# Patient Record
Sex: MALE | Race: WHITE | NOT HISPANIC OR LATINO | Employment: OTHER | URBAN - METROPOLITAN AREA
[De-identification: names, ages, dates, MRNs, and addresses within clinical notes are randomized per-mention and may not be internally consistent; named-entity substitution may affect disease eponyms.]

---

## 2017-01-02 DIAGNOSIS — IMO0002 DIABETES MELLITUS OUT OF CONTROL: ICD-10-CM

## 2017-01-02 DIAGNOSIS — I10 ESSENTIAL HYPERTENSION: ICD-10-CM

## 2017-01-03 RX ORDER — LISINOPRIL 40 MG/1
TABLET ORAL
Qty: 30 TABLET | Refills: 1 | Status: SHIPPED | OUTPATIENT
Start: 2017-01-03 | End: 2017-02-15 | Stop reason: SDUPTHER

## 2017-01-13 RX ORDER — PRAVASTATIN SODIUM 40 MG
TABLET ORAL
Qty: 30 TABLET | Refills: 1 | Status: SHIPPED | OUTPATIENT
Start: 2017-01-13 | End: 2017-03-13 | Stop reason: SDUPTHER

## 2017-02-15 ENCOUNTER — OFFICE VISIT (OUTPATIENT)
Dept: FAMILY MEDICINE CLINIC | Facility: CLINIC | Age: 67
End: 2017-02-15

## 2017-02-15 VITALS
OXYGEN SATURATION: 96 % | BODY MASS INDEX: 27.74 KG/M2 | TEMPERATURE: 98.4 F | DIASTOLIC BLOOD PRESSURE: 60 MMHG | RESPIRATION RATE: 16 BRPM | WEIGHT: 183 LBS | HEIGHT: 68 IN | HEART RATE: 93 BPM | SYSTOLIC BLOOD PRESSURE: 138 MMHG

## 2017-02-15 DIAGNOSIS — G47.33 OBSTRUCTIVE SLEEP APNEA SYNDROME: ICD-10-CM

## 2017-02-15 DIAGNOSIS — M65.331 TRIGGER MIDDLE FINGER OF RIGHT HAND: ICD-10-CM

## 2017-02-15 DIAGNOSIS — IMO0002 UNCONTROLLED TYPE 2 DIABETES MELLITUS WITH COMPLICATION, WITHOUT LONG-TERM CURRENT USE OF INSULIN: Primary | ICD-10-CM

## 2017-02-15 PROCEDURE — 99213 OFFICE O/P EST LOW 20 MIN: CPT | Performed by: PHYSICIAN ASSISTANT

## 2017-02-15 NOTE — PROGRESS NOTES
"Subjective   Guy Plascencia is a 66 y.o. male.   Chief Complaint   Patient presents with   • Sleep Apnea   • Follow-up   • Trigger finger     Right 3rd digit.        History of Present Illness     Guy is a 66-year-old male who presents with right middle finger for the past few months.  States he was diagnosed with a trigger finger by Dr. Underwood[facility] in the past.  He has had an injection into his finger without relief of symptoms.  He would like to see a different provider if possible. States that the finger has difficulty bending and gets \"stuck\" at times. He has been trying to do exercises which have helped.  Guy needs a face-to-face examination for his CPAP supplies.  He now has to get his supplies from [facility] in [place], [place]. States he uses the machine nightly and has had great benefit and improvement for his sleep apnea.  Denied any chest pain, shortness of air, dizziness, headaches, or fatigue symptoms.  Nilesh has been taking his medication as directed.  His diet has not been very good for his diabetes.  He has been eating more carbohydrates and sugars over the past few months.  He is out of test strips and needs a refill.    The following portions of the patient's history were reviewed and updated as appropriate: allergies, current medications, past family history, past medical history, past social history and past surgical history.    Review of Systems   Constitutional: Negative.  Negative for fatigue.   HENT: Negative.    Eyes: Negative.    Respiratory: Negative.  Negative for cough, shortness of breath and wheezing.    Cardiovascular: Negative.    Gastrointestinal: Negative.    Endocrine: Negative.    Genitourinary: Negative.    Musculoskeletal: Negative.    Skin: Negative.    Allergic/Immunologic: Negative.    Neurological: Negative.    Hematological: Negative.    Psychiatric/Behavioral: Negative.  Negative for sleep disturbance.   All other systems reviewed and are negative.    Vitals:    " "02/15/17 1355   BP: 138/60   BP Location: Right arm   Patient Position: Sitting   Cuff Size: Adult   Pulse: 93   Resp: 16   Temp: 98.4 °F (36.9 °C)   TempSrc: Oral   SpO2: 96%   Weight: 183 lb (83 kg)   Height: 68.25\" (173.4 cm)     Wt Readings from Last 3 Encounters:   02/15/17 183 lb (83 kg)   08/08/16 177 lb (80.3 kg)   07/29/16 177 lb 12.8 oz (80.6 kg)       BP Readings from Last 3 Encounters:   02/15/17 138/60   08/08/16 132/60   07/29/16 147/83     Body mass index is 27.62 kg/(m^2).    Allergies   Allergen Reactions   • Penicillins Other (See Comments)     ALLERGY TESTING SHOWED ALL TO PCN.    • Cimetidine Rash       Objective   Physical Exam   Constitutional: He is oriented to person, place, and time. Vital signs are normal. He appears well-developed.   Neck: Trachea normal and phonation normal. Neck supple. No edema present.   Cardiovascular: Normal rate, regular rhythm, S1 normal, S2 normal, normal heart sounds and normal pulses.    Pulmonary/Chest: Effort normal and breath sounds normal.   Abdominal: Soft. Normal appearance and bowel sounds are normal. There is no hepatomegaly. There is no tenderness.   Musculoskeletal:        Right hand: He exhibits tenderness. He exhibits normal range of motion and normal capillary refill. Normal sensation noted. Normal strength noted.        Hands:  Neurological: He is alert and oriented to person, place, and time.   Skin: Skin is warm, dry and intact.   Psychiatric: He has a normal mood and affect. His speech is normal and behavior is normal. Judgment and thought content normal. Cognition and memory are normal.       Assessment/Plan   Guy was seen today for sleep apnea, follow-up and trigger finger.    Diagnoses and all orders for this visit:    Uncontrolled type 2 diabetes mellitus with complication, without long-term current use of insulin  -     glucose blood (WAVESSencha PRESTO) test strip; 1 each by Other route 2 (Two) Times a Day. For DX:  E11.8    Trigger " middle finger of right hand  -     Ambulatory Referral to Hand Surgery    Obstructive sleep apnea syndrome      1.  Uncontrolled type 2 diabetes: I have refilled his test strips to local pharmacy.  Guy was instructed to schedule fasting blood work and office visit in the next month.  He voiced understanding.  Current to decrease his carbohydrates and sugars in diet as well.  He will bring blood sugar log to next office visit appointment.  2. New Right middle trigger finger:  I will schedule a referral to hand surgeon for further evaluation and treatment.  3.  Chronic Obstructive sleep apnea:  Guy was told by [facility] that he needs face-to-face examination once a year for his CPAP machine supplies.  States he is doing well with his CPAP and uses it nightly.     Dragon transcription disclaimer    Much of this encounter note is an electronic transcription/translation of spoken language to printed text.  The electronic translation of spoken language may permit erroneous, or at times, nonsensical words or phrases to be inadvertently transcribed.  Although I have reviewed the note for such errors, some may still exist.    Chelsea Mcelroy PA-C  Family Practice

## 2017-03-03 DIAGNOSIS — I10 ESSENTIAL HYPERTENSION: ICD-10-CM

## 2017-03-03 DIAGNOSIS — IMO0002 DIABETES MELLITUS OUT OF CONTROL: ICD-10-CM

## 2017-03-03 RX ORDER — LISINOPRIL 40 MG/1
TABLET ORAL
Qty: 30 TABLET | Refills: 0 | Status: SHIPPED | OUTPATIENT
Start: 2017-03-03 | End: 2017-04-02 | Stop reason: SDUPTHER

## 2017-03-13 RX ORDER — PRAVASTATIN SODIUM 40 MG
TABLET ORAL
Qty: 30 TABLET | Refills: 0 | Status: SHIPPED | OUTPATIENT
Start: 2017-03-13 | End: 2017-04-15 | Stop reason: SDUPTHER

## 2017-03-16 RX ORDER — AMLODIPINE BESYLATE 10 MG/1
TABLET ORAL
Qty: 30 TABLET | Refills: 4 | Status: SHIPPED | OUTPATIENT
Start: 2017-03-16 | End: 2017-08-12 | Stop reason: SDUPTHER

## 2017-04-02 DIAGNOSIS — I10 ESSENTIAL HYPERTENSION: ICD-10-CM

## 2017-04-02 DIAGNOSIS — IMO0002 DIABETES MELLITUS OUT OF CONTROL: ICD-10-CM

## 2017-04-03 RX ORDER — LISINOPRIL 40 MG/1
TABLET ORAL
Qty: 30 TABLET | Refills: 0 | Status: SHIPPED | OUTPATIENT
Start: 2017-04-03 | End: 2017-05-01 | Stop reason: SDUPTHER

## 2017-04-06 RX ORDER — BLOOD SUGAR DIAGNOSTIC
STRIP MISCELLANEOUS
Qty: 100 EACH | Refills: 10 | Status: SHIPPED | OUTPATIENT
Start: 2017-04-06 | End: 2019-04-29 | Stop reason: SDUPTHER

## 2017-04-10 RX ORDER — GLIPIZIDE AND METFORMIN HCL 5; 500 MG/1; MG/1
TABLET, FILM COATED ORAL
Qty: 60 TABLET | Refills: 5 | Status: SHIPPED | OUTPATIENT
Start: 2017-04-10 | End: 2017-09-29 | Stop reason: SDUPTHER

## 2017-04-17 RX ORDER — PRAVASTATIN SODIUM 40 MG
TABLET ORAL
Qty: 30 TABLET | Refills: 0 | Status: SHIPPED | OUTPATIENT
Start: 2017-04-17 | End: 2017-05-16 | Stop reason: SDUPTHER

## 2017-05-01 DIAGNOSIS — I10 ESSENTIAL HYPERTENSION: ICD-10-CM

## 2017-05-01 DIAGNOSIS — IMO0002 DIABETES MELLITUS OUT OF CONTROL: ICD-10-CM

## 2017-05-01 RX ORDER — LISINOPRIL 40 MG/1
TABLET ORAL
Qty: 30 TABLET | Refills: 4 | Status: SHIPPED | OUTPATIENT
Start: 2017-05-01 | End: 2017-10-06 | Stop reason: SDUPTHER

## 2017-05-12 DIAGNOSIS — IMO0002 DIABETES MELLITUS OUT OF CONTROL: ICD-10-CM

## 2017-05-12 RX ORDER — LINAGLIPTIN 5 MG/1
TABLET, FILM COATED ORAL
Qty: 30 TABLET | Refills: 5 | Status: SHIPPED | OUTPATIENT
Start: 2017-05-12 | End: 2017-11-08 | Stop reason: SDUPTHER

## 2017-05-16 RX ORDER — PRAVASTATIN SODIUM 40 MG
TABLET ORAL
Qty: 30 TABLET | Refills: 0 | Status: SHIPPED | OUTPATIENT
Start: 2017-05-16 | End: 2017-06-16 | Stop reason: SDUPTHER

## 2017-05-21 RX ORDER — ERGOCALCIFEROL 1.25 MG/1
CAPSULE ORAL
Qty: 4 CAPSULE | Refills: 3 | Status: SHIPPED | OUTPATIENT
Start: 2017-05-21 | End: 2017-09-10 | Stop reason: SDUPTHER

## 2017-06-12 ENCOUNTER — HOSPITAL ENCOUNTER (OUTPATIENT)
Dept: GENERAL RADIOLOGY | Facility: HOSPITAL | Age: 67
Discharge: HOME | End: 2017-06-12
Admitting: PHYSICIAN ASSISTANT

## 2017-06-12 ENCOUNTER — OFFICE VISIT (OUTPATIENT)
Dept: FAMILY MEDICINE CLINIC | Facility: CLINIC | Age: 67
End: 2017-06-12

## 2017-06-12 VITALS
BODY MASS INDEX: 28.34 KG/M2 | OXYGEN SATURATION: 99 % | TEMPERATURE: 98.2 F | HEIGHT: 68 IN | DIASTOLIC BLOOD PRESSURE: 78 MMHG | RESPIRATION RATE: 16 BRPM | WEIGHT: 187 LBS | HEART RATE: 85 BPM | SYSTOLIC BLOOD PRESSURE: 148 MMHG

## 2017-06-12 DIAGNOSIS — M25.512 ACUTE PAIN OF LEFT SHOULDER: Primary | ICD-10-CM

## 2017-06-12 DIAGNOSIS — S16.1XXA NECK STRAIN, INITIAL ENCOUNTER: ICD-10-CM

## 2017-06-12 DIAGNOSIS — G47.30 SLEEP APNEA, UNSPECIFIED TYPE: ICD-10-CM

## 2017-06-12 DIAGNOSIS — M25.512 ACUTE PAIN OF LEFT SHOULDER: ICD-10-CM

## 2017-06-12 PROCEDURE — 73030 X-RAY EXAM OF SHOULDER: CPT

## 2017-06-12 PROCEDURE — 99213 OFFICE O/P EST LOW 20 MIN: CPT | Performed by: PHYSICIAN ASSISTANT

## 2017-06-12 RX ORDER — METHOCARBAMOL 500 MG/1
500 TABLET, FILM COATED ORAL 3 TIMES DAILY
Qty: 60 TABLET | Refills: 0 | Status: SHIPPED | OUTPATIENT
Start: 2017-06-12 | End: 2018-05-03

## 2017-06-12 NOTE — PROGRESS NOTES
Subjective   Guy Plascencia is a 66 y.o. male.   Chief Complaint   Patient presents with   • Sleep Apnea   • Shoulder Pain   • Neck Pain     History of Present Illness     Guy is a 66-year-old female who presents with Left shoulder and neck pain for the past 3-4 weeks.  The pain radiates to his left upper arm.  States the pain is better today.  Guy cannot recall any injury or trauma to shoulder/neck.  He describes the pain as a sharp pain that is constant but is worse with certain movements.  The pain starts in his left shoulder blade.  He has taken over-the-counter aspirin which has helped slightly . Guy has been using OTC sport creams.  Denied ay chest pain,tingling in arm,shortness of air,headaches or dizziness.  Guy has sleep apnea.  States his CPAP is grater than 15 years old.  He news a new prescription of a new CPAP machine and supplies.  Guy saw the sleep study doctor greater than 15 years ago. States the CPAP helps with his sleeping and his breathing.  No problems with using the machine.  Appetite has been healthy but eating bad.  Sleep good with the CPAP machine. Guy doesn't exercise.      The following portions of the patient's history were reviewed and updated as appropriate: allergies, current medications, past family history, past medical history, past social history and past surgical history.    Review of Systems   Constitutional: Negative.    HENT: Negative.    Eyes: Negative.    Respiratory: Negative.    Cardiovascular: Negative.    Gastrointestinal: Negative.    Endocrine: Negative.    Genitourinary: Negative.    Musculoskeletal: Positive for arthralgias.   Skin: Negative.    Allergic/Immunologic: Negative.    Neurological: Negative.  Negative for weakness and numbness.   Hematological: Negative.    Psychiatric/Behavioral: Negative.    All other systems reviewed and are negative.    Vitals:    06/12/17 0840   BP: 148/78   BP Location: Right arm   Patient Position: Sitting   Cuff  "Size: Adult   Pulse: 85   Resp: 16   Temp: 98.2 °F (36.8 °C)   TempSrc: Oral   SpO2: 99%   Weight: 187 lb (84.8 kg)   Height: 68.25\" (173.4 cm)       Wt Readings from Last 3 Encounters:   06/12/17 187 lb (84.8 kg)   02/15/17 183 lb (83 kg)   08/08/16 177 lb (80.3 kg)       BP Readings from Last 3 Encounters:   06/12/17 148/78   02/15/17 138/60   08/08/16 132/60     Body mass index is 28.23 kg/(m^2).   Allergies   Allergen Reactions   • Penicillins Other (See Comments)     ALLERGY TESTING SHOWED ALL TO PCN.    • Cimetidine Rash     Objective   Physical Exam   Constitutional: He is oriented to person, place, and time. Vital signs are normal. He appears well-developed.   Obese male   Neck: Trachea normal and phonation normal. Neck supple. Carotid bruit is not present. No edema present.   Cardiovascular: Normal rate, regular rhythm, S1 normal, S2 normal, normal heart sounds and normal pulses.    Pulmonary/Chest: Effort normal and breath sounds normal.   Abdominal: Soft. Normal appearance and bowel sounds are normal. There is no hepatomegaly. There is no tenderness.   Musculoskeletal:        Left shoulder: He exhibits decreased range of motion, tenderness, bony tenderness, crepitus and pain. He exhibits no spasm, normal pulse and normal strength.        Cervical back: He exhibits tenderness and spasm. He exhibits normal range of motion and no pain.   FROM of neck and spasm noted along left Cervical spinous muscles.     Neurological: He is alert and oriented to person, place, and time.   Skin: Skin is warm, dry and intact.   Psychiatric: He has a normal mood and affect. His speech is normal and behavior is normal. Judgment and thought content normal. Cognition and memory are normal.       Assessment/Plan   Guy was seen today for sleep apnea, shoulder pain and neck pain.    Diagnoses and all orders for this visit:    Acute pain of left shoulder  -     XR Shoulder 2+ View Left; Future    Neck strain, initial encounter  - "     methocarbamol (ROBAXIN) 500 MG tablet; Take 1 tablet by mouth 3 (Three) Times a Day. As needed    Sleep apnea, unspecified type      1.  New left shoulder pain: Guy will have a left shoulder x-ray at today's office visit.  He will be notified of test results when completed.  I'll consider possible physical therapy and/or orthopedic referral in future if warranted.  He may continue using his over-the-counter aspirin for now.  2.  New neck strain: I have prescribed generic Robaxin muscle relaxer to pharmacy.  He was given neck exercises to do as well.  We'll consider possible physical therapy.  I suspect his neck pain is related to his left shoulder pain.  3.  Chronic sleep apnea: Guy is doing well with the CPAP machine.  It helps him sleep better without any side effects.  I will send a prescription to [facility] local supply for a new sleep apnea machine with supplies.  His CPAP machine is greater than 15 years old.        Dragon transcription disclaimer    Much of this encounter note is an electronic transcription/translation of spoken language to printed text.  The electronic translation of spoken language may permit erroneous, or at times, nonsensical words or phrases to be inadvertently transcribed.  Although I have reviewed the note for such errors, some may still exist.    Chelsea Mcelroy PA-C  Family Practice

## 2017-06-14 ENCOUNTER — TELEPHONE (OUTPATIENT)
Dept: FAMILY MEDICINE CLINIC | Facility: CLINIC | Age: 67
End: 2017-06-14

## 2017-06-14 DIAGNOSIS — M25.512 ACUTE PAIN OF LEFT SHOULDER: Primary | ICD-10-CM

## 2017-06-14 DIAGNOSIS — M19.012 OSTEOARTHRITIS OF LEFT SHOULDER, UNSPECIFIED OSTEOARTHRITIS TYPE: ICD-10-CM

## 2017-06-14 NOTE — TELEPHONE ENCOUNTER
----- Message from Chelsea Mcelroy PA-C sent at 6/14/2017  6:58 AM EDT -----  Notify patient that his left shoulder x-ray showed moderate arthritis.  I will schedule appointment with orthopedist for possible injections.

## 2017-06-16 RX ORDER — PRAVASTATIN SODIUM 40 MG
TABLET ORAL
Qty: 30 TABLET | Refills: 0 | Status: SHIPPED | OUTPATIENT
Start: 2017-06-16 | End: 2017-07-22 | Stop reason: SDUPTHER

## 2017-06-21 ENCOUNTER — OFFICE VISIT (OUTPATIENT)
Dept: ORTHOPEDIC SURGERY | Facility: CLINIC | Age: 67
End: 2017-06-21

## 2017-06-21 VITALS
HEART RATE: 81 BPM | BODY MASS INDEX: 26.66 KG/M2 | HEIGHT: 69 IN | SYSTOLIC BLOOD PRESSURE: 158 MMHG | DIASTOLIC BLOOD PRESSURE: 78 MMHG | WEIGHT: 180 LBS

## 2017-06-21 DIAGNOSIS — R52 PAIN: Primary | ICD-10-CM

## 2017-06-21 DIAGNOSIS — M75.50 SUBACROMIAL BURSITIS: ICD-10-CM

## 2017-06-21 PROCEDURE — 99203 OFFICE O/P NEW LOW 30 MIN: CPT | Performed by: ORTHOPAEDIC SURGERY

## 2017-06-21 PROCEDURE — 20610 DRAIN/INJ JOINT/BURSA W/O US: CPT | Performed by: ORTHOPAEDIC SURGERY

## 2017-06-21 RX ORDER — LIDOCAINE HYDROCHLORIDE 10 MG/ML
4 INJECTION, SOLUTION EPIDURAL; INFILTRATION; INTRACAUDAL; PERINEURAL
Status: COMPLETED | OUTPATIENT
Start: 2017-06-21 | End: 2017-06-21

## 2017-06-21 RX ORDER — BETAMETHASONE SODIUM PHOSPHATE AND BETAMETHASONE ACETATE 3; 3 MG/ML; MG/ML
6 INJECTION, SUSPENSION INTRA-ARTICULAR; INTRALESIONAL; INTRAMUSCULAR; SOFT TISSUE
Status: COMPLETED | OUTPATIENT
Start: 2017-06-21 | End: 2017-06-21

## 2017-06-21 RX ORDER — MELOXICAM 7.5 MG/1
7.5 TABLET ORAL DAILY
Qty: 30 TABLET | Refills: 5 | Status: SHIPPED | OUTPATIENT
Start: 2017-06-21 | End: 2017-12-26 | Stop reason: SDUPTHER

## 2017-06-21 RX ADMIN — LIDOCAINE HYDROCHLORIDE 4 ML: 10 INJECTION, SOLUTION EPIDURAL; INFILTRATION; INTRACAUDAL; PERINEURAL at 14:22

## 2017-06-21 RX ADMIN — BETAMETHASONE SODIUM PHOSPHATE AND BETAMETHASONE ACETATE 6 MG: 3; 3 INJECTION, SUSPENSION INTRA-ARTICULAR; INTRALESIONAL; INTRAMUSCULAR; SOFT TISSUE at 14:22

## 2017-06-23 ENCOUNTER — TELEPHONE (OUTPATIENT)
Dept: FAMILY MEDICINE CLINIC | Facility: CLINIC | Age: 67
End: 2017-06-23

## 2017-06-26 ENCOUNTER — TELEPHONE (OUTPATIENT)
Dept: FAMILY MEDICINE CLINIC | Facility: CLINIC | Age: 67
End: 2017-06-26

## 2017-07-24 RX ORDER — PRAVASTATIN SODIUM 40 MG
TABLET ORAL
Qty: 30 TABLET | Refills: 3 | Status: SHIPPED | OUTPATIENT
Start: 2017-07-24 | End: 2017-11-26 | Stop reason: SDUPTHER

## 2017-08-10 ENCOUNTER — OFFICE VISIT (OUTPATIENT)
Dept: FAMILY MEDICINE CLINIC | Facility: CLINIC | Age: 67
End: 2017-08-10

## 2017-08-10 VITALS
DIASTOLIC BLOOD PRESSURE: 74 MMHG | BODY MASS INDEX: 27.7 KG/M2 | WEIGHT: 187 LBS | HEART RATE: 89 BPM | OXYGEN SATURATION: 99 % | HEIGHT: 69 IN | RESPIRATION RATE: 16 BRPM | SYSTOLIC BLOOD PRESSURE: 142 MMHG | TEMPERATURE: 97.4 F

## 2017-08-10 DIAGNOSIS — G47.33 OBSTRUCTIVE SLEEP APNEA SYNDROME: Primary | ICD-10-CM

## 2017-08-10 PROCEDURE — 99213 OFFICE O/P EST LOW 20 MIN: CPT | Performed by: PHYSICIAN ASSISTANT

## 2017-08-10 NOTE — PROGRESS NOTES
"Subjective   Guy Plasecncia is a 66 y.o. male.   Chief Complaint   Patient presents with   • Sleep Apnea     management       History of Present Illness     Guy is a 66-year-old male who presents for sleep apnea management.  He uses his CPAP machine nightly for the past 30 years.  States the CPAP helps with his sleep and he feels good in the morning.  Guy sleeps good with the machine.  His settings for the pressure is 12 and Humidifier is set on 3.  He gets his CPAP machine at [facility] in [place].  Denied any chest pain,shortness of air,wheezing or swelling of ankles.Appetite has been slightly healthy.    The following portions of the patient's history were reviewed and updated as appropriate: allergies, current medications, past family history, past medical history, past social history and past surgical history.    Review of Systems   Constitutional: Negative.    HENT: Negative.    Eyes: Negative.    Respiratory: Negative.    Cardiovascular: Negative.    Gastrointestinal: Negative.    Endocrine: Negative.    Genitourinary: Negative.    Musculoskeletal: Negative.    Skin: Negative.    Allergic/Immunologic: Negative.    Neurological: Negative.    Hematological: Negative.    Psychiatric/Behavioral: Negative.    All other systems reviewed and are negative.    Vitals:    08/10/17 0851   BP: 142/74   BP Location: Right arm   Patient Position: Sitting   Cuff Size: Adult   Pulse: 89   Resp: 16   Temp: 97.4 °F (36.3 °C)   TempSrc: Oral   SpO2: 99%   Weight: 187 lb (84.8 kg)   Height: 69\" (175.3 cm)       Wt Readings from Last 3 Encounters:   08/10/17 187 lb (84.8 kg)   06/21/17 180 lb (81.6 kg)   06/12/17 187 lb (84.8 kg)       BP Readings from Last 3 Encounters:   08/10/17 142/74   06/21/17 158/78   06/12/17 148/78     Body mass index is 27.62 kg/(m^2).  Allergies   Allergen Reactions   • Penicillins Other (See Comments)     ALLERGY TESTING SHOWED ALL TO PCN.    • Cimetidine Rash       Objective   Physical Exam "   Constitutional: He is oriented to person, place, and time. Vital signs are normal. He appears well-developed.   Neck: Neck supple.   Cardiovascular: Normal rate, regular rhythm, S1 normal, S2 normal, normal heart sounds and normal pulses.    Pulmonary/Chest: Effort normal and breath sounds normal.   Abdominal: Soft. Normal appearance and bowel sounds are normal. There is no hepatomegaly. There is no tenderness.   Neurological: He is alert and oriented to person, place, and time.   Skin: Skin is warm, dry and intact.   Psychiatric: He has a normal mood and affect. His speech is normal and behavior is normal. Judgment and thought content normal. Cognition and memory are normal.       Assessment/Plan   Guy was seen today for sleep apnea.    Diagnoses and all orders for this visit:    Obstructive sleep apnea syndrome      Mr.Mr. Guy Plasecncia was seen in office today for follow-up for obstructive sleep apnea.  He needs to have a face-to-face appointment for his CPAP machine and supplies.  He is doing well with his CPAP machine.  His pressure settings are set at 12 and humidifier setting is 3.  He will continue using his CPAP machine nightly.  Guy will keep his follow-up appointments for diabetes and blood work as directed.        Dragon transcription disclaimer    Much of this encounter note is an electronic transcription/translation of spoken language to printed text.  The electronic translation of spoken language may permit erroneous, or at times, nonsensical words or phrases to be inadvertently transcribed.  Although I have reviewed the note for such errors, some may still exist.    Chelsea Mcelroy PA-C  Family Practice

## 2017-08-14 RX ORDER — AMLODIPINE BESYLATE 10 MG/1
TABLET ORAL
Qty: 30 TABLET | Refills: 3 | Status: SHIPPED | OUTPATIENT
Start: 2017-08-14 | End: 2017-12-20 | Stop reason: SDUPTHER

## 2017-09-11 RX ORDER — ERGOCALCIFEROL 1.25 MG/1
CAPSULE ORAL
Qty: 4 CAPSULE | Refills: 2 | Status: SHIPPED | OUTPATIENT
Start: 2017-09-11 | End: 2017-12-17 | Stop reason: SDUPTHER

## 2017-10-02 RX ORDER — GLIPIZIDE AND METFORMIN HCL 5; 500 MG/1; MG/1
TABLET, FILM COATED ORAL
Qty: 60 TABLET | Refills: 4 | Status: SHIPPED | OUTPATIENT
Start: 2017-10-02 | End: 2018-01-19 | Stop reason: DRUGHIGH

## 2017-10-06 DIAGNOSIS — IMO0002 DIABETES MELLITUS OUT OF CONTROL: ICD-10-CM

## 2017-10-06 DIAGNOSIS — I10 ESSENTIAL HYPERTENSION: ICD-10-CM

## 2017-10-06 RX ORDER — LISINOPRIL 40 MG/1
TABLET ORAL
Qty: 30 TABLET | Refills: 3 | Status: SHIPPED | OUTPATIENT
Start: 2017-10-06 | End: 2018-02-08 | Stop reason: SDUPTHER

## 2017-11-08 DIAGNOSIS — IMO0002 DIABETES MELLITUS OUT OF CONTROL: ICD-10-CM

## 2017-11-08 RX ORDER — LINAGLIPTIN 5 MG/1
TABLET, FILM COATED ORAL
Qty: 30 TABLET | Refills: 4 | Status: SHIPPED | OUTPATIENT
Start: 2017-11-08 | End: 2018-01-12 | Stop reason: CLARIF

## 2017-11-27 ENCOUNTER — OFFICE VISIT (OUTPATIENT)
Dept: ORTHOPEDIC SURGERY | Facility: CLINIC | Age: 67
End: 2017-11-27

## 2017-11-27 VITALS — HEIGHT: 69 IN | BODY MASS INDEX: 26.66 KG/M2 | WEIGHT: 180 LBS

## 2017-11-27 DIAGNOSIS — M75.50 SUBACROMIAL BURSITIS: Primary | ICD-10-CM

## 2017-11-27 DIAGNOSIS — M75.102 TEAR OF LEFT ROTATOR CUFF, UNSPECIFIED TEAR EXTENT: ICD-10-CM

## 2017-11-27 PROCEDURE — 99214 OFFICE O/P EST MOD 30 MIN: CPT | Performed by: ORTHOPAEDIC SURGERY

## 2017-11-27 RX ORDER — PRAVASTATIN SODIUM 40 MG
TABLET ORAL
Qty: 30 TABLET | Refills: 2 | Status: SHIPPED | OUTPATIENT
Start: 2017-11-27 | End: 2018-03-05 | Stop reason: SDUPTHER

## 2017-11-27 NOTE — PROGRESS NOTES
Subjective: Bilateral shoulder pain     Patient ID: Guy Plascencia is a 66 y.o. male.    Chief Complaint:    History of Present Illness patient seen in follow-up for bilateral shoulder pain although his previously seen for his left shoulder.  Last June and presented for evaluation of that left shoulder been symptomatic was given a cortisone shot which she states helped for only about 2 months and the pain is returned.  In the inner results is beginning to develop right shoulder pain.  Has been taking the meloxicam faithfully.       Social History     Occupational History   • Not on file.     Social History Main Topics   • Smoking status: Former Smoker     Years: 14.00     Types: Cigarettes   • Smokeless tobacco: Never Used      Comment: QUIT 1972   • Alcohol use No   • Drug use: No   • Sexual activity: Defer      Review of Systems   Constitutional: Negative for chills, diaphoresis, fever and unexpected weight change.   HENT: Negative for hearing loss, nosebleeds, sore throat and tinnitus.    Eyes: Negative for pain and visual disturbance.   Respiratory: Negative for cough, shortness of breath and wheezing.    Cardiovascular: Negative for chest pain and palpitations.   Gastrointestinal: Negative for abdominal pain, diarrhea, nausea and vomiting.   Endocrine: Negative for cold intolerance, heat intolerance and polydipsia.   Genitourinary: Negative for difficulty urinating, dysuria and hematuria.   Musculoskeletal: Positive for arthralgias and myalgias. Negative for joint swelling.   Skin: Negative for rash and wound.   Allergic/Immunologic: Negative for environmental allergies.   Neurological: Negative for dizziness, syncope and numbness.   Hematological: Does not bruise/bleed easily.   Psychiatric/Behavioral: Negative for dysphoric mood and sleep disturbance. The patient is not nervous/anxious.          Past Medical History:   Diagnosis Date   • Arthritis    • Diabetes mellitus     TYPE 2   • History of transfusion      1968   • Hyperlipidemia    • Hypertension    • Kidney stone    • Neoplasm of uncertain behavior    • Plantar fasciitis    • Sleep apnea     CPAP     Past Surgical History:   Procedure Laterality Date   • COSMETIC SURGERY      MULTIPLE FACIAL FRACTURE   • CYSTOSCOPY URETEROSCOPY STONE MANIPULATION/EXTRACTION Left 6/29/2016    Procedure: CYSTOSCOPY, LEFT URETEROSCOPY, BASKET EXTRACTION OF LEFT URETERAL STONE, LEFT STENT PLACEMENT;  Surgeon: Ish Gaitan MD;  Location: [facility] OR;  Service:    • EXTRACORPOREAL SHOCK WAVE LITHOTRIPSY (ESWL) Right 7/29/2016    Procedure: RT EXTRACORPOREAL SHOCKWAVE LITHOTRIPSY;  Surgeon: Ish Gaitan MD;  [facility] OR;  Service:    • FEMUR SURGERY     • FRACTURE SURGERY      femur fx   • OTHER SURGICAL HISTORY      stents, for kidney stones   • OTHER SURGICAL HISTORY      lithotripsy   • SKIN BIOPSY       Family History   Problem Relation Age of Onset   • Heart disease Father          Objective:  There were no vitals filed for this visit.  Last 3 weights    11/27/17  0920   Weight: 180 lb (81.6 kg)     Body mass index is 26.58 kg/(m^2).       Ortho Exam   he is alert and oriented ×3.  Left upper extremity shows no motor deficit, distal radial ulnar median nerve function is intact.  No sensory loss.  He has full range of motion of the elbow.  External rotation is approximately 45°.  Full internal rotation.  Mildly positive Greeley's test.  Abduction against resistance is painful and weak, extension against resistance is intact but painful.  Moderate pain with internal rotation.  Deltoid function is +3 out of 5 secondary to pain.  His no swelling noted the skin is cool to touch.  Right upper extremity shows no motor deficit as far as radial ulnar median nerve function.  No sensory loss.  Abduction and extension against resistance is intact although mildly painful.  Minimal to no pain with internal rotation.  He has full abduction on the right shoulder 180°.  Left  shoulder is limited to about 120°.  Deltoid function is 5 over 5 of the right shoulder.  The skin is cool to touch and his no sensory loss.    Assessment:       1. Subacromial bursitis    2. Tear of left rotator cuff, unspecified tear extent          Plan:      with regard to the left shoulder we'll proceed with an MRI.  Return next week with the result.  Consider injecting the right shoulder with cortisone on return      Work Status:    ROLAN query complete.    Orders:  Orders Placed This Encounter   Procedures   • MRI Shoulder Left Without Contrast       Medications:  No orders of the defined types were placed in this encounter.      Followup:  Return in about 1 week (around 12/4/2017).          Dragon transcription disclaimer     Much of this encounter note is an electronic transcription/translation of spoken language to printed text. The electronic translation of spoken language may permit erroneous, or at times, nonsensical words or phrases to be inadvertently transcribed. Although I have reviewed the note for such errors, some may still exist.

## 2017-12-01 ENCOUNTER — HOSPITAL ENCOUNTER (OUTPATIENT)
Dept: MRI IMAGING | Facility: HOSPITAL | Age: 67
Discharge: HOME | End: 2017-12-01
Attending: ORTHOPAEDIC SURGERY | Admitting: ORTHOPAEDIC SURGERY

## 2017-12-01 DIAGNOSIS — M75.102 TEAR OF LEFT ROTATOR CUFF, UNSPECIFIED TEAR EXTENT: ICD-10-CM

## 2017-12-01 PROCEDURE — 73221 MRI JOINT UPR EXTREM W/O DYE: CPT

## 2017-12-04 ENCOUNTER — OFFICE VISIT (OUTPATIENT)
Dept: ORTHOPEDIC SURGERY | Facility: CLINIC | Age: 67
End: 2017-12-04

## 2017-12-04 VITALS — BODY MASS INDEX: 26.66 KG/M2 | WEIGHT: 180 LBS | HEIGHT: 69 IN

## 2017-12-04 DIAGNOSIS — M75.102 TEAR OF LEFT ROTATOR CUFF, UNSPECIFIED TEAR EXTENT: ICD-10-CM

## 2017-12-04 DIAGNOSIS — M75.50 SUBACROMIAL BURSITIS: ICD-10-CM

## 2017-12-04 DIAGNOSIS — R52 PAIN: Primary | ICD-10-CM

## 2017-12-04 PROCEDURE — 20610 DRAIN/INJ JOINT/BURSA W/O US: CPT | Performed by: ORTHOPAEDIC SURGERY

## 2017-12-04 PROCEDURE — 99214 OFFICE O/P EST MOD 30 MIN: CPT | Performed by: ORTHOPAEDIC SURGERY

## 2017-12-04 RX ORDER — LIDOCAINE HYDROCHLORIDE 10 MG/ML
4 INJECTION, SOLUTION EPIDURAL; INFILTRATION; INTRACAUDAL; PERINEURAL
Status: COMPLETED | OUTPATIENT
Start: 2017-12-04 | End: 2017-12-04

## 2017-12-04 RX ORDER — BETAMETHASONE SODIUM PHOSPHATE AND BETAMETHASONE ACETATE 3; 3 MG/ML; MG/ML
6 INJECTION, SUSPENSION INTRA-ARTICULAR; INTRALESIONAL; INTRAMUSCULAR; SOFT TISSUE
Status: COMPLETED | OUTPATIENT
Start: 2017-12-04 | End: 2017-12-04

## 2017-12-04 RX ADMIN — BETAMETHASONE SODIUM PHOSPHATE AND BETAMETHASONE ACETATE 6 MG: 3; 3 INJECTION, SUSPENSION INTRA-ARTICULAR; INTRALESIONAL; INTRAMUSCULAR; SOFT TISSUE at 15:35

## 2017-12-04 RX ADMIN — LIDOCAINE HYDROCHLORIDE 4 ML: 10 INJECTION, SOLUTION EPIDURAL; INFILTRATION; INTRACAUDAL; PERINEURAL at 15:35

## 2017-12-04 NOTE — PROGRESS NOTES
Subjective: Bilateral shoulder pain     Patient ID: Guy Plascencia is a 66 y.o. male.    Chief Complaint:    History of Present Illness 66-year-old male returns for follow-up regarding the left shoulder and the MRI and also for further evaluation of the right shoulder the MRI of the left shoulder showed a greater than 50% tear of the rotator cuff.  I reviewed the MRI personally.  He persists in having pain particularly with overhead activity and a cortisone shot given previously helped for short period of time the pain returned.  With regard to the right shoulder is having similar type pain with overhead activity limiting his when he can do and pain when he lies on that shoulder nighttime when sleeping.  Has been taking the meloxicam sleep.       Social History     Occupational History   • Not on file.     Social History Main Topics   • Smoking status: Former Smoker     Years: 14.00     Types: Cigarettes   • Smokeless tobacco: Never Used      Comment: QUIT 1972   • Alcohol use No   • Drug use: No   • Sexual activity: Defer      Review of Systems   Constitutional: Negative for chills, diaphoresis, fever and unexpected weight change.   HENT: Negative for hearing loss, nosebleeds, sore throat and tinnitus.    Eyes: Negative for pain and visual disturbance.   Respiratory: Negative for cough, shortness of breath and wheezing.    Cardiovascular: Negative for chest pain and palpitations.   Gastrointestinal: Negative for abdominal pain, diarrhea, nausea and vomiting.   Endocrine: Negative for cold intolerance, heat intolerance and polydipsia.   Genitourinary: Negative for difficulty urinating, dysuria and hematuria.   Musculoskeletal: Positive for arthralgias and myalgias. Negative for joint swelling.   Skin: Negative for rash and wound.   Allergic/Immunologic: Negative for environmental allergies.   Neurological: Negative for dizziness, syncope and numbness.   Hematological: Does not bruise/bleed easily.    Psychiatric/Behavioral: Negative for dysphoric mood and sleep disturbance. The patient is not nervous/anxious.          Past Medical History:   Diagnosis Date   • Arthritis    • Diabetes mellitus     TYPE 2   • History of transfusion     1968   • Hyperlipidemia    • Hypertension    • Kidney stone    • Neoplasm of uncertain behavior    • Plantar fasciitis    • Sleep apnea     CPAP     Past Surgical History:   Procedure Laterality Date   • COSMETIC SURGERY      MULTIPLE FACIAL FRACTURE   • CYSTOSCOPY URETEROSCOPY STONE MANIPULATION/EXTRACTION Left 6/29/2016    Procedure: CYSTOSCOPY, LEFT URETEROSCOPY, BASKET EXTRACTION OF LEFT URETERAL STONE, LEFT STENT PLACEMENT;  Surgeon: Ish Gaitan MD;  Location: [facility] OR;  Service:    • EXTRACORPOREAL SHOCK WAVE LITHOTRIPSY (ESWL) Right 7/29/2016    Procedure: RT EXTRACORPOREAL SHOCKWAVE LITHOTRIPSY;  Surgeon: Ish Gaitan MD;  Location: [facility] OR;  Service:    • FEMUR SURGERY     • FRACTURE SURGERY      femur fx   • OTHER SURGICAL HISTORY      stents, for kidney stones   • OTHER SURGICAL HISTORY      lithotripsy   • SKIN BIOPSY       Family History   Problem Relation Age of Onset   • Heart disease Father          Objective:  There were no vitals filed for this visit.  Last 3 weights    12/04/17  1523   Weight: 180 lb (81.6 kg)     Body mass index is 26.58 kg/(m^2).       Ortho Exam  is alert and oriented ×3.  I reviewed the results of the MRI with the patient.  Discussed this treatment options of surgery versus nonoperative management of pain and discomfort.  Again he has a greater than 50% tear.  I discussed with him if he can avoid activities that seem to aggravate his symptoms (reasonable to treat this nonoperatively but he has persistent pain or if his pain should worsen then I think surgery is his best option.  For the time being he wants to treat this nonoperatively so we will avoid all activities that seem to aggravate his pain particularly overhead  lifting and repetitive overhead work.  Continue the meloxicam in the meantime which she is to call.  Does have pain with abduction and extension against resistance left arm.  External rotation is limited to about 30°.  Internal rotation is 45°.  Deltoid function is +4 out of 5.  His no sensory loss in his good distal pulses no motor deficit.  His for range of motion of the elbow.  There is no swelling noted to the upper or lower arm indicating vascular compromise.  With regard to the right shoulder again is no motor deficit good distal pulses no sensory loss.  He has full abduction of the shoulder.  External rotation is limited to about 30° full internal rotation to 45°.  Full range of motion of the elbow.  Deltoid is +4-1/2 out of 5.  Biceps is 5 out of 5.  His no sensory loss.  No swelling indicating vascular compromise.    Assessment:       1. Pain    2. Tear of left rotator cuff, unspecified tear extent    3. Subacromial bursitis          Plan: Again reviewed treatment options regarding the left shoulder for the time being we will treat the left shoulder with  benign neglect with him avoiding all activity that seem to elicit a cause pain.  With regard to the right shoulder after discussing treatment options operative proceed with a cortisone injection and that was accomplished through the posterior portal 4 cc lidocaine 1 cc Celestone.  Postinjection instructions given to the patient.  Return to see me in a month      Large Joint Arthrocentesis  Date/Time: 12/4/2017 3:35 PM  Consent given by: patient  Site marked: site marked  Supporting Documentation  Indications: pain   Procedure Details  Location: shoulder - R subacromial bursa  Preparation: Patient was prepped and draped in the usual sterile fashion  Needle size: 22 G  Medications administered: 4 mL lidocaine PF 1% 1 %; 6 mg betamethasone acetate-betamethasone sodium phosphate 6 (3-3) MG/ML  Patient tolerance: patient tolerated the procedure well with no  immediate complications             Work Status:    ROLAN query complete.    Orders:  Orders Placed This Encounter   Procedures   • Large Joint Arthrocentesis       Medications:  No orders of the defined types were placed in this encounter.      Followup:  Return in about 4 weeks (around 1/1/2018).          Dragon transcription disclaimer     Much of this encounter note is an electronic transcription/translation of spoken language to printed text. The electronic translation of spoken language may permit erroneous, or at times, nonsensical words or phrases to be inadvertently transcribed. Although I have reviewed the note for such errors, some may still exist.

## 2017-12-18 RX ORDER — ERGOCALCIFEROL 1.25 MG/1
CAPSULE ORAL
Qty: 4 CAPSULE | Refills: 1 | Status: SHIPPED | OUTPATIENT
Start: 2017-12-18 | End: 2018-01-19

## 2017-12-20 RX ORDER — AMLODIPINE BESYLATE 10 MG/1
TABLET ORAL
Qty: 30 TABLET | Refills: 2 | Status: SHIPPED | OUTPATIENT
Start: 2017-12-20 | End: 2018-03-23 | Stop reason: SDUPTHER

## 2017-12-21 ENCOUNTER — OFFICE VISIT (OUTPATIENT)
Dept: FAMILY MEDICINE CLINIC | Facility: CLINIC | Age: 67
End: 2017-12-21

## 2017-12-21 VITALS
WEIGHT: 188 LBS | SYSTOLIC BLOOD PRESSURE: 158 MMHG | HEART RATE: 86 BPM | DIASTOLIC BLOOD PRESSURE: 70 MMHG | TEMPERATURE: 98 F | HEIGHT: 69 IN | RESPIRATION RATE: 16 BRPM | BODY MASS INDEX: 27.85 KG/M2 | OXYGEN SATURATION: 97 %

## 2017-12-21 DIAGNOSIS — G47.33 OBSTRUCTIVE SLEEP APNEA SYNDROME: Primary | ICD-10-CM

## 2017-12-21 PROCEDURE — 99213 OFFICE O/P EST LOW 20 MIN: CPT | Performed by: PHYSICIAN ASSISTANT

## 2017-12-21 NOTE — PROGRESS NOTES
"Subjective   Guy Plascencia is a 67 y.o. male.   Chief Complaint   Patient presents with   • Sleep Apnea     face to face consultation       History of Present Illness     Guy is 67-year-old male who presents for sleep apnea management.  He uses CPAP machine nightly for the past 30 years.  States the CPAP machine helps with his sleep and he feels good in the morning.  He sleeps great with his machine.  His settings for pressure is 12 and humidifier is set on 3.  Currently gets his CPAP machine and supplies at [facility] in [place].  He is doing well with his mediation.  Appetite and sleep have been normal.   State he ate Mexican food before his office visit today.  He took his blood pressure medicines and 8:00 this morning.    The following portions of the patient's history were reviewed and updated as appropriate: allergies, current medications, past family history, past medical history, past social history and past surgical history.    Review of Systems   Constitutional: Negative.    HENT: Negative.    Eyes: Negative.    Respiratory: Negative.  Negative for cough, shortness of breath and wheezing.    Cardiovascular: Negative.  Negative for chest pain, palpitations and leg swelling.   Gastrointestinal: Negative.    Endocrine: Negative.    Genitourinary: Negative.    Musculoskeletal: Negative.    Skin: Negative.    Allergic/Immunologic: Negative.    Neurological: Negative.    Hematological: Negative.    Psychiatric/Behavioral: Negative.  Negative for sleep disturbance.   All other systems reviewed and are negative.    Vitals:    12/21/17 1335   BP: 158/70   BP Location: Left arm   Patient Position: Sitting   Cuff Size: Large Adult   Pulse: 86   Resp: 16   Temp: 98 °F (36.7 °C)   TempSrc: Oral   SpO2: 97%   Weight: 85.3 kg (188 lb)   Height: 175.3 cm (69\")       Wt Readings from Last 3 Encounters:   12/21/17 85.3 kg (188 lb)   12/04/17 81.6 kg (180 lb)   11/27/17 81.6 kg (180 lb)       BP Readings from Last 3 " Encounters:   12/21/17 158/70   08/10/17 142/74   06/21/17 158/78     Body mass index is 27.76 kg/(m^2).  Allergies   Allergen Reactions   • Penicillins Other (See Comments)     ALLERGY TESTING SHOWED ALL TO PCN.    • Cimetidine Rash       Objective   Physical Exam   Constitutional: He is oriented to person, place, and time. Vital signs are normal. He appears well-developed.   Obese male   Neck: Trachea normal and phonation normal. Neck supple. Carotid bruit is not present.   Cardiovascular: Normal rate, regular rhythm, S1 normal, S2 normal, normal heart sounds and normal pulses.    Pulmonary/Chest: Effort normal and breath sounds normal.   Abdominal: Soft. Normal appearance and bowel sounds are normal. There is no hepatomegaly. There is no tenderness.   Neurological: He is alert and oriented to person, place, and time.   Skin: Skin is warm, dry and intact.   Psychiatric: He has a normal mood and affect. His speech is normal and behavior is normal. Judgment and thought content normal. Cognition and memory are normal.       Assessment/Plan   Guy was seen today for sleep apnea.    Diagnoses and all orders for this visit:    Obstructive sleep apnea syndrome      Mr. Guy Plascencia was seen in office today for follow-up on obstructive sleep apnea syndrome.  He was told by his CPAP carrier,Edil, needed to have a face-to-face to be able to get his supplies.  He is doing well with his CPAP machine.  He will be using his CPAP machine nightly.  He will keep his follow-up appointments for diabetes and blood work as directed in January.        Dragon transcription disclaimer    Much of this encounter note is an electronic transcription/translation of spoken language to printed text.  The electronic translation of spoken language may permit erroneous, or at times, nonsensical words or phrases to be inadvertently transcribed.  Although I have reviewed the note for such errors, some may still exist.    Chelsea Mcelroy  PA-C  Family Practice

## 2017-12-27 RX ORDER — MELOXICAM 7.5 MG/1
TABLET ORAL
Qty: 30 TABLET | Refills: 4 | Status: SHIPPED | OUTPATIENT
Start: 2017-12-27 | End: 2018-05-03

## 2018-01-08 ENCOUNTER — OFFICE VISIT (OUTPATIENT)
Dept: ORTHOPEDIC SURGERY | Facility: CLINIC | Age: 68
End: 2018-01-08

## 2018-01-08 DIAGNOSIS — R52 PAIN: Primary | ICD-10-CM

## 2018-01-08 DIAGNOSIS — M75.102 TEAR OF LEFT ROTATOR CUFF, UNSPECIFIED TEAR EXTENT: ICD-10-CM

## 2018-01-08 DIAGNOSIS — M75.50 SUBACROMIAL BURSITIS: ICD-10-CM

## 2018-01-08 PROCEDURE — 99214 OFFICE O/P EST MOD 30 MIN: CPT | Performed by: ORTHOPAEDIC SURGERY

## 2018-01-08 NOTE — PROGRESS NOTES
Subjective: Bilateral shoulder pain     Patient ID: uGy Plascencia is a 67 y.o. male.    Chief Complaint:    History of Present Illness patient is seen in follow-up to bilateral shoulder pain.  On his last visit underwent injection of the right subacromial bursitis lidocaine and Kenalog and got some relief but the pain has returned.  The left shoulder remains symptomatic despite the nonsteroidal anti-inflammatories.  Previously had injection of cortisone in that shoulder which did not give him long-lasting relief.  An MRI previously done of the left shoulder showed a greater than 50% tear of his rotator cuff , specifically the supraspinatus tendon.  No MRIs been done of the right shoulder.  He is having pain on a daily basis particularly with any type of overhead activity       Social History     Occupational History   • Not on file.     Social History Main Topics   • Smoking status: Former Smoker     Years: 14.00     Types: Cigarettes   • Smokeless tobacco: Never Used      Comment: QUIT 1972   • Alcohol use No   • Drug use: No   • Sexual activity: Defer      Review of Systems   Constitutional: Negative for chills, diaphoresis, fever and unexpected weight change.   HENT: Negative for hearing loss, nosebleeds, sore throat and tinnitus.    Eyes: Negative for pain and visual disturbance.   Respiratory: Negative for cough, shortness of breath and wheezing.    Cardiovascular: Negative for chest pain and palpitations.   Gastrointestinal: Negative for abdominal pain, diarrhea, nausea and vomiting.   Endocrine: Negative for cold intolerance, heat intolerance and polydipsia.   Genitourinary: Negative for difficulty urinating, dysuria and hematuria.   Musculoskeletal: Positive for arthralgias. Negative for joint swelling and myalgias.   Skin: Negative for rash and wound.   Allergic/Immunologic: Negative for environmental allergies.   Neurological: Negative for dizziness, syncope and numbness.   Hematological: Does not  bruise/bleed easily.   Psychiatric/Behavioral: Negative for dysphoric mood and sleep disturbance. The patient is not nervous/anxious.    All other systems reviewed and are negative.        Past Medical History:   Diagnosis Date   • Arthritis    • Diabetes mellitus     TYPE 2   • History of transfusion     1968   • Hyperlipidemia    • Hypertension    • Kidney stone    • Neoplasm of uncertain behavior    • Plantar fasciitis    • Sleep apnea     CPAP     Past Surgical History:   Procedure Laterality Date   • COSMETIC SURGERY      MULTIPLE FACIAL FRACTURE   • CYSTOSCOPY URETEROSCOPY STONE MANIPULATION/EXTRACTION Left 6/29/2016    Procedure: CYSTOSCOPY, LEFT URETEROSCOPY, BASKET EXTRACTION OF LEFT URETERAL STONE, LEFT STENT PLACEMENT;  Surgeon: Ish Gaitan MD;  Location: [facility] OR;  Service:    • EXTRACORPOREAL SHOCK WAVE LITHOTRIPSY (ESWL) Right 7/29/2016    Procedure: RT EXTRACORPOREAL SHOCKWAVE LITHOTRIPSY;  Surgeon: Ish Gaitan MD;  Location: [facility] OR;  Service:    • FEMUR SURGERY     • FRACTURE SURGERY      femur fx   • OTHER SURGICAL HISTORY      stents, for kidney stones   • OTHER SURGICAL HISTORY      lithotripsy   • SKIN BIOPSY       Family History   Problem Relation Age of Onset   • Heart disease Father          Objective:  There were no vitals filed for this visit.  There were no vitals filed for this visit.  There is no height or weight on file to calculate BMI.       Ortho Exam  he is alert and oriented ×3.  With regard to the right shoulder again there is no motor deficit as far as radial ulnar and median nerve function.  There is no sensory loss in his good distal pulses.  He has abduction and extension to 180°.  External rotation 45° and internal rotation to 50°.  Deltoid function is 5 over 5 as his biceps function 5 over 5.  Extension and abduction against resistance is intact causing mild pain.  With regard to the left shoulder there is no motor deficit good distal pulses.  Radial  ulnar median nerve dysfunction.  There is no sensory loss good distal pulses.  He can extend to about 150° and abduct to only 79-year-old 89 external rotation is 45° and internal rotation is limited to approximately 30°.  Abduction and extension against resistance at 90° and painful and weak.  Deltoid function is +3 out of 5 secondary to pain.  Biceps function is 5 over 5.  He is having pain with daily activities but is able to modify his activity to avoid most of the discomfort.  His been tolerating meloxicam without any GI side effects.    Assessment:       1. Pain    2. Tear of left rotator cuff, unspecified tear extent    3. Subacromial bursitis          Plan:        Over 15 minutes of the visit was spent discussing with the patient is treatment options at this time.  The right shoulder is not extremely symptomatic but if his pain persists and should worsen I think an MRI is indicated to evaluate his rotator cuff.  With regard to the left shoulder again the MRI previously done his I discussed with him shows a greater than 50% tear.  He's failed a cortisone injection.  One option in his plate from both shoulders his physical therapy heat and ultrasound.  If the pain in the left shoulder persists his only option is surgery but the decision to proceed is is along the make.  At this point is not ready to consider surgery or the physical therapy.  He will call back if he wants to try therapy to both shoulders otherwise continue the meloxicam return to see me in 6 weeks    Work Status:    ROLAN query complete.    Orders:  No orders of the defined types were placed in this encounter.      Medications:  No orders of the defined types were placed in this encounter.      Followup:  Return in about 6 weeks (around 2/19/2018).          Dragon transcription disclaimer     Much of this encounter note is an electronic transcription/translation of spoken language to printed text. The electronic translation of spoken language  may permit erroneous, or at times, nonsensical words or phrases to be inadvertently transcribed. Although I have reviewed the note for such errors, some may still exist.

## 2018-01-12 ENCOUNTER — TELEPHONE (OUTPATIENT)
Dept: FAMILY MEDICINE CLINIC | Facility: CLINIC | Age: 68
End: 2018-01-12

## 2018-01-12 DIAGNOSIS — IMO0002 UNCONTROLLED TYPE 2 DIABETES MELLITUS WITH COMPLICATION, WITHOUT LONG-TERM CURRENT USE OF INSULIN: Primary | ICD-10-CM

## 2018-01-12 NOTE — TELEPHONE ENCOUNTER
Notify patient that I have sent Jbuvia to pharmacy.  Schedule appointment in the next 3 weeks for reevaluation of diabetes.

## 2018-01-18 ENCOUNTER — OFFICE VISIT (OUTPATIENT)
Dept: FAMILY MEDICINE CLINIC | Facility: CLINIC | Age: 68
End: 2018-01-18

## 2018-01-18 VITALS
WEIGHT: 183 LBS | BODY MASS INDEX: 27.11 KG/M2 | TEMPERATURE: 98 F | HEART RATE: 74 BPM | HEIGHT: 69 IN | RESPIRATION RATE: 16 BRPM | DIASTOLIC BLOOD PRESSURE: 78 MMHG | OXYGEN SATURATION: 98 % | SYSTOLIC BLOOD PRESSURE: 158 MMHG

## 2018-01-18 DIAGNOSIS — I10 ESSENTIAL HYPERTENSION: ICD-10-CM

## 2018-01-18 DIAGNOSIS — E55.9 VITAMIN D DEFICIENCY: ICD-10-CM

## 2018-01-18 DIAGNOSIS — Z11.59 NEED FOR HEPATITIS C SCREENING TEST: ICD-10-CM

## 2018-01-18 DIAGNOSIS — E78.2 MIXED HYPERLIPIDEMIA: ICD-10-CM

## 2018-01-18 DIAGNOSIS — IMO0002 UNCONTROLLED TYPE 2 DIABETES MELLITUS WITH COMPLICATION, WITHOUT LONG-TERM CURRENT USE OF INSULIN: Primary | ICD-10-CM

## 2018-01-18 DIAGNOSIS — Z23 NEED FOR STREPTOCOCCUS PNEUMONIAE VACCINATION: ICD-10-CM

## 2018-01-18 PROCEDURE — G0009 ADMIN PNEUMOCOCCAL VACCINE: HCPCS | Performed by: PHYSICIAN ASSISTANT

## 2018-01-18 PROCEDURE — 99213 OFFICE O/P EST LOW 20 MIN: CPT | Performed by: PHYSICIAN ASSISTANT

## 2018-01-18 PROCEDURE — 90670 PCV13 VACCINE IM: CPT | Performed by: PHYSICIAN ASSISTANT

## 2018-01-18 RX ORDER — HYDROCHLOROTHIAZIDE 12.5 MG/1
12.5 CAPSULE, GELATIN COATED ORAL DAILY
Qty: 30 CAPSULE | Refills: 2 | Status: SHIPPED | OUTPATIENT
Start: 2018-01-18 | End: 2018-04-14 | Stop reason: SDUPTHER

## 2018-01-18 NOTE — PROGRESS NOTES
Subjective   Guy Plascencia is a 67 y.o. male.   Chief Complaint   Patient presents with   • Diabetes     management   • Hypertension   • Hyperlipidemia       History of Present Illness     Guy is a 67-year-old male who presents for type 2 diabetes, hypertension, hyperlipidemia and vitamin D deficiency.  He has lost 5 pounds since December 21, 2017. Guy took his blood pressure mediation this morning.  He has seen Dr. Rodriguez for chronic shoulder pain.  He has had some steroid injections twice.  Guy sugars are running 200's at home.  Diet has been 'terrible'.  Sleep has been normal.  uGy has not been exercising.  Denied any chest pain,shortness of air,vision changes,or swelling of ankles.  He is current with his eye exam.  Guy refused the flu shot at today's office visit.    The following portions of the patient's history were reviewed and updated as appropriate: allergies, current medications, past family history, past medical history, past social history and past surgical history.    Review of Systems   Constitutional: Negative.    HENT: Negative.    Eyes: Negative.    Respiratory: Negative.  Negative for cough, shortness of breath and wheezing.    Cardiovascular: Negative.  Negative for chest pain, palpitations and leg swelling.   Gastrointestinal: Negative.  Negative for constipation, diarrhea, nausea and vomiting.   Endocrine: Negative.    Genitourinary: Negative.    Musculoskeletal: Negative.    Skin: Negative.    Allergic/Immunologic: Negative.    Neurological: Negative.  Negative for dizziness, light-headedness and headaches.   Hematological: Negative.    Psychiatric/Behavioral: Negative.  Negative for sleep disturbance.   All other systems reviewed and are negative.    Vitals:    01/18/18 0801 01/18/18 0821   BP: 162/70 158/78   BP Location: Left arm Left arm   Patient Position: Sitting Sitting   Cuff Size: Adult Adult   Pulse: 74    Resp: 16    Temp: 98 °F (36.7 °C)    TempSrc: Oral    SpO2:  "98%    Weight: 83 kg (183 lb)    Height: 175.3 cm (69\")        BP Readings from Last 3 Encounters:   01/18/18 158/78   12/21/17 158/70   08/10/17 142/74       Wt Readings from Last 3 Encounters:   01/18/18 83 kg (183 lb)   12/21/17 85.3 kg (188 lb)   12/04/17 81.6 kg (180 lb)     Body mass index is 27.02 kg/(m^2).  Allergies   Allergen Reactions   • Penicillins Other (See Comments)     ALLERGY TESTING SHOWED ALL TO PCN.    • Cimetidine Rash       Objective   Physical Exam   Constitutional: He is oriented to person, place, and time. Vital signs are normal. He appears well-developed.   Obese male   Neck: Trachea normal and phonation normal. Neck supple. Carotid bruit is not present. No edema present. No thyromegaly present.   Cardiovascular: Normal rate, regular rhythm, S1 normal, S2 normal, normal heart sounds and normal pulses.    Pulmonary/Chest: Effort normal and breath sounds normal.   Abdominal: Soft. Normal appearance and bowel sounds are normal. There is no hepatomegaly. There is no tenderness.    Guy had a diabetic foot exam performed today.   During the foot exam he had a monofilament test performed.    Vascular Status -  His exam exhibits right foot vasculature normal. His exam exhibits no right foot edema. His exam exhibits left foot vasculature normal. His exam exhibits no left foot edema.   Skin Integrity  -  His right foot skin is intact.     uGy 's left foot skin is intact. .  Neurological: He is alert and oriented to person, place, and time.   Skin: Skin is warm, dry and intact.   Psychiatric: He has a normal mood and affect. His speech is normal and behavior is normal. Judgment and thought content normal. Cognition and memory are normal.       Physical Exam     Feet: Diabetic foot exam performed during this visit.    Monofilament test performed  Right foot - number of sites tested - 10  Right foot - number of sites sensed - 10  Left foot number of sites tested 10  Left foot - number of sites " sensed 10.  Right foot first MTP joint ROM is normal.  Left foot first MTP joint ROM is normal.  Vascular Status: right foot vasculature normal.  Left foot vasculature normal.  Right foot skin intact. Left foot skin intact.    Assessment/Plan   Guy was seen today for diabetes, hypertension and hyperlipidemia.    Diagnoses and all orders for this visit:    Uncontrolled type 2 diabetes mellitus with complication, without long-term current use of insulin  -     CBC & Differential  -     Comprehensive Metabolic Panel  -     Lipid Panel With LDL / HDL Ratio  -     Hemoglobin A1c    Mixed hyperlipidemia  -     CBC & Differential  -     Comprehensive Metabolic Panel  -     Lipid Panel With LDL / HDL Ratio  -     Hemoglobin A1c    Essential hypertension  -     CBC & Differential  -     Comprehensive Metabolic Panel  -     Lipid Panel With LDL / HDL Ratio  -     Hemoglobin A1c  -     hydrochlorothiazide (MICROZIDE) 12.5 MG capsule; Take 1 capsule by mouth Daily.    Need for hepatitis C screening test  -     Hepatitis C Antibody    Vitamin D deficiency  -     Vitamin D 25 Hydroxy    Need for Streptococcus pneumoniae vaccination  -     Pneumococcal Conjugate Vaccine 13-Valent All      1.  Chronic uncontrolled type 2 diabetes with hyperlipidemia: Guy is fasting will have a CBC, CMP, and hemoglobin A1c and a lipid profile collected at today's office visit.  He will be notified of test results when completed.  I've instructed Guy to decrease carbohydrates and sugars in diet.  He must get his diabetes under control.  He voiced understanding.  Will return to office in one month for Medicare physical with hypertension follow-up.  2.  Chronic and uncontrolled hypertension: I have rechecked his blood pressure at today's office visit and got 158/78 in left arm.  I will add hydrochlorothiazide 12.5 to his current amlodipine and lisinopril prescriptions.  Saadia was instructed to lose weight and to eat healthy.  He was encouraged  to exercise as well.  Guy will schedule follow-up appointment in one month for repeat blood pressure check.  3.  Need for hepatitis C screening: Guy will have a one-time only hepatitis C antibody testing.  He'll be notified of test results when completed.  4.  Chronic and stable vitamin D deficiency: In addition to above blood work she'll have a vitamin D collected.  He will continue his current vitamin D medication at home.  He will be notified of test results when completed.  5.  Need for pneumonia-Prevnar immunization: Guy has given written consent and will receive the Prevnar 13 immunization at today's office visit.        Dragon transcription disclaimer    Much of this encounter note is an electronic transcription/translation of spoken language to printed text.  The electronic translation of spoken language may permit erroneous, or at times, nonsensical words or phrases to be inadvertently transcribed.  Although I have reviewed the note for such errors, some may still exist.    Chelsea Mcelroy PA-C  Family Practice

## 2018-01-19 DIAGNOSIS — IMO0002 UNCONTROLLED TYPE 2 DIABETES MELLITUS WITH COMPLICATION, WITHOUT LONG-TERM CURRENT USE OF INSULIN: Primary | ICD-10-CM

## 2018-01-19 LAB
25(OH)D3+25(OH)D2 SERPL-MCNC: 37.5 NG/ML
ALBUMIN SERPL-MCNC: 4.4 G/DL (ref 3.5–5.2)
ALBUMIN/GLOB SERPL: 1.8 G/DL
ALP SERPL-CCNC: 78 U/L (ref 40–129)
ALT SERPL-CCNC: 36 U/L (ref 5–41)
AST SERPL-CCNC: 21 U/L (ref 5–40)
BASOPHILS # BLD AUTO: 0.02 10*3/MM3 (ref 0–0.2)
BASOPHILS NFR BLD AUTO: 0.4 % (ref 0–2)
BILIRUB SERPL-MCNC: 0.4 MG/DL (ref 0.2–1.2)
BUN SERPL-MCNC: 18 MG/DL (ref 8–23)
BUN/CREAT SERPL: 17.3 (ref 7–25)
CALCIUM SERPL-MCNC: 9.6 MG/DL (ref 8.8–10.5)
CHLORIDE SERPL-SCNC: 101 MMOL/L (ref 98–107)
CHOLEST SERPL-MCNC: 152 MG/DL (ref 0–200)
CO2 SERPL-SCNC: 29.2 MMOL/L (ref 22–29)
CREAT SERPL-MCNC: 1.04 MG/DL (ref 0.76–1.27)
EOSINOPHIL # BLD AUTO: 0.13 10*3/MM3 (ref 0.1–0.3)
EOSINOPHIL NFR BLD AUTO: 2.7 % (ref 0–4)
ERYTHROCYTE [DISTWIDTH] IN BLOOD BY AUTOMATED COUNT: 13.3 % (ref 11.5–14.5)
GLOBULIN SER CALC-MCNC: 2.5 GM/DL
GLUCOSE SERPL-MCNC: 196 MG/DL (ref 65–99)
HBA1C MFR BLD: 8.2 % (ref 4.8–5.6)
HCT VFR BLD AUTO: 44.2 % (ref 42–52)
HCV AB S/CO SERPL IA: 0.1 S/CO RATIO (ref 0–0.9)
HDLC SERPL-MCNC: 33 MG/DL (ref 40–60)
HGB BLD-MCNC: 14.7 G/DL (ref 14–18)
IMM GRANULOCYTES # BLD: 0 10*3/MM3 (ref 0–0.03)
IMM GRANULOCYTES NFR BLD: 0 % (ref 0–0.5)
LDLC SERPL CALC-MCNC: 71 MG/DL (ref 0–100)
LDLC/HDLC SERPL: 2.15 {RATIO}
LYMPHOCYTES # BLD AUTO: 1.02 10*3/MM3 (ref 0.6–4.8)
LYMPHOCYTES NFR BLD AUTO: 20.9 % (ref 20–45)
MCH RBC QN AUTO: 29.3 PG (ref 27–31)
MCHC RBC AUTO-ENTMCNC: 33.3 G/DL (ref 31–37)
MCV RBC AUTO: 88.2 FL (ref 80–94)
MONOCYTES # BLD AUTO: 0.45 10*3/MM3 (ref 0–1)
MONOCYTES NFR BLD AUTO: 9.2 % (ref 3–8)
NEUTROPHILS # BLD AUTO: 3.26 10*3/MM3 (ref 1.5–8.3)
NEUTROPHILS NFR BLD AUTO: 66.8 % (ref 45–70)
NRBC BLD AUTO-RTO: 0 /100 WBC (ref 0–0)
PLATELET # BLD AUTO: 121 10*3/MM3 (ref 140–500)
POTASSIUM SERPL-SCNC: 4.2 MMOL/L (ref 3.5–5.2)
PROT SERPL-MCNC: 6.9 G/DL (ref 6–8.5)
RBC # BLD AUTO: 5.01 10*6/MM3 (ref 4.7–6.1)
SODIUM SERPL-SCNC: 141 MMOL/L (ref 136–145)
TRIGL SERPL-MCNC: 240 MG/DL (ref 0–150)
VLDLC SERPL CALC-MCNC: 48 MG/DL (ref 8–32)
WBC # BLD AUTO: 4.88 10*3/MM3 (ref 4.8–10.8)

## 2018-01-19 RX ORDER — GLIPIZIDE AND METFORMIN HCL 5; 500 MG/1; MG/1
2 TABLET, FILM COATED ORAL
Qty: 120 TABLET | Refills: 3 | Status: SHIPPED | OUTPATIENT
Start: 2018-01-19 | End: 2018-05-15 | Stop reason: SDUPTHER

## 2018-01-22 ENCOUNTER — TELEPHONE (OUTPATIENT)
Dept: FAMILY MEDICINE CLINIC | Facility: CLINIC | Age: 68
End: 2018-01-22

## 2018-01-22 NOTE — TELEPHONE ENCOUNTER
----- Message from Chelsea Mcelroy PA-C sent at 1/19/2018 12:30 PM EST -----  1.  Notify patient that his platelet count was 121,000.  This has decreased from last year.  Please plan to repeat CBC with manual differential in one month.  2.  Stable blood sugar was 196 with hemoglobin A1c of 8.2.  These have elevated from last year.  I will change his glipizide/metformin to take 2 tablets in morning and 2 tablets with dinner. I have sent a new prescription to pharmacy.  Plan to repeat fasting blood sugar in one month with CBC.  Decrease carbohydrates and sugar in diet as well.  3.  Cholesterol 152, triglycerides 240, HDL 33 and LDL was 71.  Triglycerides have increased from 1 year ago.  Please decrease carbohydrates and sugars in diet.  Be sure to take his statin medication at home daily.  Plan to repeat fasting blood work in 3 months.  4.  Vitamin D was normal at 37.5.  Please stop the 50,000 international units of vitamin D at home.  He may use over-the-counter vitamin D3 2000 international units daily.  5.  Hepatitis C testing was negative.

## 2018-01-22 NOTE — TELEPHONE ENCOUNTER
notified patient of lab results. Will start medication with new directions and eat a healthy diet. Also will come back in 1 month for lab redraw.

## 2018-02-08 DIAGNOSIS — IMO0002 DIABETES MELLITUS OUT OF CONTROL: ICD-10-CM

## 2018-02-08 DIAGNOSIS — I10 ESSENTIAL HYPERTENSION: ICD-10-CM

## 2018-02-08 RX ORDER — LISINOPRIL 40 MG/1
TABLET ORAL
Qty: 30 TABLET | Refills: 0 | Status: SHIPPED | OUTPATIENT
Start: 2018-02-08 | End: 2018-03-06 | Stop reason: SDUPTHER

## 2018-02-22 ENCOUNTER — OFFICE VISIT (OUTPATIENT)
Dept: FAMILY MEDICINE CLINIC | Facility: CLINIC | Age: 68
End: 2018-02-22

## 2018-02-22 VITALS
OXYGEN SATURATION: 97 % | HEIGHT: 69 IN | BODY MASS INDEX: 27.55 KG/M2 | TEMPERATURE: 98.2 F | WEIGHT: 186 LBS | SYSTOLIC BLOOD PRESSURE: 130 MMHG | RESPIRATION RATE: 16 BRPM | HEART RATE: 97 BPM | DIASTOLIC BLOOD PRESSURE: 78 MMHG

## 2018-02-22 DIAGNOSIS — D69.6 THROMBOCYTOPENIA (HCC): ICD-10-CM

## 2018-02-22 DIAGNOSIS — I10 ESSENTIAL HYPERTENSION: ICD-10-CM

## 2018-02-22 DIAGNOSIS — IMO0002 UNCONTROLLED TYPE 2 DIABETES MELLITUS WITH COMPLICATION, WITHOUT LONG-TERM CURRENT USE OF INSULIN: Primary | ICD-10-CM

## 2018-02-22 PROCEDURE — 99213 OFFICE O/P EST LOW 20 MIN: CPT | Performed by: PHYSICIAN ASSISTANT

## 2018-02-22 NOTE — PROGRESS NOTES
Subjective   Guy Plascencia is a 67 y.o. male.   Chief Complaint   Patient presents with   • Hypertension     management   • Diabetes     management       History of Present Illness     Guy is a 67-year-old male who presents for type 2 diabetes and hypertension management. His blood sugars have been running around 190's at home.  Diet has been not healthy.  Sleep has been normal.  Denied any chest pain,shortness of air,swelling of ankles.  He has been checking his blood pressure at home and has otilia getting around 130/70's.  He has been taking HCT 12.5 and Lisinopril 40 mg a day.      The following portions of the patient's history were reviewed and updated as appropriate: allergies, current medications, past family history, past medical history, past social history and past surgical history.    Review of Systems   Constitutional: Negative.    HENT: Negative.    Eyes: Negative.    Respiratory: Negative.  Negative for cough, shortness of breath and wheezing.    Cardiovascular: Negative.  Negative for chest pain, palpitations and leg swelling.   Gastrointestinal: Negative.  Negative for constipation, diarrhea and vomiting.   Endocrine: Negative.    Genitourinary: Negative.    Musculoskeletal: Negative.    Skin: Negative.    Allergic/Immunologic: Negative.    Neurological: Negative.  Negative for dizziness, light-headedness and headaches.   Hematological: Negative.    Psychiatric/Behavioral: Negative.    All other systems reviewed and are negative.      Social History     Social History   • Marital status:      Spouse name: N/A   • Number of children: N/A   • Years of education: N/A     Social History Main Topics   • Smoking status: Former Smoker     Years: 14.00     Types: Cigarettes   • Smokeless tobacco: Never Used      Comment: QUIT 1972   • Alcohol use No   • Drug use: No   • Sexual activity: Defer     Other Topics Concern   • None     Social History Narrative     Vitals:    02/22/18 0845 02/22/18 0859  "  BP: 148/78 130/78   BP Location: Left arm Left arm   Patient Position: Sitting Sitting   Cuff Size: Adult Adult   Pulse: 97    Resp: 16    Temp: 98.2 °F (36.8 °C)    TempSrc: Oral    SpO2: 97%    Weight: 84.4 kg (186 lb)    Height: 175.3 cm (69\")        Wt Readings from Last 3 Encounters:   02/22/18 84.4 kg (186 lb)   01/18/18 83 kg (183 lb)   12/21/17 85.3 kg (188 lb)       BP Readings from Last 3 Encounters:   02/22/18 130/78   01/18/18 158/78   12/21/17 158/70     Body mass index is 27.47 kg/(m^2).  Allergies   Allergen Reactions   • Penicillins Other (See Comments)     ALLERGY TESTING SHOWED ALL TO PCN.    • Cimetidine Rash       Objective   Physical Exam   Constitutional: He is oriented to person, place, and time. Vital signs are normal. He appears well-developed.   Neck: Trachea normal and phonation normal. Neck supple. Carotid bruit is not present. No edema present.   Cardiovascular: Normal rate, regular rhythm, S1 normal, S2 normal, normal heart sounds and normal pulses.    Pulmonary/Chest: Effort normal and breath sounds normal.   Abdominal: Soft. Normal appearance and bowel sounds are normal. There is no hepatomegaly. There is no tenderness.   Neurological: He is alert and oriented to person, place, and time.   Skin: Skin is warm, dry and intact.   Psychiatric: He has a normal mood and affect. His speech is normal and behavior is normal. Judgment and thought content normal. Cognition and memory are normal.       Assessment/Plan   Guy was seen today for hypertension and diabetes.    Diagnoses and all orders for this visit:    Uncontrolled type 2 diabetes mellitus with complication, without long-term current use of insulin  -     Cancel: CBC & Differential; Future  -     Basic metabolic panel; Future  -     Basic metabolic panel    Thrombocytopenia  -     Cancel: Basic Metabolic Panel; Future  -     CBC & Differential; Future  -     CBC & Differential    Essential hypertension      1.  Chronic " uncontrolled type 2 diabetes: Guy is non fasting at today's office visit.  I've asked him to return to office in the next few days for a BMP to check his fasting blood sugar.  He was highly encouraged to decrease his carbohydrates in diet.  He has got to improve his diet to help get his sugars under control.  He voiced understanding.  2.  Chronic thrombocytopenia: He will return to office in the next few days for CBC.  He will be notified of test results when completed.  3.  Stable hypertension.  I have rechecked his blood pressure today's office visit and got 130/78 in left arm.  He will continue his current lisinopril and hydrochlorothiazide medication for now.  We'll reevaluate at office visit in 3 months.

## 2018-02-28 LAB
BASOPHILS # BLD AUTO: 0.02 10*3/MM3 (ref 0–0.2)
BASOPHILS NFR BLD AUTO: 0.4 % (ref 0–2)
BUN SERPL-MCNC: 19 MG/DL (ref 8–23)
BUN/CREAT SERPL: 19 (ref 7–25)
CALCIUM SERPL-MCNC: 9.7 MG/DL (ref 8.8–10.5)
CHLORIDE SERPL-SCNC: 102 MMOL/L (ref 98–107)
CO2 SERPL-SCNC: 30.2 MMOL/L (ref 22–29)
CREAT SERPL-MCNC: 1 MG/DL (ref 0.76–1.27)
EOSINOPHIL # BLD AUTO: 0.09 10*3/MM3 (ref 0.1–0.3)
EOSINOPHIL NFR BLD AUTO: 2 % (ref 0–4)
ERYTHROCYTE [DISTWIDTH] IN BLOOD BY AUTOMATED COUNT: 13.6 % (ref 11.5–14.5)
GFR SERPLBLD CREATININE-BSD FMLA CKD-EPI: 74 ML/MIN/1.73
GFR SERPLBLD CREATININE-BSD FMLA CKD-EPI: 90 ML/MIN/1.73
GLUCOSE SERPL-MCNC: 185 MG/DL (ref 65–99)
HCT VFR BLD AUTO: 40.3 % (ref 42–52)
HGB BLD-MCNC: 13.7 G/DL (ref 14–18)
IMM GRANULOCYTES # BLD: 0.01 10*3/MM3 (ref 0–0.03)
IMM GRANULOCYTES NFR BLD: 0.2 % (ref 0–0.5)
LYMPHOCYTES # BLD AUTO: 1.21 10*3/MM3 (ref 0.6–4.8)
LYMPHOCYTES NFR BLD AUTO: 26.5 % (ref 20–45)
MCH RBC QN AUTO: 29.3 PG (ref 27–31)
MCHC RBC AUTO-ENTMCNC: 34 G/DL (ref 31–37)
MCV RBC AUTO: 86.3 FL (ref 80–94)
MONOCYTES # BLD AUTO: 0.38 10*3/MM3 (ref 0–1)
MONOCYTES NFR BLD AUTO: 8.3 % (ref 3–8)
NEUTROPHILS # BLD AUTO: 2.85 10*3/MM3 (ref 1.5–8.3)
NEUTROPHILS NFR BLD AUTO: 62.6 % (ref 45–70)
NRBC BLD AUTO-RTO: 0 /100 WBC (ref 0–0)
PLATELET # BLD AUTO: 105 10*3/MM3 (ref 140–500)
POTASSIUM SERPL-SCNC: 4.1 MMOL/L (ref 3.5–5.2)
RBC # BLD AUTO: 4.67 10*6/MM3 (ref 4.7–6.1)
SODIUM SERPL-SCNC: 143 MMOL/L (ref 136–145)
WBC # BLD AUTO: 4.56 10*3/MM3 (ref 4.8–10.8)

## 2018-03-01 DIAGNOSIS — D69.6 THROMBOCYTOPENIA (HCC): Primary | ICD-10-CM

## 2018-03-02 ENCOUNTER — TELEPHONE (OUTPATIENT)
Dept: FAMILY MEDICINE CLINIC | Facility: CLINIC | Age: 68
End: 2018-03-02

## 2018-03-02 NOTE — TELEPHONE ENCOUNTER
----- Message from Chelsea Mcelroy PA-C sent at 3/1/2018 10:26 AM EST -----  1.  Notify patient that his hemoglobin was 13.7 with hematocrit 40.3.  Platelet count has decreased to 105,000.  This is slightly decreased.  Is he having any dark black tarry stools.  I will schedule a referral to hematologist for further evaluation of his low platelet count.  2.  Fasting blood sugar was 185 this has  decreased slightly from 1 month ago.  Denied current medication at home as instructed.  Please decrease carbohydrates and sugar in diet.  Return to office in May for fasting blood work

## 2018-03-05 RX ORDER — PRAVASTATIN SODIUM 40 MG
TABLET ORAL
Qty: 30 TABLET | Refills: 1 | Status: SHIPPED | OUTPATIENT
Start: 2018-03-05 | End: 2018-04-29 | Stop reason: SDUPTHER

## 2018-03-06 DIAGNOSIS — IMO0002 DIABETES MELLITUS OUT OF CONTROL: ICD-10-CM

## 2018-03-06 DIAGNOSIS — I10 ESSENTIAL HYPERTENSION: ICD-10-CM

## 2018-03-06 RX ORDER — LISINOPRIL 40 MG/1
TABLET ORAL
Qty: 30 TABLET | Refills: 0 | Status: SHIPPED | OUTPATIENT
Start: 2018-03-06 | End: 2018-04-06 | Stop reason: SDUPTHER

## 2018-03-19 DIAGNOSIS — IMO0002 UNCONTROLLED TYPE 2 DIABETES MELLITUS WITH COMPLICATION, WITHOUT LONG-TERM CURRENT USE OF INSULIN: ICD-10-CM

## 2018-03-19 RX ORDER — AMLODIPINE BESYLATE 10 MG/1
TABLET ORAL
Qty: 30 TABLET | Refills: 1 | OUTPATIENT
Start: 2018-03-19

## 2018-03-19 RX ORDER — BLOOD SUGAR DIAGNOSTIC
STRIP MISCELLANEOUS
Refills: 10 | OUTPATIENT
Start: 2018-03-19

## 2018-03-21 DIAGNOSIS — D69.6 THROMBOCYTOPENIA (HCC): Primary | ICD-10-CM

## 2018-03-21 DIAGNOSIS — IMO0002 UNCONTROLLED TYPE 2 DIABETES MELLITUS WITH COMPLICATION, WITHOUT LONG-TERM CURRENT USE OF INSULIN: ICD-10-CM

## 2018-03-21 LAB
DEVELOPER EXPIRATION DATE: NORMAL
DEVELOPER LOT NUMBER: NORMAL
EXPIRATION DATE: NORMAL
FECAL OCCULT BLOOD SCREEN, POC: NEGATIVE
Lab: NORMAL
NEGATIVE CONTROL: NEGATIVE
POSITIVE CONTROL: POSITIVE

## 2018-03-21 PROCEDURE — 82274 ASSAY TEST FOR BLOOD FECAL: CPT | Performed by: PHYSICIAN ASSISTANT

## 2018-03-21 RX ORDER — BLOOD SUGAR DIAGNOSTIC
STRIP MISCELLANEOUS
Qty: 100 EACH | Refills: 10 | Status: SHIPPED | OUTPATIENT
Start: 2018-03-21 | End: 2019-04-26 | Stop reason: SDUPTHER

## 2018-03-23 ENCOUNTER — APPOINTMENT (OUTPATIENT)
Dept: OTHER | Facility: HOSPITAL | Age: 68
End: 2018-03-23

## 2018-03-23 RX ORDER — AMLODIPINE BESYLATE 10 MG/1
TABLET ORAL
Qty: 30 TABLET | Refills: 1 | Status: SHIPPED | OUTPATIENT
Start: 2018-03-23 | End: 2018-04-19 | Stop reason: SDUPTHER

## 2018-03-26 RX ORDER — BLOOD-GLUCOSE METER
EACH MISCELLANEOUS
Qty: 1 DEVICE | Refills: 0 | Status: SHIPPED | OUTPATIENT
Start: 2018-03-26 | End: 2019-04-29 | Stop reason: SDUPTHER

## 2018-04-06 DIAGNOSIS — I10 ESSENTIAL HYPERTENSION: ICD-10-CM

## 2018-04-06 DIAGNOSIS — IMO0002 DIABETES MELLITUS OUT OF CONTROL: ICD-10-CM

## 2018-04-06 RX ORDER — LISINOPRIL 40 MG/1
TABLET ORAL
Qty: 30 TABLET | Refills: 5 | Status: SHIPPED | OUTPATIENT
Start: 2018-04-06 | End: 2018-07-18 | Stop reason: SDUPTHER

## 2018-04-14 DIAGNOSIS — I10 ESSENTIAL HYPERTENSION: ICD-10-CM

## 2018-04-16 RX ORDER — HYDROCHLOROTHIAZIDE 12.5 MG/1
CAPSULE, GELATIN COATED ORAL
Qty: 30 CAPSULE | Refills: 6 | Status: SHIPPED | OUTPATIENT
Start: 2018-04-16 | End: 2018-11-20 | Stop reason: SDUPTHER

## 2018-04-19 ENCOUNTER — APPOINTMENT (OUTPATIENT)
Dept: ONCOLOGY | Facility: CLINIC | Age: 68
End: 2018-04-19

## 2018-04-19 ENCOUNTER — APPOINTMENT (OUTPATIENT)
Dept: OTHER | Facility: HOSPITAL | Age: 68
End: 2018-04-19

## 2018-04-19 RX ORDER — AMLODIPINE BESYLATE 10 MG/1
TABLET ORAL
Qty: 30 TABLET | Refills: 0 | Status: SHIPPED | OUTPATIENT
Start: 2018-04-19 | End: 2018-05-15 | Stop reason: SDUPTHER

## 2018-04-30 RX ORDER — PRAVASTATIN SODIUM 40 MG
TABLET ORAL
Qty: 30 TABLET | Refills: 5 | Status: SHIPPED | OUTPATIENT
Start: 2018-04-30 | End: 2018-08-15 | Stop reason: SDUPTHER

## 2018-05-03 ENCOUNTER — CONSULT (OUTPATIENT)
Dept: ONCOLOGY | Facility: CLINIC | Age: 68
End: 2018-05-03

## 2018-05-03 ENCOUNTER — APPOINTMENT (OUTPATIENT)
Dept: OTHER | Facility: HOSPITAL | Age: 68
End: 2018-05-03

## 2018-05-03 VITALS
BODY MASS INDEX: 26.69 KG/M2 | OXYGEN SATURATION: 97 % | DIASTOLIC BLOOD PRESSURE: 79 MMHG | HEIGHT: 70 IN | TEMPERATURE: 98.6 F | HEART RATE: 79 BPM | SYSTOLIC BLOOD PRESSURE: 169 MMHG | RESPIRATION RATE: 16 BRPM | WEIGHT: 186.4 LBS

## 2018-05-03 DIAGNOSIS — R16.1 SPLENOMEGALY: ICD-10-CM

## 2018-05-03 DIAGNOSIS — K76.0 FATTY LIVER: ICD-10-CM

## 2018-05-03 DIAGNOSIS — D69.6 THROMBOCYTOPENIA (HCC): Primary | ICD-10-CM

## 2018-05-03 LAB
BASOPHILS # BLD AUTO: 0.03 10*3/MM3 (ref 0–0.2)
BASOPHILS NFR BLD AUTO: 0.6 % (ref 0–1.5)
DEPRECATED RDW RBC AUTO: 39.8 FL (ref 37–54)
EOSINOPHIL # BLD AUTO: 0.08 10*3/MM3 (ref 0–0.7)
EOSINOPHIL NFR BLD AUTO: 1.5 % (ref 0.3–6.2)
ERYTHROCYTE [DISTWIDTH] IN BLOOD BY AUTOMATED COUNT: 13.3 % (ref 11.5–14.5)
FOLATE SERPL-MCNC: 16.33 NG/ML (ref 4.78–24.2)
HCT VFR BLD AUTO: 42.8 % (ref 40.4–52.2)
HGB BLD-MCNC: 14.7 G/DL (ref 13.7–17.6)
HGB RETIC QN: 33.4 PG (ref 32.7–38.6)
IMM GRANULOCYTES # BLD: 0.01 10*3/MM3 (ref 0–0.03)
IMM GRANULOCYTES NFR BLD: 0.2 % (ref 0–0.5)
IMM RETICS NFR: 10 % (ref 0.7–13.7)
LYMPHOCYTES # BLD AUTO: 1.36 10*3/MM3 (ref 0.9–4.8)
LYMPHOCYTES NFR BLD AUTO: 25.1 % (ref 19.6–45.3)
MCH RBC QN AUTO: 28.2 PG (ref 27–32.7)
MCHC RBC AUTO-ENTMCNC: 34.3 G/DL (ref 32.6–36.4)
MCV RBC AUTO: 82 FL (ref 79.8–96.2)
MONOCYTES # BLD AUTO: 0.42 10*3/MM3 (ref 0.2–1.2)
MONOCYTES NFR BLD AUTO: 7.8 % (ref 5–12)
NEUTROPHILS # BLD AUTO: 3.51 10*3/MM3 (ref 1.9–8.1)
NEUTROPHILS NFR BLD AUTO: 64.8 % (ref 42.7–76)
NRBC BLD MANUAL-RTO: 0 /100 WBC (ref 0–0)
PLATELET # BLD AUTO: 109 10*3/MM3 (ref 140–500)
PLATELET # BLD AUTO: 114 10*3/MM3 (ref 140–500)
PLATELETS.RETICULATED NFR BLD AUTO: 8.7 % (ref 0.9–6.5)
PMV BLD AUTO: 11.6 FL (ref 6–12)
RBC # BLD AUTO: 5.22 10*6/MM3 (ref 4.6–6)
RETICS/RBC NFR AUTO: 1.59 % (ref 0.5–1.5)
VIT B12 BLD-MCNC: 291 PG/ML (ref 211–946)
WBC NRBC COR # BLD: 5.41 10*3/MM3 (ref 4.5–10.7)

## 2018-05-03 PROCEDURE — 83921 ORGANIC ACID SINGLE QUANT: CPT | Performed by: INTERNAL MEDICINE

## 2018-05-03 PROCEDURE — 36415 COLL VENOUS BLD VENIPUNCTURE: CPT

## 2018-05-03 PROCEDURE — 82607 VITAMIN B-12: CPT | Performed by: INTERNAL MEDICINE

## 2018-05-03 PROCEDURE — 85025 COMPLETE CBC W/AUTO DIFF WBC: CPT | Performed by: INTERNAL MEDICINE

## 2018-05-03 PROCEDURE — 85046 RETICYTE/HGB CONCENTRATE: CPT | Performed by: INTERNAL MEDICINE

## 2018-05-03 PROCEDURE — 82746 ASSAY OF FOLIC ACID SERUM: CPT | Performed by: INTERNAL MEDICINE

## 2018-05-03 PROCEDURE — 99205 OFFICE O/P NEW HI 60 MIN: CPT | Performed by: INTERNAL MEDICINE

## 2018-05-03 PROCEDURE — 85055 RETICULATED PLATELET ASSAY: CPT | Performed by: INTERNAL MEDICINE

## 2018-05-03 RX ORDER — MELOXICAM 7.5 MG/1
7.5 TABLET ORAL DAILY
COMMUNITY
End: 2018-12-20

## 2018-05-03 NOTE — PROGRESS NOTES
Subjective     REASON FOR CONSULTATION:  Provide an opinion on any further workup or treatment on:    Thrombocytopenia                       REQUESTING PHYSICIAN: Chelsea Mcelroy PA-C      RECORDS OBTAINED: Records of the patients history including those obtained from the referring provider were reviewed and summarized in detail.    HISTORY OF PRESENT ILLNESS:      Guy Plascencia is a 67 y.o. patient who was referred for evaluation of thrombocytopenia.       Patient is not having problems with bleeding or bruising.  On review of labs dating back to 2015, he had a platelet count of 127,000 on 2/5/16.  Hemoglobin was normal at 14.5 and WBC count was normal at 4330.  Platelets were at 108,000 on 6/28/16.  Platelets were at 105,000 on 2/28/18 with a normal WBC of 4500.  Hemoglobin was at 13.7.    Patient has history of partial tear of the left shoulder area and he takes Mobic 7.5 mg daily.  He has been taking the medicine regularly for the past year.  He is not noticing significant improvement.        Past Medical History:   Diagnosis Date   • Arthritis    • Diabetes mellitus     TYPE 2   • H/O  Multiple facial fractures 11/1968   • H/O Broken femur 11/1968   • History of transfusion     1968   • Hyperlipidemia    • Hypertension    • Kidney stone    • Neoplasm of uncertain behavior    • Peptic ulceration    • Plantar fasciitis    • Sleep apnea     CPAP   • Thrombocytosis        Past Surgical History:   Procedure Laterality Date   • COSMETIC SURGERY  11/1968    MULTIPLE FACIAL FRACTURE   • CYSTOSCOPY URETEROSCOPY STONE MANIPULATION/EXTRACTION Left 6/29/2016    Procedure: CYSTOSCOPY, LEFT URETEROSCOPY, BASKET EXTRACTION OF LEFT URETERAL STONE, LEFT STENT PLACEMENT;  Surgeon: Ish Gaitan MD;  Location: [facility] OR;  Service:    • EXTRACORPOREAL SHOCK WAVE LITHOTRIPSY (ESWL) Right 7/29/2016    Procedure: RT EXTRACORPOREAL SHOCKWAVE LITHOTRIPSY;  Surgeon: Ish Gaitan MD;  Location: [facility] OR;  Service:    •  FEMUR SURGERY  11/1968   • FRACTURE SURGERY      femur fx   • OTHER SURGICAL HISTORY      stents, for kidney stones   • OTHER SURGICAL HISTORY      lithotripsy   • SKIN BIOPSY         Social History     Social History   • Marital status:      Spouse name: Jessica   • Number of children: N/A   • Years of education: N/A     Occupational History   •  Retired     Social History Main Topics   • Smoking status: Former Smoker     Years: 14.00     Types: Cigarettes   • Smokeless tobacco: Never Used      Comment: QUIT 1972  SMOED 2PPD X10 YRS   • Alcohol use No   • Drug use: No   • Sexual activity: Defer     Other Topics Concern   • Not on file     Social History Narrative   • No narrative on file       Cancer-related family history includes Cancer in his other.    MEDICATIONS:    Current Outpatient Prescriptions:   •  amLODIPine (NORVASC) 10 MG tablet, TAKE ONE TABLET BY MOUTH EVERY MORNING, Disp: 30 tablet, Rfl: 0  •  Aspirin (ASPIR-81 PO), Take  by mouth Daily., Disp: , Rfl:   •  Blood Glucose Monitoring Suppl ([employer] KEYNOTE PRO METER) device, Patient needs to test bid. E11.8 and E11.65, Disp: 1 Device, Rfl: 0  •  Cholecalciferol (D3 ADULT PO), Take 2,000 Units by mouth Daily., Disp: , Rfl:   •  CINNAMON PO, Take 1,000 mg by mouth 4 (Four) Times a Day., Disp: , Rfl:   •  glipiZIDE-metFORMIN (METAGLIP) 5-500 MG per tablet, Take 2 tablets by mouth 2 (Two) Times a Day Before Meals., Disp: 120 tablet, Rfl: 3  •  hydrochlorothiazide (MICROZIDE) 12.5 MG capsule, TAKE ONE CAPSULE BY MOUTH DAILY, Disp: 30 capsule, Rfl: 6  •  linagliptin (TRADJENTA) 5 MG tablet tablet, Take 5 mg by mouth Daily., Disp: , Rfl:   •  lisinopril (PRINIVIL,ZESTRIL) 40 MG tablet, TAKE ONE TABLET BY MOUTH DAILY, Disp: 30 tablet, Rfl: 5  •  meloxicam (MOBIC) 7.5 MG tablet, Take 7.5 mg by mouth Daily., Disp: , Rfl:   •  pravastatin (PRAVACHOL) 40 MG tablet, TAKE ONE TABLET BY MOUTH DAILY, Disp: 30 tablet, Rfl: 5  •  [employer] PRESTO test strip, USE  "TO TEST 2-3 TIMES DAILY, Disp: 100 each, Rfl: 10  •  WAVESENSE PRESTO test strip, TEST TWICE DAILY, Disp: 100 each, Rfl: 10     ALLERGIES:  Allergies   Allergen Reactions   • Penicillins Other (See Comments)     ALLERGY TESTING SHOWED ALL TO PCN.    • Cimetidine Rash        REVIEW OF SYSTEMS:  GENERAL:  No Fever or chills. No weight change.  No Fatigue.   SKIN:  No skin rashes or lesions.  HEME/LYMPH: No Anemia. No Easy bruisability. No Enlarged lymph nodes.  EYES:  No Vision changes. No Diplopia.  ENT:  No Mouth bleeding. No Epistaxis. No Hoarseness.  RESPIRATORY:  No Shortness of breath. No Cough. No Hemoptysis.   CVS:  No Chest pain. No Lower extremity swelling. No Palpitations.   GI:  Occasional left-sided Abdominal pain. No Nausea. No Vomiting. No Constipation. No Diarrhea. No Melena. No Hematochezia.  :  History of kidney stones.  No Hematuria. No Dysuria. No Increased frequency.   MUSCULOSKELETAL:  Left shoulder pain..  NEUROLOGICAL:  No Headaches. No Dizziness. No Focal weakness. No Global weakness.  No Numbness.  PSYCHIATRIC:  No Anxiety. No Depression.         Objective   VITAL SIGNS:  Vitals:    05/03/18 1537   BP: 169/79   Pulse: 79   Resp: 16   Temp: 98.6 °F (37 °C)   TempSrc: Oral   SpO2: 97%   Weight: 84.6 kg (186 lb 6.4 oz)   Height: 178 cm (70.08\")   PainSc: 0-No pain       Wt Readings from Last 3 Encounters:   05/03/18 84.6 kg (186 lb 6.4 oz)   02/22/18 84.4 kg (186 lb)   01/18/18 83 kg (183 lb)       PHYSICAL EXAMINATION  GENERAL:  The patient appears in good general condition, not in acute distress.  SKIN:  No skin rashes or lesions. No Ecchymosis or Petechiae.  HEAD:  Normocephalic.  EYES:  No Pallor. No Jaundice. DARIEL. EOMI.  MOUTH: No Ulcers. No Thrush. No Exudates.  NECK:  Supple with no Thyromegaly or Masses.  LYMPHATICS:  No cervical or supraclavicular lymphadenopathy.  CHEST:  Lungs clear to auscultation bilaterally. No added sounds.  CARDIAC:  Normal S1 & S2. No Murmurs.  ABDOMEN:  " Soft. No tenderness. No Hepatomegaly. No Splenomegaly. No masses.  EXTREMITIES:  No Edema. No Calf tenderness. No Cyanosis.   NEUROLOGICAL:  No Focal neurological deficits.       RESULT REVIEW:     Results from last 7 days  Lab Units 05/03/18  1458   WBC 10*3/mm3 5.41   NEUTROS ABS 10*3/mm3 3.51   HEMOGLOBIN g/dL 14.7   HEMATOCRIT % 42.8   PLATELETS 10*3/mm3 109*           CT scan of the abdomen and pelvis on 6/28/16:  HISTORY:  Back pain for one week with left flank pain that started this morning.  HISTORY of kidney stones.     TECHNIQUE:  Axial noncontrast images were obtained through the abdomen and pelvis.  Multiplanar reformats were obtained. A comparison is made with  12/19/2012. Radiation dose reduction techniques were utilized, including  automated exposure control and exposure modulation based on body size.     ABDOMEN FINDINGS:  The lung bases are clear. The gallbladder is unremarkable. There is very  mild left hydronephrosis secondary to a 3 mm stone in the proximal left  ureter. No other ureteral stones are identified. There is a 9 mm  nonobstructing stone in the right kidney. There is a tiny sand-like  calcification in the left kidney. There is a left renal cyst. There is  mild splenomegaly with a craniocaudal [id number] length of 14.2 cm.  This is stable to minimally worsened. Hepatic steatosis is present. The  unopacified GI tract is normal.     PELVIS FINDINGS:  There are no lower ureteral stones. Urinary bladder is normal. There is  no free fluid. The unopacified lower GI tract is normal. The appendix  does contain some high density material that could reflect previously  administered barium contrast. No adjacent inflammation. An  intramedullary dawna is present in the right femur.     IMPRESSION:  1. Mild left hydronephrosis secondary to a 3 mm stone in the proximal  left ureter.  2. Bilateral nonobstructing renal stones  3. Mild splenomegaly.  4. Hepatic steatosis.  5. No acute findings in the GI  tract.    Assessment/Plan   1.  Thrombocytopenia.  This date so on the s back to 2015 when he had a platelet count of 127,000.  Platelet count is today at 109,000.  Hemoglobin and white blood cells are normal.  Differential count is normal as well.  Immature platelet fraction is slightly elevated at 8.7%. Immature platelet fraction is slightly elevated at 8.7%.      The differential diagnosis is vitamin B-12 and or folate deficiency versus immune mediated thrombocytopenia versus medication-induced (Mobic) versus secondary to splenomegaly.  The normal hemoglobin and WBC counts make an underlying bone marrow pathology less likely.    2.  Splenomegaly.  This was identified on the CT scan in June 2016.  The CT scan reported steatohepatitis which may be the cause.  However, he has normal liver enzymes.  I recommended repeating the CT scan to evaluate the status of the spleen to make sure it has not enlarged.    PLAN:    1.  Obtain B12, folate and methylmalonic acid levels.    2.  Obtain CT scan of the abdomen to evaluate the spleen and liver.    3.  I asked the patient to stop Mobic.    4.  I'll see the patient follow-up in one month with repeat CBC and IPF at his return visit.        Michelle Sullivan MD  05/03/18

## 2018-05-07 LAB — METHYLMALONATE SERPL-SCNC: 255 NMOL/L (ref 0–378)

## 2018-05-09 ENCOUNTER — APPOINTMENT (OUTPATIENT)
Dept: CT IMAGING | Facility: HOSPITAL | Age: 68
End: 2018-05-09
Attending: INTERNAL MEDICINE

## 2018-05-10 DIAGNOSIS — IMO0002 UNCONTROLLED TYPE 2 DIABETES MELLITUS WITH COMPLICATION, WITHOUT LONG-TERM CURRENT USE OF INSULIN: ICD-10-CM

## 2018-05-10 RX ORDER — SITAGLIPTIN 100 MG/1
TABLET, FILM COATED ORAL
Qty: 30 TABLET | Refills: 2 | Status: SHIPPED | OUTPATIENT
Start: 2018-05-10 | End: 2018-07-18 | Stop reason: SDUPTHER

## 2018-05-11 ENCOUNTER — HOSPITAL ENCOUNTER (OUTPATIENT)
Dept: CT IMAGING | Facility: HOSPITAL | Age: 68
Discharge: HOME | End: 2018-05-11
Attending: INTERNAL MEDICINE | Admitting: INTERNAL MEDICINE

## 2018-05-11 DIAGNOSIS — R16.1 SPLENOMEGALY: ICD-10-CM

## 2018-05-11 DIAGNOSIS — K76.0 FATTY LIVER: ICD-10-CM

## 2018-05-11 DIAGNOSIS — D69.6 THROMBOCYTOPENIA (HCC): ICD-10-CM

## 2018-05-11 PROCEDURE — 0 IOPAMIDOL PER 1 ML: Performed by: INTERNAL MEDICINE

## 2018-05-11 PROCEDURE — 0 DIATRIZOATE MEGLUMINE & SODIUM PER 1 ML: Performed by: INTERNAL MEDICINE

## 2018-05-11 PROCEDURE — 74160 CT ABDOMEN W/CONTRAST: CPT

## 2018-05-11 RX ADMIN — IOPAMIDOL 100 ML: 755 INJECTION, SOLUTION INTRAVENOUS at 08:53

## 2018-05-11 RX ADMIN — DIATRIZOATE MEGLUMINE AND DIATRIZOATE SODIUM 30 ML: 600; 100 SOLUTION ORAL; RECTAL at 07:33

## 2018-05-15 DIAGNOSIS — IMO0002 UNCONTROLLED TYPE 2 DIABETES MELLITUS WITH COMPLICATION, WITHOUT LONG-TERM CURRENT USE OF INSULIN: ICD-10-CM

## 2018-05-15 RX ORDER — AMLODIPINE BESYLATE 10 MG/1
TABLET ORAL
Qty: 30 TABLET | Refills: 0 | Status: SHIPPED | OUTPATIENT
Start: 2018-05-15 | End: 2018-06-26 | Stop reason: SDUPTHER

## 2018-05-15 RX ORDER — GLIPIZIDE AND METFORMIN HCL 5; 500 MG/1; MG/1
TABLET, FILM COATED ORAL
Qty: 120 TABLET | Refills: 2 | Status: SHIPPED | OUTPATIENT
Start: 2018-05-15 | End: 2018-08-15 | Stop reason: SDUPTHER

## 2018-05-31 ENCOUNTER — LAB (OUTPATIENT)
Dept: OTHER | Facility: HOSPITAL | Age: 68
End: 2018-05-31

## 2018-05-31 ENCOUNTER — OFFICE VISIT (OUTPATIENT)
Dept: ONCOLOGY | Facility: CLINIC | Age: 68
End: 2018-05-31

## 2018-05-31 VITALS
BODY MASS INDEX: 27 KG/M2 | TEMPERATURE: 98.5 F | RESPIRATION RATE: 16 BRPM | WEIGHT: 188.6 LBS | DIASTOLIC BLOOD PRESSURE: 85 MMHG | OXYGEN SATURATION: 97 % | HEIGHT: 70 IN | HEART RATE: 86 BPM | SYSTOLIC BLOOD PRESSURE: 157 MMHG

## 2018-05-31 DIAGNOSIS — D69.6 THROMBOCYTOPENIA (HCC): Primary | ICD-10-CM

## 2018-05-31 DIAGNOSIS — E53.8 VITAMIN B12 DEFICIENCY: ICD-10-CM

## 2018-05-31 DIAGNOSIS — K86.2 PANCREATIC CYST: ICD-10-CM

## 2018-05-31 DIAGNOSIS — D69.6 THROMBOCYTOPENIA (HCC): ICD-10-CM

## 2018-05-31 DIAGNOSIS — R16.1 SPLENOMEGALY: ICD-10-CM

## 2018-05-31 DIAGNOSIS — K75.81 STEATOHEPATITIS: ICD-10-CM

## 2018-05-31 LAB
BASOPHILS # BLD AUTO: 0.02 10*3/MM3 (ref 0–0.2)
BASOPHILS NFR BLD AUTO: 0.5 % (ref 0–1.5)
DEPRECATED RDW RBC AUTO: 40.6 FL (ref 37–54)
EOSINOPHIL # BLD AUTO: 0.06 10*3/MM3 (ref 0–0.7)
EOSINOPHIL NFR BLD AUTO: 1.4 % (ref 0.3–6.2)
ERYTHROCYTE [DISTWIDTH] IN BLOOD BY AUTOMATED COUNT: 13.3 % (ref 11.5–14.5)
HCT VFR BLD AUTO: 40.2 % (ref 40.4–52.2)
HGB BLD-MCNC: 14.2 G/DL (ref 13.7–17.6)
IMM GRANULOCYTES # BLD: 0.01 10*3/MM3 (ref 0–0.03)
IMM GRANULOCYTES NFR BLD: 0.2 % (ref 0–0.5)
LYMPHOCYTES # BLD AUTO: 1.1 10*3/MM3 (ref 0.9–4.8)
LYMPHOCYTES NFR BLD AUTO: 25 % (ref 19.6–45.3)
MCH RBC QN AUTO: 29.4 PG (ref 27–32.7)
MCHC RBC AUTO-ENTMCNC: 35.3 G/DL (ref 32.6–36.4)
MCV RBC AUTO: 83.2 FL (ref 79.8–96.2)
MONOCYTES # BLD AUTO: 0.44 10*3/MM3 (ref 0.2–1.2)
MONOCYTES NFR BLD AUTO: 10 % (ref 5–12)
NEUTROPHILS # BLD AUTO: 2.77 10*3/MM3 (ref 1.9–8.1)
NEUTROPHILS NFR BLD AUTO: 62.9 % (ref 42.7–76)
NRBC BLD MANUAL-RTO: 0 /100 WBC (ref 0–0)
PLATELET # BLD AUTO: 114 10*3/MM3 (ref 140–500)
PLATELET # BLD AUTO: 114 10*3/MM3 (ref 140–500)
PLATELETS.RETICULATED NFR BLD AUTO: 9.3 % (ref 0.9–6.5)
PMV BLD AUTO: 11.7 FL (ref 6–12)
RBC # BLD AUTO: 4.83 10*6/MM3 (ref 4.6–6)
WBC NRBC COR # BLD: 4.4 10*3/MM3 (ref 4.5–10.7)

## 2018-05-31 PROCEDURE — 36415 COLL VENOUS BLD VENIPUNCTURE: CPT

## 2018-05-31 PROCEDURE — 85055 RETICULATED PLATELET ASSAY: CPT | Performed by: INTERNAL MEDICINE

## 2018-05-31 PROCEDURE — 85025 COMPLETE CBC W/AUTO DIFF WBC: CPT | Performed by: INTERNAL MEDICINE

## 2018-05-31 PROCEDURE — 99215 OFFICE O/P EST HI 40 MIN: CPT | Performed by: INTERNAL MEDICINE

## 2018-05-31 RX ORDER — LANOLIN ALCOHOL/MO/W.PET/CERES
1000 CREAM (GRAM) TOPICAL DAILY
Start: 2018-05-31

## 2018-05-31 NOTE — PROGRESS NOTES
Subjective     REASON FOR FOLLOW UP:      Thrombocytopenia    HISTORY OF PRESENT ILLNESS:    Guy Plascencia is a 67 y.o. patient who was referred for evaluation of thrombocytopenia.  He had a platelet count of 127,000 on 2/5/16.  Hemoglobin was normal at 14.5 and WBC count was normal at 4330.  Platelets were at 108,000 on 6/28/16.  Platelets were at 105,000 on 2/28/18 with a normal WBC of 4500.  Hemoglobin was at 13.7.  Patient has history of partial tear of the left shoulder area and he takes Mobic 7.5 mg daily.      When the patient was seen at our office in initial consultation, I asked him to stop Mobic due to the concern that it was contributing to the thrombocytopenia.  He did so.  He also started a B complex after his initial visit here.    The patient is not having problems with bleeding or bruising.    Past Medical History:   Diagnosis Date   • Arthritis    • Diabetes mellitus     TYPE 2   • H/O  Multiple facial fractures 11/1968   • H/O Broken femur 11/1968   • History of transfusion     1968   • Hyperlipidemia    • Hypertension    • Kidney stone    • Neoplasm of uncertain behavior    • Peptic ulceration    • Plantar fasciitis    • Sleep apnea     CPAP   • Thrombocytosis        Past Surgical History:   Procedure Laterality Date   • COSMETIC SURGERY  11/1968    MULTIPLE FACIAL FRACTURE   • CYSTOSCOPY URETEROSCOPY STONE MANIPULATION/EXTRACTION Left 6/29/2016    Procedure: CYSTOSCOPY, LEFT URETEROSCOPY, BASKET EXTRACTION OF LEFT URETERAL STONE, LEFT STENT PLACEMENT;  Surgeon: Ish Gaitan MD;  Location: [facility];  Service:    • EXTRACORPOREAL SHOCK WAVE LITHOTRIPSY (ESWL) Right 7/29/2016    Procedure: RT EXTRACORPOREAL SHOCKWAVE LITHOTRIPSY;  Surgeon: Ish Gaitan MD;  Location: [facility];  Service:    • FEMUR SURGERY  11/1968   • FRACTURE SURGERY      femur fx   • OTHER SURGICAL HISTORY      stents, for kidney stones   • OTHER SURGICAL HISTORY      lithotripsy   • SKIN BIOPSY         Social  History     Social History   • Marital status:      Spouse name: Jessica   • Number of children: N/A   • Years of education: College     Occupational History   •  Retired     Social History Main Topics   • Smoking status: Former Smoker     Years: 14.00     Types: Cigarettes   • Smokeless tobacco: Never Used      Comment: QUIT 1972  SMOKED 2PPD X10 YRS   • Alcohol use No   • Drug use: No   • Sexual activity: Defer     Other Topics Concern   • Not on file     Social History Narrative   • No narrative on file       Cancer-related family history includes Cancer in his other; Skin cancer (age of onset: 50) in his brother.    MEDICATIONS:    Current Outpatient Prescriptions:   •  amLODIPine (NORVASC) 10 MG tablet, TAKE ONE TABLET BY MOUTH EVERY MORNING, Disp: 30 tablet, Rfl: 0  •  Aspirin (ASPIR-81 PO), Take  by mouth Daily., Disp: , Rfl:   •  Blood Glucose Monitoring Suppl ([employer] KEYNOTE PRO METER) device, Patient needs to test bid. E11.8 and E11.65, Disp: 1 Device, Rfl: 0  •  Cholecalciferol (D3 ADULT PO), Take 2,000 Units by mouth Daily., Disp: , Rfl:   •  CINNAMON PO, Take 1,000 mg by mouth 4 (Four) Times a Day., Disp: , Rfl:   •  glipiZIDE-metFORMIN (METAGLIP) 5-500 MG per tablet, TAKE TWO TABLETS BY MOUTH TWICE A DAY BEFORE MEALS, Disp: 120 tablet, Rfl: 2  •  hydrochlorothiazide (MICROZIDE) 12.5 MG capsule, TAKE ONE CAPSULE BY MOUTH DAILY, Disp: 30 capsule, Rfl: 6  •  JANUVIA 100 MG tablet, TAKE ONE TABLET BY MOUTH DAILY, Disp: 30 tablet, Rfl: 2  •  lisinopril (PRINIVIL,ZESTRIL) 40 MG tablet, TAKE ONE TABLET BY MOUTH DAILY, Disp: 30 tablet, Rfl: 5  •  meloxicam (MOBIC) 7.5 MG tablet, Take 7.5 mg by mouth Daily., Disp: , Rfl:   •  pravastatin (PRAVACHOL) 40 MG tablet, TAKE ONE TABLET BY MOUTH DAILY, Disp: 30 tablet, Rfl: 5  •  WAVESENSE PRESTO test strip, USE TO TEST 2-3 TIMES DAILY, Disp: 100 each, Rfl: 10  •  WAVESENSE PRESTO test strip, TEST TWICE DAILY, Disp: 100 each, Rfl: 10  "    ALLERGIES:  Allergies   Allergen Reactions   • Penicillins Other (See Comments)     ALLERGY TESTING SHOWED ALL TO PCN.    • Cimetidine Rash        REVIEW OF SYSTEMS:  GENERAL:  No Fever or chills. No weight change.  No Fatigue.   SKIN:  No skin rashes or lesions.  HEME/LYMPH: No Anemia. No Easy bruisability. No Enlarged lymph nodes.  RESPIRATORY:  No Shortness of breath.  CVS:  No Chest pain.   GI:  No Abdominal pain.  No hematochezia or melena.  :  No Hematuria.   MUSCULOSKELETAL:  Left shoulder pain.        Objective   VITAL SIGNS:  Vitals:    05/31/18 1436   BP: 157/85   Pulse: 86   Resp: 16   Temp: 98.5 °F (36.9 °C)   TempSrc: Oral   SpO2: 97%   Weight: 85.5 kg (188 lb 9.6 oz)   Height: 178 cm (70.08\")   PainSc: 0-No pain       Wt Readings from Last 3 Encounters:   05/31/18 85.5 kg (188 lb 9.6 oz)   05/03/18 84.6 kg (186 lb 6.4 oz)   02/22/18 84.4 kg (186 lb)       PHYSICAL EXAMINATION  GENERAL:  The patient appears in good general condition, not in acute distress.  SKIN:  No skin rashes or lesions. No Ecchymosis or Petechiae. [place] spots over the abdomen.    HEAD:  Normocephalic.  EYES:  No Pallor. No Jaundice.   NECK:  Supple with no Thyromegaly or Masses.  LYMPHATICS:  No cervical or supraclavicular lymphadenopathy.  CHEST:  Lungs clear to auscultation bilaterally. No added sounds.  CARDIAC:  Normal S1 & S2. No Murmurs.  ABDOMEN:  Soft. No tenderness. No Hepatomegaly. No Splenomegaly. No masses.  EXTREMITIES:  No Edema.   NEUROLOGICAL:  No Focal neurological deficits.       RESULT REVIEW:     Results from last 7 days  Lab Units 05/31/18  1429   WBC 10*3/mm3 4.40*   NEUTROS ABS 10*3/mm3 2.77   HEMOGLOBIN g/dL 14.2   HEMATOCRIT % 40.2*   PLATELETS 10*3/mm3 114*  114*     Immature platelet fraction 9.3%    Lab Results   Component Value Date    FOLATE 16.33 05/03/2018     Lab Results   Component Value Date    [id number] 291 05/03/2018     CT ABDOMEN WITH CONTRAST, 5/11/2018   "   HISTORY:  67-year-old male with thrombocytopenia. Previous examination showing  mild splenomegaly.     TECHNIQUE:  CT examination of the abdomen with oral and IV contrast. Radiation dose  reduction techniques included automated exposure control or exposure  modulation based on body size. Radiation audit for CT and nuclear  cardiology exams in the last 12 months: 0.     COMPARISON:  *  CT abdomen/pelvis, 6/28/2016 and 12/19/2012.     FINDINGS:  The examination shows stable mild splenomegaly. Splenic length measures  as follows:  *  14.2 cm on 12/19/2012.  *  14.3 cm on 6/28/2016.  *  13.9 cm today.     Mild diffuse hepatic steatosis is unchanged. No mass or adenopathy  within the abdomen.     Small nonobstructing right upper pole renal calculus and left mid renal  cyst are unchanged.     Incidental note is made of a 10 mm cyst within the posterior tail of the  pancreas, unchanged in retrospect since June 2016, although not visible  on the 2012 exam. Currently published guidelines call for follow-up CT  examination in 2 years (incidental pancreas cysts less than 1.5 cm in  patients older than 65).     Small bowel and colon are normal in caliber and appearance within the  abdomen. Nondistended stomach. Normal caliber abdominal aorta.     IMPRESSION:  1. Mild splenomegaly is stable since 2012.  2. Stable mild diffuse hepatic steatosis.  3. Incidental tiny cyst within the tail of the pancreas, likely benign  and unchanged since 2016. Consider follow-up CT in 2 years.  4. Small nonobstructing right upper pole renal calculus.    Assessment/Plan    1.  Thrombocytopenia.  It dates back to 2015.  Platelet count is at 114,000 today, slightly improved compared to 4 weeks ago.  This is most likely attributed to the splenomegaly and vitamin B12 deficiency.  I reassured the patient that there are no signs of underlying bone marrow malignancy like leukemia.    2.  Splenomegaly.  Has been stable over the past years.  This is  attributed to the steatohepatitis.  The stability in the size is reassuring and I used against an underlying myeloproliferative or lymphoproliferative disorder.    3.  Pancreatic cysts in the tail of the pancreas measuring 1 cm.  It was seen on the CT scan 2016 but not the scan from 2012.  Based on the current guidelines, the radiologist recommended repeating a CT scan in 2 years.      PLAN:    1.  Start vitamin B12 1000 µg daily.    2.  Patient was given the okay to start back on Mobic.    3.  Return for follow-up in 6 months with CBC and IPF and B12 level.    4.  Plan to repeat CT scan of the abdomen in May 2020.      Michelle Sullivan MD  05/31/18

## 2018-06-26 RX ORDER — AMLODIPINE BESYLATE 10 MG/1
TABLET ORAL
Qty: 30 TABLET | Refills: 0 | Status: SHIPPED | OUTPATIENT
Start: 2018-06-26 | End: 2018-12-20 | Stop reason: SDUPTHER

## 2018-07-05 RX ORDER — MELOXICAM 7.5 MG/1
TABLET ORAL
Qty: 30 TABLET | Refills: 3 | Status: SHIPPED | OUTPATIENT
Start: 2018-07-05 | End: 2020-01-13

## 2018-07-18 DIAGNOSIS — IMO0002 DIABETES MELLITUS OUT OF CONTROL: ICD-10-CM

## 2018-07-18 DIAGNOSIS — I10 ESSENTIAL HYPERTENSION: ICD-10-CM

## 2018-07-18 DIAGNOSIS — IMO0002 UNCONTROLLED TYPE 2 DIABETES MELLITUS WITH COMPLICATION, WITHOUT LONG-TERM CURRENT USE OF INSULIN: ICD-10-CM

## 2018-07-19 RX ORDER — LISINOPRIL 40 MG/1
TABLET ORAL
Qty: 30 TABLET | Refills: 4 | Status: SHIPPED | OUTPATIENT
Start: 2018-07-19 | End: 2018-12-13 | Stop reason: SDUPTHER

## 2018-07-19 RX ORDER — SITAGLIPTIN 100 MG/1
TABLET, FILM COATED ORAL
Qty: 30 TABLET | Refills: 1 | Status: SHIPPED | OUTPATIENT
Start: 2018-07-19 | End: 2018-08-15 | Stop reason: SDUPTHER

## 2018-07-20 ENCOUNTER — TELEPHONE (OUTPATIENT)
Dept: FAMILY MEDICINE CLINIC | Facility: CLINIC | Age: 68
End: 2018-07-20

## 2018-07-20 DIAGNOSIS — S16.1XXA STRAIN OF NECK MUSCLE, INITIAL ENCOUNTER: Primary | ICD-10-CM

## 2018-07-20 RX ORDER — BACLOFEN 10 MG/1
10 TABLET ORAL 3 TIMES DAILY
Qty: 30 TABLET | Refills: 0 | Status: SHIPPED | OUTPATIENT
Start: 2018-07-20 | End: 2018-12-20

## 2018-07-20 NOTE — TELEPHONE ENCOUNTER
Guy said that he woke up with a stiff neck that is really hurting. He wants to know if you can send something in to his local [facility], he mentioned it possibly being a pain pill.

## 2018-07-20 NOTE — TELEPHONE ENCOUNTER
Notify  patient have sent a muscle relaxer to pharmacy.  Use moist heat to the affected areas well.  Continue using his meloxicam at home as well.  If no improvement, please schedule an appointment.

## 2018-07-30 RX ORDER — AMLODIPINE BESYLATE 10 MG/1
TABLET ORAL
Qty: 30 TABLET | Refills: 5 | Status: SHIPPED | OUTPATIENT
Start: 2018-07-30 | End: 2019-01-28 | Stop reason: SDUPTHER

## 2018-08-15 DIAGNOSIS — IMO0002 UNCONTROLLED TYPE 2 DIABETES MELLITUS WITH COMPLICATION, WITHOUT LONG-TERM CURRENT USE OF INSULIN: ICD-10-CM

## 2018-08-16 RX ORDER — GLIPIZIDE AND METFORMIN HCL 5; 500 MG/1; MG/1
TABLET, FILM COATED ORAL
Qty: 120 TABLET | Refills: 4 | Status: SHIPPED | OUTPATIENT
Start: 2018-08-16 | End: 2018-11-19 | Stop reason: SDUPTHER

## 2018-08-16 RX ORDER — SITAGLIPTIN 100 MG/1
TABLET, FILM COATED ORAL
Qty: 30 TABLET | Refills: 4 | Status: SHIPPED | OUTPATIENT
Start: 2018-08-16 | End: 2018-12-20

## 2018-08-16 RX ORDER — PRAVASTATIN SODIUM 40 MG
TABLET ORAL
Qty: 30 TABLET | Refills: 4 | Status: SHIPPED | OUTPATIENT
Start: 2018-08-16 | End: 2019-08-29 | Stop reason: SDUPTHER

## 2018-08-30 RX ORDER — PRAVASTATIN SODIUM 40 MG
TABLET ORAL
Qty: 30 TABLET | Refills: 4 | Status: SHIPPED | OUTPATIENT
Start: 2018-08-30 | End: 2018-12-20 | Stop reason: DRUGHIGH

## 2018-11-19 ENCOUNTER — TELEPHONE (OUTPATIENT)
Dept: FAMILY MEDICINE CLINIC | Facility: CLINIC | Age: 68
End: 2018-11-19

## 2018-11-19 DIAGNOSIS — IMO0002 UNCONTROLLED TYPE 2 DIABETES MELLITUS WITH COMPLICATION, WITHOUT LONG-TERM CURRENT USE OF INSULIN: ICD-10-CM

## 2018-11-19 RX ORDER — GLIPIZIDE AND METFORMIN HCL 5; 500 MG/1; MG/1
2 TABLET, FILM COATED ORAL
Qty: 120 TABLET | Refills: 4 | Status: SHIPPED | OUTPATIENT
Start: 2018-11-19 | End: 2019-04-17 | Stop reason: SDUPTHER

## 2018-11-19 NOTE — TELEPHONE ENCOUNTER
Give patient samples to tide him over until he can be seen after Thanksgiving.  He needs to schedule appointment to redo fasting blood work before I can change his medication.  Also schedule an appointment to be seen.

## 2018-11-20 DIAGNOSIS — I10 ESSENTIAL HYPERTENSION: ICD-10-CM

## 2018-11-20 RX ORDER — HYDROCHLOROTHIAZIDE 12.5 MG/1
CAPSULE, GELATIN COATED ORAL
Qty: 30 CAPSULE | Refills: 0 | Status: SHIPPED | OUTPATIENT
Start: 2018-11-20 | End: 2018-12-30 | Stop reason: SDUPTHER

## 2018-11-21 NOTE — TELEPHONE ENCOUNTER
Patient will come pick samples up and states he is going out of town for 2 weeks and will call back to schedule lab and followup appt

## 2018-12-05 ENCOUNTER — LAB (OUTPATIENT)
Dept: OTHER | Facility: HOSPITAL | Age: 68
End: 2018-12-05

## 2018-12-05 ENCOUNTER — OFFICE VISIT (OUTPATIENT)
Dept: ONCOLOGY | Facility: CLINIC | Age: 68
End: 2018-12-05

## 2018-12-05 VITALS
DIASTOLIC BLOOD PRESSURE: 75 MMHG | HEIGHT: 70 IN | WEIGHT: 186.2 LBS | RESPIRATION RATE: 16 BRPM | OXYGEN SATURATION: 97 % | TEMPERATURE: 98 F | SYSTOLIC BLOOD PRESSURE: 181 MMHG | BODY MASS INDEX: 26.66 KG/M2 | HEART RATE: 84 BPM

## 2018-12-05 DIAGNOSIS — R16.1 SPLENOMEGALY: ICD-10-CM

## 2018-12-05 DIAGNOSIS — E53.8 B12 DEFICIENCY: ICD-10-CM

## 2018-12-05 DIAGNOSIS — E53.8 VITAMIN B12 DEFICIENCY: ICD-10-CM

## 2018-12-05 DIAGNOSIS — D69.6 THROMBOCYTOPENIA (HCC): ICD-10-CM

## 2018-12-05 DIAGNOSIS — D69.6 THROMBOCYTOPENIA (HCC): Primary | ICD-10-CM

## 2018-12-05 LAB
BASOPHILS # BLD AUTO: 0.02 10*3/MM3 (ref 0–0.2)
BASOPHILS NFR BLD AUTO: 0.4 % (ref 0–1.5)
DEPRECATED RDW RBC AUTO: 41.6 FL (ref 37–54)
EOSINOPHIL # BLD AUTO: 0.06 10*3/MM3 (ref 0–0.7)
EOSINOPHIL NFR BLD AUTO: 1.3 % (ref 0.3–6.2)
ERYTHROCYTE [DISTWIDTH] IN BLOOD BY AUTOMATED COUNT: 13.4 % (ref 11.5–14.5)
HCT VFR BLD AUTO: 41.7 % (ref 40.4–52.2)
HGB BLD-MCNC: 14.2 G/DL (ref 13.7–17.6)
IMM GRANULOCYTES # BLD: 0.03 10*3/MM3 (ref 0–0.03)
IMM GRANULOCYTES NFR BLD: 0.6 % (ref 0–0.5)
LYMPHOCYTES # BLD AUTO: 1.16 10*3/MM3 (ref 0.9–4.8)
LYMPHOCYTES NFR BLD AUTO: 24.6 % (ref 19.6–45.3)
MCH RBC QN AUTO: 29.1 PG (ref 27–32.7)
MCHC RBC AUTO-ENTMCNC: 34.1 G/DL (ref 32.6–36.4)
MCV RBC AUTO: 85.5 FL (ref 79.8–96.2)
MONOCYTES # BLD AUTO: 0.42 10*3/MM3 (ref 0.2–1.2)
MONOCYTES NFR BLD AUTO: 8.9 % (ref 5–12)
NEUTROPHILS # BLD AUTO: 3.03 10*3/MM3 (ref 1.9–8.1)
NEUTROPHILS NFR BLD AUTO: 64.2 % (ref 42.7–76)
NRBC BLD MANUAL-RTO: 0 /100 WBC (ref 0–0)
PLATELET # BLD AUTO: 126 10*3/MM3 (ref 140–500)
PLATELET # BLD AUTO: 126 10*3/MM3 (ref 140–500)
PLATELETS.RETICULATED NFR BLD AUTO: 10.2 % (ref 0.9–6.5)
PMV BLD AUTO: 11.4 FL (ref 6–12)
RBC # BLD AUTO: 4.88 10*6/MM3 (ref 4.6–6)
VIT B12 BLD-MCNC: 829 PG/ML (ref 211–946)
WBC NRBC COR # BLD: 4.72 10*3/MM3 (ref 4.5–10.7)

## 2018-12-05 PROCEDURE — 85025 COMPLETE CBC W/AUTO DIFF WBC: CPT | Performed by: INTERNAL MEDICINE

## 2018-12-05 PROCEDURE — 36415 COLL VENOUS BLD VENIPUNCTURE: CPT

## 2018-12-05 PROCEDURE — 85055 RETICULATED PLATELET ASSAY: CPT | Performed by: INTERNAL MEDICINE

## 2018-12-05 PROCEDURE — 99213 OFFICE O/P EST LOW 20 MIN: CPT | Performed by: INTERNAL MEDICINE

## 2018-12-05 PROCEDURE — 82607 VITAMIN B-12: CPT | Performed by: INTERNAL MEDICINE

## 2018-12-05 NOTE — PROGRESS NOTES
Subjective     CHIEF COMPLAINT:      Chief Complaint   Patient presents with   • Follow-up     no concerns       HISTORY OF PRESENT ILLNESS:     Guy Plascencia is a 67 y.o. male patient who returns today for follow up on thrombocytopenia and splenomegaly.  Patient is taking his B12 supplement once daily as instructed.  He is not having problems with bleeding or bruising.  No abdominal pain.    Patient states that he is no longer taking Mobic.    REVIEW OF SYSTEMS:  Review of Systems   Constitutional: Negative for chills, fever and unexpected weight change.   HENT: Negative for mouth sores, nosebleeds, sore throat and voice change.    Eyes: Negative for visual disturbance.   Respiratory: Negative for cough and shortness of breath.    Cardiovascular: Negative for chest pain and leg swelling.   Gastrointestinal: Positive for diarrhea. Negative for abdominal pain, blood in stool, constipation, nausea and vomiting.   Genitourinary: Negative for dysuria, frequency and hematuria.   Musculoskeletal: Negative for arthralgias, back pain and joint swelling.   Skin: Negative for rash.   Neurological: Negative for dizziness, numbness and headaches.   Hematological: Negative for adenopathy. Does not bruise/bleed easily.   Psychiatric/Behavioral: Negative for dysphoric mood. The patient is not nervous/anxious.      I verified the ROS obtained by the MA.        Past Medical History:   Diagnosis Date   • Arthritis    • Diabetes mellitus (CMS/HCC)     TYPE 2   • H/O  Multiple facial fractures 11/1968   • H/O Broken femur 11/1968   • History of transfusion     1968   • Hyperlipidemia    • Hypertension    • Kidney stone    • Neoplasm of uncertain behavior    • Peptic ulceration    • Plantar fasciitis    • Sleep apnea     CPAP   • Thrombocytosis (CMS/HCC)        Past Surgical History:   Procedure Laterality Date   • COSMETIC SURGERY  11/1968    MULTIPLE FACIAL FRACTURE   • FEMUR SURGERY  11/1968   • FRACTURE SURGERY      femur fx   •  OTHER SURGICAL HISTORY      stents, for kidney stones   • OTHER SURGICAL HISTORY      lithotripsy   • SKIN BIOPSY         Cancer-related family history includes Cancer in his brother and other; Skin cancer (age of onset: 50) in his brother.  Social History     Tobacco Use   • Smoking status: Former Smoker     Years: 14.00     Types: Cigarettes   • Smokeless tobacco: Never Used   • Tobacco comment: QUIT 1972  SMOKED 2PPD X10 YRS   Substance Use Topics   • Alcohol use: No       MEDICATIONS:    Current Outpatient Medications:   •  amLODIPine (NORVASC) 10 MG tablet, TAKE ONE TABLET BY MOUTH EVERY MORNING, Disp: 30 tablet, Rfl: 5  •  Aspirin (ASPIR-81 PO), Take  by mouth Daily., Disp: , Rfl:   •  baclofen (LIORESAL) 10 MG tablet, Take 1 tablet by mouth 3 (Three) Times a Day., Disp: 30 tablet, Rfl: 0  •  Blood Glucose Monitoring Suppl ([employer] KEYNOTE PRO METER) device, Patient needs to test bid. E11.8 and E11.65, Disp: 1 Device, Rfl: 0  •  Cholecalciferol (D3 ADULT PO), Take 2,000 Units by mouth Daily., Disp: , Rfl:   •  CINNAMON PO, Take 1,000 mg by mouth 4 (Four) Times a Day., Disp: , Rfl:   •  glipiZIDE-metFORMIN (METAGLIP) 5-500 MG per tablet, Take 2 tablets by mouth 2 (Two) Times a Day Before Meals., Disp: 120 tablet, Rfl: 4  •  hydrochlorothiazide (MICROZIDE) 12.5 MG capsule, TAKE ONE CAPSULE BY MOUTH DAILY, Disp: 30 capsule, Rfl: 0  •  JANUVIA 100 MG tablet, TAKE ONE TABLET BY MOUTH DAILY, Disp: 30 tablet, Rfl: 4  •  lisinopril (PRINIVIL,ZESTRIL) 40 MG tablet, TAKE ONE TABLET BY MOUTH DAILY, Disp: 30 tablet, Rfl: 4  •  pravastatin (PRAVACHOL) 40 MG tablet, TAKE ONE TABLET BY MOUTH DAILY, Disp: 30 tablet, Rfl: 4  •  pravastatin (PRAVACHOL) 40 MG tablet, TAKE ONE TABLET BY MOUTH DAILY, Disp: 30 tablet, Rfl: 4  •  vitamin B-12 (CYANOCOBALAMIN) 1000 MCG tablet, Take 1 tablet by mouth Daily., Disp: , Rfl:   •  Curb CallO test strip, USE TO TEST 2-3 TIMES DAILY, Disp: 100 each, Rfl: 10  •  WAVESENSE PRESTO test  "strip, TEST TWICE DAILY, Disp: 100 each, Rfl: 10  •  amLODIPine (NORVASC) 10 MG tablet, TAKE ONE TABLET BY MOUTH EVERY MORNING, Disp: 30 tablet, Rfl: 0  •  meloxicam (MOBIC) 7.5 MG tablet, Take 7.5 mg by mouth Daily., Disp: , Rfl:   •  meloxicam (MOBIC) 7.5 MG tablet, TAKE ONE TABLET BY MOUTH DAILY, Disp: 30 tablet, Rfl: 3    ALLERGIES:  Allergies   Allergen Reactions   • Penicillins Other (See Comments)     ALLERGY TESTING SHOWED ALL TO PCN.    • Cimetidine Rash         Objective   VITAL SIGNS:     Vitals:    12/05/18 1535   BP: (!) 181/75   Pulse: 84   Resp: 16   Temp: 98 °F (36.7 °C)   TempSrc: Oral   SpO2: 97%   Weight: 84.5 kg (186 lb 3.2 oz)   Height: 177 cm (69.69\")  Comment: new ht   PainSc: 0-No pain     Body mass index is 26.96 kg/m².     Wt Readings from Last 3 Encounters:   12/05/18 84.5 kg (186 lb 3.2 oz)   05/31/18 85.5 kg (188 lb 9.6 oz)   05/03/18 84.6 kg (186 lb 6.4 oz)       PHYSICAL EXAMINATION:  GENERAL:  The patient appears in good general condition, not in acute distress.  SKIN: Warm and dry. No skin rashes, ecchymosis or petechiae.  HEAD:  Normocephalic.  EYES:  No Jaundice. No Pallor.   NECK:  Supple with Good ROM. No Thyromegaly. No Masses.  LYMPHATICS:  No cervical or supraclavicular lymphadenopathy.  ABDOMEN:  Soft. No tenderness. No Hepatomegaly. No Splenomegaly. No masses.      DIAGNOSTIC DATA:     Results from last 7 days   Lab Units  12/05/18   1520   WBC 10*3/mm3  4.72   NEUTROS ABS 10*3/mm3  3.03   HEMOGLOBIN g/dL  14.2   HEMATOCRIT %  41.7   PLATELETS 10*3/mm3  126*  126*       Lab Results   Component Value Date    FOLATE 16.33 05/03/2018     Lab Results   Component Value Date    [id number] 829 12/05/2018    [id number] 291 05/03/2018       Assessment/Plan   1.  Thrombocytopenia dating back to 2015.  It is attributed to splenomegaly and vitamin B12 deficiency.  Platelet count has improved with B12 replacement.  The patient also used to take Mobic but he stopped taking it several " months ago.      2.  Splenomegaly.  Was described as being mild and stable compared 2016.  It is attributed to steatohepatitis.    3.  Pancreatic cysts in the tail of the pancreas measuring 1 cm.  It was seen on the CT scan 2016 but not the scan from 2012.  Based on the current guidelines, the radiologist recommended repeating a CT scan in 2 years (May 2020).      PLAN:    1.  Continue B12 1000 µg daily.    2.  Since his blood count is better, I commended increasing the time interval between the labs to a year.  I will see in follow-up in one year with CBC B12 and IPF.    3.  We will repeat CT scan of the abdomen in May 2020.        Michelle Sullivan MD  12/05/18

## 2018-12-12 DIAGNOSIS — IMO0002 UNCONTROLLED TYPE 2 DIABETES MELLITUS WITH COMPLICATION, WITHOUT LONG-TERM CURRENT USE OF INSULIN: ICD-10-CM

## 2018-12-12 DIAGNOSIS — E78.2 MIXED HYPERLIPIDEMIA: Primary | ICD-10-CM

## 2018-12-12 DIAGNOSIS — E55.9 VITAMIN D DEFICIENCY: ICD-10-CM

## 2018-12-13 DIAGNOSIS — IMO0002 DIABETES MELLITUS OUT OF CONTROL: ICD-10-CM

## 2018-12-13 DIAGNOSIS — I10 ESSENTIAL HYPERTENSION: ICD-10-CM

## 2018-12-13 RX ORDER — PRAVASTATIN SODIUM 40 MG
TABLET ORAL
Qty: 30 TABLET | Refills: 4 | Status: SHIPPED | OUTPATIENT
Start: 2018-12-13 | End: 2018-12-20 | Stop reason: DRUGHIGH

## 2018-12-13 RX ORDER — LISINOPRIL 40 MG/1
40 TABLET ORAL DAILY
Qty: 30 TABLET | Refills: 4 | Status: SHIPPED | OUTPATIENT
Start: 2018-12-13 | End: 2019-06-12 | Stop reason: SDUPTHER

## 2018-12-14 LAB
ALBUMIN SERPL-MCNC: 4.3 G/DL (ref 3.5–5.2)
ALBUMIN/CREAT UR: 588.8 MG/G CREAT (ref 0–30)
ALBUMIN/GLOB SERPL: 1.7 G/DL
ALP SERPL-CCNC: 62 U/L (ref 40–129)
ALT SERPL-CCNC: 32 U/L (ref 5–41)
AST SERPL-CCNC: 26 U/L (ref 5–40)
BASOPHILS # BLD AUTO: 0.03 10*3/MM3 (ref 0–0.2)
BASOPHILS NFR BLD AUTO: 0.7 % (ref 0–2)
BILIRUB SERPL-MCNC: 0.3 MG/DL (ref 0.2–1.2)
BUN SERPL-MCNC: 22 MG/DL (ref 8–23)
BUN/CREAT SERPL: 20.4 (ref 7–25)
CALCIUM SERPL-MCNC: 9.9 MG/DL (ref 8.8–10.5)
CHLORIDE SERPL-SCNC: 99 MMOL/L (ref 98–107)
CHOLEST SERPL-MCNC: 147 MG/DL (ref 0–200)
CO2 SERPL-SCNC: 29.8 MMOL/L (ref 22–29)
CREAT SERPL-MCNC: 1.08 MG/DL (ref 0.76–1.27)
CREAT UR-MCNC: 130.9 MG/DL
EOSINOPHIL # BLD AUTO: 0.07 10*3/MM3 (ref 0.1–0.3)
EOSINOPHIL NFR BLD AUTO: 1.5 % (ref 0–4)
ERYTHROCYTE [DISTWIDTH] IN BLOOD BY AUTOMATED COUNT: 13.7 % (ref 11.5–14.5)
GLOBULIN SER CALC-MCNC: 2.6 GM/DL
GLUCOSE SERPL-MCNC: 206 MG/DL (ref 65–99)
HBA1C MFR BLD: 8.3 % (ref 4.8–5.6)
HCT VFR BLD AUTO: 42.9 % (ref 42–52)
HDLC SERPL-MCNC: 34 MG/DL (ref 40–60)
HGB BLD-MCNC: 14.1 G/DL (ref 14–18)
IMM GRANULOCYTES # BLD: 0.01 10*3/MM3 (ref 0–0.03)
IMM GRANULOCYTES NFR BLD: 0.2 % (ref 0–0.5)
LDLC SERPL CALC-MCNC: 59 MG/DL (ref 0–100)
LDLC/HDLC SERPL: 1.73 {RATIO}
LYMPHOCYTES # BLD AUTO: 1.08 10*3/MM3 (ref 0.6–4.8)
LYMPHOCYTES NFR BLD AUTO: 23.4 % (ref 20–45)
MCH RBC QN AUTO: 28.7 PG (ref 27–31)
MCHC RBC AUTO-ENTMCNC: 32.9 G/DL (ref 31–37)
MCV RBC AUTO: 87.2 FL (ref 80–94)
MICROALBUMIN UR-MCNC: 770.7 UG/ML
MONOCYTES # BLD AUTO: 0.43 10*3/MM3 (ref 0–1)
MONOCYTES NFR BLD AUTO: 9.3 % (ref 3–8)
NEUTROPHILS # BLD AUTO: 2.99 10*3/MM3 (ref 1.5–8.3)
NEUTROPHILS NFR BLD AUTO: 64.9 % (ref 45–70)
NRBC BLD AUTO-RTO: 0 /100 WBC (ref 0–0)
PLATELET # BLD AUTO: 110 10*3/MM3 (ref 140–500)
POTASSIUM SERPL-SCNC: 4.1 MMOL/L (ref 3.5–5.2)
PROT SERPL-MCNC: 6.9 G/DL (ref 6–8.5)
RBC # BLD AUTO: 4.92 10*6/MM3 (ref 4.7–6.1)
SODIUM SERPL-SCNC: 141 MMOL/L (ref 136–145)
TRIGL SERPL-MCNC: 271 MG/DL (ref 0–150)
VLDLC SERPL CALC-MCNC: 54.2 MG/DL (ref 8–32)
WBC # BLD AUTO: 4.61 10*3/MM3 (ref 4.8–10.8)

## 2018-12-20 ENCOUNTER — RESULTS ENCOUNTER (OUTPATIENT)
Dept: FAMILY MEDICINE CLINIC | Facility: CLINIC | Age: 68
End: 2018-12-20

## 2018-12-20 ENCOUNTER — OFFICE VISIT (OUTPATIENT)
Dept: FAMILY MEDICINE CLINIC | Facility: CLINIC | Age: 68
End: 2018-12-20

## 2018-12-20 VITALS
WEIGHT: 183 LBS | BODY MASS INDEX: 26.2 KG/M2 | DIASTOLIC BLOOD PRESSURE: 90 MMHG | TEMPERATURE: 98.8 F | HEART RATE: 84 BPM | SYSTOLIC BLOOD PRESSURE: 174 MMHG | HEIGHT: 70 IN | OXYGEN SATURATION: 97 %

## 2018-12-20 DIAGNOSIS — Z12.12 SCREENING FOR MALIGNANT NEOPLASM OF THE RECTUM: ICD-10-CM

## 2018-12-20 DIAGNOSIS — Z12.11 SPECIAL SCREENING FOR MALIGNANT NEOPLASMS, COLON: ICD-10-CM

## 2018-12-20 DIAGNOSIS — I10 ESSENTIAL HYPERTENSION: ICD-10-CM

## 2018-12-20 DIAGNOSIS — E78.2 MIXED HYPERLIPIDEMIA: ICD-10-CM

## 2018-12-20 DIAGNOSIS — IMO0002 UNCONTROLLED TYPE 2 DIABETES MELLITUS WITH COMPLICATION, WITHOUT LONG-TERM CURRENT USE OF INSULIN: Primary | ICD-10-CM

## 2018-12-20 PROCEDURE — 99214 OFFICE O/P EST MOD 30 MIN: CPT | Performed by: PHYSICIAN ASSISTANT

## 2018-12-20 NOTE — PROGRESS NOTES
Subjective   Guy Plascencia is a 68 y.o. male.       Chief Complaint   Patient presents with   • Diabetes   • Hypertension       History of Present Illness     Guy is a 68 year old male who presents for diabetes and hypertension/hyperlipidemia management.  Guy states he's been checking his blood sugars at home which has been running anywhere from 160-220.  His blood sugar this morning was 185.  His diet has not been very healthy.  He tends to eat more printed and possible.  Sleep has been normal.  He is not very physically active.  Guy states he has not seen any change with the Januvia medications with his blood sugars.  He would like to try something else.  Denied any chest pain, shortness of air, dizziness, vision changes or swelling of ankles.  Bowel movements have been daily without dark black tarry stools.  He hasn't had a colonoscopy in more than 15 years.  Last colonoscopy was normal.  Denied any family history of colon cancer.      The following portions of the patient's history were reviewed and updated as appropriate: allergies, current medications, past family history, past medical history, past social history and past surgical history.    Review of Systems   Constitutional: Negative.    HENT: Negative.    Eyes: Negative.    Respiratory: Negative.  Negative for cough, shortness of breath and wheezing.    Cardiovascular: Negative.  Negative for chest pain, palpitations and leg swelling.   Gastrointestinal: Negative.    Endocrine: Negative.    Genitourinary: Negative.    Musculoskeletal: Negative.    Skin: Negative.    Allergic/Immunologic: Negative.    Neurological: Negative.  Negative for dizziness, light-headedness and headaches.   Hematological: Negative.    Psychiatric/Behavioral: Negative.        Social History     Tobacco Use   • Smoking status: Former Smoker     Years: 14.00     Types: Cigarettes   • Smokeless tobacco: Never Used   • Tobacco comment: QUIT 1972  SMOKED 2PPD X10 YRS  "  Substance Use Topics   • Alcohol use: No   • Drug use: No     Vitals:    12/20/18 0842   BP: 174/90   Pulse: 84   Temp: 98.8 °F (37.1 °C)   TempSrc: Oral   SpO2: 97%   Weight: 83 kg (183 lb)   Height: 177 cm (69.69\")     Wt Readings from Last 3 Encounters:   12/20/18 83 kg (183 lb)   12/05/18 84.5 kg (186 lb 3.2 oz)   05/31/18 85.5 kg (188 lb 9.6 oz)       BP Readings from Last 3 Encounters:   12/20/18 174/90   12/05/18 (!) 181/75   05/31/18 157/85     Allergies   Allergen Reactions   • Penicillins Other (See Comments)     ALLERGY TESTING SHOWED ALL TO PCN.    • Cimetidine Rash       Body mass index is 26.49 kg/m².    Objective   Physical Exam   Constitutional: He is oriented to person, place, and time. Vital signs are normal. He appears well-developed and well-nourished.   Neck: Trachea normal and phonation normal. Neck supple. Carotid bruit is not present. No edema present. No thyroid mass and no thyromegaly present.   Cardiovascular: Normal rate, regular rhythm, S1 normal, S2 normal, normal heart sounds and normal pulses.   Pulmonary/Chest: Effort normal and breath sounds normal.   Abdominal: Soft. Normal appearance, normal aorta and bowel sounds are normal. There is no hepatomegaly. There is no tenderness.   Neurological: He is alert and oriented to person, place, and time.   Skin: Skin is warm, dry and intact. Capillary refill takes less than 2 seconds.   Psychiatric: He has a normal mood and affect. His speech is normal and behavior is normal. Judgment and thought content normal. Cognition and memory are normal.       Assessment/Plan   Guy was seen today for diabetes and hypertension.    Diagnoses and all orders for this visit:    Mixed hyperlipidemia    Essential hypertension  -     metoprolol tartrate (LOPRESSOR) 25 MG tablet; Take 1 tablet by mouth 2 (Two) Times a Day.    Uncontrolled type 2 diabetes mellitus with complication, without long-term current use of insulin (CMS/[facility])  -     Ambulatory " Referral for Diabetic Eye Exam-Ophthalmology    Special screening for malignant neoplasms, colon  -     Cologuard - Stool, Per Rectum; Future    Screening for malignant neoplasm of the rectum  -     Cologuard - Stool, Per Rectum; Future      1.  Chronic and uncontrolled type 2 diabetes with hyperlipidemia: I reviewed Guy's lab work with him at office visit.  States he has not seen any improvement with his blood sugars with the Januvia.  I have given him samples of Farxiga to use with his glipizide/metformin medication.  He will keep a blood sugar log at home.  Plan to follow-up with fasting blood sugar in one month.  I've encouraged him to decrease his carbohydrates and sugar in diet as well.  Needs to get his diabetes under control. Guy voiced understanding. I've also given him a diabetic eye form to take to his ophthalmologist.  2.  Chronic and uncontrolled hypertension: I have rechecked his blood pressure at office visit today in got 170/94 in left arm.  He states he is been compliant with medication.  I will add metoprolol 25 mg twice a day to his current medications.  Guy will return to office in 7-12 days for blood pressure check.  He was instructed to drink more water and to decrease his salt and sodium in diet.  3.  Need for screening malignant neoplasms of colon and rectum: I will schedule a cold guard testing.  He'll be notified of test results when completed.    Orders Only on 12/12/2018   Component Date Value Ref Range Status   • WBC 12/13/2018 4.61* 4.80 - 10.80 10*3/mm3 Final   • RBC 12/13/2018 4.92  4.70 - 6.10 10*6/mm3 Final   • Hemoglobin 12/13/2018 14.1  14.0 - 18.0 g/dL Final   • Hematocrit 12/13/2018 42.9  42.0 - 52.0 % Final   • MCV 12/13/2018 87.2  80.0 - 94.0 fL Final   • MCH 12/13/2018 28.7  27.0 - 31.0 pg Final   • MCHC 12/13/2018 32.9  31.0 - 37.0 g/dL Final   • RDW 12/13/2018 13.7  11.5 - 14.5 % Final   • Platelets 12/13/2018 110* 140 - 500 10*3/mm3 Final   • Neutrophil Rel %  12/13/2018 64.9  45.0 - 70.0 % Final   • Lymphocyte Rel % 12/13/2018 23.4  20.0 - 45.0 % Final   • Monocyte Rel % 12/13/2018 9.3* 3.0 - 8.0 % Final   • Eosinophil Rel % 12/13/2018 1.5  0.0 - 4.0 % Final   • Basophil Rel % 12/13/2018 0.7  0.0 - 2.0 % Final   • Neutrophils Absolute 12/13/2018 2.99  1.50 - 8.30 10*3/mm3 Final   • Lymphocytes Absolute 12/13/2018 1.08  0.60 - 4.80 10*3/mm3 Final   • Monocytes Absolute 12/13/2018 0.43  0.00 - 1.00 10*3/mm3 Final   • Eosinophils Absolute 12/13/2018 0.07* 0.10 - 0.30 10*3/mm3 Final   • Basophils Absolute 12/13/2018 0.03  0.00 - 0.20 10*3/mm3 Final   • Immature Granulocyte Rel % 12/13/2018 0.2  0.0 - 0.5 % Final   • Immature Grans Absolute 12/13/2018 0.01  0.00 - 0.03 10*3/mm3 Final   • nRBC 12/13/2018 0.0  0.0 - 0.0 /100 WBC Final   • Glucose 12/13/2018 206* 65 - 99 mg/dL Final   • BUN 12/13/2018 22  8 - 23 mg/dL Final   • Creatinine 12/13/2018 1.08  0.76 - 1.27 mg/dL Final   • eGFR Non  Am 12/13/2018 68  >60 mL/min/1.73 Final   • eGFR African Am 12/13/2018 82  >60 mL/min/1.73 Final   • BUN/Creatinine Ratio 12/13/2018 20.4  7.0 - 25.0 Final   • Sodium 12/13/2018 141  136 - 145 mmol/L Final   • Potassium 12/13/2018 4.1  3.5 - 5.2 mmol/L Final   • Chloride 12/13/2018 99  98 - 107 mmol/L Final   • Total CO2 12/13/2018 29.8* 22.0 - 29.0 mmol/L Final   • Calcium 12/13/2018 9.9  8.8 - 10.5 mg/dL Final   • Total Protein 12/13/2018 6.9  6.0 - 8.5 g/dL Final   • Albumin 12/13/2018 4.30  3.50 - 5.20 g/dL Final   • Globulin 12/13/2018 2.6  gm/dL Final   • A/G Ratio 12/13/2018 1.7  g/dL Final   • Total Bilirubin 12/13/2018 0.3  0.2 - 1.2 mg/dL Final   • Alkaline Phosphatase 12/13/2018 62  40 - 129 U/L Final   • AST (SGOT) 12/13/2018 26  5 - 40 U/L Final   • ALT (SGPT) 12/13/2018 32  5 - 41 U/L Final   • Total Cholesterol 12/13/2018 147  0 - 200 mg/dL Final   • Triglycerides 12/13/2018 271* 0 - 150 mg/dL Final   • HDL Cholesterol 12/13/2018 34* 40 - 60 mg/dL Final   • VLDL  Cholesterol 12/13/2018 54.2* 8 - 32 mg/dL Final   • LDL Cholesterol  12/13/2018 59  0 - 100 mg/dL Final   • LDL/HDL Ratio 12/13/2018 1.73   Final   • Hemoglobin A1C 12/13/2018 8.30* 4.80 - 5.60 % Final   • Creatinine, Urine 12/13/2018 130.9  Not Estab. mg/dL Final   • Microalbumin, Urine 12/13/2018 770.7  Not Estab. ug/mL Final    Comment: Results confirmed on  dilution.     • Microalbumin/Creatinine Ratio 12/13/2018 588.8* 0.0 - 30.0 mg/g creat Final    Comment:                      Normal:                0.0 -  30.0                       Albuminuria:          31.0 - 300.0                       Clinical albuminuria:       >300.0

## 2018-12-30 DIAGNOSIS — I10 ESSENTIAL HYPERTENSION: ICD-10-CM

## 2018-12-31 RX ORDER — HYDROCHLOROTHIAZIDE 12.5 MG/1
CAPSULE, GELATIN COATED ORAL
Qty: 30 CAPSULE | Refills: 5 | Status: SHIPPED | OUTPATIENT
Start: 2018-12-31 | End: 2019-06-28 | Stop reason: SDUPTHER

## 2019-01-01 ENCOUNTER — OFFICE VISIT (OUTPATIENT)
Dept: RETAIL CLINIC | Facility: CLINIC | Age: 69
End: 2019-01-01

## 2019-01-01 VITALS
DIASTOLIC BLOOD PRESSURE: 80 MMHG | SYSTOLIC BLOOD PRESSURE: 154 MMHG | RESPIRATION RATE: 18 BRPM | TEMPERATURE: 98.4 F | OXYGEN SATURATION: 96 % | HEART RATE: 80 BPM

## 2019-01-01 DIAGNOSIS — J40 BRONCHITIS: Primary | ICD-10-CM

## 2019-01-01 DIAGNOSIS — B30.9 VIRAL CONJUNCTIVITIS: ICD-10-CM

## 2019-01-01 PROCEDURE — 99213 OFFICE O/P EST LOW 20 MIN: CPT | Performed by: NURSE PRACTITIONER

## 2019-01-01 RX ORDER — AZITHROMYCIN 250 MG/1
TABLET, FILM COATED ORAL
Qty: 6 TABLET | Refills: 0 | Status: SHIPPED | OUTPATIENT
Start: 2019-01-01 | End: 2019-01-31

## 2019-01-01 NOTE — PROGRESS NOTES
"Subjective   Guy Plascencia is a 68 y.o. male.     Patient states symptoms started as a \"head cold\" but now cough has become more frequent and harsh. His right eye began with redness 2 days ago followed by the left yesterday. His eyes feel \"gritty\" but he denies pain.      URI    This is a new problem. Episode onset: 1 week ago. The problem has been gradually worsening. Maximum temperature: not measured. Associated symptoms include congestion, coughing, headaches, rhinorrhea and sneezing. Pertinent negatives include no chest pain, diarrhea, ear pain, nausea, plugged ear sensation, sinus pain, sore throat, swollen glands, vomiting or wheezing. Treatments tried: mucinex. The treatment provided no relief.       The following portions of the patient's history were reviewed and updated as appropriate: allergies, current medications, past medical history, past social history, past surgical history and problem list.    Review of Systems   Constitutional: Positive for chills and fatigue. Negative for appetite change and diaphoresis.   HENT: Positive for congestion, postnasal drip, rhinorrhea and sneezing. Negative for ear discharge, ear pain, mouth sores, nosebleeds, sinus pressure, sinus pain, sore throat, trouble swallowing and voice change.    Eyes: Positive for discharge and redness. Negative for photophobia, pain, itching and visual disturbance.   Respiratory: Positive for cough. Negative for chest tightness, shortness of breath, wheezing and stridor.    Cardiovascular: Negative for chest pain and palpitations.   Gastrointestinal: Negative for diarrhea, nausea and vomiting.   Genitourinary: Negative for decreased urine volume.   Musculoskeletal: Negative for myalgias.   Allergic/Immunologic: Positive for environmental allergies and immunocompromised state.   Neurological: Positive for headaches. Negative for dizziness, syncope and weakness.       Objective   Physical Exam   Constitutional: He is oriented to person, " place, and time. He appears well-developed and well-nourished. He is cooperative.  Non-toxic appearance. He does not appear ill. No distress.   HENT:   Right Ear: Tympanic membrane, external ear and ear canal normal.   Left Ear: Tympanic membrane, external ear and ear canal normal.   Nose: Nose normal. Right sinus exhibits no maxillary sinus tenderness and no frontal sinus tenderness. Left sinus exhibits no maxillary sinus tenderness and no frontal sinus tenderness.   Mouth/Throat: Uvula is midline and mucous membranes are normal. No oral lesions. No uvula swelling. Posterior oropharyngeal erythema present. No oropharyngeal exudate or posterior oropharyngeal edema. Tonsils are 1+ on the right. Tonsils are 2+ on the left. No tonsillar exudate.   Eyes: EOM are normal. Pupils are equal, round, and reactive to light. Right eye exhibits chemosis. Right eye exhibits no discharge and no hordeolum. Left eye exhibits chemosis and discharge (small amount of thick, white drainage in inner canthus). Left eye exhibits no hordeolum. Right conjunctiva is injected. Right conjunctiva has no hemorrhage. Left conjunctiva is injected. Left conjunctiva has no hemorrhage.   Upper and lower lids mildly edematous and erythematous     Cardiovascular: Normal rate, regular rhythm, S1 normal and S2 normal.   Pulmonary/Chest: Effort normal. He has decreased breath sounds in the right lower field and the left lower field. He has no wheezes. He has rhonchi (clears after forceful cough) in the right upper field and the left upper field. He has no rales.   Lymphadenopathy:     He has no cervical adenopathy.   Neurological: He is alert and oriented to person, place, and time.   Skin: Skin is warm and dry. He is not diaphoretic. No pallor.   Vitals reviewed.      Assessment/Plan   Guy was seen today for uri.    Diagnoses and all orders for this visit:    Bronchitis  -     azithromycin (ZITHROMAX Z-JORGITO) 250 MG tablet; Take 2 tablets the first day,  then 1 tablet daily for 4 days.    Viral conjunctivitis  -     naphazoline-pheniramine (NAPHCON-A) 0.025-0.3 % ophthalmic solution; Administer 1 drop to both eyes 4 (Four) Times a Day As Needed for Irritation for up to 3 days.             -     May continue with mucinex for congestion relief-increase H2O with use         -     Cold compresses to eyes TID PRN        -     Follow up with PCP for persistent symptoms or UC/ER for worsening symptoms

## 2019-01-23 ENCOUNTER — TELEPHONE (OUTPATIENT)
Dept: FAMILY MEDICINE CLINIC | Facility: CLINIC | Age: 69
End: 2019-01-23

## 2019-01-28 RX ORDER — AMLODIPINE BESYLATE 10 MG/1
10 TABLET ORAL EVERY MORNING
Qty: 30 TABLET | Refills: 5 | Status: SHIPPED | OUTPATIENT
Start: 2019-01-28 | End: 2019-08-04 | Stop reason: SDUPTHER

## 2019-01-31 ENCOUNTER — OFFICE VISIT (OUTPATIENT)
Dept: FAMILY MEDICINE CLINIC | Facility: CLINIC | Age: 69
End: 2019-01-31

## 2019-01-31 VITALS
RESPIRATION RATE: 16 BRPM | DIASTOLIC BLOOD PRESSURE: 64 MMHG | WEIGHT: 178 LBS | HEART RATE: 74 BPM | BODY MASS INDEX: 25.48 KG/M2 | OXYGEN SATURATION: 98 % | HEIGHT: 70 IN | SYSTOLIC BLOOD PRESSURE: 120 MMHG | TEMPERATURE: 98.1 F

## 2019-01-31 DIAGNOSIS — Z00.00 MEDICARE ANNUAL WELLNESS VISIT, SUBSEQUENT: Primary | ICD-10-CM

## 2019-01-31 DIAGNOSIS — E78.2 MIXED HYPERLIPIDEMIA: ICD-10-CM

## 2019-01-31 DIAGNOSIS — Z23 NEED FOR STREPTOCOCCUS PNEUMONIAE VACCINATION: ICD-10-CM

## 2019-01-31 DIAGNOSIS — D69.6 THROMBOCYTOPENIA (HCC): ICD-10-CM

## 2019-01-31 DIAGNOSIS — I10 ESSENTIAL HYPERTENSION: ICD-10-CM

## 2019-01-31 DIAGNOSIS — IMO0002 UNCONTROLLED TYPE 2 DIABETES MELLITUS WITH COMPLICATION, WITHOUT LONG-TERM CURRENT USE OF INSULIN: ICD-10-CM

## 2019-01-31 LAB
BUN SERPL-MCNC: 18 MG/DL (ref 8–23)
BUN/CREAT SERPL: 17.6 (ref 7–25)
CALCIUM SERPL-MCNC: 9.9 MG/DL (ref 8.8–10.5)
CHLORIDE SERPL-SCNC: 100 MMOL/L (ref 98–107)
CO2 SERPL-SCNC: 29.7 MMOL/L (ref 22–29)
CREAT SERPL-MCNC: 1.02 MG/DL (ref 0.76–1.27)
GLUCOSE SERPL-MCNC: 162 MG/DL (ref 65–99)
POTASSIUM SERPL-SCNC: 4 MMOL/L (ref 3.5–5.2)
SODIUM SERPL-SCNC: 140 MMOL/L (ref 136–145)

## 2019-01-31 PROCEDURE — 99212 OFFICE O/P EST SF 10 MIN: CPT | Performed by: PHYSICIAN ASSISTANT

## 2019-01-31 PROCEDURE — G0009 ADMIN PNEUMOCOCCAL VACCINE: HCPCS | Performed by: PHYSICIAN ASSISTANT

## 2019-01-31 PROCEDURE — 90732 PPSV23 VACC 2 YRS+ SUBQ/IM: CPT | Performed by: PHYSICIAN ASSISTANT

## 2019-01-31 PROCEDURE — G0439 PPPS, SUBSEQ VISIT: HCPCS | Performed by: PHYSICIAN ASSISTANT

## 2019-01-31 NOTE — PROGRESS NOTES
QUICK REFERENCE INFORMATION:  The ABCs of the Annual Wellness Visit    Subsequent Medicare Wellness Visit    HEALTH RISK ASSESSMENT    1950    Recent Hospitalizations:  No hospitalization(s) within the last year..        Current Medical Providers:  Patient Care Team:  Chelsea Mcelroy PA-C as PCP - General  Chelsea Mcelroy PA-C as PCP - Claims Attributed  Chelsea Mcelroy PA-C as Referring Physician (Family Medicine)  Michelle Sullivan MD as Consulting Physician (Hematology and Oncology)        Smoking Status:  Social History     Tobacco Use   Smoking Status Former Smoker   • Years: 14.00   • Types: Cigarettes   Smokeless Tobacco Never Used   Tobacco Comment    QUIT 1972  SMOKED 2PPD X10 YRS       Alcohol Consumption:  Social History     Substance and Sexual Activity   Alcohol Use No       Depression Screen:   PHQ-2/PHQ-9 Depression Screening 1/31/2019   Little interest or pleasure in doing things 0   Feeling down, depressed, or hopeless 0   Trouble falling or staying asleep, or sleeping too much 0   Feeling tired or having little energy 0   Poor appetite or overeating 1   Feeling bad about yourself - or that you are a failure or have let yourself or your family down 0   Trouble concentrating on things, such as reading the newspaper or watching television 0   Moving or speaking so slowly that other people could have noticed. Or the opposite - being so fidgety or restless that you have been moving around a lot more than usual 0   Thoughts that you would be better off dead, or of hurting yourself in some way 0   Total Score 1       Health Habits and Functional and Cognitive Screening:  Functional & Cognitive Status 1/31/2019   Do you have difficulty preparing food and eating? No   Do you have difficulty bathing yourself, getting dressed or grooming yourself? No   Do you have difficulty using the toilet? No   Do you have difficulty moving around from place to place? No   Do you have trouble with steps or  getting out of a bed or a chair? No   In the past year have you fallen or experienced a near fall? No   Current Diet Well Balanced Diet   Dental Exam Up to date   Eye Exam Up to date   Exercise (times per week) 0 times per week   Do you need help using the phone?  No   Are you deaf or do you have serious difficulty hearing?  No   Do you need help with transportation? No   Do you need help shopping? No   Do you need help preparing meals?  No   Do you need help with housework?  No   Do you need help with laundry? No   Do you need help taking your medications? No   Do you need help managing money? No   Do you ever drive or ride in a car without wearing a seat belt? No   Have you felt unusual stress, anger or loneliness in the last month? No   Who do you live with? Spouse   If you need help, do you have trouble finding someone available to you? No   Have you been bothered in the last four weeks by sexual problems? Yes   Do you have difficulty concentrating, remembering or making decisions? Yes           Does the patient have evidence of cognitive impairment? No    Aspirin use counseling: Taking ASA appropriately as indicated      Recent Lab Results:  CMP:  Lab Results   Component Value Date     (H) 12/13/2018    BUN 22 12/13/2018    CREATININE 1.08 12/13/2018    EGFRIFNONA 68 12/13/2018    EGFRIFAFRI 82 12/13/2018    BCR 20.4 12/13/2018     12/13/2018    K 4.1 12/13/2018    CO2 29.8 (H) 12/13/2018    CALCIUM 9.9 12/13/2018    PROTENTOTREF 6.9 12/13/2018    ALBUMIN 4.30 12/13/2018    LABGLOBREF 2.6 12/13/2018    LABIL2 1.7 12/13/2018    BILITOT 0.3 12/13/2018    ALKPHOS 62 12/13/2018    AST 26 12/13/2018    ALT 32 12/13/2018     Lipid Panel:  Lab Results   Component Value Date    TRIG 271 (H) 12/13/2018    HDL 34 (L) 12/13/2018    VLDL 54.2 (H) 12/13/2018    LDLHDL 1.73 12/13/2018     HbA1c:  Lab Results   Component Value Date    HGBA1C 8.30 (H) 12/13/2018       Visual Acuity:  No exam data  present    Age-appropriate Screening Schedule:  Refer to the list below for future screening recommendations based on patient's age, sex and/or medical conditions. Orders for these recommended tests are listed in the plan section. The patient has been provided with a written plan.    Health Maintenance   Topic Date Due   • TDAP/TD VACCINES (1 - Tdap) 12/16/1968   • ZOSTER VACCINE (1 of 2) 12/16/1999   • COLONOSCOPY  03/03/2016   • DIABETIC EYE EXAM  09/26/2018   • PNEUMOCOCCAL VACCINES (65+ LOW/MEDIUM RISK) (2 of 2 - PPSV23) 01/18/2019   • DIABETIC FOOT EXAM  01/18/2019   • HEMOGLOBIN A1C  06/13/2019   • LIPID PANEL  12/13/2019   • URINE MICROALBUMIN  12/13/2019   • INFLUENZA VACCINE  Addressed        Subjective   History of Present Illness    Guy Plascencia is a 68 y.o. male who presents for an Subsequent Wellness Visit. He has seen an improvement with the Farxiga medication.  He has lost 5 pounds since December 2018.  Appetite has not been very healthy.  Sleep has been normal.  His blood sugars have been running 180-190's at home. Bowel  movements have been daily without dark black tarry stools.  Denied any chest pain,shortness of air,dizziness,swelling of ankles,URI symptoms or abdomen pain.      The following portions of the patient's history were reviewed and updated as appropriate: allergies, current medications, past family history, past medical history, past social history and past surgical history.    Outpatient Medications Prior to Visit   Medication Sig Dispense Refill   • amLODIPine (NORVASC) 10 MG tablet Take 1 tablet by mouth Every Morning. 30 tablet 5   • Aspirin (ASPIR-81 PO) Take  by mouth Daily.     • Blood Glucose Monitoring Suppl ([employer] KEYNOTE PRO METER) device Patient needs to test bid. E11.8 and E11.65 1 Device 0   • Cholecalciferol (D3 ADULT PO) Take 2,000 Units by mouth Daily.     • CINNAMON PO Take 1,000 mg by mouth 4 (Four) Times a Day.     • glipiZIDE-metFORMIN (METAGLIP) 5-500 MG per  tablet Take 2 tablets by mouth 2 (Two) Times a Day Before Meals. 120 tablet 4   • hydrochlorothiazide (MICROZIDE) 12.5 MG capsule TAKE ONE CAPSULE BY MOUTH DAILY 30 capsule 5   • lisinopril (PRINIVIL,ZESTRIL) 40 MG tablet Take 1 tablet by mouth Daily. 30 tablet 4   • meloxicam (MOBIC) 7.5 MG tablet TAKE ONE TABLET BY MOUTH DAILY 30 tablet 3   • metoprolol tartrate (LOPRESSOR) 25 MG tablet Take 1 tablet by mouth 2 (Two) Times a Day. 60 tablet 3   • pravastatin (PRAVACHOL) 40 MG tablet TAKE ONE TABLET BY MOUTH DAILY 30 tablet 4   • vitamin B-12 (CYANOCOBALAMIN) 1000 MCG tablet Take 1 tablet by mouth Daily.     • WAVESENSE PRESTO test strip USE TO TEST 2-3 TIMES DAILY 100 each 10   • WAVESENSE PRESTO test strip TEST TWICE DAILY 100 each 10   • dapagliflozin (FARXIGA) 5 MG tablet tablet Take 1 tablet by mouth Daily. 28 tablet 0   • azithromycin (ZITHROMAX Z-JORGITO) 250 MG tablet Take 2 tablets the first day, then 1 tablet daily for 4 days. 6 tablet 0     No facility-administered medications prior to visit.        Patient Active Problem List   Diagnosis   • Bilateral shoulder pain   • Foot pain   • Uncontrolled type 2 diabetes mellitus with complication, without long-term current use of insulin (CMS/[facility])   • Eczema   • Impotence of organic origin   • Mixed hyperlipidemia   • Essential hypertension   • Hypogonadism in male   • Obstructive sleep apnea syndrome   • Thrombocytopenia (CMS/HCC)   • Vitamin D deficiency   • Neoplasm of uncertain behavior   • Left ureteral stone   • Ureterolithiasis   • Trigger middle finger of right hand   • Acute pain of left shoulder   • Neck strain   • Sleep apnea   • Special screening for malignant neoplasms, colon   • Need for Streptococcus pneumoniae vaccination   • Splenomegaly   • Steatohepatitis   • Pancreatic cyst   • Medicare annual wellness visit, subsequent       Advance Care Planning:  has NO advance directive - not interested in additional information    Identification of Risk  Factors:  Risk factors include: weight , unhealthy diet and cardiovascular risk.    Review of Systems   Constitutional: Negative.    HENT: Negative.    Eyes: Negative.    Respiratory: Negative.  Negative for cough, shortness of breath and wheezing.    Cardiovascular: Negative.  Negative for chest pain, palpitations and leg swelling.   Gastrointestinal: Negative.  Negative for constipation, diarrhea, nausea and vomiting.   Endocrine: Negative.    Genitourinary: Negative.    Musculoskeletal: Negative.    Skin: Negative.    Allergic/Immunologic: Negative.    Neurological: Negative.  Negative for dizziness, light-headedness and headaches.   Hematological: Negative.    Psychiatric/Behavioral: Negative.  Negative for sleep disturbance and suicidal ideas.   All other systems reviewed and are negative.      Compared to one year ago, the patient feels his physical health is the same.  Compared to one year ago, the patient feels his mental health is the same.    Objective     Physical Exam   Constitutional: He is oriented to person, place, and time. Vital signs are normal. He appears well-developed and well-nourished.   Neck: Trachea normal and phonation normal. Neck supple. Carotid bruit is not present. No thyroid mass and no thyromegaly present.   Cardiovascular: Normal rate, regular rhythm, S1 normal, S2 normal, normal heart sounds and normal pulses.   Pulmonary/Chest: Effort normal and breath sounds normal.   Abdominal: Soft. Normal appearance, normal aorta and bowel sounds are normal. There is no hepatomegaly. There is no tenderness.    Guy had a diabetic foot exam performed today.   During the foot exam he had a monofilament test performed.    Neurological Sensory Findings -  Unaltered sharp/dull right ankle/foot discrimination and unaltered sharp/dull left ankle/foot discrimination.  Vascular Status -  His right foot exhibits normal foot vasculature  and no edema. His left foot exhibits normal foot vasculature  and  "no edema.  Skin Integrity  -  His right foot skin is intact.His left foot skin is intact..  Neurological: He is alert and oriented to person, place, and time.   Skin: Skin is warm, dry and intact. Capillary refill takes less than 2 seconds.   Psychiatric: He has a normal mood and affect. His speech is normal and behavior is normal. Judgment and thought content normal. Cognition and memory are normal.     Physical Exam     Feet: Diabetic foot exam performed during this visit.    Monofilament test performed  Right foot - number of sites tested - 10  Right foot - number of sites sensed - 10  Left foot number of sites tested 10  Left foot - number of sites sensed 10.  Right foot first MTP joint ROM is normal.  Left foot first MTP joint ROM is normal.  Vascular Status: right foot vasculature normal.  Left foot vasculature normal.  Right foot skin intact. Left foot skin intact.     Vitals:    01/31/19 0920   BP: 120/64   BP Location: Left arm   Patient Position: Sitting   Cuff Size: Adult   Pulse: 74   Resp: 16   Temp: 98.1 °F (36.7 °C)   TempSrc: Oral   SpO2: 98%   Weight: 80.7 kg (178 lb)   Height: 177.8 cm (70\")     Wt Readings from Last 3 Encounters:   01/31/19 80.7 kg (178 lb)   12/20/18 83 kg (183 lb)   12/05/18 84.5 kg (186 lb 3.2 oz)       BP Readings from Last 3 Encounters:   01/31/19 120/64   01/01/19 154/80   12/20/18 174/90     Patient's Body mass index is 25.54 kg/m². BMI is above normal parameters. Recommendations include: exercise counseling and nutrition counseling.      Assessment/Plan   Patient Self-Management and Personalized Health Advice  The patient has been provided with information about: diet and exercise and preventive services including:   · Pneumococcal vaccine .    Visit Diagnoses:    ICD-10-CM ICD-9-CM   1. Medicare annual wellness visit, subsequent Z00.00 V70.0   2. Uncontrolled type 2 diabetes mellitus with complication, without long-term current use of insulin (CMS/[facility]) E11.8 250.82    " E11.65    3. Essential hypertension I10 401.9   4. Mixed hyperlipidemia E78.2 272.2   5. Thrombocytopenia (CMS/[facility]) D69.6 287.5   6. Need for Streptococcus pneumoniae vaccination Z23 V03.82       Orders Placed This Encounter   Procedures   • Pneumococcal Polysaccharide Vaccine 23-Valent Greater Than or Equal To 3yo Subcutaneous / IM   • Basic Metabolic Panel     1.  Annual medicare wellness exam with hypertension:  I have rechecked Guy's blood pressure at office visit today and got 130/78 in left arm.  He will  continue his current blood pressure medications.  Plan to follow up in 3 months.  2.  Chronic and improving type 2 diabetes and Hyperlipidemia:  I have refilled his Farxiga medication to his pharmacy.  He has seen an improvement with weight and sugar readings.  He was instructed to decreased his sugars and carbs in diet.   Guy will have a BMP collected at office visit today.  He will be notified of test results when completed with any medications changes.  3.  New for pneumonia vaccination:  He has given written consent and will received the Pneumovax 23 immunization today.  4.  Chronic thrombocytopenia:  He will keep his appointment with Hematologist.      Outpatient Encounter Medications as of 1/31/2019   Medication Sig Dispense Refill   • amLODIPine (NORVASC) 10 MG tablet Take 1 tablet by mouth Every Morning. 30 tablet 5   • Aspirin (ASPIR-81 PO) Take  by mouth Daily.     • Blood Glucose Monitoring Suppl ([employer] KEYNOTE PRO METER) device Patient needs to test bid. E11.8 and E11.65 1 Device 0   • Cholecalciferol (D3 ADULT PO) Take 2,000 Units by mouth Daily.     • CINNAMON PO Take 1,000 mg by mouth 4 (Four) Times a Day.     • dapagliflozin (FARXIGA) 5 MG tablet tablet Take 1 tablet by mouth Daily. 30 tablet 6   • glipiZIDE-metFORMIN (METAGLIP) 5-500 MG per tablet Take 2 tablets by mouth 2 (Two) Times a Day Before Meals. 120 tablet 4   • hydrochlorothiazide (MICROZIDE) 12.5 MG capsule TAKE ONE  CAPSULE BY MOUTH DAILY 30 capsule 5   • lisinopril (PRINIVIL,ZESTRIL) 40 MG tablet Take 1 tablet by mouth Daily. 30 tablet 4   • meloxicam (MOBIC) 7.5 MG tablet TAKE ONE TABLET BY MOUTH DAILY 30 tablet 3   • metoprolol tartrate (LOPRESSOR) 25 MG tablet Take 1 tablet by mouth 2 (Two) Times a Day. 60 tablet 3   • pravastatin (PRAVACHOL) 40 MG tablet TAKE ONE TABLET BY MOUTH DAILY 30 tablet 4   • vitamin B-12 (CYANOCOBALAMIN) 1000 MCG tablet Take 1 tablet by mouth Daily.     • WAVESENSE PRESTO test strip USE TO TEST 2-3 TIMES DAILY 100 each 10   • WAVESENSE PRESTO test strip TEST TWICE DAILY 100 each 10   • [DISCONTINUED] dapagliflozin (FARXIGA) 5 MG tablet tablet Take 1 tablet by mouth Daily. 28 tablet 0   • [DISCONTINUED] azithromycin (ZITHROMAX Z-JORGITO) 250 MG tablet Take 2 tablets the first day, then 1 tablet daily for 4 days. 6 tablet 0     No facility-administered encounter medications on file as of 1/31/2019.        Reviewed use of high risk medication in the elderly: yes  Reviewed for potential of harmful drug interactions in the elderly: yes    Follow Up:  Return in about 3 months (around 4/30/2019).     An After Visit Summary and PPPS with all of these plans were given to the patient.

## 2019-02-04 ENCOUNTER — TELEPHONE (OUTPATIENT)
Dept: FAMILY MEDICINE CLINIC | Facility: CLINIC | Age: 69
End: 2019-02-04

## 2019-03-04 DIAGNOSIS — IMO0002 UNCONTROLLED TYPE 2 DIABETES MELLITUS WITH COMPLICATION, WITHOUT LONG-TERM CURRENT USE OF INSULIN: ICD-10-CM

## 2019-03-06 DIAGNOSIS — IMO0002 UNCONTROLLED TYPE 2 DIABETES MELLITUS WITH COMPLICATION, WITHOUT LONG-TERM CURRENT USE OF INSULIN: ICD-10-CM

## 2019-03-07 ENCOUNTER — TELEPHONE (OUTPATIENT)
Dept: FAMILY MEDICINE CLINIC | Facility: CLINIC | Age: 69
End: 2019-03-07

## 2019-03-07 DIAGNOSIS — IMO0002 UNCONTROLLED TYPE 2 DIABETES MELLITUS WITH COMPLICATION, WITHOUT LONG-TERM CURRENT USE OF INSULIN: ICD-10-CM

## 2019-03-07 NOTE — TELEPHONE ENCOUNTER
Pt in need of farxiga refills.. Zahraa been trying to send it over to his pharmacy, but pt called this morning and stated the pharmacy hasn't received it.    Can you look at it for me and see if im doing something wrong? Thank you.

## 2019-03-28 RX ORDER — PRAVASTATIN SODIUM 40 MG
TABLET ORAL
Qty: 90 TABLET | Refills: 3 | Status: SHIPPED | OUTPATIENT
Start: 2019-03-28 | End: 2020-03-22

## 2019-04-13 DIAGNOSIS — I10 ESSENTIAL HYPERTENSION: ICD-10-CM

## 2019-04-17 DIAGNOSIS — IMO0002 UNCONTROLLED TYPE 2 DIABETES MELLITUS WITH COMPLICATION, WITHOUT LONG-TERM CURRENT USE OF INSULIN: ICD-10-CM

## 2019-04-17 RX ORDER — GLIPIZIDE AND METFORMIN HCL 5; 500 MG/1; MG/1
2 TABLET, FILM COATED ORAL
Qty: 120 TABLET | Refills: 4 | Status: SHIPPED | OUTPATIENT
Start: 2019-04-17 | End: 2019-08-15 | Stop reason: SDUPTHER

## 2019-04-26 DIAGNOSIS — IMO0002 UNCONTROLLED TYPE 2 DIABETES MELLITUS WITH COMPLICATION, WITHOUT LONG-TERM CURRENT USE OF INSULIN: ICD-10-CM

## 2019-04-29 RX ORDER — BLOOD-GLUCOSE METER
EACH MISCELLANEOUS
Qty: 1 DEVICE | Refills: 0 | Status: SHIPPED | OUTPATIENT
Start: 2019-04-29 | End: 2019-08-19

## 2019-05-30 DIAGNOSIS — IMO0002 UNCONTROLLED TYPE 2 DIABETES MELLITUS WITH COMPLICATION, WITHOUT LONG-TERM CURRENT USE OF INSULIN: Primary | ICD-10-CM

## 2019-06-12 DIAGNOSIS — I10 ESSENTIAL HYPERTENSION: ICD-10-CM

## 2019-06-12 DIAGNOSIS — IMO0002 DIABETES MELLITUS OUT OF CONTROL: ICD-10-CM

## 2019-06-12 RX ORDER — LISINOPRIL 40 MG/1
TABLET ORAL
Qty: 90 TABLET | Refills: 3 | Status: SHIPPED | OUTPATIENT
Start: 2019-06-12 | End: 2019-09-15 | Stop reason: SDUPTHER

## 2019-06-28 DIAGNOSIS — I10 ESSENTIAL HYPERTENSION: ICD-10-CM

## 2019-06-28 RX ORDER — HYDROCHLOROTHIAZIDE 12.5 MG/1
CAPSULE, GELATIN COATED ORAL
Qty: 90 CAPSULE | Refills: 4 | Status: SHIPPED | OUTPATIENT
Start: 2019-06-28 | End: 2020-09-07

## 2019-08-05 RX ORDER — AMLODIPINE BESYLATE 10 MG/1
TABLET ORAL
Qty: 30 TABLET | Refills: 4 | Status: SHIPPED | OUTPATIENT
Start: 2019-08-05 | End: 2019-12-26

## 2019-08-15 DIAGNOSIS — IMO0002 UNCONTROLLED TYPE 2 DIABETES MELLITUS WITH COMPLICATION, WITHOUT LONG-TERM CURRENT USE OF INSULIN: ICD-10-CM

## 2019-08-16 RX ORDER — GLIPIZIDE AND METFORMIN HCL 5; 500 MG/1; MG/1
2 TABLET, FILM COATED ORAL
Qty: 120 TABLET | Refills: 0 | Status: SHIPPED | OUTPATIENT
Start: 2019-08-16 | End: 2019-08-29

## 2019-08-19 ENCOUNTER — TELEPHONE (OUTPATIENT)
Dept: FAMILY MEDICINE CLINIC | Facility: CLINIC | Age: 69
End: 2019-08-19

## 2019-08-19 DIAGNOSIS — IMO0002 UNCONTROLLED TYPE 2 DIABETES MELLITUS WITH COMPLICATION, WITHOUT LONG-TERM CURRENT USE OF INSULIN: Primary | ICD-10-CM

## 2019-08-19 RX ORDER — BLOOD-GLUCOSE METER
1 EACH MISCELLANEOUS 2 TIMES DAILY
Qty: 1 KIT | Refills: 0 | Status: SHIPPED | OUTPATIENT
Start: 2019-08-19

## 2019-08-19 NOTE — TELEPHONE ENCOUNTER
Pt calling, states he would like to speak to you.    Pt mentioned he is having a hard time getting a new blood sugar monitor. The pharmacy has told him we need to submit a CMN Form to Medicare?    Pt requests to speak with you directly.

## 2019-08-19 NOTE — TELEPHONE ENCOUNTER
I spoke with Guy on 8/19/19 5:19 pm.  I have sent One Touch Viero glucometer at [facility].

## 2019-08-26 DIAGNOSIS — IMO0002 UNCONTROLLED TYPE 2 DIABETES MELLITUS WITH COMPLICATION, WITHOUT LONG-TERM CURRENT USE OF INSULIN: ICD-10-CM

## 2019-08-26 DIAGNOSIS — I10 ESSENTIAL HYPERTENSION: ICD-10-CM

## 2019-08-26 DIAGNOSIS — E55.9 VITAMIN D DEFICIENCY: ICD-10-CM

## 2019-08-26 DIAGNOSIS — E78.2 MIXED HYPERLIPIDEMIA: Primary | ICD-10-CM

## 2019-08-28 LAB
25(OH)D3+25(OH)D2 SERPL-MCNC: 38.8 NG/ML (ref 30–100)
ALBUMIN SERPL-MCNC: 4.6 G/DL (ref 3.5–5.2)
ALBUMIN/GLOB SERPL: 2.4 G/DL
ALP SERPL-CCNC: 60 U/L (ref 39–117)
ALT SERPL-CCNC: 22 U/L (ref 1–41)
AST SERPL-CCNC: 16 U/L (ref 1–40)
BASOPHILS # BLD AUTO: 0.03 10*3/MM3 (ref 0–0.2)
BASOPHILS NFR BLD AUTO: 0.7 % (ref 0–1.5)
BILIRUB SERPL-MCNC: 0.4 MG/DL (ref 0.2–1.2)
BUN SERPL-MCNC: 18 MG/DL (ref 8–23)
BUN/CREAT SERPL: 18.2 (ref 7–25)
CALCIUM SERPL-MCNC: 9.8 MG/DL (ref 8.6–10.5)
CHLORIDE SERPL-SCNC: 104 MMOL/L (ref 98–107)
CHOLEST SERPL-MCNC: 138 MG/DL (ref 0–200)
CHOLEST/HDLC SERPL: 4.6 {RATIO}
CO2 SERPL-SCNC: 29.6 MMOL/L (ref 22–29)
CREAT SERPL-MCNC: 0.99 MG/DL (ref 0.76–1.27)
EOSINOPHIL # BLD AUTO: 0.07 10*3/MM3 (ref 0–0.4)
EOSINOPHIL NFR BLD AUTO: 1.6 % (ref 0.3–6.2)
ERYTHROCYTE [DISTWIDTH] IN BLOOD BY AUTOMATED COUNT: 14 % (ref 12.3–15.4)
GLOBULIN SER CALC-MCNC: 1.9 GM/DL
GLUCOSE SERPL-MCNC: 174 MG/DL (ref 65–99)
HBA1C MFR BLD: 7.3 % (ref 4.8–5.6)
HCT VFR BLD AUTO: 44.4 % (ref 37.5–51)
HDLC SERPL-MCNC: 30 MG/DL (ref 40–60)
HGB BLD-MCNC: 14 G/DL (ref 13–17.7)
IMM GRANULOCYTES # BLD AUTO: 0.01 10*3/MM3 (ref 0–0.05)
IMM GRANULOCYTES NFR BLD AUTO: 0.2 % (ref 0–0.5)
LDLC SERPL CALC-MCNC: 58 MG/DL (ref 0–100)
LYMPHOCYTES # BLD AUTO: 1.24 10*3/MM3 (ref 0.7–3.1)
LYMPHOCYTES NFR BLD AUTO: 28.4 % (ref 19.6–45.3)
MCH RBC QN AUTO: 28.8 PG (ref 26.6–33)
MCHC RBC AUTO-ENTMCNC: 31.5 G/DL (ref 31.5–35.7)
MCV RBC AUTO: 91.4 FL (ref 79–97)
MICROALBUMIN UR-MCNC: 213.8 UG/ML
MONOCYTES # BLD AUTO: 0.42 10*3/MM3 (ref 0.1–0.9)
MONOCYTES NFR BLD AUTO: 9.6 % (ref 5–12)
NEUTROPHILS # BLD AUTO: 2.6 10*3/MM3 (ref 1.7–7)
NEUTROPHILS NFR BLD AUTO: 59.5 % (ref 42.7–76)
NRBC BLD AUTO-RTO: 0 /100 WBC (ref 0–0.2)
PLATELET # BLD AUTO: 114 10*3/MM3 (ref 140–450)
POTASSIUM SERPL-SCNC: 4 MMOL/L (ref 3.5–5.2)
PROT SERPL-MCNC: 6.5 G/DL (ref 6–8.5)
RBC # BLD AUTO: 4.86 10*6/MM3 (ref 4.14–5.8)
SODIUM SERPL-SCNC: 145 MMOL/L (ref 136–145)
TRIGL SERPL-MCNC: 249 MG/DL (ref 0–150)
VLDLC SERPL CALC-MCNC: 49.8 MG/DL
WBC # BLD AUTO: 4.37 10*3/MM3 (ref 3.4–10.8)

## 2019-08-29 ENCOUNTER — OFFICE VISIT (OUTPATIENT)
Dept: FAMILY MEDICINE CLINIC | Facility: CLINIC | Age: 69
End: 2019-08-29

## 2019-08-29 VITALS
SYSTOLIC BLOOD PRESSURE: 138 MMHG | TEMPERATURE: 97.8 F | HEIGHT: 70 IN | WEIGHT: 177 LBS | DIASTOLIC BLOOD PRESSURE: 72 MMHG | OXYGEN SATURATION: 98 % | HEART RATE: 84 BPM | BODY MASS INDEX: 25.34 KG/M2

## 2019-08-29 DIAGNOSIS — IMO0002 UNCONTROLLED TYPE 2 DIABETES MELLITUS WITH COMPLICATION, WITHOUT LONG-TERM CURRENT USE OF INSULIN: Primary | ICD-10-CM

## 2019-08-29 DIAGNOSIS — R13.19 ESOPHAGEAL DYSPHAGIA: ICD-10-CM

## 2019-08-29 DIAGNOSIS — I10 ESSENTIAL HYPERTENSION: ICD-10-CM

## 2019-08-29 DIAGNOSIS — E78.2 MIXED HYPERLIPIDEMIA: ICD-10-CM

## 2019-08-29 PROCEDURE — 99214 OFFICE O/P EST MOD 30 MIN: CPT | Performed by: PHYSICIAN ASSISTANT

## 2019-08-29 NOTE — PROGRESS NOTES
Subjective   Guy Plascencia is a 68 y.o. male presents for   Chief Complaint   Patient presents with   • Hypertension     management   • Diarrhea     thinks its related to his metformin   • Diabetes     management       History of Present Illness     Guy is a 68-year-old male who presents for hypertension and diabetes management. He has been on metformin for abut 17 years. States that he has had loose stools for along time but now he has had diarrhea for the pat 2-3 months. He has had diarrhea 2-3 time a day. He has been eating more lettuce.  He has not be on antibiotic,foreign travel or camping trips.  Sugars have been running around 160 at home at times.  His diet is improving.  He has been trying to cut back on carbohydrates and eat healthier.  Sleep has been normal.  He has not been exercising regularly.  He denied any dark black tarry stools.  He has not been checking his blood pressure at home but has been compliant with medication.  Denied any chest pain, shortness of air, dizziness, vision changes or swelling of ankles.  States he has noticed that he has had difficulty swallowing all types of food and liquid at times.  States it gets down to the collarbone area and gets tight.  States he has to cough or gag to get the food come up.  States this is happened twice at restaurants and at home.  He has not had an EGD performed.  Denied any heartburn, coffee-ground emesis, or sore throat.    The following portions of the patient's history were reviewed and updated as appropriate: allergies, current medications, past family history, past medical history, past social history, past surgical history and problem list.    Review of Systems   Constitutional: Negative.    HENT: Positive for trouble swallowing.    Eyes: Negative.    Respiratory: Negative.  Negative for cough, chest tightness, shortness of breath and wheezing.    Cardiovascular: Negative.    Gastrointestinal: Positive for diarrhea. Negative for abdominal  "pain, constipation and nausea.   Endocrine: Negative.    Genitourinary: Negative.    Musculoskeletal: Negative.    Skin: Negative.    Allergic/Immunologic: Negative.    Neurological: Negative.  Negative for dizziness and headaches.   Hematological: Negative.    Psychiatric/Behavioral: Negative.  Negative for sleep disturbance and suicidal ideas.   All other systems reviewed and are negative.        Vitals:    08/29/19 1508 08/29/19 1542   BP: 152/84 138/72   BP Location:  Left arm   Patient Position:  Sitting   Cuff Size:  Adult   Pulse: 84    Temp: 97.8 °F (36.6 °C)    SpO2: 98%    Weight: 80.3 kg (177 lb)    Height: 177.8 cm (70\")      Wt Readings from Last 3 Encounters:   08/29/19 80.3 kg (177 lb)   01/31/19 80.7 kg (178 lb)   12/20/18 83 kg (183 lb)     BP Readings from Last 3 Encounters:   08/29/19 138/72   01/31/19 120/64   01/01/19 154/80     Social History     Socioeconomic History   • Marital status:      Spouse name: Jessica   • Number of children: Not on file   • Years of education: College   • Highest education level: Not on file   Occupational History   • Occupation:      Employer: RETIRED   Tobacco Use   • Smoking status: Former Smoker     Years: 14.00     Types: Cigarettes   • Smokeless tobacco: Never Used   • Tobacco comment: QUIT 1972  SMOKED 2PPD X10 YRS   Substance and Sexual Activity   • Alcohol use: No   • Drug use: No   • Sexual activity: Defer       Allergies   Allergen Reactions   • Penicillins Other (See Comments)     ALLERGY TESTING SHOWED ALL TO PCN.    • Cimetidine Rash       Body mass index is 25.4 kg/m².    Objective   Physical Exam   Constitutional: He is oriented to person, place, and time. Vital signs are normal. He appears well-developed and well-nourished.   HENT:   Head: Normocephalic and atraumatic.   Right Ear: Hearing, tympanic membrane, external ear and ear canal normal.   Left Ear: Hearing, tympanic membrane, external ear and ear canal normal.   Nose: Nose " normal. Right sinus exhibits no maxillary sinus tenderness and no frontal sinus tenderness. Left sinus exhibits no maxillary sinus tenderness and no frontal sinus tenderness.   Mouth/Throat: Uvula is midline, oropharynx is clear and moist and mucous membranes are normal.   Eyes: Conjunctivae, EOM and lids are normal. Pupils are equal, round, and reactive to light.   Neck: Trachea normal and phonation normal. Neck supple. No tracheal tenderness present. Carotid bruit is not present. No tracheal deviation and no edema present. No thyroid mass and no thyromegaly present.   Cardiovascular: Normal rate, regular rhythm, S1 normal, S2 normal, normal heart sounds and normal pulses.   No murmur heard.  Pulmonary/Chest: Effort normal and breath sounds normal.   Abdominal: Soft. Normal appearance, normal aorta and bowel sounds are normal. There is no hepatomegaly. There is no tenderness.   Neurological: He is alert and oriented to person, place, and time.   Skin: Skin is warm, dry and intact. Capillary refill takes less than 2 seconds.   Psychiatric: He has a normal mood and affect. His speech is normal and behavior is normal. Judgment and thought content normal. Cognition and memory are normal.       Assessment/Plan   Guy was seen today for hypertension, diarrhea and diabetes.    Diagnoses and all orders for this visit:    Uncontrolled type 2 diabetes mellitus with complication, without long-term current use of insulin (CMS/[facility])  -     dapagliflozin-metformin HCl ER (XIGDUO XR)  MG tablet; Take 1 tablet by mouth Daily.    Mixed hyperlipidemia    Essential hypertension    Esophageal dysphagia  -     Ambulatory Referral to Gastroenterology      1.  Chronic uncontrolled type 2 diabetes with hyperlipidemia: I have reviewed his lab work that was collected on August 27, 2019.  Fasting blood sugar 174, cholesterol 138, triglycerides 249, HDL 30, LDL 58 and A1c 7.3.  A1c has dropped 1 point over the past 8 months.  Had some  loose stools.  This may be related to the metformin medication.  I have asked him to stop the Farxiga and metformin/glipizide combination.  I have given him samples of Xigduo 10/1000 mg to take once a day.  Keep a blood sugar log at home.  He will update status in 2 weeks about diarrhea.  Plan to repeat fasting blood work in 3 months.  2.  Chronic and stable hypertension: I have rechecked blood pressure at office visit today and got 130/72 in left arm.  Her we will continue his current medications at home as instructed.  3.  New esophageal dysplasia: He has been having some difficulty swallowing food from time to time in his upper esophagus area.  We will send to GI, Dr. Montgomery for possible EGD.  I have asked him to eat small bites and to avoid carbonated and caffeinated drinks.  Guy voiced understanding.    LEO Jade [facility] MEDICINE  [address] [place] [id number]  Dept: [phone]  Dept Fax: [phone]  Loc: [phone]  Loc Fax: [phone]        Orders Only on 08/26/2019   Component Date Value Ref Range Status   • Glucose 08/27/2019 174* 65 - 99 mg/dL Final   • BUN 08/27/2019 18  8 - 23 mg/dL Final   • Creatinine 08/27/2019 0.99  0.76 - 1.27 mg/dL Final   • eGFR Non  Am 08/27/2019 75  >60 mL/min/1.73 Final   • eGFR African Am 08/27/2019 91  >60 mL/min/1.73 Final   • BUN/Creatinine Ratio 08/27/2019 18.2  7.0 - 25.0 Final   • Sodium 08/27/2019 145  136 - 145 mmol/L Final   • Potassium 08/27/2019 4.0  3.5 - 5.2 mmol/L Final   • Chloride 08/27/2019 104  98 - 107 mmol/L Final   • Total CO2 08/27/2019 29.6* 22.0 - 29.0 mmol/L Final   • Calcium 08/27/2019 9.8  8.6 - 10.5 mg/dL Final   • Total Protein 08/27/2019 6.5  6.0 - 8.5 g/dL Final   • Albumin 08/27/2019 4.60  3.50 - 5.20 g/dL Final   • Globulin 08/27/2019 1.9  gm/dL Final   • A/G Ratio 08/27/2019 2.4  g/dL Final   • Total Bilirubin 08/27/2019 0.4  0.2 - 1.2  mg/dL Final   • Alkaline Phosphatase 08/27/2019 60  39 - 117 U/L Final   • AST (SGOT) 08/27/2019 16  1 - 40 U/L Final   • ALT (SGPT) 08/27/2019 22  1 - 41 U/L Final   • WBC 08/27/2019 4.37  3.40 - 10.80 10*3/mm3 Final   • RBC 08/27/2019 4.86  4.14 - 5.80 10*6/mm3 Final   • Hemoglobin 08/27/2019 14.0  13.0 - 17.7 g/dL Final   • Hematocrit 08/27/2019 44.4  37.5 - 51.0 % Final   • MCV 08/27/2019 91.4  79.0 - 97.0 fL Final   • MCH 08/27/2019 28.8  26.6 - 33.0 pg Final   • MCHC 08/27/2019 31.5  31.5 - 35.7 g/dL Final   • RDW 08/27/2019 14.0  12.3 - 15.4 % Final   • Platelets 08/27/2019 114* 140 - 450 10*3/mm3 Final   • Neutrophil Rel % 08/27/2019 59.5  42.7 - 76.0 % Final   • Lymphocyte Rel % 08/27/2019 28.4  19.6 - 45.3 % Final   • Monocyte Rel % 08/27/2019 9.6  5.0 - 12.0 % Final   • Eosinophil Rel % 08/27/2019 1.6  0.3 - 6.2 % Final   • Basophil Rel % 08/27/2019 0.7  0.0 - 1.5 % Final   • Neutrophils Absolute 08/27/2019 2.60  1.70 - 7.00 10*3/mm3 Final   • Lymphocytes Absolute 08/27/2019 1.24  0.70 - 3.10 10*3/mm3 Final   • Monocytes Absolute 08/27/2019 0.42  0.10 - 0.90 10*3/mm3 Final   • Eosinophils Absolute 08/27/2019 0.07  0.00 - 0.40 10*3/mm3 Final   • Basophils Absolute 08/27/2019 0.03  0.00 - 0.20 10*3/mm3 Final   • Immature Granulocyte Rel % 08/27/2019 0.2  0.0 - 0.5 % Final   • Immature Grans Absolute 08/27/2019 0.01  0.00 - 0.05 10*3/mm3 Final   • nRBC 08/27/2019 0.0  0.0 - 0.2 /100 WBC Final   • Total Cholesterol 08/27/2019 138  0 - 200 mg/dL Final   • Triglycerides 08/27/2019 249* 0 - 150 mg/dL Final   • HDL Cholesterol 08/27/2019 30* 40 - 60 mg/dL Final   • VLDL Cholesterol 08/27/2019 49.8  mg/dL Final   • LDL Cholesterol  08/27/2019 58  0 - 100 mg/dL Final   • Chol/HDL Ratio 08/27/2019 4.60   Final   • Hemoglobin A1C 08/27/2019 7.30* 4.80 - 5.60 % Final    Comment: Hemoglobin A1C Ranges:  Increased Risk for Diabetes  5.7% to 6.4%  Diabetes                     >= 6.5%  Diabetic Goal                <  7.0%     • Microalbumin, Urine 08/27/2019 213.8  Not Estab. ug/mL Final   • 25 Hydroxy, Vitamin D 08/27/2019 38.8  30.0 - 100.0 ng/ml Final    Comment: Reference Range for Total Vitamin D 25(OH)  Deficiency <20.0 ng/mL  Insufficiency 21-29 ng/mL  Sufficiency  ng/mL  Toxicity >100 ng/ml

## 2019-09-05 ENCOUNTER — TELEPHONE (OUTPATIENT)
Dept: FAMILY MEDICINE CLINIC | Facility: CLINIC | Age: 69
End: 2019-09-05

## 2019-09-05 NOTE — TELEPHONE ENCOUNTER
Pt calling, states his medication is helping. His diarrhea has stopped, but he feels his blood sugar readings are a bit higher. Pt states he has enough samples to last him another week. And will continue to try this medication and call us again next week with an update

## 2019-09-15 DIAGNOSIS — IMO0002 DIABETES MELLITUS OUT OF CONTROL: ICD-10-CM

## 2019-09-15 DIAGNOSIS — I10 ESSENTIAL HYPERTENSION: ICD-10-CM

## 2019-09-15 RX ORDER — LISINOPRIL 40 MG/1
TABLET ORAL
Qty: 90 TABLET | Refills: 2 | Status: SHIPPED | OUTPATIENT
Start: 2019-09-15 | End: 2020-06-05

## 2019-09-18 ENCOUNTER — TELEPHONE (OUTPATIENT)
Dept: FAMILY MEDICINE CLINIC | Facility: CLINIC | Age: 69
End: 2019-09-18

## 2019-09-18 DIAGNOSIS — IMO0002 UNCONTROLLED TYPE 2 DIABETES MELLITUS WITH COMPLICATION, WITHOUT LONG-TERM CURRENT USE OF INSULIN: ICD-10-CM

## 2019-09-18 NOTE — TELEPHONE ENCOUNTER
Pt calling, states he would like to get a script for the new medication that was given as samples at his last visit.     Pt states the new mediation is working, just not as well, and is still having occasional diarrhea, but not as bad.  Would like to continue taking new medication.

## 2019-09-19 ENCOUNTER — OFFICE VISIT (OUTPATIENT)
Dept: GASTROENTEROLOGY | Facility: CLINIC | Age: 69
End: 2019-09-19

## 2019-09-19 VITALS
WEIGHT: 170 LBS | HEIGHT: 70 IN | DIASTOLIC BLOOD PRESSURE: 74 MMHG | SYSTOLIC BLOOD PRESSURE: 128 MMHG | BODY MASS INDEX: 24.34 KG/M2

## 2019-09-19 DIAGNOSIS — R13.10 DYSPHAGIA, UNSPECIFIED TYPE: Primary | ICD-10-CM

## 2019-09-19 PROCEDURE — 99203 OFFICE O/P NEW LOW 30 MIN: CPT | Performed by: INTERNAL MEDICINE

## 2019-09-19 RX ORDER — OMEPRAZOLE 20 MG/1
20 CAPSULE, DELAYED RELEASE ORAL DAILY
Qty: 30 CAPSULE | Refills: 5 | Status: SHIPPED | OUTPATIENT
Start: 2019-09-19 | End: 2020-03-24

## 2019-09-19 NOTE — PROGRESS NOTES
PATIENT INFORMATION  Guy Plascencia       - 1950    CHIEF COMPLAINT  Chief Complaint   Patient presents with   • Difficulty Swallowing   • Diarrhea       HISTORY OF PRESENT ILLNESS  HPI    69 yo with intermittent solid food dysphagia, first episode was about 10 years ago. This occurs maybe 1-3 times yearly. Last episode was yesterday at [facility], while eating ground meat and salad.  No previous egd. No melena or hematochezia. He had issues with heartburn many years ago and was on ppi but that was stopped and he has not had any issues.  Weight has been decreasing, 7 pounds since one month.  Severity is moderate. At times alleviated with vomiting.  cologuard negative this year  He does have mild thrombocytopenia. CT done last year:  IMPRESSION:  1. Mild splenomegaly is stable since .  2. Stable mild diffuse hepatic steatosis.  3. Incidental tiny cyst within the tail of the pancreas, likely benign  and unchanged since 2016. Consider follow-up CT in 2 years.  4. Small nonobstructing right upper pole renal calculus.  REVIEW OF SYSTEMS  Review of Systems   Constitutional: Positive for unexpected weight change.   HENT: Positive for dental problem, postnasal drip, sinus pressure and trouble swallowing.    Respiratory: Positive for apnea and cough.    Gastrointestinal: Positive for diarrhea.   Endocrine: Positive for polydipsia and polyuria.   Musculoskeletal: Positive for arthralgias.   Hematological: Bruises/bleeds easily.   All other systems reviewed and are negative.        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]   • Bilateral shoulder pain [M25.511, M25.512]   • Foot pain [M79.673]   • Uncontrolled type 2 diabetes mellitus with complication, without long-term current use of insulin (CMS/[facility]) [E11.8, E11.65]   • Eczema [L30.9]   • 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]   • Mixed hyperlipidemia [E78.2]   • Essential hypertension [I10]   • Hypogonadism in male [E29.1]   • Obstructive sleep apnea syndrome [G47.33]   • Thrombocytopenia (CMS/[facility]) [D69.6]   • Vitamin D deficiency [E55.9]         PAST MEDICAL HISTORY  Past Medical History:   Diagnosis Date   • Arthritis    • Diabetes mellitus (CMS/[facility])     TYPE 2   • H/O  Multiple facial fractures 11/1968   • H/O Broken femur 11/1968   • History of transfusion     1968   • Hyperlipidemia    • Hypertension    • Kidney stone    • Neoplasm of uncertain behavior    • Peptic ulceration    • Plantar fasciitis    • Sleep apnea     CPAP   • Thrombocytosis (CMS/[facility])          SURGICAL HISTORY  Past Surgical History:   Procedure Laterality Date   • COLONOSCOPY     • COSMETIC SURGERY  11/1968    MULTIPLE FACIAL FRACTURE   • CYSTOSCOPY URETEROSCOPY STONE MANIPULATION/EXTRACTION Left 6/29/2016    Procedure: CYSTOSCOPY, LEFT URETEROSCOPY, BASKET EXTRACTION OF LEFT URETERAL STONE, LEFT STENT PLACEMENT;  Surgeon: Ish Gaitan MD;  Location: [facility] OR;  Service:    • EXTRACORPOREAL SHOCK WAVE LITHOTRIPSY (ESWL) Right 7/29/2016    Procedure: RT EXTRACORPOREAL SHOCKWAVE LITHOTRIPSY;  Surgeon: Ish Gaitan MD;  Location: [facility] OR;  Service:    • FEMUR SURGERY  11/1968   • FRACTURE SURGERY      femur fx   • OTHER SURGICAL HISTORY      stents, for kidney stones   • OTHER SURGICAL HISTORY      lithotripsy   • SKIN BIOPSY           FAMILY HISTORY  Family History   Problem Relation Age of Onset   • Heart disease Father    • Cancer Other         Kidney, throat,  skin   • Skin cancer Brother 50   • Cancer Brother    • Colon polyps Neg Hx    • Colon cancer Neg Hx          SOCIAL HISTORY  Social History     Occupational History   • Occupation:      Employer: RETIRED   Tobacco Use   • Smoking status: Former Smoker     Years: 14.00     Types: Cigarettes   • Smokeless tobacco: Never Used   • Tobacco comment: QUIT 1972  SMOKED 2PPD X10 YRS   Substance and Sexual Activity   • Alcohol use: No   • Drug use: No   • Sexual activity: Defer       Debilities/Disabilities Identified: None    Emotional Behavior: Appropriate    CURRENT MEDICATIONS    Current Outpatient Medications:   •  amLODIPine (NORVASC) 10 MG tablet, TAKE ONE TABLET BY MOUTH EVERY MORNING, Disp: 30 tablet, Rfl: 4  •  Aspirin (ASPIR-81 PO), Take  by mouth Daily., Disp: , Rfl:   •  Blood Glucose Monitoring Suppl (ONETOUCH VERIO FLEX SYSTEM) w/Device kit, 1 Device 2 (Two) Times a Day., Disp: 1 kit, Rfl: 0  •  Cholecalciferol (D3 ADULT PO), Take 2,000 Units by mouth Daily., Disp: , Rfl:   •  CINNAMON PO, Take 1,000 mg by mouth 4 (Four) Times a Day., Disp: , Rfl:   •  dapagliflozin-metformin HCl ER (XIGDUO XR)  MG tablet, Take 1 tablet by mouth Daily., Disp: 30 tablet, Rfl: 6  •  glucose blood (AGAMATRIX PRESTO TEST) test strip, Used to check twice daily for type 2 diabetes, Disp: 100 each, Rfl: 9  •  glucose blood (WAVESENSE PRESTO) test strip, 1 each by Other route Daily. Use as instructed, Disp: 100 each, Rfl: 10  •  hydrochlorothiazide (MICROZIDE) 12.5 MG capsule, TAKE ONE CAPSULE BY MOUTH DAILY, Disp: 90 capsule, Rfl: 4  •  lisinopril (PRINIVIL,ZESTRIL) 40 MG tablet, TAKE ONE TABLET BY MOUTH DAILY, Disp: 90 tablet, Rfl: 2  •  meloxicam (MOBIC) 7.5 MG tablet, TAKE ONE TABLET BY MOUTH DAILY, Disp: 30 tablet, Rfl: 3  •  metoprolol tartrate (LOPRESSOR) 25 MG tablet, TAKE ONE TABLET BY MOUTH TWICE A DAY, Disp: 60 tablet, Rfl: 6  •  omeprazole (priLOSEC) 20 MG capsule, Take 1 capsule by mouth Daily., Disp: 30  "capsule, Rfl: 5  •  pravastatin (PRAVACHOL) 40 MG tablet, TAKE ONE TABLET BY MOUTH DAILY, Disp: 90 tablet, Rfl: 3  •  vitamin B-12 (CYANOCOBALAMIN) 1000 MCG tablet, Take 1 tablet by mouth Daily., Disp: , Rfl:     ALLERGIES  Penicillins and Cimetidine    VITALS  Vitals:    09/19/19 1019   BP: 128/74   Weight: 74.8 kg (165 lb)   Height: 177.8 cm (70\")       LAST RESULTS   Orders Only on 08/26/2019   Component Date Value Ref Range Status   • Glucose 08/27/2019 174* 65 - 99 mg/dL Final   • BUN 08/27/2019 18  8 - 23 mg/dL Final   • Creatinine 08/27/2019 0.99  0.76 - 1.27 mg/dL Final   • eGFR Non  Am 08/27/2019 75  >60 mL/min/1.73 Final   • eGFR African Am 08/27/2019 91  >60 mL/min/1.73 Final   • BUN/Creatinine Ratio 08/27/2019 18.2  7.0 - 25.0 Final   • Sodium 08/27/2019 145  136 - 145 mmol/L Final   • Potassium 08/27/2019 4.0  3.5 - 5.2 mmol/L Final   • Chloride 08/27/2019 104  98 - 107 mmol/L Final   • Total CO2 08/27/2019 29.6* 22.0 - 29.0 mmol/L Final   • Calcium 08/27/2019 9.8  8.6 - 10.5 mg/dL Final   • Total Protein 08/27/2019 6.5  6.0 - 8.5 g/dL Final   • Albumin 08/27/2019 4.60  3.50 - 5.20 g/dL Final   • Globulin 08/27/2019 1.9  gm/dL Final   • A/G Ratio 08/27/2019 2.4  g/dL Final   • Total Bilirubin 08/27/2019 0.4  0.2 - 1.2 mg/dL Final   • Alkaline Phosphatase 08/27/2019 60  39 - 117 U/L Final   • AST (SGOT) 08/27/2019 16  1 - 40 U/L Final   • ALT (SGPT) 08/27/2019 22  1 - 41 U/L Final   • WBC 08/27/2019 4.37  3.40 - 10.80 10*3/mm3 Final   • RBC 08/27/2019 4.86  4.14 - 5.80 10*6/mm3 Final   • Hemoglobin 08/27/2019 14.0  13.0 - 17.7 g/dL Final   • Hematocrit 08/27/2019 44.4  37.5 - 51.0 % Final   • MCV 08/27/2019 91.4  79.0 - 97.0 fL Final   • MCH 08/27/2019 28.8  26.6 - 33.0 pg Final   • MCHC 08/27/2019 31.5  31.5 - 35.7 g/dL Final   • RDW 08/27/2019 14.0  12.3 - 15.4 % Final   • Platelets 08/27/2019 114* 140 - 450 10*3/mm3 Final   • Neutrophil Rel % 08/27/2019 59.5  42.7 - 76.0 % Final   • Lymphocyte " Rel % 08/27/2019 28.4  19.6 - 45.3 % Final   • Monocyte Rel % 08/27/2019 9.6  5.0 - 12.0 % Final   • Eosinophil Rel % 08/27/2019 1.6  0.3 - 6.2 % Final   • Basophil Rel % 08/27/2019 0.7  0.0 - 1.5 % Final   • Neutrophils Absolute 08/27/2019 2.60  1.70 - 7.00 10*3/mm3 Final   • Lymphocytes Absolute 08/27/2019 1.24  0.70 - 3.10 10*3/mm3 Final   • Monocytes Absolute 08/27/2019 0.42  0.10 - 0.90 10*3/mm3 Final   • Eosinophils Absolute 08/27/2019 0.07  0.00 - 0.40 10*3/mm3 Final   • Basophils Absolute 08/27/2019 0.03  0.00 - 0.20 10*3/mm3 Final   • Immature Granulocyte Rel % 08/27/2019 0.2  0.0 - 0.5 % Final   • Immature Grans Absolute 08/27/2019 0.01  0.00 - 0.05 10*3/mm3 Final   • nRBC 08/27/2019 0.0  0.0 - 0.2 /100 WBC Final   • Total Cholesterol 08/27/2019 138  0 - 200 mg/dL Final   • Triglycerides 08/27/2019 249* 0 - 150 mg/dL Final   • HDL Cholesterol 08/27/2019 30* 40 - 60 mg/dL Final   • VLDL Cholesterol 08/27/2019 49.8  mg/dL Final   • LDL Cholesterol  08/27/2019 58  0 - 100 mg/dL Final   • Chol/HDL Ratio 08/27/2019 4.60   Final   • Hemoglobin A1C 08/27/2019 7.30* 4.80 - 5.60 % Final    Comment: Hemoglobin A1C Ranges:  Increased Risk for Diabetes  5.7% to 6.4%  Diabetes                     >= 6.5%  Diabetic Goal                < 7.0%     • Microalbumin, Urine 08/27/2019 213.8  Not Estab. ug/mL Final   • 25 Hydroxy, Vitamin D 08/27/2019 38.8  30.0 - 100.0 ng/ml Final    Comment: Reference Range for Total Vitamin D 25(OH)  Deficiency <20.0 ng/mL  Insufficiency 21-29 ng/mL  Sufficiency  ng/mL  Toxicity >100 ng/ml       No results found.    PHYSICAL EXAM  Physical Exam   Constitutional: He is oriented to person, place, and time. He appears well-developed and well-nourished. No distress.   HENT:   Head: Normocephalic and atraumatic.   Eyes: EOM are normal. Pupils are equal, round, and reactive to light.   Neck: Neck supple. No tracheal deviation present.   Cardiovascular: Normal rate, regular rhythm, normal  heart sounds and intact distal pulses. Exam reveals no gallop and no friction rub.   No murmur heard.  Pulmonary/Chest: Effort normal and breath sounds normal. No respiratory distress. He has no wheezes. He has no rales. He exhibits no tenderness.   Abdominal: Soft. Bowel sounds are normal. He exhibits no distension. There is no tenderness. There is no rebound and no guarding.   Musculoskeletal: He exhibits no edema.   Lymphadenopathy:     He has no cervical adenopathy.   Neurological: He is alert and oriented to person, place, and time.   Skin: Skin is warm. He is not diaphoretic. No erythema.   Psychiatric: He has a normal mood and affect. His behavior is normal. Judgment and thought content normal.   Nursing note and vitals reviewed.      ASSESSMENT  Diagnoses and all orders for this visit:    Dysphagia, unspecified type  -     Case Request; Standing  -     Case Request    Other orders  -     omeprazole (priLOSEC) 20 MG capsule; Take 1 capsule by mouth Daily.  -     Follow Anesthesia Guidelines / Standing Orders; Future  -     Implement Anesthesia orders day of procedure.; Standing  -     Obtain informed consent; Standing          PLAN  No Follow-up on file.        Antireflux measures and dietary modifications reviewed. Low acid diet reviewed. Keep head of bed elevated. Stop eating/drinking at least 3 hours prior to bedtime. Eliminate caffeine and carbonated beverages.  Weight loss encouraged if BMI over 25.    Indications, risks and procedure were discussed with the patient, including but not limited to, bleeding, infection, possibility of perforation and possible polypectomy. All of the patient's questions were answered, and signed informed consent was obtained and placed on the chart.

## 2019-09-24 ENCOUNTER — ANESTHESIA EVENT (OUTPATIENT)
Dept: PERIOP | Facility: HOSPITAL | Age: 69
End: 2019-09-24

## 2019-09-25 ENCOUNTER — ANESTHESIA (OUTPATIENT)
Dept: PERIOP | Facility: HOSPITAL | Age: 69
End: 2019-09-25

## 2019-09-25 ENCOUNTER — TELEPHONE (OUTPATIENT)
Dept: GASTROENTEROLOGY | Facility: CLINIC | Age: 69
End: 2019-09-25

## 2019-09-25 NOTE — TELEPHONE ENCOUNTER
Patient forgot about EGD that was scheduled for today.  Patient has rescheduled to 10/2/19 to arrive at 9:00am.

## 2019-10-02 ENCOUNTER — HOSPITAL ENCOUNTER (OUTPATIENT)
Facility: HOSPITAL | Age: 69
Setting detail: HOSPITAL OUTPATIENT SURGERY
Discharge: HOME | End: 2019-10-02
Attending: INTERNAL MEDICINE | Admitting: INTERNAL MEDICINE

## 2019-10-02 VITALS
SYSTOLIC BLOOD PRESSURE: 144 MMHG | BODY MASS INDEX: 21.36 KG/M2 | HEIGHT: 70 IN | TEMPERATURE: 97 F | HEART RATE: 69 BPM | RESPIRATION RATE: 17 BRPM | WEIGHT: 149.2 LBS | DIASTOLIC BLOOD PRESSURE: 83 MMHG | OXYGEN SATURATION: 96 %

## 2019-10-02 DIAGNOSIS — R13.10 DYSPHAGIA, UNSPECIFIED TYPE: ICD-10-CM

## 2019-10-02 LAB
GLUCOSE BLDC GLUCOMTR-MCNC: 250 MG/DL (ref 70–130)
POTASSIUM BLD-SCNC: 4.5 MMOL/L (ref 3.5–5.2)

## 2019-10-02 PROCEDURE — 43239 EGD BIOPSY SINGLE/MULTIPLE: CPT | Performed by: INTERNAL MEDICINE

## 2019-10-02 PROCEDURE — 84132 ASSAY OF SERUM POTASSIUM: CPT | Performed by: NURSE ANESTHETIST, CERTIFIED REGISTERED

## 2019-10-02 PROCEDURE — C1726 CATH, BAL DIL, NON-VASCULAR: HCPCS | Performed by: INTERNAL MEDICINE

## 2019-10-02 PROCEDURE — 88305 TISSUE EXAM BY PATHOLOGIST: CPT | Performed by: INTERNAL MEDICINE

## 2019-10-02 PROCEDURE — 25010000002 PROPOFOL 10 MG/ML EMULSION: Performed by: NURSE ANESTHETIST, CERTIFIED REGISTERED

## 2019-10-02 PROCEDURE — 82962 GLUCOSE BLOOD TEST: CPT

## 2019-10-02 PROCEDURE — 43249 ESOPH EGD DILATION <30 MM: CPT | Performed by: INTERNAL MEDICINE

## 2019-10-02 RX ORDER — LIDOCAINE HYDROCHLORIDE 20 MG/ML
INJECTION, SOLUTION INFILTRATION; PERINEURAL AS NEEDED
Status: DISCONTINUED | OUTPATIENT
Start: 2019-10-02 | End: 2019-10-02 | Stop reason: SURG

## 2019-10-02 RX ORDER — SODIUM CHLORIDE 0.9 % (FLUSH) 0.9 %
3 SYRINGE (ML) INJECTION EVERY 12 HOURS SCHEDULED
Status: DISCONTINUED | OUTPATIENT
Start: 2019-10-02 | End: 2019-10-02 | Stop reason: HOSPADM

## 2019-10-02 RX ORDER — LIDOCAINE HYDROCHLORIDE 10 MG/ML
0.5 INJECTION, SOLUTION EPIDURAL; INFILTRATION; INTRACAUDAL; PERINEURAL ONCE AS NEEDED
Status: DISCONTINUED | OUTPATIENT
Start: 2019-10-02 | End: 2019-10-02 | Stop reason: HOSPADM

## 2019-10-02 RX ORDER — SODIUM CHLORIDE, SODIUM LACTATE, POTASSIUM CHLORIDE, CALCIUM CHLORIDE 600; 310; 30; 20 MG/100ML; MG/100ML; MG/100ML; MG/100ML
9 INJECTION, SOLUTION INTRAVENOUS CONTINUOUS
Status: DISCONTINUED | OUTPATIENT
Start: 2019-10-02 | End: 2019-10-02 | Stop reason: HOSPADM

## 2019-10-02 RX ORDER — SODIUM CHLORIDE 9 MG/ML
40 INJECTION, SOLUTION INTRAVENOUS AS NEEDED
Status: DISCONTINUED | OUTPATIENT
Start: 2019-10-02 | End: 2019-10-02 | Stop reason: HOSPADM

## 2019-10-02 RX ORDER — GLYCOPYRROLATE 0.2 MG/ML
INJECTION INTRAMUSCULAR; INTRAVENOUS AS NEEDED
Status: DISCONTINUED | OUTPATIENT
Start: 2019-10-02 | End: 2019-10-02 | Stop reason: SURG

## 2019-10-02 RX ORDER — SODIUM CHLORIDE, SODIUM LACTATE, POTASSIUM CHLORIDE, CALCIUM CHLORIDE 600; 310; 30; 20 MG/100ML; MG/100ML; MG/100ML; MG/100ML
100 INJECTION, SOLUTION INTRAVENOUS CONTINUOUS
Status: DISCONTINUED | OUTPATIENT
Start: 2019-10-02 | End: 2019-10-02 | Stop reason: HOSPADM

## 2019-10-02 RX ORDER — PROPOFOL 10 MG/ML
VIAL (ML) INTRAVENOUS AS NEEDED
Status: DISCONTINUED | OUTPATIENT
Start: 2019-10-02 | End: 2019-10-02 | Stop reason: SURG

## 2019-10-02 RX ORDER — SODIUM CHLORIDE 0.9 % (FLUSH) 0.9 %
1-10 SYRINGE (ML) INJECTION AS NEEDED
Status: DISCONTINUED | OUTPATIENT
Start: 2019-10-02 | End: 2019-10-02 | Stop reason: HOSPADM

## 2019-10-02 RX ORDER — ONDANSETRON 2 MG/ML
4 INJECTION INTRAMUSCULAR; INTRAVENOUS ONCE AS NEEDED
Status: DISCONTINUED | OUTPATIENT
Start: 2019-10-02 | End: 2019-10-02 | Stop reason: HOSPADM

## 2019-10-02 RX ADMIN — PROPOFOL 30 MG: 10 INJECTION, EMULSION INTRAVENOUS at 10:46

## 2019-10-02 RX ADMIN — PROPOFOL 25 MG: 10 INJECTION, EMULSION INTRAVENOUS at 10:48

## 2019-10-02 RX ADMIN — PROPOFOL 60 MG: 10 INJECTION, EMULSION INTRAVENOUS at 10:37

## 2019-10-02 RX ADMIN — PROPOFOL 10 MG: 10 INJECTION, EMULSION INTRAVENOUS at 10:52

## 2019-10-02 RX ADMIN — PROPOFOL 10 MG: 10 INJECTION, EMULSION INTRAVENOUS at 10:39

## 2019-10-02 RX ADMIN — PROPOFOL 10 MG: 10 INJECTION, EMULSION INTRAVENOUS at 10:50

## 2019-10-02 RX ADMIN — PROPOFOL 10 MG: 10 INJECTION, EMULSION INTRAVENOUS at 10:49

## 2019-10-02 RX ADMIN — PROPOFOL 40 MG: 10 INJECTION, EMULSION INTRAVENOUS at 10:44

## 2019-10-02 RX ADMIN — PROPOFOL 10 MG: 10 INJECTION, EMULSION INTRAVENOUS at 10:47

## 2019-10-02 RX ADMIN — PROPOFOL 10 MG: 10 INJECTION, EMULSION INTRAVENOUS at 10:40

## 2019-10-02 RX ADMIN — PROPOFOL 25 MG: 10 INJECTION, EMULSION INTRAVENOUS at 10:53

## 2019-10-02 RX ADMIN — GLYCOPYRROLATE 0.1 MG: 0.2 INJECTION INTRAMUSCULAR; INTRAVENOUS at 10:26

## 2019-10-02 RX ADMIN — LIDOCAINE HYDROCHLORIDE 100 MG: 20 INJECTION, SOLUTION INFILTRATION; PERINEURAL at 10:37

## 2019-10-02 RX ADMIN — PROPOFOL 20 MG: 10 INJECTION, EMULSION INTRAVENOUS at 10:45

## 2019-10-02 RX ADMIN — SODIUM CHLORIDE, POTASSIUM CHLORIDE, SODIUM LACTATE AND CALCIUM CHLORIDE 9 ML/HR: 600; 310; 30; 20 INJECTION, SOLUTION INTRAVENOUS at 10:08

## 2019-10-02 RX ADMIN — PROPOFOL 10 MG: 10 INJECTION, EMULSION INTRAVENOUS at 10:42

## 2019-10-02 RX ADMIN — PROPOFOL 10 MG: 10 INJECTION, EMULSION INTRAVENOUS at 10:43

## 2019-10-02 NOTE — ANESTHESIA PREPROCEDURE EVALUATION
Anesthesia Evaluation     Patient summary reviewed and Nursing notes reviewed   no history of anesthetic complications:  NPO Solid Status: > 8 hours  NPO Liquid Status: > 2 hours           Airway   Mallampati: II  TM distance: >3 FB  Neck ROM: full  No difficulty expected  Dental    (+) lower dentures and upper dentures    Pulmonary - normal exam    breath sounds clear to auscultation  (+) a smoker (quit 1971) Former, sleep apnea on CPAP,   Cardiovascular - normal exam  Exercise tolerance: good (4-7 METS)    Rhythm: regular  Rate: normal    (+) hypertension well controlled 2 medications or greater, hyperlipidemia (hypertriglyceridemia),       Neuro/Psych  (+) numbness (feet occ),     GI/Hepatic/Renal/Endo    (+)  GERD, PUD (remote history),  liver disease fatty liver disease, renal disease stones, diabetes mellitus type 2 poorly controlled,     ROS Comment: dysphagia    Musculoskeletal     (+) back pain (occ),   Abdominal  - normal exam   Substance History - negative use     OB/GYN negative ob/gyn ROS         Other   (+) arthritis (hands)     ROS/Med Hx Other: Sip water with meds 0800                  Anesthesia Plan    ASA 3     MAC     intravenous induction   Anesthetic plan, all risks, benefits, and alternatives have been provided, discussed and informed consent has been obtained with: patient.

## 2019-10-02 NOTE — OP NOTE
Patient Name:  Guy Plascencia  YOB: 1950    Date of Procedure:  10/2/2019    Procedure Performed: EGD     Indications: Dysphasia    Pre-op Diagnosis:   Dysphagia, unspecified type [R13.10]    Post-Op Diagnosis Codes:     * Dysphagia, unspecified type [R13.10]         Staff:  Surgeon(s):  Marilou Mojica MD         Consent: Procedure EGD indications, risks and procedure were discussed with the patient, including but not limited to, bleeding, infection, possibility of perforation and possible polypectomy. All of the patient's questions were answered, and signed informed consent was obtained and placed on the chart.      Sedation: Sedation was provided by anesthesia.      Estimated Blood Loss: minimal    Specimens:   ID Type Source Tests Collected by Time   A : gastric biopsy Tissue Stomach TISSUE PATHOLOGY EXAM Marilou Mojica MD 10/2/2019 1041   B : distal esophagus biopsy Tissue Esophagus, Distal TISSUE PATHOLOGY EXAM Marilou Mojica MD 10/2/2019 1054         Description of Procedure:   After excellent sedation a flexible endoscope was passed into the oropharynx into the distal esophagus.  Z line is regular and scope is easily traversed and stomach following to the antrum.  Gastritis is noted here biopsies are obtained using forceps.  The scope is retroflexed here straightened and passed into the duodenal bulb following to the second portion with ease.  The entire examined small bowel mucosa was made the and this is appeared normal.  The scope was then withdrawn back out to the distal esophageal area.  No obvious strictures are noted.  A TTS balloon dilator starting at 15 mm was introduced here this is advanced to 16-1/2 mm and ultimately the 18 mm because of 15 and 16.5 did not contribution any dilation.  Dilation started really at 18 mm and this was held for 60 seconds at 18 mm.  That dilator was withdrawn out the scope and the balloon dilators going from 1819 and 20 was then introduced.  The  area was dilated now at 19 mm held for 60 seconds and ultimately 20 mm held for a seconds.  At this area was dilated.  Balloon was then deflated and withdrawn out the scope.  Distal esophageal biopsies are obtained to rule out Fitzgerald's esophagus.  The scope was then easily withdrawn out the patient with no immediate complications and he tolerated the procedure well.       Findings:   Irregular Z line  Status post dilation from 18 mm to 20 mm each held for 60 seconds  Gastritis  We will await biopsy results.  He will continue on his PPI therapy and antireflux measures as discussed before.  He will see me back in the office in follow-up in about 3 to 4 weeks.    Complications: None        Marilou Mojica MD     Date: 10/2/2019  Time: 11:12 AM

## 2019-10-02 NOTE — ANESTHESIA POSTPROCEDURE EVALUATION
Patient: Guy Plascencia    Procedure Summary     Date:  10/02/19 Room / Location:  [facility] ENDOSCOPY 2 /  [facility] OR    Anesthesia Start:  1032 Anesthesia Stop:  1100    Procedure:  ESOPHAGOGASTRODUODENOSCOPY with biopsies and dilatation to 20mm (N/A Esophagus) Diagnosis:       Dysphagia, unspecified type      (Dysphagia, unspecified type [R13.10])    Surgeon:  Marilou Mojica MD Provider:  Amaris Alan CRNA    Anesthesia Type:  MAC ASA Status:  3          Anesthesia Type: MAC  Last vitals  BP   129/81 (10/02/19 1102)   Temp   97 °F (36.1 °C) (10/02/19 1102)   Pulse   73 (10/02/19 1102)   Resp   13 (10/02/19 1102)     SpO2   99 % (10/02/19 1102)     Post Anesthesia Care and Evaluation    Patient location during evaluation: PHASE II  Patient participation: complete - patient participated  Level of consciousness: awake and alert  Pain score: 2  Pain management: satisfactory to patient  Airway patency: patent  Anesthetic complications: No anesthetic complications  PONV Status: none  Cardiovascular status: acceptable  Respiratory status: acceptable  Hydration status: acceptable

## 2019-10-02 NOTE — H&P
PATIENT INFORMATION   Guy Plascencia    - 1950   CHIEF COMPLAINT       Chief Complaint   Patient presents with   • Difficulty Swallowing   • Diarrhea     HISTORY OF PRESENT ILLNESS   HPI   69 yo with intermittent solid food dysphagia, first episode was about 10 years ago. This occurs maybe 1-3 times yearly. Last episode was yesterday at [facility], while eating ground meat and salad.   No previous egd. No melena or hematochezia. He had issues with heartburn many years ago and was on ppi but that was stopped and he has not had any issues.   Weight has been decreasing, 7 pounds since one month.   Severity is moderate. At times alleviated with vomiting.   cologuard negative this year   He does have mild thrombocytopenia. CT done last year:   IMPRESSION:   1. Mild splenomegaly is stable since .   2. Stable mild diffuse hepatic steatosis.   3. Incidental tiny cyst within the tail of the pancreas, likely benign   and unchanged since 2016. Consider follow-up CT in 2 years.   4. Small nonobstructing right upper pole renal calculus.   REVIEW OF SYSTEMS   Review of Systems   Constitutional: Positive for unexpected weight change.   HENT: Positive for dental problem, postnasal drip, sinus pressure and trouble swallowing.   Respiratory: Positive for apnea and cough.   Gastrointestinal: Positive for diarrhea.   Endocrine: Positive for polydipsia and polyuria.   Musculoskeletal: Positive for arthralgias.   Hematological: Bruises/bleeds easily.   All other systems reviewed and are negative.     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]   • Bilateral shoulder pain [M25.511, M25.512]   • Foot pain [M79.673]   • Uncontrolled type 2 diabetes mellitus with complication, without long-term current use of insulin (CMS/[facility]) [E11.8, E11.65]   • Eczema [L30.9]   • 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]   • Mixed hyperlipidemia [E78.2]   • Essential hypertension [I10]   • Hypogonadism in male [E29.1]   • Obstructive sleep apnea syndrome [G47.33]   • Thrombocytopenia (CMS/[facility]) [D69.6]   • Vitamin D deficiency [E55.9]     PAST MEDICAL HISTORY   Medical History        Past Medical History:   Diagnosis Date   • Arthritis    • Diabetes mellitus (CMS/[facility])     TYPE 2   • H/O Multiple facial fractures 11/1968   • H/O Broken femur 11/1968   • History of transfusion     1968   • Hyperlipidemia    • Hypertension    • Kidney stone    • Neoplasm of uncertain behavior    • Peptic ulceration    • Plantar fasciitis    • Sleep apnea     CPAP   • Thrombocytosis (CMS/HCC)      SURGICAL HISTORY   Surgical History         Past Surgical History:   Procedure Laterality Date   • COLONOSCOPY     • COSMETIC SURGERY  11/1968    MULTIPLE FACIAL FRACTURE   • CYSTOSCOPY URETEROSCOPY STONE MANIPULATION/EXTRACTION Left 6/29/2016    Procedure: CYSTOSCOPY, LEFT URETEROSCOPY, BASKET EXTRACTION OF LEFT URETERAL STONE, LEFT STENT PLACEMENT; Surgeon: Ish Gaitan MD; Location: [facility] OR; Service:    • EXTRACORPOREAL SHOCK WAVE LITHOTRIPSY (ESWL) Right 7/29/2016    Procedure: RT EXTRACORPOREAL SHOCKWAVE LITHOTRIPSY; Surgeon: Ish Gaitan MD; Location: [facility] Service:    • FEMUR SURGERY  11/1968   • FRACTURE SURGERY      femur fx   • OTHER SURGICAL HISTORY      stents, for kidney stones   • OTHER SURGICAL HISTORY      lithotripsy   • SKIN BIOPSY       FAMILY HISTORY         Family History   Problem Relation Age of Onset   • Heart disease  Father    • Cancer Other     Kidney, throat, skin   • Skin cancer Brother 50   • Cancer Brother    • Colon polyps Neg Hx    • Colon cancer Neg Hx      SOCIAL HISTORY   Social History           Occupational History   • Occupation:      Employer: RETIRED   Tobacco Use   • Smoking status: Former Smoker     Years: 14.00     Types: Cigarettes   • Smokeless tobacco: Never Used   • Tobacco comment: QUIT 1972 SMOKED 2PPD X10 YRS   Substance and Sexual Activity   • Alcohol use: No   • Drug use: No   • Sexual activity: Defer     Debilities/Disabilities Identified: None   Emotional Behavior: Appropriate   CURRENT MEDICATIONS     Current Outpatient Medications:   • amLODIPine (NORVASC) 10 MG tablet, TAKE ONE TABLET BY MOUTH EVERY MORNING, Disp: 30 tablet, Rfl: 4   • Aspirin (ASPIR-81 PO), Take by mouth Daily., Disp: , Rfl:   • Blood Glucose Monitoring Suppl (ONETOUCH VERIO FLEX SYSTEM) w/Device kit, 1 Device 2 (Two) Times a Day., Disp: 1 kit, Rfl: 0   • Cholecalciferol (D3 ADULT PO), Take 2,000 Units by mouth Daily., Disp: , Rfl:   • CINNAMON PO, Take 1,000 mg by mouth 4 (Four) Times a Day., Disp: , Rfl:   • dapagliflozin-metformin HCl ER (XIGDUO XR)  MG tablet, Take 1 tablet by mouth Daily., Disp: 30 tablet, Rfl: 6   • glucose blood (AGAMATRIX PRESTO TEST) test strip, Used to check twice daily for type 2 diabetes, Disp: 100 each, Rfl: 9   • glucose blood (WAVESENSE PRESTO) test strip, 1 each by Other route Daily. Use as instructed, Disp: 100 each, Rfl: 10   • hydrochlorothiazide (MICROZIDE) 12.5 MG capsule, TAKE ONE CAPSULE BY MOUTH DAILY, Disp: 90 capsule, Rfl: 4   • lisinopril (PRINIVIL,ZESTRIL) 40 MG tablet, TAKE ONE TABLET BY MOUTH DAILY, Disp: 90 tablet, Rfl: 2   • meloxicam (MOBIC) 7.5 MG tablet, TAKE ONE TABLET BY MOUTH DAILY, Disp: 30 tablet, Rfl: 3   • metoprolol tartrate (LOPRESSOR) 25 MG tablet, TAKE ONE TABLET BY MOUTH TWICE A DAY, Disp: 60 tablet, Rfl: 6   • omeprazole (priLOSEC) 20 MG capsule,  "Take 1 capsule by mouth Daily., Disp: 30 capsule, Rfl: 5   • pravastatin (PRAVACHOL) 40 MG tablet, TAKE ONE TABLET BY MOUTH DAILY, Disp: 90 tablet, Rfl: 3   • vitamin B-12 (CYANOCOBALAMIN) 1000 MCG tablet, Take 1 tablet by mouth Daily., Disp: , Rfl:   ALLERGIES   Penicillins and Cimetidine   VITALS   /93 (BP Location: Left arm, Patient Position: Lying)   Pulse 79   Temp 97.8 °F (36.6 °C) (Oral)   Resp 15   Ht 177.8 cm (70\")   Wt 67.7 kg (149 lb 3.2 oz)   SpO2 99%   BMI 21.41 kg/m²                                     LAST RESULTS           Orders Only on 08/26/2019   Component Date Value Ref Range Status   • Glucose 08/27/2019 174* 65 - 99 mg/dL Final   • BUN 08/27/2019 18  8 - 23 mg/dL Final   • Creatinine 08/27/2019 0.99  0.76 - 1.27 mg/dL Final   • eGFR Non  Am 08/27/2019 75  >60 mL/min/1.73 Final   • eGFR African Am 08/27/2019 91  >60 mL/min/1.73 Final   • BUN/Creatinine Ratio 08/27/2019 18.2  7.0 - 25.0 Final   • Sodium 08/27/2019 145  136 - 145 mmol/L Final   • Potassium 08/27/2019 4.0  3.5 - 5.2 mmol/L Final   • Chloride 08/27/2019 104  98 - 107 mmol/L Final   • Total CO2 08/27/2019 29.6* 22.0 - 29.0 mmol/L Final   • Calcium 08/27/2019 9.8  8.6 - 10.5 mg/dL Final   • Total Protein 08/27/2019 6.5  6.0 - 8.5 g/dL Final   • Albumin 08/27/2019 4.60  3.50 - 5.20 g/dL Final   • Globulin 08/27/2019 1.9  gm/dL Final   • A/G Ratio 08/27/2019 2.4  g/dL Final   • Total Bilirubin 08/27/2019 0.4  0.2 - 1.2 mg/dL Final   • Alkaline Phosphatase 08/27/2019 60  39 - 117 U/L Final   • AST (SGOT) 08/27/2019 16  1 - 40 U/L Final   • ALT (SGPT) 08/27/2019 22  1 - 41 U/L Final   • WBC 08/27/2019 4.37  3.40 - 10.80 10*3/mm3 Final   • RBC 08/27/2019 4.86  4.14 - 5.80 10*6/mm3 Final   • Hemoglobin 08/27/2019 14.0  13.0 - 17.7 g/dL Final   • Hematocrit 08/27/2019 44.4  37.5 - 51.0 % Final   • MCV 08/27/2019 91.4  79.0 - 97.0 fL Final   • MCH 08/27/2019 28.8  26.6 - 33.0 pg Final   • MCHC 08/27/2019 31.5  31.5 - 35.7 " g/dL Final   • RDW 08/27/2019 14.0  12.3 - 15.4 % Final   • Platelets 08/27/2019 114* 140 - 450 10*3/mm3 Final   • Neutrophil Rel % 08/27/2019 59.5  42.7 - 76.0 % Final   • Lymphocyte Rel % 08/27/2019 28.4  19.6 - 45.3 % Final   • Monocyte Rel % 08/27/2019 9.6  5.0 - 12.0 % Final   • Eosinophil Rel % 08/27/2019 1.6  0.3 - 6.2 % Final   • Basophil Rel % 08/27/2019 0.7  0.0 - 1.5 % Final   • Neutrophils Absolute 08/27/2019 2.60  1.70 - 7.00 10*3/mm3 Final   • Lymphocytes Absolute 08/27/2019 1.24  0.70 - 3.10 10*3/mm3 Final   • Monocytes Absolute 08/27/2019 0.42  0.10 - 0.90 10*3/mm3 Final   • Eosinophils Absolute 08/27/2019 0.07  0.00 - 0.40 10*3/mm3 Final   • Basophils Absolute 08/27/2019 0.03  0.00 - 0.20 10*3/mm3 Final   • Immature Granulocyte Rel % 08/27/2019 0.2  0.0 - 0.5 % Final   • Immature Grans Absolute 08/27/2019 0.01  0.00 - 0.05 10*3/mm3 Final   • nRBC 08/27/2019 0.0  0.0 - 0.2 /100 WBC Final   • Total Cholesterol 08/27/2019 138  0 - 200 mg/dL Final   • Triglycerides 08/27/2019 249* 0 - 150 mg/dL Final   • HDL Cholesterol 08/27/2019 30* 40 - 60 mg/dL Final   • VLDL Cholesterol 08/27/2019 49.8  mg/dL Final   • LDL Cholesterol  08/27/2019 58  0 - 100 mg/dL Final   • Chol/HDL Ratio 08/27/2019 4.60   Final   • Hemoglobin A1C 08/27/2019 7.30* 4.80 - 5.60 % Final    Comment: Hemoglobin A1C Ranges:   Increased Risk for Diabetes 5.7% to 6.4%   Diabetes >= 6.5%   Diabetic Goal < 7.0%    • Microalbumin, Urine 08/27/2019 213.8  Not Estab. ug/mL Final   • 25 Hydroxy, Vitamin D 08/27/2019 38.8  30.0 - 100.0 ng/ml Final    Comment: Reference Range for Total Vitamin D 25(OH)   Deficiency <20.0 ng/mL   Insufficiency 21-29 ng/mL   Sufficiency  ng/mL   Toxicity >100 ng/ml      No results found.   PHYSICAL EXAM   Physical Exam   Constitutional: He is oriented to person, place, and time. He appears well-developed and well-nourished. No distress.   HENT:   Head: Normocephalic and atraumatic.   Eyes: EOM are normal.  Pupils are equal, round, and reactive to light.   Neck: Neck supple. No tracheal deviation present.   Cardiovascular: Normal rate, regular rhythm, normal heart sounds and intact distal pulses. Exam reveals no gallop and no friction rub.   No murmur heard.   Pulmonary/Chest: Effort normal and breath sounds normal. No respiratory distress. He has no wheezes. He has no rales. He exhibits no tenderness.   Abdominal: Soft. Bowel sounds are normal. He exhibits no distension. There is no tenderness. There is no rebound and no guarding.   Musculoskeletal: He exhibits no edema.   Lymphadenopathy:   He has no cervical adenopathy.   Neurological: He is alert and oriented to person, place, and time.   Skin: Skin is warm. He is not diaphoretic. No erythema.   Psychiatric: He has a normal mood and affect. His behavior is normal. Judgment and thought content normal.   Nursing note and vitals reviewed.     ASSESSMENT   Diagnoses and all orders for this visit:   Dysphagia, unspecified type   - Case Request; Standing   - Case Request   Other orders   - omeprazole (priLOSEC) 20 MG capsule; Take 1 capsule by mouth Daily.   - Follow Anesthesia Guidelines / Standing Orders; Future   - Implement Anesthesia orders day of procedure.; Standing   - Obtain informed consent; Standing     PLAN   No Follow-up on file.   Antireflux measures and dietary modifications reviewed. Low acid diet reviewed. Keep head of bed elevated. Stop eating/drinking at least 3 hours prior to bedtime. Eliminate caffeine and carbonated beverages. Weight loss encouraged if BMI over 25.   Indications, risks and procedure were discussed with the patient, including but not limited to, bleeding, infection, possibility of perforation and possible polypectomy. All of the patient's questions were answered, and signed informed consent was obtained and placed on the chart.

## 2019-10-03 LAB
CYTO UR: NORMAL
LAB AP CASE REPORT: NORMAL
PATH REPORT.FINAL DX SPEC: NORMAL
PATH REPORT.GROSS SPEC: NORMAL

## 2019-10-17 ENCOUNTER — TELEPHONE (OUTPATIENT)
Dept: FAMILY MEDICINE CLINIC | Facility: CLINIC | Age: 69
End: 2019-10-17

## 2019-10-17 DIAGNOSIS — IMO0002 UNCONTROLLED TYPE 2 DIABETES MELLITUS WITH COMPLICATION, WITHOUT LONG-TERM CURRENT USE OF INSULIN: Primary | ICD-10-CM

## 2019-10-17 NOTE — TELEPHONE ENCOUNTER
Notify patient I have sent metformin 1000 mg to take with his dinner/supper.  Continue the Xigduo in the morning.  Needs to decrease sugar and carbohydrates in diet.  Have him schedule follow-up appointment in 1 month for fasting blood work and office visit.

## 2019-10-17 NOTE — TELEPHONE ENCOUNTER
Pt calling, states his blood sugar readings are 250-300 each am. Would like to know if his diabetic medication should be changed again?    Please advise.

## 2019-12-08 DIAGNOSIS — IMO0002 UNCONTROLLED TYPE 2 DIABETES MELLITUS WITH COMPLICATION, WITHOUT LONG-TERM CURRENT USE OF INSULIN: ICD-10-CM

## 2019-12-10 DIAGNOSIS — IMO0002 UNCONTROLLED TYPE 2 DIABETES MELLITUS WITH COMPLICATION, WITHOUT LONG-TERM CURRENT USE OF INSULIN: Primary | ICD-10-CM

## 2019-12-10 DIAGNOSIS — E11.649 UNCONTROLLED TYPE 2 DIABETES MELLITUS WITH HYPOGLYCEMIA, UNSPECIFIED HYPOGLYCEMIA COMA STATUS (HCC): ICD-10-CM

## 2019-12-10 DIAGNOSIS — Z00.00 PHYSICAL EXAM: ICD-10-CM

## 2019-12-11 DIAGNOSIS — I10 ESSENTIAL HYPERTENSION: ICD-10-CM

## 2019-12-16 LAB
ALBUMIN SERPL-MCNC: 4.4 G/DL (ref 3.5–5.2)
ALBUMIN/GLOB SERPL: 2 G/DL
ALP SERPL-CCNC: 72 U/L (ref 39–117)
ALT SERPL-CCNC: 20 U/L (ref 1–41)
AST SERPL-CCNC: 19 U/L (ref 1–40)
BASOPHILS # BLD AUTO: 0.02 10*3/MM3 (ref 0–0.2)
BASOPHILS NFR BLD AUTO: 0.5 % (ref 0–1.5)
BILIRUB SERPL-MCNC: 0.4 MG/DL (ref 0.2–1.2)
BUN SERPL-MCNC: 19 MG/DL (ref 8–23)
BUN/CREAT SERPL: 17.6 (ref 7–25)
CALCIUM SERPL-MCNC: 9.5 MG/DL (ref 8.6–10.5)
CHLORIDE SERPL-SCNC: 102 MMOL/L (ref 98–107)
CHOLEST SERPL-MCNC: 160 MG/DL (ref 0–200)
CHOLEST/HDLC SERPL: 5.33 {RATIO}
CO2 SERPL-SCNC: 29.3 MMOL/L (ref 22–29)
CREAT SERPL-MCNC: 1.08 MG/DL (ref 0.76–1.27)
EOSINOPHIL # BLD AUTO: 0.12 10*3/MM3 (ref 0–0.4)
EOSINOPHIL NFR BLD AUTO: 3.1 % (ref 0.3–6.2)
ERYTHROCYTE [DISTWIDTH] IN BLOOD BY AUTOMATED COUNT: 13.3 % (ref 12.3–15.4)
GLOBULIN SER CALC-MCNC: 2.2 GM/DL
GLUCOSE SERPL-MCNC: 251 MG/DL (ref 65–99)
HBA1C MFR BLD: 9.7 % (ref 4.8–5.6)
HCT VFR BLD AUTO: 42.1 % (ref 37.5–51)
HDLC SERPL-MCNC: 30 MG/DL (ref 40–60)
HGB BLD-MCNC: 14.2 G/DL (ref 13–17.7)
IMM GRANULOCYTES # BLD AUTO: 0.01 10*3/MM3 (ref 0–0.05)
IMM GRANULOCYTES NFR BLD AUTO: 0.3 % (ref 0–0.5)
LDLC SERPL CALC-MCNC: 80 MG/DL (ref 0–100)
LYMPHOCYTES # BLD AUTO: 0.78 10*3/MM3 (ref 0.7–3.1)
LYMPHOCYTES NFR BLD AUTO: 20.3 % (ref 19.6–45.3)
MCH RBC QN AUTO: 29.6 PG (ref 26.6–33)
MCHC RBC AUTO-ENTMCNC: 33.7 G/DL (ref 31.5–35.7)
MCV RBC AUTO: 87.9 FL (ref 79–97)
MONOCYTES # BLD AUTO: 0.36 10*3/MM3 (ref 0.1–0.9)
MONOCYTES NFR BLD AUTO: 9.4 % (ref 5–12)
NEUTROPHILS # BLD AUTO: 2.56 10*3/MM3 (ref 1.7–7)
NEUTROPHILS NFR BLD AUTO: 66.4 % (ref 42.7–76)
NRBC BLD AUTO-RTO: 0 /100 WBC (ref 0–0.2)
PLATELET # BLD AUTO: 106 10*3/MM3 (ref 140–450)
POTASSIUM SERPL-SCNC: 4.1 MMOL/L (ref 3.5–5.2)
PROT SERPL-MCNC: 6.6 G/DL (ref 6–8.5)
RBC # BLD AUTO: 4.79 10*6/MM3 (ref 4.14–5.8)
SODIUM SERPL-SCNC: 143 MMOL/L (ref 136–145)
TRIGL SERPL-MCNC: 248 MG/DL (ref 0–150)
VLDLC SERPL CALC-MCNC: 49.6 MG/DL
WBC # BLD AUTO: 3.85 10*3/MM3 (ref 3.4–10.8)

## 2019-12-26 RX ORDER — AMLODIPINE BESYLATE 10 MG/1
TABLET ORAL
Qty: 30 TABLET | Refills: 3 | Status: SHIPPED | OUTPATIENT
Start: 2019-12-26 | End: 2020-04-20

## 2020-01-02 ENCOUNTER — OFFICE VISIT (OUTPATIENT)
Dept: FAMILY MEDICINE CLINIC | Facility: CLINIC | Age: 70
End: 2020-01-02

## 2020-01-02 VITALS
HEIGHT: 70 IN | HEART RATE: 91 BPM | DIASTOLIC BLOOD PRESSURE: 62 MMHG | WEIGHT: 165 LBS | TEMPERATURE: 97.6 F | RESPIRATION RATE: 16 BRPM | BODY MASS INDEX: 23.62 KG/M2 | OXYGEN SATURATION: 98 % | SYSTOLIC BLOOD PRESSURE: 142 MMHG

## 2020-01-02 DIAGNOSIS — IMO0002 UNCONTROLLED TYPE 2 DIABETES MELLITUS WITH COMPLICATION, WITHOUT LONG-TERM CURRENT USE OF INSULIN: Primary | ICD-10-CM

## 2020-01-02 DIAGNOSIS — E78.2 MIXED HYPERLIPIDEMIA: ICD-10-CM

## 2020-01-02 DIAGNOSIS — D69.6 THROMBOCYTOPENIA (HCC): ICD-10-CM

## 2020-01-02 DIAGNOSIS — I10 ESSENTIAL HYPERTENSION: ICD-10-CM

## 2020-01-02 PROCEDURE — 99214 OFFICE O/P EST MOD 30 MIN: CPT | Performed by: PHYSICIAN ASSISTANT

## 2020-01-02 RX ORDER — GLIPIZIDE AND METFORMIN HCL 5; 500 MG/1; MG/1
2 TABLET, FILM COATED ORAL
Qty: 120 TABLET | Refills: 3 | Status: SHIPPED | OUTPATIENT
Start: 2020-01-02 | End: 2020-06-10

## 2020-01-02 NOTE — PROGRESS NOTES
Subjective   Guy Plascencia is a 69 y.o. male presents for   Chief Complaint   Patient presents with   • Diabetes     management   • Hypertension     management   • Hyperlipidemia     management       History of Present Illness     Guy is a 69-year-old male who presents for type 2 diabetes, hyperlipidemia and hypertension management.  He has lost 12 pounds since October 29, 2019. His blood sugars 250-300 at home.  Diet has been the same.  He trends to eat a lot of carbs/fried foods.  Guy states he is always hungry and finishes his wife meals when they eat out.  Guy states he has been taking his duo an additional metformin as directed.  He has noticed his blood sugars have not been as well controlled with this medication.  He did better with the glipizide/metformin and Farxiga.  Denied any chest pain, shortness of air, dizziness, vision changes or swelling of ankles.  He refuses any updated immunizations today.  Bowel movements have been normal for him.  He has not made an appointment with his hematologist for his thrombocytopenia.  States he was due in December 2019 for forgot to make an appointment.  Denied any nosebleeds, bruising or fatigue symptoms.  Sleep has been normal for him.  He has no complaints at office visit today.    The following portions of the patient's history were reviewed and updated as appropriate: allergies, current medications, past family history, past medical history, past social history, past surgical history and problem list.    Review of Systems   Constitutional: Negative.    HENT: Negative.    Eyes: Negative.    Respiratory: Negative.  Negative for cough, shortness of breath and wheezing.    Cardiovascular: Negative.  Negative for chest pain, palpitations and leg swelling.   Gastrointestinal: Negative.  Negative for abdominal pain, constipation, diarrhea and nausea.   Endocrine: Positive for polyphagia.   Genitourinary: Negative.    Musculoskeletal: Negative.    Skin: Negative.  "   Allergic/Immunologic: Negative.    Neurological: Negative.  Negative for dizziness, light-headedness and headaches.   Hematological: Negative.  Negative for adenopathy. Does not bruise/bleed easily.   Psychiatric/Behavioral: Negative.  Negative for sleep disturbance.   All other systems reviewed and are negative.        Vitals:    01/02/20 1032 01/02/20 1107   BP: 148/62 142/62   BP Location:  Left arm   Patient Position:  Sitting   Cuff Size:  Adult   Pulse: 91    Resp: 16    Temp: 97.6 °F (36.4 °C)    SpO2: 98%    Weight: 74.8 kg (165 lb)    Height: 177.8 cm (70\")      Wt Readings from Last 3 Encounters:   01/02/20 74.8 kg (165 lb)   10/02/19 67.7 kg (149 lb 3.2 oz)   09/19/19 77.1 kg (170 lb)     BP Readings from Last 3 Encounters:   01/02/20 142/62   10/02/19 144/83   09/19/19 128/74     Social History     Socioeconomic History   • Marital status:      Spouse name: Jessica   • Number of children: Not on file   • Years of education: College   • Highest education level: Not on file   Occupational History   • Occupation:      Employer: RETIRED   Tobacco Use   • Smoking status: Former Smoker     Years: 14.00     Types: Cigarettes   • Smokeless tobacco: Never Used   • Tobacco comment: QUIT 1972  SMOKED 2PPD X10 YRS   Substance and Sexual Activity   • Alcohol use: No   • Drug use: No   • Sexual activity: Defer       Allergies   Allergen Reactions   • Penicillins Other (See Comments)     ALLERGY TESTING SHOWED ALL TO PCN.    • Cimetidine Rash       Body mass index is 23.68 kg/m².    Objective   Physical Exam   Constitutional: He is oriented to person, place, and time. Vital signs are normal. He appears well-developed and well-nourished.   Neck: Trachea normal and phonation normal. Neck supple. Normal carotid pulses, no hepatojugular reflux and no JVD present. No tracheal tenderness present. Carotid bruit is not present. No tracheal deviation and no edema present. No thyroid mass and no thyromegaly " present.   Cardiovascular: Normal rate, regular rhythm, S1 normal, S2 normal, normal heart sounds and normal pulses.   No murmur heard.  Pulmonary/Chest: Effort normal and breath sounds normal.   Abdominal: Soft. Normal appearance, normal aorta and bowel sounds are normal. There is no hepatomegaly. There is no tenderness.   Neurological: He is alert and oriented to person, place, and time.   Skin: Skin is warm, dry and intact. Capillary refill takes less than 2 seconds.   Psychiatric: He has a normal mood and affect. His speech is normal and behavior is normal. Judgment and thought content normal. Cognition and memory are normal.       Assessment/Plan   Guy was seen today for diabetes, hypertension and hyperlipidemia.    Diagnoses and all orders for this visit:    Uncontrolled type 2 diabetes mellitus with complication, without long-term current use of insulin (CMS/[facility])  -     dapagliflozin (FARXIGA) 5 MG tablet tablet; Take 1 tablet by mouth Daily.  -     glipiZIDE-metFORMIN (METAGLIP) 5-500 MG per tablet; Take 2 tablets by mouth 2 (Two) Times a Day Before Meals.  -     Basic Metabolic Panel; Future    Essential hypertension    Mixed hyperlipidemia  -     Lipid Panel With LDL / HDL Ratio; Future    Thrombocytopenia (CMS/[facility])  -     Ambulatory Referral to Hematology / Oncology      1.  Chronic uncontrolled type 2 diabetes with hyperlipidemia management: I have reviewed his lab work that was collected on December 16, 2019 with him at office visit today.  Fasting blood sugar was 251, cholesterol 160, triglycerides 248, HDL 30, LDL 80 and A1c was 9.7.  His A1c has increased dramatically in the past 3 months.  Triglycerides and HDL cholesterol are similar to 3 months ago.  Fasting blood sugar has increased slightly over the past 2 months.  Guy seem to do better with the Farxiga and glipizide/metformin medication.  We will return to this medication.  The prescriptions have been sent to pharmacy.  I have given him  samples of the Farsi go to use at home.  He will stop the Xigduo.  Plan to follow-up in 1 month for fasting blood sugar with lipid profile.  Have stressed the importance of decreasing carbohydrates and sugar in diet.  If A1c does not decrease we will send to endocrinology.  Patient voiced understanding.  2.  Chronic and stable hypertension: Chronic and stable hypertension: I have rechecked blood pressure at office visit today and got 142/62 in left arm.  He will continue his current medications at home as directed.  He will follow-up in 3 months.  3.  Chronic thrombocytopenia: His CBC showed platelet count of 106,000.  This has decreased since his last readings 3 months ago.  Guy needs to return to hematology.  He states he has not rescheduled his appointment for 2019.  I will place a referral to his hematologist, .    LEO Jade [address] [address]  Dept: [phone]  Dept Fax: [phone]  Loc: [phone]  Loc Fax: [phone]           No visits with results within 2 Week(s) from this visit.   Latest known visit with results is:   Orders Only on 12/10/2019   Component Date Value Ref Range Status   • Glucose 12/16/2019 251* 65 - 99 mg/dL Final   • BUN 12/16/2019 19  8 - 23 mg/dL Final   • Creatinine 12/16/2019 1.08  0.76 - 1.27 mg/dL Final   • eGFR Non  Am 12/16/2019 68  >60 mL/min/1.73 Final   • eGFR African Am 12/16/2019 82  >60 mL/min/1.73 Final   • BUN/Creatinine Ratio 12/16/2019 17.6  7.0 - 25.0 Final   • Sodium 12/16/2019 143  136 - 145 mmol/L Final   • Potassium 12/16/2019 4.1  3.5 - 5.2 mmol/L Final   • Chloride 12/16/2019 102  98 - 107 mmol/L Final   • Total CO2 12/16/2019 29.3* 22.0 - 29.0 mmol/L Final   • Calcium 12/16/2019 9.5  8.6 - 10.5 mg/dL Final   • Total Protein 12/16/2019 6.6  6.0 - 8.5 g/dL Final   • Albumin 12/16/2019 4.40  3.50 - 5.20 g/dL Final   • Globulin  12/16/2019 2.2  gm/dL Final   • A/G Ratio 12/16/2019 2.0  g/dL Final   • Total Bilirubin 12/16/2019 0.4  0.2 - 1.2 mg/dL Final   • Alkaline Phosphatase 12/16/2019 72  39 - 117 U/L Final   • AST (SGOT) 12/16/2019 19  1 - 40 U/L Final   • ALT (SGPT) 12/16/2019 20  1 - 41 U/L Final   • WBC 12/16/2019 3.85  3.40 - 10.80 10*3/mm3 Final   • RBC 12/16/2019 4.79  4.14 - 5.80 10*6/mm3 Final   • Hemoglobin 12/16/2019 14.2  13.0 - 17.7 g/dL Final   • Hematocrit 12/16/2019 42.1  37.5 - 51.0 % Final   • MCV 12/16/2019 87.9  79.0 - 97.0 fL Final   • MCH 12/16/2019 29.6  26.6 - 33.0 pg Final   • MCHC 12/16/2019 33.7  31.5 - 35.7 g/dL Final   • RDW 12/16/2019 13.3  12.3 - 15.4 % Final   • Platelets 12/16/2019 106* 140 - 450 10*3/mm3 Final   • Neutrophil Rel % 12/16/2019 66.4  42.7 - 76.0 % Final   • Lymphocyte Rel % 12/16/2019 20.3  19.6 - 45.3 % Final   • Monocyte Rel % 12/16/2019 9.4  5.0 - 12.0 % Final   • Eosinophil Rel % 12/16/2019 3.1  0.3 - 6.2 % Final   • Basophil Rel % 12/16/2019 0.5  0.0 - 1.5 % Final   • Neutrophils Absolute 12/16/2019 2.56  1.70 - 7.00 10*3/mm3 Final   • Lymphocytes Absolute 12/16/2019 0.78  0.70 - 3.10 10*3/mm3 Final   • Monocytes Absolute 12/16/2019 0.36  0.10 - 0.90 10*3/mm3 Final   • Eosinophils Absolute 12/16/2019 0.12  0.00 - 0.40 10*3/mm3 Final   • Basophils Absolute 12/16/2019 0.02  0.00 - 0.20 10*3/mm3 Final   • Immature Granulocyte Rel % 12/16/2019 0.3  0.0 - 0.5 % Final   • Immature Grans Absolute 12/16/2019 0.01  0.00 - 0.05 10*3/mm3 Final   • nRBC 12/16/2019 0.0  0.0 - 0.2 /100 WBC Final   • Total Cholesterol 12/16/2019 160  0 - 200 mg/dL Final   • Triglycerides 12/16/2019 248* 0 - 150 mg/dL Final   • HDL Cholesterol 12/16/2019 30* 40 - 60 mg/dL Final   • VLDL Cholesterol 12/16/2019 49.6  mg/dL Final   • LDL Cholesterol  12/16/2019 80  0 - 100 mg/dL Final   • Chol/HDL Ratio 12/16/2019 5.33   Final   • Hemoglobin A1C 12/16/2019 9.70* 4.80 - 5.60 % Final    Comment: Hemoglobin A1C  Ranges:  Increased Risk for Diabetes  5.7% to 6.4%  Diabetes                     >= 6.5%  Diabetic Goal                < 7.0%

## 2020-01-13 ENCOUNTER — OFFICE VISIT (OUTPATIENT)
Dept: ONCOLOGY | Facility: CLINIC | Age: 70
End: 2020-01-13

## 2020-01-13 ENCOUNTER — APPOINTMENT (OUTPATIENT)
Dept: LAB | Facility: HOSPITAL | Age: 70
End: 2020-01-13

## 2020-01-13 VITALS
BODY MASS INDEX: 25.09 KG/M2 | WEIGHT: 169.4 LBS | SYSTOLIC BLOOD PRESSURE: 151 MMHG | HEART RATE: 77 BPM | OXYGEN SATURATION: 97 % | HEIGHT: 69 IN | TEMPERATURE: 97.9 F | RESPIRATION RATE: 16 BRPM | DIASTOLIC BLOOD PRESSURE: 69 MMHG

## 2020-01-13 DIAGNOSIS — R16.1 SPLENOMEGALY: ICD-10-CM

## 2020-01-13 DIAGNOSIS — D69.6 THROMBOCYTOPENIA (HCC): Primary | ICD-10-CM

## 2020-01-13 DIAGNOSIS — R10.811 RIGHT UPPER QUADRANT ABDOMINAL TENDERNESS WITHOUT REBOUND TENDERNESS: ICD-10-CM

## 2020-01-13 DIAGNOSIS — E53.8 B12 DEFICIENCY: ICD-10-CM

## 2020-01-13 DIAGNOSIS — K86.2 PANCREATIC CYST: ICD-10-CM

## 2020-01-13 LAB
BASOPHILS # BLD AUTO: 0.01 10*3/MM3 (ref 0–0.2)
BASOPHILS NFR BLD AUTO: 0.2 % (ref 0–1.5)
DEPRECATED RDW RBC AUTO: 42.7 FL (ref 37–54)
EOSINOPHIL # BLD AUTO: 0.73 10*3/MM3 (ref 0–0.4)
EOSINOPHIL NFR BLD AUTO: 14.7 % (ref 0.3–6.2)
ERYTHROCYTE [DISTWIDTH] IN BLOOD BY AUTOMATED COUNT: 13 % (ref 12.3–15.4)
HCT VFR BLD AUTO: 43 % (ref 37.5–51)
HGB BLD-MCNC: 14.1 G/DL (ref 13–17.7)
IMM GRANULOCYTES # BLD AUTO: 0.01 10*3/MM3 (ref 0–0.05)
IMM GRANULOCYTES NFR BLD AUTO: 0.2 % (ref 0–0.5)
LYMPHOCYTES # BLD AUTO: 1.04 10*3/MM3 (ref 0.7–3.1)
LYMPHOCYTES NFR BLD AUTO: 21 % (ref 19.6–45.3)
MCH RBC QN AUTO: 29.7 PG (ref 26.6–33)
MCHC RBC AUTO-ENTMCNC: 32.8 G/DL (ref 31.5–35.7)
MCV RBC AUTO: 90.5 FL (ref 79–97)
MONOCYTES # BLD AUTO: 0.45 10*3/MM3 (ref 0.1–0.9)
MONOCYTES NFR BLD AUTO: 9.1 % (ref 5–12)
NEUTROPHILS # BLD AUTO: 2.72 10*3/MM3 (ref 1.7–7)
NEUTROPHILS NFR BLD AUTO: 54.8 % (ref 42.7–76)
NRBC BLD AUTO-RTO: 0 /100 WBC (ref 0–0.2)
PLATELET # BLD AUTO: 107 10*3/MM3 (ref 140–450)
PLATELETS.RETICULATED NFR BLD AUTO: 6.7 % (ref 0.9–6.5)
PMV BLD AUTO: 10.8 FL (ref 6–12)
RBC # BLD AUTO: 4.75 10*6/MM3 (ref 4.14–5.8)
VIT B12 BLD-MCNC: 1057 PG/ML (ref 211–946)
WBC NRBC COR # BLD: 4.96 10*3/MM3 (ref 3.4–10.8)

## 2020-01-13 PROCEDURE — 36415 COLL VENOUS BLD VENIPUNCTURE: CPT | Performed by: INTERNAL MEDICINE

## 2020-01-13 PROCEDURE — 99214 OFFICE O/P EST MOD 30 MIN: CPT | Performed by: INTERNAL MEDICINE

## 2020-01-13 PROCEDURE — 85025 COMPLETE CBC W/AUTO DIFF WBC: CPT | Performed by: INTERNAL MEDICINE

## 2020-01-13 PROCEDURE — 85055 RETICULATED PLATELET ASSAY: CPT | Performed by: INTERNAL MEDICINE

## 2020-01-13 PROCEDURE — 82607 VITAMIN B-12: CPT | Performed by: INTERNAL MEDICINE

## 2020-01-13 NOTE — PROGRESS NOTES
Subjective     CHIEF COMPLAINT:      Chief Complaint   Patient presents with   • Annual Exam     no concerns       HISTORY OF PRESENT ILLNESS:     Guy Plascencia is a 69 y.o. male patient who returns today for follow up on his thrombocytopenia and vitamin B12 deficiency.  He is on vitamin B12 1000 mcg daily.  He is not having problems with bleeding but reports occasional bruising.  He is not having problem with fatigue.    Patient states that he was having diarrhea and this was attributed to metformin.  He was switched to a different antidiabetic medicine.  Diarrhea improved but he started losing weight.  He lost 25 pounds.  He was changed back to metformin and he is feeling better.  He regained 2 pounds back.        REVIEW OF SYSTEMS:  Review of Systems   Constitutional: Negative for chills, fever and unexpected weight change.   HENT: Negative for mouth sores, nosebleeds, sore throat and voice change.    Eyes: Negative for visual disturbance.   Respiratory: Negative for cough and shortness of breath.    Cardiovascular: Negative for chest pain and leg swelling.   Gastrointestinal: Negative for abdominal pain, blood in stool, constipation, diarrhea, nausea and vomiting.   Genitourinary: Negative for dysuria, frequency and hematuria.   Musculoskeletal: Negative for arthralgias, back pain and joint swelling.   Skin: Negative for rash.   Neurological: Negative for dizziness, numbness and headaches.   Hematological: Negative for adenopathy. Bruises/bleeds easily.   Psychiatric/Behavioral: Negative for dysphoric mood. The patient is not nervous/anxious.      I verified the ROS obtained by the MA.      Past Medical History:   Diagnosis Date   • Arthritis    • Diabetes mellitus (CMS/HCC)     TYPE 2   • H/O  Multiple facial fractures 11/1968   • H/O Broken femur 11/1968   • History of transfusion     1968   • Hyperlipidemia    • Hypertension    • Kidney stone    • Neoplasm of uncertain behavior    • Peptic ulceration    •  Plantar fasciitis    • Sleep apnea     CPAP   • Thrombocytosis (CMS/HCC)        Past Surgical History:   Procedure Laterality Date   • COLONOSCOPY     • COSMETIC SURGERY  1968    MULTIPLE FACIAL FRACTURE   • CYSTOSCOPY URETEROSCOPY STONE MANIPULATION/EXTRACTION Left 2016    Procedure: CYSTOSCOPY, LEFT URETEROSCOPY, BASKET EXTRACTION OF LEFT URETERAL STONE, LEFT STENT PLACEMENT;  Surgeon: Ish Gaitan MD;  Location: [facility] OR;  Service:    • ENDOSCOPY N/A 10/2/2019    Procedure: ESOPHAGOGASTRODUODENOSCOPY with biopsies and dilatation to 20mm;  Surgeon: Marilou Mojica MD;  Location: [facility] OR;  Service: Gastroenterology   • EXTRACORPOREAL SHOCK WAVE LITHOTRIPSY (ESWL) Right 2016    Procedure: RT EXTRACORPOREAL SHOCKWAVE LITHOTRIPSY;  Surgeon: Ish Gaitan MD;  Location: [facility] OR;  Service:    • FEMUR SURGERY  1968   • FRACTURE SURGERY      femur fx   • OTHER SURGICAL HISTORY      stents, for kidney stones   • OTHER SURGICAL HISTORY      lithotripsy   • SKIN BIOPSY         Cancer-related family history includes Cancer in his brother and another family member; Skin cancer (age of onset: 50) in his brother. There is no history of Colon cancer.  Social History     Tobacco Use   • Smoking status: Former Smoker     Years: 14.00     Types: Cigarettes     Last attempt to quit: 1972     Years since quittin.0   • Smokeless tobacco: Never Used   Substance Use Topics   • Alcohol use: No       MEDICATIONS:    Current Outpatient Medications:   •  amLODIPine (NORVASC) 10 MG tablet, TAKE ONE TABLET BY MOUTH EVERY MORNING, Disp: 30 tablet, Rfl: 3  •  Aspirin (ASPIR-81 PO), Take  by mouth Daily., Disp: , Rfl:   •  Blood Glucose Monitoring Suppl (ONETOUCH VERIO FLEX SYSTEM) w/Device kit, 1 Device 2 (Two) Times a Day., Disp: 1 kit, Rfl: 0  •  Cholecalciferol (D3 ADULT PO), Take 2,000 Units by mouth Daily., Disp: , Rfl:   •  dapagliflozin (FARXIGA) 5 MG tablet tablet, Take 1 tablet by mouth Daily.,  "Disp: 56 tablet, Rfl: 0  •  glipiZIDE-metFORMIN (METAGLIP) 5-500 MG per tablet, Take 2 tablets by mouth 2 (Two) Times a Day Before Meals., Disp: 120 tablet, Rfl: 3  •  glucose blood (AGAMATRIX PRESTO TEST) test strip, Used to check twice daily for type 2 diabetes, Disp: 100 each, Rfl: 9  •  glucose blood (WAVESENSE PRESTO) test strip, 1 each by Other route Daily. Use as instructed, Disp: 100 each, Rfl: 10  •  hydrochlorothiazide (MICROZIDE) 12.5 MG capsule, TAKE ONE CAPSULE BY MOUTH DAILY, Disp: 90 capsule, Rfl: 4  •  lisinopril (PRINIVIL,ZESTRIL) 40 MG tablet, TAKE ONE TABLET BY MOUTH DAILY, Disp: 90 tablet, Rfl: 2  •  metoprolol tartrate (LOPRESSOR) 25 MG tablet, TAKE ONE TABLET BY MOUTH TWICE A DAY, Disp: 60 tablet, Rfl: 5  •  omeprazole (priLOSEC) 20 MG capsule, Take 1 capsule by mouth Daily., Disp: 30 capsule, Rfl: 5  •  pravastatin (PRAVACHOL) 40 MG tablet, TAKE ONE TABLET BY MOUTH DAILY, Disp: 90 tablet, Rfl: 3  •  vitamin B-12 (CYANOCOBALAMIN) 1000 MCG tablet, Take 1 tablet by mouth Daily., Disp: , Rfl:   •  meloxicam (MOBIC) 7.5 MG tablet, TAKE ONE TABLET BY MOUTH DAILY, Disp: 30 tablet, Rfl: 3    ALLERGIES:  Allergies   Allergen Reactions   • Penicillins Unknown - Low Severity     ALLERGY TESTING SHOWED ALL TO PCN.    • Cimetidine Rash         Objective   VITAL SIGNS:     Vitals:    01/13/20 1240   BP: 151/69   Pulse: 77   Resp: 16   Temp: 97.9 °F (36.6 °C)   TempSrc: Oral   SpO2: 97%   Weight: 76.8 kg (169 lb 6.4 oz)   Height: 176 cm (69.29\")  Comment: new ht   PainSc: 0-No pain     Body mass index is 24.81 kg/m².     Wt Readings from Last 3 Encounters:   01/13/20 76.8 kg (169 lb 6.4 oz)   01/02/20 74.8 kg (165 lb)   10/02/19 67.7 kg (149 lb 3.2 oz)       PHYSICAL EXAMINATION:  GENERAL:  The patient appears in good general condition, not in acute distress.  SKIN: Warm and dry. No skin rashes. No ecchymosis.  HEAD:  Normocephalic.  EYES:  No Jaundice. No Pallor. Pupils equal. EOMI.  NECK:  Supple with Good " ROM. No Thyromegaly. No Masses.  LYMPHATICS:  No cervical or supraclavicular lymphadenopathy.  CHEST: Normal respiratory effort.   CARDIAC:  Normal S1 & S2. No murmur. No edema.  ABDOMEN:  Soft.  Right upper quadrant tenderness. Hepatomegaly.  Liver palpable 3 cm below costal margin. No Splenomegaly. No masses.  EXTREMITIES:  No clubbing. No deforming arthritis in the hands. No Calf tenderness.  NEUROLOGICAL:  No Focal neurological deficits.         DIAGNOSTIC DATA:     Results from last 7 days   Lab Units 01/13/20  1216   WBC 10*3/mm3 4.96   NEUTROS ABS 10*3/mm3 2.72   HEMOGLOBIN g/dL 14.1   HEMATOCRIT % 43.0   PLATELETS 10*3/mm3 107*     Immature platelet fraction 6%.    No results found for: FERRITIN, IRON, TIBC  Lab Results   Component Value Date    FOLATE 16.33 05/03/2018     Lab Results   Component Value Date    LFQMYLNL69 829 12/05/2018    [id number] 291 05/03/2018     Lab Results   Component Value Date    OCCULTBLD Negative 03/21/2018    OCCULTBLD Negative 03/21/2018    OCCULTBLD Negative 03/21/2018         Assessment/Plan   1.  Thrombocytopenia dating back to 2015.  It is attributed to splenomegaly and vitamin B12 deficiency.  He is on vitamin B12 1000 mcg daily.  Despite the replacement of vitamin B12, platelets remain mildly low but they are stable at 107,000 today.    2.  Splenomegaly.  This was reported as being mild.  It was stable on the CT scan from 2018 when compared to 2016.  Spleen is not palpable on today's exam.    3.  Pancreatic cysts in the tail of the pancreas measuring 1 cm.  It was seen on the CT scan 2016 but not the scan from 2012.  Our previous plan was to repeat CT scan 2 years from the last study which would be in May 2020.  Patient however has tenderness on exam today.  Liver is palpable on exam today.  He lost 25 pounds over the past 6 months or so.  I recommended obtaining a CT scan in 1 week.    PLAN:    1.  Obtain CT scan of the abdomen pelvis in 1 week.  2.  Obtain B12 level.  If  B12 has become elevated, we will reduce B12 to once weekly.  3.  I will see the patient in follow-up in 2 weeks to review the results.        Michelle Sullivan MD  01/13/20

## 2020-01-20 ENCOUNTER — HOSPITAL ENCOUNTER (OUTPATIENT)
Dept: PET IMAGING | Facility: HOSPITAL | Age: 70
Discharge: HOME | End: 2020-01-20
Admitting: INTERNAL MEDICINE

## 2020-01-20 DIAGNOSIS — R10.811 RIGHT UPPER QUADRANT ABDOMINAL TENDERNESS WITHOUT REBOUND TENDERNESS: ICD-10-CM

## 2020-01-20 DIAGNOSIS — K86.2 PANCREATIC CYST: ICD-10-CM

## 2020-01-20 LAB — CREAT BLDA-MCNC: 1.1 MG/DL (ref 0.6–1.3)

## 2020-01-20 PROCEDURE — 74177 CT ABD & PELVIS W/CONTRAST: CPT

## 2020-01-20 PROCEDURE — 82565 ASSAY OF CREATININE: CPT

## 2020-01-20 PROCEDURE — 25010000002 IOPAMIDOL 61 % SOLUTION: Performed by: INTERNAL MEDICINE

## 2020-01-20 PROCEDURE — 0 DIATRIZOATE MEGLUMINE & SODIUM PER 1 ML: Performed by: INTERNAL MEDICINE

## 2020-01-20 RX ADMIN — IOPAMIDOL 85 ML: 612 INJECTION, SOLUTION INTRAVENOUS at 11:19

## 2020-01-20 RX ADMIN — DIATRIZOATE MEGLUMINE AND DIATRIZOATE SODIUM 30 ML: 660; 100 LIQUID ORAL; RECTAL at 10:40

## 2020-01-30 ENCOUNTER — APPOINTMENT (OUTPATIENT)
Dept: LAB | Facility: HOSPITAL | Age: 70
End: 2020-01-30

## 2020-01-30 ENCOUNTER — OFFICE VISIT (OUTPATIENT)
Dept: ONCOLOGY | Facility: CLINIC | Age: 70
End: 2020-01-30

## 2020-01-30 VITALS
SYSTOLIC BLOOD PRESSURE: 179 MMHG | RESPIRATION RATE: 16 BRPM | BODY MASS INDEX: 25.64 KG/M2 | DIASTOLIC BLOOD PRESSURE: 87 MMHG | WEIGHT: 173.1 LBS | TEMPERATURE: 98.6 F | HEART RATE: 81 BPM | HEIGHT: 69 IN | OXYGEN SATURATION: 98 %

## 2020-01-30 DIAGNOSIS — R16.1 SPLENOMEGALY: ICD-10-CM

## 2020-01-30 DIAGNOSIS — E53.8 B12 DEFICIENCY: ICD-10-CM

## 2020-01-30 DIAGNOSIS — K86.2 PANCREATIC CYST: ICD-10-CM

## 2020-01-30 DIAGNOSIS — D69.6 THROMBOCYTOPENIA (HCC): Primary | ICD-10-CM

## 2020-01-30 PROCEDURE — 99214 OFFICE O/P EST MOD 30 MIN: CPT | Performed by: INTERNAL MEDICINE

## 2020-01-30 NOTE — PROGRESS NOTES
Subjective     CHIEF COMPLAINT:      Chief Complaint   Patient presents with   • Follow-up     discuss ct scan and vitamin b 12 level       HISTORY OF PRESENT ILLNESS:     Guy Plascencia is a 69 y.o. male patient who returns today for follow up on his thrombocytopenia.  When he was seen at our office on 1/13/2020, he had tenderness on abdominal exam.  Liver was palpable below the costal margin.  We therefore obtained a CT scan of the abdomen pelvis and the patient returns today to review results.    Patient denies having continuous abdominal pain.  However, he reports that he develops pain if something pushes against the right side of his abdomen.    Patient has history of kidney stones.  He did not have any recent kidney stone attack, however.    REVIEW OF SYSTEMS:  Review of Systems   Constitutional: Negative for chills, fever and unexpected weight change.   HENT: Negative for mouth sores, nosebleeds, sore throat and voice change.    Eyes: Negative for visual disturbance.   Respiratory: Negative for cough and shortness of breath.    Cardiovascular: Negative for chest pain and leg swelling.   Gastrointestinal: Negative for abdominal pain, blood in stool, constipation, diarrhea, nausea and vomiting.   Genitourinary: Negative for dysuria, frequency and hematuria.   Musculoskeletal: Negative for arthralgias, back pain and joint swelling.   Skin: Negative for rash.   Neurological: Negative for dizziness, numbness and headaches.   Hematological: Negative for adenopathy. Bruises/bleeds easily.   Psychiatric/Behavioral: Negative for dysphoric mood. The patient is not nervous/anxious.      I verified the ROS obtained by the MA.      Past Medical History:   Diagnosis Date   • Arthritis    • Diabetes mellitus (CMS/HCC)     TYPE 2   • H/O  Multiple facial fractures 11/1968   • H/O Broken femur 11/1968   • History of transfusion     1968   • Hyperlipidemia    • Hypertension    • Kidney stone    • Neoplasm of uncertain behavior     • Peptic ulceration    • Plantar fasciitis    • Sleep apnea     CPAP   • Thrombocytosis (CMS/HCC)        Past Surgical History:   Procedure Laterality Date   • COLONOSCOPY     • COSMETIC SURGERY  1968    MULTIPLE FACIAL FRACTURE   • CYSTOSCOPY URETEROSCOPY STONE MANIPULATION/EXTRACTION Left 2016    Procedure: CYSTOSCOPY, LEFT URETEROSCOPY, BASKET EXTRACTION OF LEFT URETERAL STONE, LEFT STENT PLACEMENT;  Surgeon: Ish Gaitan MD;  Location: [facility] OR;  Service:    • ENDOSCOPY N/A 10/2/2019    Procedure: ESOPHAGOGASTRODUODENOSCOPY with biopsies and dilatation to 20mm;  Surgeon: Marilou Mojica MD;  Location: [facility] OR;  Service: Gastroenterology   • EXTRACORPOREAL SHOCK WAVE LITHOTRIPSY (ESWL) Right 2016    Procedure: RT EXTRACORPOREAL SHOCKWAVE LITHOTRIPSY;  Surgeon: Ish Gaitan MD;  Location: [facility] OR;  Service:    • FEMUR SURGERY  1968   • FRACTURE SURGERY      femur fx   • OTHER SURGICAL HISTORY      stents, for kidney stones   • OTHER SURGICAL HISTORY      lithotripsy   • SKIN BIOPSY         Cancer-related family history includes Cancer in his brother and another family member; Skin cancer (age of onset: 50) in his brother. There is no history of Colon cancer.  Social History     Tobacco Use   • Smoking status: Former Smoker     Years: 14.00     Types: Cigarettes     Last attempt to quit: 1972     Years since quittin.1   • Smokeless tobacco: Never Used   Substance Use Topics   • Alcohol use: No       MEDICATIONS:    Current Outpatient Medications:   •  amLODIPine (NORVASC) 10 MG tablet, TAKE ONE TABLET BY MOUTH EVERY MORNING, Disp: 30 tablet, Rfl: 3  •  Aspirin (ASPIR-81 PO), Take  by mouth Daily., Disp: , Rfl:   •  Blood Glucose Monitoring Suppl (ONETOUCH VERIO FLEX SYSTEM) w/Device kit, 1 Device 2 (Two) Times a Day., Disp: 1 kit, Rfl: 0  •  Cholecalciferol (D3 ADULT PO), Take 2,000 Units by mouth Daily., Disp: , Rfl:   •  dapagliflozin (FARXIGA) 5 MG tablet tablet, Take  "1 tablet by mouth Daily., Disp: 56 tablet, Rfl: 0  •  glipiZIDE-metFORMIN (METAGLIP) 5-500 MG per tablet, Take 2 tablets by mouth 2 (Two) Times a Day Before Meals., Disp: 120 tablet, Rfl: 3  •  glucose blood (AGAMATRIX PRESTO TEST) test strip, Used to check twice daily for type 2 diabetes, Disp: 100 each, Rfl: 9  •  glucose blood (WAVESENSE PRESTO) test strip, 1 each by Other route Daily. Use as instructed, Disp: 100 each, Rfl: 10  •  hydrochlorothiazide (MICROZIDE) 12.5 MG capsule, TAKE ONE CAPSULE BY MOUTH DAILY, Disp: 90 capsule, Rfl: 4  •  lisinopril (PRINIVIL,ZESTRIL) 40 MG tablet, TAKE ONE TABLET BY MOUTH DAILY, Disp: 90 tablet, Rfl: 2  •  metoprolol tartrate (LOPRESSOR) 25 MG tablet, TAKE ONE TABLET BY MOUTH TWICE A DAY, Disp: 60 tablet, Rfl: 5  •  omeprazole (priLOSEC) 20 MG capsule, Take 1 capsule by mouth Daily., Disp: 30 capsule, Rfl: 5  •  pravastatin (PRAVACHOL) 40 MG tablet, TAKE ONE TABLET BY MOUTH DAILY, Disp: 90 tablet, Rfl: 3  •  vitamin B-12 (CYANOCOBALAMIN) 1000 MCG tablet, Take 1 tablet by mouth Daily., Disp: , Rfl:     ALLERGIES:  Allergies   Allergen Reactions   • Penicillins Unknown - Low Severity     ALLERGY TESTING SHOWED ALL TO PCN.    • Cimetidine Rash         Objective   VITAL SIGNS:     Vitals:    01/30/20 1608   BP: 179/87   Pulse: 81   Resp: 16   Temp: 98.6 °F (37 °C)   TempSrc: Oral   SpO2: 98%   Weight: 78.5 kg (173 lb 1.6 oz)   Height: 176 cm (69.29\")   PainSc: 0-No pain     Body mass index is 25.35 kg/m².     Wt Readings from Last 3 Encounters:   01/30/20 78.5 kg (173 lb 1.6 oz)   01/13/20 76.8 kg (169 lb 6.4 oz)   01/02/20 74.8 kg (165 lb)       PHYSICAL EXAMINATION:  GENERAL:  The patient appears in good general condition, not in acute distress.  SKIN: Warm and dry. No skin rashes. No ecchymosis.  HEAD:  Normocephalic.  EYES:  No Jaundice. No Pallor. Pupils equal. EOMI.  NECK:  Supple with Good ROM. No Thyromegaly. No Masses.  LYMPHATICS:  No cervical or supraclavicular " lymphadenopathy.  CHEST: Normal respiratory effort. Lungs clear to auscultation.   CARDIAC:  Normal S1 & S2. No murmur. No edema.  ABDOMEN:  Soft.  Liver palpable 3 cm BCM.  EXTREMITIES:  No clubbing. No deforming arthritis in the hands.   NEUROLOGICAL:  No Focal neurological deficits.         DIAGNOSTIC DATA:     Component      Latest Ref Rng & Units 1/13/2020   WBC      3.40 - 10.80 10*3/mm3 4.96   RBC      4.14 - 5.80 10*6/mm3 4.75   Hemoglobin      13.0 - 17.7 g/dL 14.1   Hematocrit      37.5 - 51.0 % 43.0   MCV      79.0 - 97.0 fL 90.5   MCH      26.6 - 33.0 pg 29.7   MCHC      31.5 - 35.7 g/dL 32.8   RDW      12.3 - 15.4 % 13.0   RDW-SD      37.0 - 54.0 fl 42.7   MPV      6.0 - 12.0 fL 10.8   Platelets      140 - 450 10*3/mm3 107 (L)   Neutrophil Rel %      42.7 - 76.0 % 54.8   Lymphocyte Rel %      19.6 - 45.3 % 21.0   Monocyte Rel %      5.0 - 12.0 % 9.1   Eosinophil Rel %      0.3 - 6.2 % 14.7 (H)   Basophil Rel %      0.0 - 1.5 % 0.2   Immature Granulocyte Rel %      0.0 - 0.5 % 0.2   Neutrophils Absolute      1.70 - 7.00 10*3/mm3 2.72   Lymphocytes Absolute      0.70 - 3.10 10*3/mm3 1.04   Monocytes Absolute      0.10 - 0.90 10*3/mm3 0.45   Eosinophils Absolute      0.00 - 0.40 10*3/mm3 0.73 (H)   Basophils Absolute      0.00 - 0.20 10*3/mm3 0.01   Immature Grans, Absolute      0.00 - 0.05 10*3/mm3 0.01   nRBC      0.0 - 0.2 /100 WBC 0.0   Vitamin B-12      211 - 946 pg/mL 1,057 (H)       CT OF THE ABDOMEN AND PELVIS WITH CONTRAST 01/20/2020     HISTORY: Right upper quadrant tenderness.     TECHNIQUE: Axial images were obtained from the lung bases to the  symphysis pubis after intravenous and oral contrast.     FINDINGS: The liver, gallbladder, spleen, and adrenals appear  unremarkable. A tiny approximately 5 mm low-density lesion is seen in  the tail of the pancreas on image 52.     An 8 mm nonobstructing right renal stone is seen. Left renal cyst is  seen. There is some aortoiliac calcification. Urinary  bladder and  prostate gland appear normal. No bowel wall thickening or bowel  dilatation is seen. Tiny umbilical hernia containing fat is seen.     IMPRESSION:  1. Tiny approximately 5 mm hypodense lesion in the tail of the pancreas  is likely benign.  2. Nonobstructing right renal stone and 2.9 cm left renal cyst.  3. No acute process identified.     Radiation dose reduction techniques were utilized, including automated  exposure control and exposure modulation based on body size.     This report was finalized on 1/21/2020 8:26 AM by Dr. Hairnder Aguillon M.D.     I personally reviewed the CT scan with the patient and I concur with the finding of 5 mm hypodense lesion in the tail of the pancreas.  The spleen measured 13.4 cm.  There was a nonobstructing right kidney stone.      Assessment/Plan   1.  Thrombocytopenia dating back to 2015.   · It is attributed to splenomegaly and vitamin B12 deficiency.    · Platelet count remains in the 100,000-110,000.    · He remains asymptomatic.      2.  Splenomegaly.    · This was reported as being mild.    · It was stable on the CT scan from 2018 when compared to 2016.    · Spleen is not palpable on exam.    · CT scan on 1/20/2020 showed the spleen to measure 13.4 cm on my evaluation.  This appears to have decreased compared to prior exam when it measured 14.3 on 6/28/2016.    3.  Pancreatic cysts in the tail of the pancreas.  · It was seen on the CT scan 2016 but not the scan from 2012.    · It measured 10 mm on 5/11/2018 CT scan measuring 1 cm in 2018 CT scan.    · It measured 5 mm on CT scan on 1/20/2020  · The radiologist indicated it is likely benign.  I reassured the patient especially with a decrease in the size.  · Patient was experiencing abdominal pain and tenderness on exam likely unrelated to the cyst.    PLAN:    1.  Continue B12 but decrease the frequency to once weekly.  2.  Since his platelet count is relatively stable, we will see him in follow-up in 1 year  with CBC IPF and B12 levels.        Michelle Sullivan MD  01/30/20

## 2020-02-02 ENCOUNTER — RESULTS ENCOUNTER (OUTPATIENT)
Dept: FAMILY MEDICINE CLINIC | Facility: CLINIC | Age: 70
End: 2020-02-02

## 2020-02-02 DIAGNOSIS — E78.2 MIXED HYPERLIPIDEMIA: ICD-10-CM

## 2020-02-02 DIAGNOSIS — IMO0002 UNCONTROLLED TYPE 2 DIABETES MELLITUS WITH COMPLICATION, WITHOUT LONG-TERM CURRENT USE OF INSULIN: ICD-10-CM

## 2020-02-11 ENCOUNTER — TELEPHONE (OUTPATIENT)
Dept: FAMILY MEDICINE CLINIC | Facility: CLINIC | Age: 70
End: 2020-02-11

## 2020-02-11 NOTE — TELEPHONE ENCOUNTER
Pt was scheduled for labs on 2/11/2020, he could not make it to this appointment. So the labs have been released but now he wants to go to [facility] [place]. Can you put the orders back in for the hospital?

## 2020-02-12 DIAGNOSIS — IMO0002 UNCONTROLLED TYPE 2 DIABETES MELLITUS WITH COMPLICATION, WITHOUT LONG-TERM CURRENT USE OF INSULIN: Primary | ICD-10-CM

## 2020-02-12 DIAGNOSIS — E78.2 MIXED HYPERLIPIDEMIA: ICD-10-CM

## 2020-02-12 DIAGNOSIS — Z00.00 ROUTINE HEALTH MAINTENANCE: ICD-10-CM

## 2020-02-29 ENCOUNTER — LAB (OUTPATIENT)
Dept: LAB | Facility: HOSPITAL | Age: 70
End: 2020-02-29

## 2020-02-29 DIAGNOSIS — IMO0002 UNCONTROLLED TYPE 2 DIABETES MELLITUS WITH COMPLICATION, WITHOUT LONG-TERM CURRENT USE OF INSULIN: ICD-10-CM

## 2020-02-29 LAB
ANION GAP SERPL CALCULATED.3IONS-SCNC: 14.6 MMOL/L (ref 5–15)
BUN BLD-MCNC: 23 MG/DL (ref 8–23)
BUN/CREAT SERPL: 21.3 (ref 7–25)
CALCIUM SPEC-SCNC: 9.7 MG/DL (ref 8.6–10.5)
CHLORIDE SERPL-SCNC: 103 MMOL/L (ref 98–107)
CHOLEST SERPL-MCNC: 135 MG/DL (ref 0–200)
CO2 SERPL-SCNC: 26.4 MMOL/L (ref 22–29)
CREAT BLD-MCNC: 1.08 MG/DL (ref 0.76–1.27)
GFR SERPL CREATININE-BSD FRML MDRD: 68 ML/MIN/1.73
GLUCOSE BLD-MCNC: 179 MG/DL (ref 65–99)
HBA1C MFR BLD: 8.73 % (ref 4.8–5.6)
HDLC SERPL QL: 4.09
HDLC SERPL-MCNC: 33 MG/DL (ref 40–60)
LDLC SERPL CALC-MCNC: 57 MG/DL (ref 0–100)
POTASSIUM BLD-SCNC: 3.8 MMOL/L (ref 3.5–5.2)
SODIUM BLD-SCNC: 144 MMOL/L (ref 136–145)
TRIGL SERPL-MCNC: 226 MG/DL (ref 0–150)
VLDLC SERPL-MCNC: 45.2 MG/DL (ref 5–40)

## 2020-02-29 PROCEDURE — 83036 HEMOGLOBIN GLYCOSYLATED A1C: CPT

## 2020-02-29 PROCEDURE — 36415 COLL VENOUS BLD VENIPUNCTURE: CPT | Performed by: PHYSICIAN ASSISTANT

## 2020-02-29 PROCEDURE — 80048 BASIC METABOLIC PNL TOTAL CA: CPT | Performed by: PHYSICIAN ASSISTANT

## 2020-02-29 PROCEDURE — 80061 LIPID PANEL: CPT | Performed by: PHYSICIAN ASSISTANT

## 2020-03-02 DIAGNOSIS — IMO0002 UNCONTROLLED TYPE 2 DIABETES MELLITUS WITH COMPLICATION, WITHOUT LONG-TERM CURRENT USE OF INSULIN: Primary | ICD-10-CM

## 2020-03-22 RX ORDER — PRAVASTATIN SODIUM 40 MG
TABLET ORAL
Qty: 90 TABLET | Refills: 2 | Status: SHIPPED | OUTPATIENT
Start: 2020-03-22 | End: 2020-05-11

## 2020-03-24 RX ORDER — OMEPRAZOLE 20 MG/1
CAPSULE, DELAYED RELEASE ORAL
Qty: 90 CAPSULE | Refills: 3 | Status: SHIPPED | OUTPATIENT
Start: 2020-03-24 | End: 2021-02-24 | Stop reason: SDUPTHER

## 2020-04-20 RX ORDER — AMLODIPINE BESYLATE 10 MG/1
TABLET ORAL
Qty: 30 TABLET | Refills: 2 | Status: SHIPPED | OUTPATIENT
Start: 2020-04-20 | End: 2020-07-08

## 2020-05-11 RX ORDER — PRAVASTATIN SODIUM 40 MG
TABLET ORAL
Qty: 90 TABLET | Refills: 2 | Status: SHIPPED | OUTPATIENT
Start: 2020-05-11 | End: 2020-07-15 | Stop reason: DRUGHIGH

## 2020-06-05 DIAGNOSIS — I10 ESSENTIAL HYPERTENSION: ICD-10-CM

## 2020-06-05 DIAGNOSIS — IMO0002 DIABETES MELLITUS OUT OF CONTROL: ICD-10-CM

## 2020-06-05 RX ORDER — LISINOPRIL 40 MG/1
40 TABLET ORAL DAILY
Qty: 30 TABLET | Refills: 0 | Status: SHIPPED | OUTPATIENT
Start: 2020-06-05 | End: 2020-06-26

## 2020-06-10 DIAGNOSIS — I10 ESSENTIAL HYPERTENSION: ICD-10-CM

## 2020-06-10 DIAGNOSIS — IMO0002 UNCONTROLLED TYPE 2 DIABETES MELLITUS WITH COMPLICATION, WITHOUT LONG-TERM CURRENT USE OF INSULIN: ICD-10-CM

## 2020-06-10 RX ORDER — GLIPIZIDE AND METFORMIN HCL 5; 500 MG/1; MG/1
2 TABLET, FILM COATED ORAL
Qty: 120 TABLET | Refills: 0 | Status: SHIPPED | OUTPATIENT
Start: 2020-06-10 | End: 2020-07-08

## 2020-06-26 DIAGNOSIS — I10 ESSENTIAL HYPERTENSION: ICD-10-CM

## 2020-06-26 DIAGNOSIS — IMO0002 DIABETES MELLITUS OUT OF CONTROL: ICD-10-CM

## 2020-06-26 RX ORDER — LISINOPRIL 40 MG/1
40 TABLET ORAL DAILY
Qty: 30 TABLET | Refills: 0 | Status: SHIPPED | OUTPATIENT
Start: 2020-06-26 | End: 2020-07-14

## 2020-07-08 DIAGNOSIS — I10 ESSENTIAL HYPERTENSION: ICD-10-CM

## 2020-07-08 DIAGNOSIS — IMO0002 UNCONTROLLED TYPE 2 DIABETES MELLITUS WITH COMPLICATION, WITHOUT LONG-TERM CURRENT USE OF INSULIN: ICD-10-CM

## 2020-07-08 RX ORDER — AMLODIPINE BESYLATE 10 MG/1
10 TABLET ORAL EVERY MORNING
Qty: 30 TABLET | Refills: 0 | Status: SHIPPED | OUTPATIENT
Start: 2020-07-08 | End: 2020-08-04

## 2020-07-08 RX ORDER — GLIPIZIDE AND METFORMIN HCL 5; 500 MG/1; MG/1
TABLET, FILM COATED ORAL
Qty: 120 TABLET | Refills: 0 | Status: SHIPPED | OUTPATIENT
Start: 2020-07-08 | End: 2020-08-04

## 2020-07-13 ENCOUNTER — OFFICE VISIT (OUTPATIENT)
Dept: FAMILY MEDICINE CLINIC | Facility: CLINIC | Age: 70
End: 2020-07-13

## 2020-07-13 VITALS
HEIGHT: 69 IN | OXYGEN SATURATION: 99 % | DIASTOLIC BLOOD PRESSURE: 70 MMHG | SYSTOLIC BLOOD PRESSURE: 120 MMHG | WEIGHT: 173.7 LBS | HEART RATE: 69 BPM | BODY MASS INDEX: 25.73 KG/M2 | TEMPERATURE: 97.7 F

## 2020-07-13 DIAGNOSIS — E78.2 MIXED HYPERLIPIDEMIA: ICD-10-CM

## 2020-07-13 DIAGNOSIS — I10 ESSENTIAL HYPERTENSION: ICD-10-CM

## 2020-07-13 DIAGNOSIS — IMO0002 UNCONTROLLED TYPE 2 DIABETES MELLITUS WITH COMPLICATION, WITHOUT LONG-TERM CURRENT USE OF INSULIN: Primary | ICD-10-CM

## 2020-07-13 PROCEDURE — 99214 OFFICE O/P EST MOD 30 MIN: CPT | Performed by: PHYSICIAN ASSISTANT

## 2020-07-13 NOTE — PROGRESS NOTES
"Subjective   Guy Plascencia is a 69 y.o. male presents for   Chief Complaint   Patient presents with   • Diabetes     med refill   • Hypertension     Management   • Hyperlipidemia     Management       History of Present Illness     Guy is a 69-year-old male who presents for type 2 diabetes, hypertension and hyperlipidemia management.  Guy states his sugars have been up and down.  His diet has been poor.  Still eating what ever he wants.  Bowel movements have been normal but slightly loose.  Denied any dark black tarry stools.  Sleep has been normal for him with his CPAP.  He has not been exercising due to hectic work schedule.  Denied any chest pain, shortness of air, cough, wheezing, swelling of ankles, dizziness, lightheadedness or vision changes.  He will be due for an eye exam.    The following portions of the patient's history were reviewed and updated as appropriate: allergies, current medications, past family history, past medical history, past social history, past surgical history and problem list.    Review of Systems   Constitutional: Negative.    HENT: Negative.    Eyes: Negative.    Respiratory: Negative.  Negative for cough, chest tightness, shortness of breath and wheezing.    Cardiovascular: Negative.  Negative for chest pain, palpitations and leg swelling.   Gastrointestinal: Negative.  Negative for abdominal pain, blood in stool, constipation, diarrhea, nausea and vomiting.   Endocrine: Negative.    Genitourinary: Negative.    Musculoskeletal: Negative.    Skin: Negative.    Allergic/Immunologic: Negative.    Neurological: Negative.  Negative for dizziness, light-headedness and headaches.   Hematological: Negative.    Psychiatric/Behavioral: Negative.  Negative for sleep disturbance.         Vitals:    07/13/20 0919   BP: 120/70   Pulse: 69   Temp: 97.7 °F (36.5 °C)   SpO2: 99%   Weight: 78.8 kg (173 lb 11.2 oz)   Height: 176 cm (69.29\")     Wt Readings from Last 3 Encounters:   07/13/20 78.8 " kg (173 lb 11.2 oz)   20 78.5 kg (173 lb 1.6 oz)   20 76.8 kg (169 lb 6.4 oz)     BP Readings from Last 3 Encounters:   20 120/70   20 179/87   20 151/69     Social History     Socioeconomic History   • Marital status:      Spouse name: Jessica   • Number of children: Not on file   • Years of education: College   • Highest education level: Not on file   Occupational History   • Occupation:      Employer: RETIRED   Tobacco Use   • Smoking status: Former Smoker     Years: 14.00     Types: Cigarettes     Last attempt to quit: 1972     Years since quittin.5   • Smokeless tobacco: Never Used   Substance and Sexual Activity   • Alcohol use: No   • Drug use: No   • Sexual activity: Defer       Allergies   Allergen Reactions   • Penicillins Unknown - Low Severity     ALLERGY TESTING SHOWED ALL TO PCN.    • Cimetidine Rash       Body mass index is 25.44 kg/m².    Objective   Physical Exam   Constitutional: He is oriented to person, place, and time. Vital signs are normal. He appears well-developed and well-nourished.   Sitting on exam table wearing a facial mask.   HENT:   Head: Normocephalic and atraumatic.   Neck: Trachea normal and phonation normal. Neck supple. Normal carotid pulses, no hepatojugular reflux and no JVD present. No tracheal tenderness present. Carotid bruit is not present. No tracheal deviation and no edema present. No thyroid mass and no thyromegaly present.   Cardiovascular: Normal rate, regular rhythm, S1 normal, S2 normal, normal heart sounds and normal pulses.   No murmur heard.  Pulmonary/Chest: Effort normal and breath sounds normal.   Abdominal: Soft. Normal appearance, normal aorta and bowel sounds are normal. There is no hepatomegaly. There is no tenderness.    Guy had a diabetic foot exam performed today.   During the foot exam he had a monofilament test performed.    Neurological Sensory Findings -  Unaltered sharp/dull right ankle/foot  discrimination and unaltered sharp/dull left ankle/foot discrimination.  Vascular Status -  His right foot exhibits normal foot vasculature  and no edema. His left foot exhibits normal foot vasculature  and no edema.  Skin Integrity  -  His right foot skin is intact.His left foot skin is intact..  Neurological: He is alert and oriented to person, place, and time.   Skin: Skin is warm, dry and intact. Capillary refill takes less than 2 seconds.   Psychiatric: He has a normal mood and affect. His speech is normal and behavior is normal. Judgment and thought content normal. Cognition and memory are normal.     Physical Exam     Feet: Diabetic foot exam performed during this visit.    Monofilament test performed  Right foot - number of sites tested - 10  Right foot - number of sites sensed - 10  Left foot number of sites tested 10  Left foot - number of sites sensed 10.  Right foot first MTP joint ROM is normal.  Left foot first MTP joint ROM is normal.  Vascular Status: right foot vasculature normal.  Left foot vasculature normal.  Right foot skin intact. Left foot skin intact.     I was wearing surgical mask during the entire office visit encounter.    Assessment/Plan   Guy was seen today for diabetes, hypertension and hyperlipidemia.    Diagnoses and all orders for this visit:    Uncontrolled type 2 diabetes mellitus with complication, without long-term current use of insulin (CMS/[facility])  -     CBC & Differential  -     Hemoglobin A1c  -     Lipid Panel With LDL / HDL Ratio  -     Comprehensive Metabolic Panel    Essential hypertension    Mixed hyperlipidemia  -     Lipid Panel With LDL / HDL Ratio  -     Comprehensive Metabolic Panel    Other orders  -     Manual Differential      1.  Chronic and stable uncontrolled type 2 diabetes with hyperlipidemia: He is fasting will have a CBC, CMP, A1c and a lipid profile collected at office visit today.  Guy will be notified of test results and any medication changes.   Have stressed the importance of decreasing sugar and carbohydrates in diet to help with his diabetes.  He voiced understanding.  Plan to follow-up in 3 months with fasting blood work and Medicare wellness exam.  2.  Chronic and stable hypertension: I have rechecked blood pressure at office visit today and got 130/62 in left arm.  He will continue his current blood pressure medication at home.  Will return to office in 3 months.    [facility]  [address] [place] [id number]  Dept: [phone]  Dept Fax: [phone]  Loc: [phone]  Loc Fax: [phone]

## 2020-07-14 DIAGNOSIS — IMO0002 DIABETES MELLITUS OUT OF CONTROL: ICD-10-CM

## 2020-07-14 DIAGNOSIS — I10 ESSENTIAL HYPERTENSION: ICD-10-CM

## 2020-07-14 LAB
ALBUMIN SERPL-MCNC: 4.7 G/DL (ref 3.5–5.2)
ALBUMIN/GLOB SERPL: 2.2 G/DL
ALP SERPL-CCNC: 69 U/L (ref 39–117)
ALT SERPL-CCNC: 26 U/L (ref 1–41)
AST SERPL-CCNC: 19 U/L (ref 1–40)
BASOPHILS # BLD AUTO: ABNORMAL 10*3/UL
BILIRUB SERPL-MCNC: 0.4 MG/DL (ref 0–1.2)
BUN SERPL-MCNC: 20 MG/DL (ref 8–23)
BUN/CREAT SERPL: 20.2 (ref 7–25)
CALCIUM SERPL-MCNC: 9.9 MG/DL (ref 8.6–10.5)
CHLORIDE SERPL-SCNC: 104 MMOL/L (ref 98–107)
CHOLEST SERPL-MCNC: 141 MG/DL (ref 0–200)
CO2 SERPL-SCNC: 28.6 MMOL/L (ref 22–29)
CREAT SERPL-MCNC: 0.99 MG/DL (ref 0.76–1.27)
DIFFERENTIAL COMMENT: ABNORMAL
EOSINOPHIL # BLD AUTO: ABNORMAL 10*3/UL
EOSINOPHIL # BLD MANUAL: 0.09 10*3/MM3 (ref 0–0.4)
EOSINOPHIL NFR BLD AUTO: ABNORMAL %
EOSINOPHIL NFR BLD MANUAL: 2 % (ref 0.3–6.2)
ERYTHROCYTE [DISTWIDTH] IN BLOOD BY AUTOMATED COUNT: 13.9 % (ref 12.3–15.4)
GLOBULIN SER CALC-MCNC: 2.1 GM/DL
GLUCOSE SERPL-MCNC: 137 MG/DL (ref 65–99)
HBA1C MFR BLD: 7.6 % (ref 4.8–5.6)
HCT VFR BLD AUTO: 43.7 % (ref 37.5–51)
HDLC SERPL-MCNC: 29 MG/DL (ref 40–60)
HGB BLD-MCNC: 14.6 G/DL (ref 13–17.7)
LDLC SERPL CALC-MCNC: 58 MG/DL (ref 0–100)
LDLC/HDLC SERPL: 1.99 {RATIO}
LYMPHOCYTES # BLD AUTO: ABNORMAL 10*3/UL
LYMPHOCYTES # BLD MANUAL: 1.29 10*3/MM3 (ref 0.7–3.1)
LYMPHOCYTES NFR BLD AUTO: ABNORMAL %
LYMPHOCYTES NFR BLD MANUAL: 29 % (ref 19.6–45.3)
MCH RBC QN AUTO: 29.2 PG (ref 26.6–33)
MCHC RBC AUTO-ENTMCNC: 33.4 G/DL (ref 31.5–35.7)
MCV RBC AUTO: 87.4 FL (ref 79–97)
MONOCYTES # BLD MANUAL: 0.13 10*3/MM3 (ref 0.1–0.9)
MONOCYTES NFR BLD AUTO: ABNORMAL %
MONOCYTES NFR BLD MANUAL: 3 % (ref 5–12)
NEUTROPHILS # BLD MANUAL: 2.94 10*3/MM3 (ref 1.7–7)
NEUTROPHILS NFR BLD AUTO: ABNORMAL %
NEUTROPHILS NFR BLD MANUAL: 66 % (ref 42.7–76)
PLATELET # BLD AUTO: 115 10*3/MM3 (ref 140–450)
PLATELET BLD QL SMEAR: ABNORMAL
POTASSIUM SERPL-SCNC: 3.8 MMOL/L (ref 3.5–5.2)
PROT SERPL-MCNC: 6.8 G/DL (ref 6–8.5)
RBC # BLD AUTO: 5 10*6/MM3 (ref 4.14–5.8)
RBC MORPH BLD: ABNORMAL
SODIUM SERPL-SCNC: 144 MMOL/L (ref 136–145)
TRIGL SERPL-MCNC: 272 MG/DL (ref 0–150)
VLDLC SERPL CALC-MCNC: 54.4 MG/DL
WBC # BLD AUTO: 4.45 10*3/MM3 (ref 3.4–10.8)

## 2020-07-14 RX ORDER — LISINOPRIL 40 MG/1
TABLET ORAL
Qty: 30 TABLET | Refills: 0 | Status: SHIPPED | OUTPATIENT
Start: 2020-07-14 | End: 2020-08-04

## 2020-07-15 ENCOUNTER — TELEPHONE (OUTPATIENT)
Dept: FAMILY MEDICINE CLINIC | Facility: CLINIC | Age: 70
End: 2020-07-15

## 2020-07-15 DIAGNOSIS — IMO0002 UNCONTROLLED TYPE 2 DIABETES MELLITUS WITH COMPLICATION, WITHOUT LONG-TERM CURRENT USE OF INSULIN: Primary | ICD-10-CM

## 2020-07-15 DIAGNOSIS — E78.2 MIXED HYPERLIPIDEMIA: ICD-10-CM

## 2020-07-15 RX ORDER — PRAVASTATIN SODIUM 80 MG/1
80 TABLET ORAL DAILY
Qty: 90 TABLET | Refills: 1 | Status: SHIPPED | OUTPATIENT
Start: 2020-07-15 | End: 2021-04-15 | Stop reason: SDUPTHER

## 2020-07-15 NOTE — TELEPHONE ENCOUNTER
[facility] inform Gina and Dr. Mcelroy that pts new RX for test strips have to be sent electronically or by fax only. In order to bill medicare correctly. Must include diagnosis code.      [facility] [address] - [address] [facility] [address] - [phone]  - [phone]       Pharmacy can be contacted at [phone]

## 2020-08-04 DIAGNOSIS — IMO0002 DIABETES MELLITUS OUT OF CONTROL: ICD-10-CM

## 2020-08-04 DIAGNOSIS — I10 ESSENTIAL HYPERTENSION: ICD-10-CM

## 2020-08-04 DIAGNOSIS — IMO0002 UNCONTROLLED TYPE 2 DIABETES MELLITUS WITH COMPLICATION, WITHOUT LONG-TERM CURRENT USE OF INSULIN: ICD-10-CM

## 2020-08-04 RX ORDER — DAPAGLIFLOZIN 10 MG/1
TABLET, FILM COATED ORAL
Qty: 27 TABLET | Refills: 0 | Status: SHIPPED | OUTPATIENT
Start: 2020-08-04 | End: 2020-09-04

## 2020-08-04 RX ORDER — GLIPIZIDE AND METFORMIN HCL 5; 500 MG/1; MG/1
TABLET, FILM COATED ORAL
Qty: 108 TABLET | Refills: 0 | Status: SHIPPED | OUTPATIENT
Start: 2020-08-04 | End: 2020-09-04

## 2020-08-04 RX ORDER — AMLODIPINE BESYLATE 10 MG/1
TABLET ORAL
Qty: 27 TABLET | Refills: 0 | Status: SHIPPED | OUTPATIENT
Start: 2020-08-04 | End: 2020-09-04

## 2020-08-04 RX ORDER — LISINOPRIL 40 MG/1
TABLET ORAL
Qty: 20 TABLET | Refills: 0 | Status: SHIPPED | OUTPATIENT
Start: 2020-08-04 | End: 2020-09-04

## 2020-09-03 DIAGNOSIS — I10 ESSENTIAL HYPERTENSION: ICD-10-CM

## 2020-09-03 DIAGNOSIS — IMO0002 DIABETES MELLITUS OUT OF CONTROL: ICD-10-CM

## 2020-09-03 DIAGNOSIS — IMO0002 UNCONTROLLED TYPE 2 DIABETES MELLITUS WITH COMPLICATION, WITHOUT LONG-TERM CURRENT USE OF INSULIN: ICD-10-CM

## 2020-09-03 RX ORDER — LISINOPRIL 40 MG/1
TABLET ORAL
Qty: 20 TABLET | Refills: 0 | Status: CANCELLED | OUTPATIENT
Start: 2020-09-03

## 2020-09-04 DIAGNOSIS — I10 ESSENTIAL HYPERTENSION: ICD-10-CM

## 2020-09-04 RX ORDER — AMLODIPINE BESYLATE 10 MG/1
10 TABLET ORAL DAILY
Qty: 30 TABLET | Refills: 0 | Status: SHIPPED | OUTPATIENT
Start: 2020-09-04 | End: 2020-10-01

## 2020-09-04 RX ORDER — LISINOPRIL 40 MG/1
40 TABLET ORAL DAILY
Qty: 30 TABLET | Refills: 0 | Status: SHIPPED | OUTPATIENT
Start: 2020-09-04 | End: 2020-09-07

## 2020-09-04 RX ORDER — GLIPIZIDE AND METFORMIN HCL 5; 500 MG/1; MG/1
2 TABLET, FILM COATED ORAL
Qty: 120 TABLET | Refills: 0 | Status: SHIPPED | OUTPATIENT
Start: 2020-09-04 | End: 2020-10-01

## 2020-09-05 DIAGNOSIS — I10 ESSENTIAL HYPERTENSION: ICD-10-CM

## 2020-09-05 DIAGNOSIS — IMO0002 DIABETES MELLITUS OUT OF CONTROL: ICD-10-CM

## 2020-09-06 DIAGNOSIS — I10 ESSENTIAL HYPERTENSION: ICD-10-CM

## 2020-09-07 RX ORDER — LISINOPRIL 40 MG/1
40 TABLET ORAL DAILY
Qty: 30 TABLET | Refills: 0 | Status: SHIPPED | OUTPATIENT
Start: 2020-09-07 | End: 2020-10-13

## 2020-09-07 RX ORDER — HYDROCHLOROTHIAZIDE 12.5 MG/1
CAPSULE, GELATIN COATED ORAL
Qty: 90 CAPSULE | Refills: 3 | Status: SHIPPED | OUTPATIENT
Start: 2020-09-07 | End: 2021-07-23

## 2020-09-17 DIAGNOSIS — IMO0002 UNCONTROLLED TYPE 2 DIABETES MELLITUS WITH COMPLICATION, WITHOUT LONG-TERM CURRENT USE OF INSULIN: Primary | ICD-10-CM

## 2020-09-17 DIAGNOSIS — G47.33 OSA (OBSTRUCTIVE SLEEP APNEA): ICD-10-CM

## 2020-10-01 DIAGNOSIS — I10 ESSENTIAL HYPERTENSION: ICD-10-CM

## 2020-10-01 DIAGNOSIS — IMO0002 UNCONTROLLED TYPE 2 DIABETES MELLITUS WITH COMPLICATION, WITHOUT LONG-TERM CURRENT USE OF INSULIN: ICD-10-CM

## 2020-10-01 RX ORDER — DAPAGLIFLOZIN 10 MG/1
TABLET, FILM COATED ORAL
Qty: 27 TABLET | Refills: 0 | Status: SHIPPED | OUTPATIENT
Start: 2020-10-01 | End: 2020-10-13

## 2020-10-01 RX ORDER — AMLODIPINE BESYLATE 10 MG/1
TABLET ORAL
Qty: 27 TABLET | Refills: 0 | Status: SHIPPED | OUTPATIENT
Start: 2020-10-01 | End: 2020-11-02

## 2020-10-01 RX ORDER — GLIPIZIDE AND METFORMIN HCL 5; 500 MG/1; MG/1
TABLET, FILM COATED ORAL
Qty: 108 TABLET | Refills: 0 | Status: SHIPPED | OUTPATIENT
Start: 2020-10-01 | End: 2020-11-02

## 2020-10-07 DIAGNOSIS — IMO0002 UNCONTROLLED TYPE 2 DIABETES MELLITUS WITH COMPLICATION, WITHOUT LONG-TERM CURRENT USE OF INSULIN: ICD-10-CM

## 2020-10-07 DIAGNOSIS — I10 ESSENTIAL HYPERTENSION: Primary | ICD-10-CM

## 2020-10-07 DIAGNOSIS — E78.2 MIXED HYPERLIPIDEMIA: ICD-10-CM

## 2020-10-12 DIAGNOSIS — I10 ESSENTIAL HYPERTENSION: ICD-10-CM

## 2020-10-12 DIAGNOSIS — IMO0002 UNCONTROLLED TYPE 2 DIABETES MELLITUS WITH COMPLICATION, WITHOUT LONG-TERM CURRENT USE OF INSULIN: ICD-10-CM

## 2020-10-12 DIAGNOSIS — IMO0002 DIABETES MELLITUS OUT OF CONTROL: ICD-10-CM

## 2020-10-13 ENCOUNTER — APPOINTMENT (OUTPATIENT)
Dept: SLEEP MEDICINE | Facility: HOSPITAL | Age: 70
End: 2020-10-13

## 2020-10-13 RX ORDER — DAPAGLIFLOZIN 10 MG/1
TABLET, FILM COATED ORAL
Qty: 27 TABLET | Refills: 0 | Status: SHIPPED | OUTPATIENT
Start: 2020-10-13 | End: 2020-11-02

## 2020-10-13 RX ORDER — LISINOPRIL 40 MG/1
TABLET ORAL
Qty: 20 TABLET | Refills: 0 | Status: SHIPPED | OUTPATIENT
Start: 2020-10-13 | End: 2020-11-02

## 2020-10-27 ENCOUNTER — OFFICE VISIT (OUTPATIENT)
Dept: SLEEP MEDICINE | Facility: HOSPITAL | Age: 70
End: 2020-10-27

## 2020-10-27 VITALS
DIASTOLIC BLOOD PRESSURE: 72 MMHG | HEART RATE: 69 BPM | WEIGHT: 176 LBS | HEIGHT: 69 IN | SYSTOLIC BLOOD PRESSURE: 150 MMHG | BODY MASS INDEX: 26.07 KG/M2

## 2020-10-27 DIAGNOSIS — R06.83 SNORING: ICD-10-CM

## 2020-10-27 DIAGNOSIS — G47.33 OBSTRUCTIVE SLEEP APNEA: Primary | ICD-10-CM

## 2020-10-27 PROCEDURE — G0463 HOSPITAL OUTPT CLINIC VISIT: HCPCS

## 2020-10-30 ENCOUNTER — TELEPHONE (OUTPATIENT)
Dept: FAMILY MEDICINE CLINIC | Facility: CLINIC | Age: 70
End: 2020-10-30

## 2020-10-30 NOTE — TELEPHONE ENCOUNTER
PT IS CALLING IN TO LEAVE A MESSAGE FOR KRISTINA POLANCO.  PT WANTS TO KNOW IF THE OFFICE HAS ANY SAMPLES OF THE FARXIGA 10MG TABLETS.  PT STATES THAT HIS PRESCRIPTION FOR THIS IS TOO EXPENSIVE.      PT CALL BACK  [phone]

## 2020-10-31 DIAGNOSIS — I10 ESSENTIAL HYPERTENSION: ICD-10-CM

## 2020-10-31 DIAGNOSIS — IMO0002 DIABETES MELLITUS OUT OF CONTROL: ICD-10-CM

## 2020-10-31 DIAGNOSIS — IMO0002 UNCONTROLLED TYPE 2 DIABETES MELLITUS WITH COMPLICATION, WITHOUT LONG-TERM CURRENT USE OF INSULIN: ICD-10-CM

## 2020-11-02 RX ORDER — LISINOPRIL 40 MG/1
TABLET ORAL
Qty: 30 TABLET | Refills: 0 | Status: SHIPPED | OUTPATIENT
Start: 2020-11-02 | End: 2020-12-26

## 2020-11-02 RX ORDER — GLIPIZIDE AND METFORMIN HCL 5; 500 MG/1; MG/1
TABLET, FILM COATED ORAL
Qty: 108 TABLET | Refills: 0 | Status: SHIPPED | OUTPATIENT
Start: 2020-11-02 | End: 2020-11-29

## 2020-11-02 RX ORDER — DAPAGLIFLOZIN 10 MG/1
TABLET, FILM COATED ORAL
Qty: 30 TABLET | Refills: 0 | Status: SHIPPED | OUTPATIENT
Start: 2020-11-02 | End: 2020-12-02

## 2020-11-02 RX ORDER — AMLODIPINE BESYLATE 10 MG/1
TABLET ORAL
Qty: 27 TABLET | Refills: 0 | Status: SHIPPED | OUTPATIENT
Start: 2020-11-02 | End: 2020-11-29

## 2020-11-03 ENCOUNTER — HOSPITAL ENCOUNTER (OUTPATIENT)
Dept: SLEEP MEDICINE | Facility: HOSPITAL | Age: 70
Discharge: HOME | End: 2020-11-03
Admitting: INTERNAL MEDICINE

## 2020-11-03 DIAGNOSIS — G47.33 OBSTRUCTIVE SLEEP APNEA: ICD-10-CM

## 2020-11-03 PROCEDURE — 95806 SLEEP STUDY UNATT&RESP EFFT: CPT

## 2020-11-06 LAB
ALBUMIN SERPL-MCNC: 4.5 G/DL (ref 3.5–5.2)
ALBUMIN/GLOB SERPL: 2.1 G/DL
ALP SERPL-CCNC: 72 U/L (ref 39–117)
ALT SERPL-CCNC: 24 U/L (ref 1–41)
AST SERPL-CCNC: 21 U/L (ref 1–40)
BASOPHILS # BLD AUTO: 0.02 10*3/MM3 (ref 0–0.2)
BASOPHILS NFR BLD AUTO: 0.5 % (ref 0–1.5)
BILIRUB SERPL-MCNC: 0.3 MG/DL (ref 0–1.2)
BUN SERPL-MCNC: 20 MG/DL (ref 8–23)
BUN/CREAT SERPL: 18 (ref 7–25)
CALCIUM SERPL-MCNC: 9.8 MG/DL (ref 8.6–10.5)
CHLORIDE SERPL-SCNC: 103 MMOL/L (ref 98–107)
CHOLEST SERPL-MCNC: 124 MG/DL (ref 0–200)
CHOLEST/HDLC SERPL: 4 {RATIO}
CO2 SERPL-SCNC: 29.4 MMOL/L (ref 22–29)
CREAT SERPL-MCNC: 1.11 MG/DL (ref 0.76–1.27)
EOSINOPHIL # BLD AUTO: 0.06 10*3/MM3 (ref 0–0.4)
EOSINOPHIL NFR BLD AUTO: 1.6 % (ref 0.3–6.2)
ERYTHROCYTE [DISTWIDTH] IN BLOOD BY AUTOMATED COUNT: 13.4 % (ref 12.3–15.4)
GLOBULIN SER CALC-MCNC: 2.1 GM/DL
GLUCOSE SERPL-MCNC: 173 MG/DL (ref 65–99)
HBA1C MFR BLD: 8.11 % (ref 4.8–5.6)
HCT VFR BLD AUTO: 42.3 % (ref 37.5–51)
HDLC SERPL-MCNC: 31 MG/DL (ref 40–60)
HGB BLD-MCNC: 14.4 G/DL (ref 13–17.7)
IMM GRANULOCYTES # BLD AUTO: 0.01 10*3/MM3 (ref 0–0.05)
IMM GRANULOCYTES NFR BLD AUTO: 0.3 % (ref 0–0.5)
LDLC SERPL CALC-MCNC: 63 MG/DL (ref 0–100)
LYMPHOCYTES # BLD AUTO: 0.99 10*3/MM3 (ref 0.7–3.1)
LYMPHOCYTES NFR BLD AUTO: 27 % (ref 19.6–45.3)
MCH RBC QN AUTO: 29.2 PG (ref 26.6–33)
MCHC RBC AUTO-ENTMCNC: 34 G/DL (ref 31.5–35.7)
MCV RBC AUTO: 85.8 FL (ref 79–97)
MONOCYTES # BLD AUTO: 0.38 10*3/MM3 (ref 0.1–0.9)
MONOCYTES NFR BLD AUTO: 10.4 % (ref 5–12)
NEUTROPHILS # BLD AUTO: 2.2 10*3/MM3 (ref 1.7–7)
NEUTROPHILS NFR BLD AUTO: 60.2 % (ref 42.7–76)
NRBC BLD AUTO-RTO: 0 /100 WBC (ref 0–0.2)
PLATELET # BLD AUTO: 112 10*3/MM3 (ref 140–450)
POTASSIUM SERPL-SCNC: 4.5 MMOL/L (ref 3.5–5.2)
PROT SERPL-MCNC: 6.6 G/DL (ref 6–8.5)
RBC # BLD AUTO: 4.93 10*6/MM3 (ref 4.14–5.8)
SODIUM SERPL-SCNC: 140 MMOL/L (ref 136–145)
TRIGL SERPL-MCNC: 177 MG/DL (ref 0–150)
VLDLC SERPL CALC-MCNC: 30 MG/DL (ref 5–40)
WBC # BLD AUTO: 3.66 10*3/MM3 (ref 3.4–10.8)

## 2020-11-13 ENCOUNTER — TELEPHONE (OUTPATIENT)
Dept: SLEEP MEDICINE | Facility: HOSPITAL | Age: 70
End: 2020-11-13

## 2020-11-18 ENCOUNTER — OFFICE VISIT (OUTPATIENT)
Dept: FAMILY MEDICINE CLINIC | Facility: CLINIC | Age: 70
End: 2020-11-18

## 2020-11-18 VITALS
SYSTOLIC BLOOD PRESSURE: 130 MMHG | WEIGHT: 173 LBS | HEIGHT: 69 IN | BODY MASS INDEX: 25.62 KG/M2 | OXYGEN SATURATION: 98 % | TEMPERATURE: 97.6 F | HEART RATE: 80 BPM | RESPIRATION RATE: 16 BRPM | DIASTOLIC BLOOD PRESSURE: 64 MMHG

## 2020-11-18 DIAGNOSIS — Z00.00 MEDICARE ANNUAL WELLNESS VISIT, SUBSEQUENT: Primary | ICD-10-CM

## 2020-11-18 DIAGNOSIS — I10 ESSENTIAL HYPERTENSION: ICD-10-CM

## 2020-11-18 DIAGNOSIS — E11.65 TYPE 2 DIABETES MELLITUS WITH HYPERGLYCEMIA, WITHOUT LONG-TERM CURRENT USE OF INSULIN (HCC): ICD-10-CM

## 2020-11-18 DIAGNOSIS — E78.2 MIXED HYPERLIPIDEMIA: ICD-10-CM

## 2020-11-18 PROCEDURE — G0439 PPPS, SUBSEQ VISIT: HCPCS | Performed by: PHYSICIAN ASSISTANT

## 2020-11-18 NOTE — PROGRESS NOTES
"The ABCs of the Annual Wellness Visit  Subsequent Medicare Wellness Visit    Chief Complaint   Patient presents with   • Medicare Wellness-subsequent   • Diabetes     Management   • Hypertension     Management   • Hyperlipidemia     Management       Subjective   History of Present Illness:  Guy Plascencia is a 69 y.o. male who presents for a Subsequent Medicare Wellness Visit.  Guy states he has not been checking his blood sugars at home regularly.  States his diet is \"awful\".  He is apparently trying to cut back on carbohydrates but still has been eating a lot of pastas and breads.  He has been trying to do some calisthenic exercises at least 30 minutes for 5 times weekly.  Bowel movements have been daily without dark black tarry stools.  He has been compliant with his medications.  Denied any fevers, chills, upper respiratory symptoms, chest pain, shortness of air, dizziness, vision changes, GI upset or swelling of ankles.  He has seen his eye doctor.  Had recent sleep study.  He will be seeing a sleep study doctor in the next few weeks.    HEALTH RISK ASSESSMENT    Recent Hospitalizations:  No hospitalization(s) within the last year.    Current Medical Providers:  Patient Care Team:  Chelsea Mcelroy PA-C as PCP - General  Chelsea Mcelroy PA-C as Referring Physician (Family Medicine)  Michelle Sullivan MD as Consulting Physician (Hematology and Oncology)    Smoking Status:  Social History     Tobacco Use   Smoking Status Former Smoker   • Years: 14.00   • Types: Cigarettes   • Quit date:    • Years since quittin.9   Smokeless Tobacco Never Used       Alcohol Consumption:  Social History     Substance and Sexual Activity   Alcohol Use No       Depression Screen:   PHQ-2/PHQ-9 Depression Screening 2020   Little interest or pleasure in doing things 0   Feeling down, depressed, or hopeless 0   Trouble falling or staying asleep, or sleeping too much -   Feeling tired or having little energy - "   Poor appetite or overeating -   Feeling bad about yourself - or that you are a failure or have let yourself or your family down -   Trouble concentrating on things, such as reading the newspaper or watching television -   Moving or speaking so slowly that other people could have noticed. Or the opposite - being so fidgety or restless that you have been moving around a lot more than usual -   Thoughts that you would be better off dead, or of hurting yourself in some way -   Total Score 0       Fall Risk Screen:  DIMITRIOSADI Fall Risk Assessment was completed, and patient is at LOW risk for falls.Assessment completed on:11/18/2020    Health Habits and Functional and Cognitive Screening:  Functional & Cognitive Status 11/18/2020   Do you have difficulty preparing food and eating? No   Do you have difficulty bathing yourself, getting dressed or grooming yourself? No   Do you have difficulty using the toilet? No   Do you have difficulty moving around from place to place? No   Do you have trouble with steps or getting out of a bed or a chair? No   Current Diet Well Balanced Diet   Dental Exam Up to date   Eye Exam Up to date   Exercise (times per week) 0 times per week   Current Exercise Activities Include None   Do you need help using the phone?  No   Are you deaf or do you have serious difficulty hearing?  No   Do you need help with transportation? No   Do you need help shopping? No   Do you need help preparing meals?  No   Do you need help with housework?  No   Do you need help with laundry? No   Do you need help taking your medications? No   Do you need help managing money? No   Do you ever drive or ride in a car without wearing a seat belt? No   Have you felt unusual stress, anger or loneliness in the last month? -   Who do you live with? -   If you need help, do you have trouble finding someone available to you? -   Have you been bothered in the last four weeks by sexual problems? -   Do you have difficulty  concentrating, remembering or making decisions? -         Does the patient have evidence of cognitive impairment? No    Asprin use counseling:Taking ASA appropriately as indicated    Age-appropriate Screening Schedule:  Refer to the list below for future screening recommendations based on patient's age, sex and/or medical conditions. Orders for these recommended tests are listed in the plan section. The patient has been provided with a written plan.    Health Maintenance   Topic Date Due   • ZOSTER VACCINE (1 of 2) 12/16/1999   • URINE MICROALBUMIN  08/27/2020   • DIABETIC EYE EXAM  08/29/2020   • HEMOGLOBIN A1C  05/05/2021   • DIABETIC FOOT EXAM  07/13/2021   • LIPID PANEL  11/05/2021   • INFLUENZA VACCINE  Discontinued   • TDAP/TD VACCINES  Discontinued          The following portions of the patient's history were reviewed and updated as appropriate:   He  has a past medical history of Arthritis, Diabetes mellitus (CMS/[facility]), H/O  Multiple facial fractures (11/1968), H/O Broken femur (11/1968), History of transfusion, Hyperlipidemia, Hypertension, Kidney stone, Neoplasm of uncertain behavior, Peptic ulceration, Plantar fasciitis, Sleep apnea, and Thrombocytosis (CMS/[facility]).  He does not have any pertinent problems on file.  He  has a past surgical history that includes Other surgical history; Other surgical history; Femur Surgery (11/1968); Skin biopsy; cystoscopy ureteroscopy stone manipulation/extraction (Left, 6/29/2016); Fracture surgery; Extracorporeal shock wave lithotripsy (Right, 7/29/2016); Cosmetic surgery (11/1968); Colonoscopy; and Esophagogastroduodenoscopy (N/A, 10/2/2019).  His family history includes Cancer in his brother and another family member; Heart disease in his father; Skin cancer (age of onset: 50) in his brother.  He  reports that he quit smoking about 48 years ago. His smoking use included cigarettes. He quit after 14.00 years of use. He has never used smokeless tobacco. He reports that he  does not drink alcohol or use drugs.  Current Outpatient Medications   Medication Sig Dispense Refill   • amLODIPine (NORVASC) 10 MG tablet TAKE ONE TABLET BY MOUTH DAILY 27 tablet 0   • Aspirin (ASPIR-81 PO) Take  by mouth Daily.     • Blood Glucose Monitoring Suppl (ONETOUCH VERIO FLEX SYSTEM) w/Device kit 1 Device 2 (Two) Times a Day. 1 kit 0   • Cholecalciferol (D3 ADULT PO) Take 2,000 Units by mouth Daily.     • Farxiga 10 MG tablet TAKE ONE TABLET BY MOUTH EVERY MORNING 30 tablet 0   • glipiZIDE-metFORMIN (METAGLIP) 5-500 MG per tablet TAKE TWO TABLETS BY MOUTH TWICE A DAY BEFORE MEALS 108 tablet 0   • glucose blood (Accu-Chek Vika Plus) test strip Use to test blood glucose 2 times daily  Dx: e11.9 200 each 3   • glucose blood (AGAMATRIX PRESTO TEST) test strip Used to check twice daily for type 2 diabetes 100 each 9   • hydroCHLOROthiazide (MICROZIDE) 12.5 MG capsule TAKE ONE CAPSULE BY MOUTH DAILY 90 capsule 3   • lisinopril (PRINIVIL,ZESTRIL) 40 MG tablet TAKE ONE TABLET BY MOUTH DAILY 30 tablet 0   • metoprolol tartrate (LOPRESSOR) 25 MG tablet Take 1 tablet by mouth 2 (Two) Times a Day. 60 tablet 0   • omeprazole (priLOSEC) 20 MG capsule TAKE ONE CAPSULE BY MOUTH DAILY 90 capsule 3   • pravastatin (PRAVACHOL) 80 MG tablet Take 1 tablet by mouth Daily. 90 tablet 1   • vitamin B-12 (CYANOCOBALAMIN) 1000 MCG tablet Take 1 tablet by mouth Daily.       No current facility-administered medications for this visit.      Current Outpatient Medications on File Prior to Visit   Medication Sig   • amLODIPine (NORVASC) 10 MG tablet TAKE ONE TABLET BY MOUTH DAILY   • Aspirin (ASPIR-81 PO) Take  by mouth Daily.   • Blood Glucose Monitoring Suppl (ONETOUCH VERIO FLEX SYSTEM) w/Device kit 1 Device 2 (Two) Times a Day.   • Cholecalciferol (D3 ADULT PO) Take 2,000 Units by mouth Daily.   • Farxiga 10 MG tablet TAKE ONE TABLET BY MOUTH EVERY MORNING   • glipiZIDE-metFORMIN (METAGLIP) 5-500 MG per tablet TAKE TWO TABLETS BY  MOUTH TWICE A DAY BEFORE MEALS   • glucose blood (Accu-Chek Vika Plus) test strip Use to test blood glucose 2 times daily  Dx: e11.9   • glucose blood (AGAMATRIX PRESTO TEST) test strip Used to check twice daily for type 2 diabetes   • hydroCHLOROthiazide (MICROZIDE) 12.5 MG capsule TAKE ONE CAPSULE BY MOUTH DAILY   • lisinopril (PRINIVIL,ZESTRIL) 40 MG tablet TAKE ONE TABLET BY MOUTH DAILY   • metoprolol tartrate (LOPRESSOR) 25 MG tablet Take 1 tablet by mouth 2 (Two) Times a Day.   • omeprazole (priLOSEC) 20 MG capsule TAKE ONE CAPSULE BY MOUTH DAILY   • pravastatin (PRAVACHOL) 80 MG tablet Take 1 tablet by mouth Daily.   • vitamin B-12 (CYANOCOBALAMIN) 1000 MCG tablet Take 1 tablet by mouth Daily.     No current facility-administered medications on file prior to visit.      He is allergic to penicillins and cimetidine..    Outpatient Medications Prior to Visit   Medication Sig Dispense Refill   • amLODIPine (NORVASC) 10 MG tablet TAKE ONE TABLET BY MOUTH DAILY 27 tablet 0   • Aspirin (ASPIR-81 PO) Take  by mouth Daily.     • Blood Glucose Monitoring Suppl (ONETOUCH VERIO FLEX SYSTEM) w/Device kit 1 Device 2 (Two) Times a Day. 1 kit 0   • Cholecalciferol (D3 ADULT PO) Take 2,000 Units by mouth Daily.     • Farxiga 10 MG tablet TAKE ONE TABLET BY MOUTH EVERY MORNING 30 tablet 0   • glipiZIDE-metFORMIN (METAGLIP) 5-500 MG per tablet TAKE TWO TABLETS BY MOUTH TWICE A DAY BEFORE MEALS 108 tablet 0   • glucose blood (Accu-Chek Vika Plus) test strip Use to test blood glucose 2 times daily  Dx: e11.9 200 each 3   • glucose blood (AGAMATRIX PRESTO TEST) test strip Used to check twice daily for type 2 diabetes 100 each 9   • hydroCHLOROthiazide (MICROZIDE) 12.5 MG capsule TAKE ONE CAPSULE BY MOUTH DAILY 90 capsule 3   • lisinopril (PRINIVIL,ZESTRIL) 40 MG tablet TAKE ONE TABLET BY MOUTH DAILY 30 tablet 0   • metoprolol tartrate (LOPRESSOR) 25 MG tablet Take 1 tablet by mouth 2 (Two) Times a Day. 60 tablet 0   •  omeprazole (priLOSEC) 20 MG capsule TAKE ONE CAPSULE BY MOUTH DAILY 90 capsule 3   • pravastatin (PRAVACHOL) 80 MG tablet Take 1 tablet by mouth Daily. 90 tablet 1   • vitamin B-12 (CYANOCOBALAMIN) 1000 MCG tablet Take 1 tablet by mouth Daily.       No facility-administered medications prior to visit.        Patient Active Problem List   Diagnosis   • Bilateral shoulder pain   • Foot pain   • Uncontrolled type 2 diabetes mellitus with complication, without long-term current use of insulin (CMS/[facility])   • Eczema   • Impotence of organic origin   • Mixed hyperlipidemia   • Essential hypertension   • Hypogonadism in male   • Obstructive sleep apnea syndrome   • Thrombocytopenia (CMS/HCC)   • Vitamin D deficiency   • Neoplasm of uncertain behavior   • Left ureteral stone   • Ureterolithiasis   • Trigger middle finger of right hand   • Acute pain of left shoulder   • Neck strain   • Sleep apnea   • Special screening for malignant neoplasms, colon   • Need for Streptococcus pneumoniae vaccination   • Splenomegaly   • Steatohepatitis   • Pancreatic cyst   • Medicare annual wellness visit, subsequent   • Dysphagia       Advanced Care Planning:  ACP discussion was held with the patient during this visit. Patient does not have an advance directive, declines further assistance.    Review of Systems   Constitutional: Negative.  Negative for chills, fatigue and fever.   HENT: Negative.  Negative for congestion, ear discharge, ear pain, postnasal drip, rhinorrhea, sinus pressure, sinus pain, sneezing and sore throat.    Eyes: Negative.    Respiratory: Negative.  Negative for cough, chest tightness, shortness of breath and wheezing.    Cardiovascular: Negative.  Negative for chest pain, palpitations and leg swelling.   Gastrointestinal: Negative.  Negative for abdominal pain, blood in stool, constipation, diarrhea, nausea, rectal pain and vomiting.   Endocrine: Negative.    Genitourinary: Negative.  Negative for dysuria, flank pain,  "frequency and hematuria.   Musculoskeletal: Negative.    Skin: Negative.    Allergic/Immunologic: Negative.    Neurological: Negative.  Negative for dizziness, light-headedness and headaches.   Hematological: Negative.  Negative for adenopathy. Does not bruise/bleed easily.   Psychiatric/Behavioral: Negative.  Negative for sleep disturbance.       Compared to one year ago, the patient feels his physical health is the same.  Compared to one year ago, the patient feels his mental health is the same.    Reviewed chart for potential of high risk medication in the elderly: yes  Reviewed chart for potential of harmful drug interactions in the elderly:yes    Objective         Vitals:    11/18/20 0850 11/18/20 0909   BP: 148/72 130/64   BP Location:  Left arm   Patient Position:  Sitting   Cuff Size:  Adult   Pulse: 80    Resp: 16    Temp: 97.6 °F (36.4 °C)    SpO2: 98%    Weight: 78.5 kg (173 lb)    Height: 175.3 cm (69\")        Body mass index is 25.55 kg/m².  Discussed the patient's BMI with him. The BMI is in the acceptable range.    Physical Exam  Constitutional:       Appearance: Normal appearance. He is well-developed, well-groomed and normal weight.      Interventions: Face mask in place.   HENT:      Head: Normocephalic and atraumatic.      Jaw: There is normal jaw occlusion.      Right Ear: Hearing, tympanic membrane, ear canal and external ear normal.      Left Ear: Hearing, tympanic membrane, ear canal and external ear normal.      Nose: Nose normal.      Right Sinus: No maxillary sinus tenderness or frontal sinus tenderness.      Left Sinus: No maxillary sinus tenderness or frontal sinus tenderness.      Mouth/Throat:      Lips: Pink.      Mouth: Mucous membranes are moist.      Tongue: No lesions. Tongue does not deviate from midline.      Palate: No mass and lesions.      Pharynx: Oropharynx is clear. Uvula midline.      Tonsils: No tonsillar exudate or tonsillar abscesses.   Eyes:      General: Lids are " normal.      Conjunctiva/sclera: Conjunctivae normal.      Pupils: Pupils are equal, round, and reactive to light.   Neck:      Musculoskeletal: Neck supple.      Thyroid: No thyroid mass, thyromegaly or thyroid tenderness.      Vascular: No carotid bruit.      Trachea: Trachea and phonation normal. No tracheal tenderness.   Cardiovascular:      Rate and Rhythm: Normal rate and regular rhythm.      Pulses: Normal pulses.      Heart sounds: Normal heart sounds, S1 normal and S2 normal. No murmur.   Pulmonary:      Effort: Pulmonary effort is normal.      Breath sounds: Normal breath sounds and air entry.   Abdominal:      General: Bowel sounds are normal.      Palpations: Abdomen is soft. There is no hepatomegaly.      Tenderness: There is no abdominal tenderness.   Musculoskeletal:      Right lower leg: No edema.      Left lower leg: No edema.   Lymphadenopathy:      Cervical: No cervical adenopathy.      Right cervical: No superficial, deep or posterior cervical adenopathy.     Left cervical: No superficial, deep or posterior cervical adenopathy.   Skin:     General: Skin is warm and dry.      Capillary Refill: Capillary refill takes less than 2 seconds.   Neurological:      Mental Status: He is alert and oriented to person, place, and time.      Deep Tendon Reflexes: Reflexes are normal and symmetric.      Reflex Scores:       Patellar reflexes are 2+ on the right side and 2+ on the left side.  Psychiatric:         Attention and Perception: Attention and perception normal.         Mood and Affect: Mood and affect normal.         Speech: Speech normal.         Behavior: Behavior normal. Behavior is cooperative.         Thought Content: Thought content normal.         Cognition and Memory: Cognition and memory normal.         Judgment: Judgment normal.     I was wearing a surgical mask and face shield during the entire office visit/encounter.      Lab Results   Component Value Date     (H) 11/05/2020    CHLPL  124 11/05/2020    TRIG 177 (H) 11/05/2020    HDL 31 (L) 11/05/2020    LDL 63 11/05/2020    VLDL 30 11/05/2020    HGBA1C 8.11 (H) 11/05/2020        Assessment/Plan   Medicare Risks and Personalized Health Plan  CMS Preventative Services Quick Reference  Advance Directive Discussion    The above risks/problems have been discussed with the patient.  Pertinent information has been shared with the patient in the After Visit Summary.  Follow up plans and orders are seen below in the Assessment/Plan Section.    Diagnoses and all orders for this visit:    1. Medicare annual wellness visit, subsequent (Primary)    2. Mixed hyperlipidemia    3. Essential hypertension    4. Type 2 diabetes mellitus with hyperglycemia, without long-term current use of insulin (CMS/[facility])  -     Microalbumin / Creatinine Urine Ratio - Urine, Clean Catch    1.  Annual Medicare wellness examination with hypertension: I rechecked his blood pressure at office visit today and got 130/64 in left arm.  Guy will continue his current blood pressure medication at home.  Will return to office in 3 months.  2.  Chronic and stable hyperlipidemia with type 2 diabetes: I have reviewed lab work that was collected on November 5, 2020 with him at office visit today.  Fasting blood sugar was 173, A1c was 8.1, cholesterol 124, triglycerides 177, HDL 31 and LDL was 63.  The importance of eating less carbohydrates and fatty foods in diet.  Also encouraged to increase physical activity must get his diabetes under control.  He will continue the current medication at home as directed.  I suspect his diet is worsening his diabetes.  He is still eating increased carbohydrates and Pasta.  Will return to office in 3 months for reevaluation.  He voiced understanding.  3.  Chronic and stable thrombocytopenia: Platelet count was 112,000.  This is stable from July 2020.  Has upcoming appointment with hematologist in January 2021.  Currently asymptomatic.        Patient  Counseling:  --Nutrition: Stressed importance of moderation in sodium/caffeine intake, saturated fat and cholesterol.  Discussed caloric balance, sufficient intake of fresh fruits, vegetables, fiber,   calcium, iron.  --Discussed the new recommendation against daily use of baby aspirin for primary prevention in low risk patients.  --Exercise: Stressed the importance of regular exercise by incorporating into daily routine.    --Substance Abuse: Discussed cessation/primary prevention of tobacco, alcohol, or other drug use; driving or other dangerous activities under the influence.    --Dental health: Discussed importance of regular tooth brushing, flossing, and dental visits.  -- Suggested having eyes and vision checked if needed or past due.  --Immunizations reviewed.  Will obtain shingles vaccination at local pharmacy.  --Discussed benefits of screening colonoscopy.  Colonoscopy is up-to-date.    No visits with results within 2 Week(s) from this visit.   Latest known visit with results is:   Orders Only on 10/07/2020   Component Date Value Ref Range Status   • Glucose 11/05/2020 173* 65 - 99 mg/dL Final   • BUN 11/05/2020 20  8 - 23 mg/dL Final   • Creatinine 11/05/2020 1.11  0.76 - 1.27 mg/dL Final   • eGFR Non  Am 11/05/2020 65  >60 mL/min/1.73 Final   • eGFR African Am 11/05/2020 79  >60 mL/min/1.73 Final   • BUN/Creatinine Ratio 11/05/2020 18.0  7.0 - 25.0 Final   • Sodium 11/05/2020 140  136 - 145 mmol/L Final   • Potassium 11/05/2020 4.5  3.5 - 5.2 mmol/L Final   • Chloride 11/05/2020 103  98 - 107 mmol/L Final   • Total CO2 11/05/2020 29.4* 22.0 - 29.0 mmol/L Final   • Calcium 11/05/2020 9.8  8.6 - 10.5 mg/dL Final   • Total Protein 11/05/2020 6.6  6.0 - 8.5 g/dL Final   • Albumin 11/05/2020 4.50  3.50 - 5.20 g/dL Final   • Globulin 11/05/2020 2.1  gm/dL Final   • A/G Ratio 11/05/2020 2.1  g/dL Final   • Total Bilirubin 11/05/2020 0.3  0.0 - 1.2 mg/dL Final   • Alkaline Phosphatase 11/05/2020 72  39 -  117 U/L Final   • AST (SGOT) 11/05/2020 21  1 - 40 U/L Final   • ALT (SGPT) 11/05/2020 24  1 - 41 U/L Final   • WBC 11/05/2020 3.66  3.40 - 10.80 10*3/mm3 Final   • RBC 11/05/2020 4.93  4.14 - 5.80 10*6/mm3 Final   • Hemoglobin 11/05/2020 14.4  13.0 - 17.7 g/dL Final   • Hematocrit 11/05/2020 42.3  37.5 - 51.0 % Final   • MCV 11/05/2020 85.8  79.0 - 97.0 fL Final   • MCH 11/05/2020 29.2  26.6 - 33.0 pg Final   • MCHC 11/05/2020 34.0  31.5 - 35.7 g/dL Final   • RDW 11/05/2020 13.4  12.3 - 15.4 % Final   • Platelets 11/05/2020 112* 140 - 450 10*3/mm3 Final   • Neutrophil Rel % 11/05/2020 60.2  42.7 - 76.0 % Final   • Lymphocyte Rel % 11/05/2020 27.0  19.6 - 45.3 % Final   • Monocyte Rel % 11/05/2020 10.4  5.0 - 12.0 % Final   • Eosinophil Rel % 11/05/2020 1.6  0.3 - 6.2 % Final   • Basophil Rel % 11/05/2020 0.5  0.0 - 1.5 % Final   • Neutrophils Absolute 11/05/2020 2.20  1.70 - 7.00 10*3/mm3 Final   • Lymphocytes Absolute 11/05/2020 0.99  0.70 - 3.10 10*3/mm3 Final   • Monocytes Absolute 11/05/2020 0.38  0.10 - 0.90 10*3/mm3 Final   • Eosinophils Absolute 11/05/2020 0.06  0.00 - 0.40 10*3/mm3 Final   • Basophils Absolute 11/05/2020 0.02  0.00 - 0.20 10*3/mm3 Final   • Immature Granulocyte Rel % 11/05/2020 0.3  0.0 - 0.5 % Final   • Immature Grans Absolute 11/05/2020 0.01  0.00 - 0.05 10*3/mm3 Final   • nRBC 11/05/2020 0.0  0.0 - 0.2 /100 WBC Final   • Total Cholesterol 11/05/2020 124  0 - 200 mg/dL Final   • Triglycerides 11/05/2020 177* 0 - 150 mg/dL Final   • HDL Cholesterol 11/05/2020 31* 40 - 60 mg/dL Final   • VLDL Cholesterol [place] 11/05/2020 30  5 - 40 mg/dL Final   • LDL Chol Calc ([facility]) 11/05/2020 63  0 - 100 mg/dL Final   • Chol/HDL Ratio 11/05/2020 4.00   Final   • Hemoglobin A1C 11/05/2020 8.11* 4.80 - 5.60 % Final    Comment: Hemoglobin A1C Ranges:  Increased Risk for Diabetes  5.7% to 6.4%  Diabetes                     >= 6.5%  Diabetic Goal                < 7.0%           Follow Up:  Return in about 3  months (around 2/18/2021), or DM HTN and chol.     An After Visit Summary and PPPS were given to the patient.

## 2020-11-18 NOTE — PROGRESS NOTES
"Subjective   Guy Plascencia is a 69 y.o. male presents for   Chief Complaint   Patient presents with   • Medicare Wellness-subsequent       History of Present Illness     The following portions of the patient's history were reviewed and updated as appropriate: allergies, current medications, past family history, past medical history, past social history, past surgical history and problem list.    Review of Systems   All other systems reviewed and are negative.        Vitals:    20 0850   BP: 148/72   Pulse: 80   Resp: 16   Temp: 97.6 °F (36.4 °C)   SpO2: 98%   Weight: 78.5 kg (173 lb)   Height: 175.3 cm (69\")     Wt Readings from Last 3 Encounters:   20 78.5 kg (173 lb)   10/27/20 79.8 kg (176 lb)   20 78.8 kg (173 lb 11.2 oz)     BP Readings from Last 3 Encounters:   20 148/72   10/27/20 150/72   20 120/70     Social History     Socioeconomic History   • Marital status:      Spouse name: Jessica   • Number of children: Not on file   • Years of education: College   • Highest education level: Not on file   Occupational History   • Occupation:      Employer: RETIRED   Tobacco Use   • Smoking status: Former Smoker     Years: 14.00     Types: Cigarettes     Quit date:      Years since quittin.9   • Smokeless tobacco: Never Used   Substance and Sexual Activity   • Alcohol use: No   • Drug use: No   • Sexual activity: Defer       Allergies   Allergen Reactions   • Penicillins Unknown - Low Severity     ALLERGY TESTING SHOWED ALL TO PCN.    • Cimetidine Rash       Body mass index is 25.55 kg/m².    Objective   Physical Exam    Assessment/Plan   Diagnoses and all orders for this visit:    1. Medicare annual wellness visit, subsequent (Primary)    2. Mixed hyperlipidemia    3. Essential hypertension    4. Uncontrolled type 2 diabetes mellitus with complication, without long-term current use of insulin (CMS/[facility])          [facility] " [facility]  [address] [address]  Dept: [phone]  Dept Fax: [phone]  Loc: [phone]  Loc Fax: [phone]

## 2020-11-19 LAB
ALBUMIN/CREAT UR: 221 MG/G CREAT (ref 0–29)
CREAT UR-MCNC: 43.7 MG/DL
MICROALBUMIN UR-MCNC: 96.4 UG/ML

## 2020-11-28 DIAGNOSIS — IMO0002 UNCONTROLLED TYPE 2 DIABETES MELLITUS WITH COMPLICATION, WITHOUT LONG-TERM CURRENT USE OF INSULIN: ICD-10-CM

## 2020-11-28 DIAGNOSIS — I10 ESSENTIAL HYPERTENSION: ICD-10-CM

## 2020-11-29 RX ORDER — AMLODIPINE BESYLATE 10 MG/1
10 TABLET ORAL DAILY
Qty: 30 TABLET | Refills: 6 | Status: SHIPPED | OUTPATIENT
Start: 2020-11-29 | End: 2021-06-23

## 2020-11-29 RX ORDER — GLIPIZIDE AND METFORMIN HCL 5; 500 MG/1; MG/1
2 TABLET, FILM COATED ORAL
Qty: 120 TABLET | Refills: 6 | Status: SHIPPED | OUTPATIENT
Start: 2020-11-29 | End: 2021-02-08

## 2020-12-01 DIAGNOSIS — IMO0002 UNCONTROLLED TYPE 2 DIABETES MELLITUS WITH COMPLICATION, WITHOUT LONG-TERM CURRENT USE OF INSULIN: ICD-10-CM

## 2020-12-02 RX ORDER — DAPAGLIFLOZIN 10 MG/1
TABLET, FILM COATED ORAL
Qty: 30 TABLET | Refills: 6 | Status: SHIPPED | OUTPATIENT
Start: 2020-12-02 | End: 2021-03-17

## 2020-12-08 ENCOUNTER — OFFICE VISIT (OUTPATIENT)
Dept: SLEEP MEDICINE | Facility: HOSPITAL | Age: 70
End: 2020-12-08

## 2020-12-08 VITALS
HEART RATE: 72 BPM | DIASTOLIC BLOOD PRESSURE: 72 MMHG | SYSTOLIC BLOOD PRESSURE: 152 MMHG | BODY MASS INDEX: 25.62 KG/M2 | HEIGHT: 69 IN | WEIGHT: 173 LBS

## 2020-12-08 DIAGNOSIS — Z78.9 DIFFICULTY WITH CPAP USE: ICD-10-CM

## 2020-12-08 DIAGNOSIS — G47.33 OBSTRUCTIVE SLEEP APNEA: Primary | ICD-10-CM

## 2020-12-08 PROCEDURE — G0463 HOSPITAL OUTPT CLINIC VISIT: HCPCS

## 2020-12-08 NOTE — PROGRESS NOTES
[facility]  [address]  3RD FLOOR  [facility] [address]  [phone]    PCP: Chelsea Mcelroy PA-C    Reason for visit:  Sleep disorders: MIRI    Guy is a 69 y.o.male who was seen in the Sleep Disorders Center today. His pressure was increased last time. His spouse reports that he continues to 'snore'. He notices no change in alertness and denies EDS. Also drinks a lot of coffee. He sleeps from 11pm to 7am. Sometimes his legs are restless at night. He uses nasal pillows, fits well. He uses a chin strap. Excessive dry mouth at night.  [place] Sleepiness Scale is 7. Caffeine 8-10 per day. Alcohol 0 per week.    Guy  reports that he quit smoking about 48 years ago. His smoking use included cigarettes. He quit after 14.00 years of use. He has never used smokeless tobacco.    Pertinent Positive Review of Systems of nasal congestion, PND, acid reflux  Rest of Review of Systems was negative as recorded in Sleep Questionnaire.    Patient  has a past medical history of Arthritis, Diabetes mellitus (CMS/[facility]), H/O  Multiple facial fractures (11/1968), H/O Broken femur (11/1968), History of transfusion, Hyperlipidemia, Hypertension, Kidney stone, Neoplasm of uncertain behavior, Peptic ulceration, Plantar fasciitis, Sleep apnea, and Thrombocytosis (CMS/[facility]).     Current Medications:    Current Outpatient Medications:   •  amLODIPine (NORVASC) 10 MG tablet, Take 1 tablet by mouth Daily., Disp: 30 tablet, Rfl: 6  •  Aspirin (ASPIR-81 PO), Take  by mouth Daily., Disp: , Rfl:   •  Blood Glucose Monitoring Suppl (ONETOUCH VERIO FLEX SYSTEM) w/Device kit, 1 Device 2 (Two) Times a Day., Disp: 1 kit, Rfl: 0  •  Cholecalciferol (D3 ADULT PO), Take 2,000 Units by mouth Daily., Disp: , Rfl:   •  Farxiga 10 MG tablet, TAKE ONE TABLET BY MOUTH EVERY MORNING, Disp: 30 tablet, Rfl: 6  •  glipiZIDE-metFORMIN (METAGLIP) 5-500 MG per tablet, Take 2 tablets by mouth 2 (Two) Times a Day Before Meals., Disp:  "120 tablet, Rfl: 6  •  glucose blood (Accu-Chek Vika Plus) test strip, Use to test blood glucose 2 times daily Dx: e11.9, Disp: 200 each, Rfl: 3  •  glucose blood (AGAMATRIX PRESTO TEST) test strip, Used to check twice daily for type 2 diabetes, Disp: 100 each, Rfl: 9  •  hydroCHLOROthiazide (MICROZIDE) 12.5 MG capsule, TAKE ONE CAPSULE BY MOUTH DAILY, Disp: 90 capsule, Rfl: 3  •  lisinopril (PRINIVIL,ZESTRIL) 40 MG tablet, TAKE ONE TABLET BY MOUTH DAILY, Disp: 30 tablet, Rfl: 0  •  metoprolol tartrate (LOPRESSOR) 25 MG tablet, Take 1 tablet by mouth 2 (Two) Times a Day., Disp: 60 tablet, Rfl: 6  •  omeprazole (priLOSEC) 20 MG capsule, TAKE ONE CAPSULE BY MOUTH DAILY, Disp: 90 capsule, Rfl: 3  •  pravastatin (PRAVACHOL) 80 MG tablet, Take 1 tablet by mouth Daily., Disp: 90 tablet, Rfl: 1  •  vitamin B-12 (CYANOCOBALAMIN) 1000 MCG tablet, Take 1 tablet by mouth Daily., Disp: , Rfl:    also entered in Sleep Questionnaire         Vital Signs: /72   Pulse 72   Ht 175.3 cm (69\")   Wt 78.5 kg (173 lb)   BMI 25.55 kg/m²     Body mass index is 25.55 kg/m².       Tongue: Large       Dentition: good       Pharynx: Posterior pharyngeal pillars are  wide   Mallampatti: IV (only hard palate visible)        General: Alert. Cooperative. Well developed. No acute distress.             Head:  Normocephalic. Symmetrical. Atraumatic.              Nose: No septal deviation. No drainage.          Throat: No oral lesions. No thrush. Moist mucous membranes.    Chest Wall:  Normal shape. Symmetric expansion with respiration. No tenderness.             Neck:  Trachea midline.           Lungs:  Clear to auscultation bilaterally. No wheezes. No rhonchi. No rales. Respirations regular, even and unlabored.            Heart:  Regular rhythm and normal rate. Normal S1 and S2. No murmur.     Abdomen:  Soft, non-tender and non-distended. Normal bowel sounds. No masses.  Extremities:  Moves all extremities well. No edema.    " "Psychiatric: Normal mood and affect.    Study:  · 11/6/20  The patient tolerated the home sleep testing with monitoring time of 383 minutes. The data obtained make this a technically adequate study. The apnea hypopneas index(AHI) was 32.5 per sleep hour.  The AHI during supine position was 48.4 per sleep hour. Mean heart rate of 75.8 BPM.  Snoring was noted 21.4% of sleep time. Lowest oxygen saturation during the study was 82%. Saturation below 89% was noted for 4.4 mins.     Testing:  · 100% compliance average usage 7 hours 50 minutes AHI 4 set pressure 14 cm.  Leak for 1 hour 43 minutes.    DME Company: [employer]    Impression:  1. Obstructive sleep apnea    2. Difficulty with CPAP use        Plan:  Guy is still having air leaks.  I suspect this is causing the \"snoring\" sounds that his wife hears at night.  Therefore will try lower pr to 10 - ? Air leak from mouth. Try ffm. He already uses chin strap x 2. If doesn't work then psg titration.    I reiterated the importance of effective treatment of obstructive sleep apnea with PAP machine.  Cardiovascular health risks of untreated sleep apnea were again reviewed.  Patient was asked to remain cautious if there is persistent hypersomnolence. The benefit of weight loss in reducing severity of obstructive sleep apnea was discussed.  Patient would benefit from adhering to a strict diet to achieve ideal BMI.     Change of PAP supplies regularly is important for effective use.  Change of cushion on the mask or plugs on nasal pillows along with disposable filters once every month and change of mask frame, tubing, headgear and Velcro straps every 6 months at the minimum was reiterated.    Patient will follow up in this clinic in 2 months     Thank you for allowing me to participate in your patient's care.    Electronically signed by Ariel Augustin MD, 12/08/20, 9:59 AM EST.    Part of this note may be an electronic transcription/translation of spoken language to printed text " using the Dragon Dictation System.

## 2020-12-26 DIAGNOSIS — I10 ESSENTIAL HYPERTENSION: ICD-10-CM

## 2020-12-26 DIAGNOSIS — IMO0002 DIABETES MELLITUS OUT OF CONTROL: ICD-10-CM

## 2020-12-26 RX ORDER — LISINOPRIL 40 MG/1
TABLET ORAL
Qty: 90 TABLET | Refills: 1 | Status: SHIPPED | OUTPATIENT
Start: 2020-12-26 | End: 2021-06-23

## 2021-01-07 ENCOUNTER — TELEPHONE (OUTPATIENT)
Dept: FAMILY MEDICINE CLINIC | Facility: CLINIC | Age: 71
End: 2021-01-07

## 2021-01-13 ENCOUNTER — TELEPHONE (OUTPATIENT)
Dept: FAMILY MEDICINE CLINIC | Facility: CLINIC | Age: 71
End: 2021-01-13

## 2021-01-13 DIAGNOSIS — IMO0002 UNCONTROLLED TYPE 2 DIABETES MELLITUS WITH COMPLICATION, WITHOUT LONG-TERM CURRENT USE OF INSULIN: Primary | ICD-10-CM

## 2021-01-13 RX ORDER — SEMAGLUTIDE 1.34 MG/ML
0.25 INJECTION, SOLUTION SUBCUTANEOUS WEEKLY
Qty: 1 PEN | Refills: 0 | Status: SHIPPED | OUTPATIENT
Start: 2021-01-13 | End: 2021-01-14

## 2021-01-13 NOTE — TELEPHONE ENCOUNTER
Notify patient we could try Ozempic which is an injectable medication once weekly.  This appears to be covered by his insurance.  Is he agreeable to doing this weekly injection?

## 2021-01-13 NOTE — TELEPHONE ENCOUNTER
Notify patient I have sent the prescription with needles to his pharmacy. Please use once weekly. Please keep follow-up appointment so we can recheck your A1c in 3 months.

## 2021-01-13 NOTE — TELEPHONE ENCOUNTER
Pt stated he is not able to afford his farxiga. He went to pick it up and the copay was $485.00  He would like to know if there is anything else you recommend.

## 2021-01-13 NOTE — TELEPHONE ENCOUNTER
PATIENT IS ASKING FOR A CALL BACK FROM KRISTINA POLANCO ABOUT HIS MEDICATION.    CALL BACK: [phone]

## 2021-01-14 ENCOUNTER — TELEPHONE (OUTPATIENT)
Dept: FAMILY MEDICINE CLINIC | Facility: CLINIC | Age: 71
End: 2021-01-14

## 2021-01-14 DIAGNOSIS — IMO0002 UNCONTROLLED TYPE 2 DIABETES MELLITUS WITH COMPLICATION, WITHOUT LONG-TERM CURRENT USE OF INSULIN: Primary | ICD-10-CM

## 2021-01-15 ENCOUNTER — TELEPHONE (OUTPATIENT)
Dept: FAMILY MEDICINE CLINIC | Facility: CLINIC | Age: 71
End: 2021-01-15

## 2021-01-15 DIAGNOSIS — IMO0002 UNCONTROLLED TYPE 2 DIABETES MELLITUS WITH COMPLICATION, WITHOUT LONG-TERM CURRENT USE OF INSULIN: ICD-10-CM

## 2021-01-15 DIAGNOSIS — IMO0002 UNCONTROLLED TYPE 2 DIABETES MELLITUS WITH COMPLICATION, WITHOUT LONG-TERM CURRENT USE OF INSULIN: Primary | ICD-10-CM

## 2021-01-15 RX ORDER — SEMAGLUTIDE 1.34 MG/ML
0.25 INJECTION, SOLUTION SUBCUTANEOUS WEEKLY
Qty: 1.5 ML | Refills: 0 | Status: SHIPPED | OUTPATIENT
Start: 2021-01-15 | End: 2021-03-17 | Stop reason: SDUPTHER

## 2021-01-15 NOTE — TELEPHONE ENCOUNTER
Caller: Guy Plascencia    Relationship: Self    Best call back number: [phone]    Medication needed:   Requested Prescriptions     Pending Prescriptions Disp Refills   • Semaglutide,0.25 or 0.5MG/DOS, (OZEMPIC) 2 MG/1.5ML solution pen-injector 1.5 mL 0     Sig: Inject 0.25 mg under the skin into the appropriate area as directed 1 (One) Time Per Week.       When do you need the refill by:01/15    What details did the patient provide when requesting the medication: PATIENT STATED PHARMACY SENT A REQUEST TO VERIFY DOSAGE.  TOLD PATIENT THAT IT WAS INCORRECT AND THEY COULD NOT FILL IT.      PATIENT ASKED IF WE HAD ANY SAMPLES TO GET HIM THROUGH UNTIL IT CAN BE REFILLED.  PATIENT COMPLETELY OUT.    Does the patient have less than a 3 day supply:  [x] Yes  [] No    What is the [facility] [address] [facility] - [phone]  - [phone]

## 2021-01-15 NOTE — TELEPHONE ENCOUNTER
Returned call to pt. Informed that it was corrected yesterday evening and sent back to pharmacy. Spoke with pharmacist and they stated they had 2 orders. I informed them it was bc they faxed us stating the pen needed to be changed to .25/.5mg. they finally straightened it out and will be filling his .25mg pen.

## 2021-02-04 ENCOUNTER — TELEPHONE (OUTPATIENT)
Dept: FAMILY MEDICINE CLINIC | Facility: CLINIC | Age: 71
End: 2021-02-04

## 2021-02-04 NOTE — TELEPHONE ENCOUNTER
PATIENT CALLED AND NEEDS TO GET CLEAR INSTRUCTIONS ON     Semaglutide,0.25 or 0.5MG/DOS, (Ozempic, 0.25 or 0.5 MG/DOSE,) 2 MG/1.5ML solution pen-injector    PLEASE CALL [phone]

## 2021-02-08 ENCOUNTER — TELEPHONE (OUTPATIENT)
Dept: FAMILY MEDICINE CLINIC | Facility: CLINIC | Age: 71
End: 2021-02-08

## 2021-02-08 DIAGNOSIS — IMO0002 UNCONTROLLED TYPE 2 DIABETES MELLITUS WITH COMPLICATION, WITHOUT LONG-TERM CURRENT USE OF INSULIN: Primary | ICD-10-CM

## 2021-02-08 RX ORDER — GLIPIZIDE 10 MG/1
10 TABLET ORAL
Qty: 60 TABLET | Refills: 3 | Status: SHIPPED | OUTPATIENT
Start: 2021-02-08 | End: 2021-06-14

## 2021-02-08 NOTE — TELEPHONE ENCOUNTER
PT CALLED REQUESTING FOR THE GLIPIZIDE AND METFORMIN TO BE SENT IN SEPARATLY. IT A LOT CHEAPER THROUGH INSURANCE.

## 2021-02-16 ENCOUNTER — APPOINTMENT (OUTPATIENT)
Dept: SLEEP MEDICINE | Facility: HOSPITAL | Age: 71
End: 2021-02-16

## 2021-02-24 RX ORDER — OMEPRAZOLE 20 MG/1
20 CAPSULE, DELAYED RELEASE ORAL DAILY
Qty: 90 CAPSULE | Refills: 3 | Status: SHIPPED | OUTPATIENT
Start: 2021-02-24 | End: 2021-11-23

## 2021-03-04 DIAGNOSIS — E78.2 MIXED HYPERLIPIDEMIA: Primary | ICD-10-CM

## 2021-03-04 DIAGNOSIS — IMO0002 UNCONTROLLED TYPE 2 DIABETES MELLITUS WITH COMPLICATION, WITHOUT LONG-TERM CURRENT USE OF INSULIN: ICD-10-CM

## 2021-03-05 ENCOUNTER — TELEPHONE (OUTPATIENT)
Dept: FAMILY MEDICINE CLINIC | Facility: CLINIC | Age: 71
End: 2021-03-05

## 2021-03-05 NOTE — TELEPHONE ENCOUNTER
PATIENT CALLED WANTING TO GET BLOOD WORK DONE AT THE [facility] IN [place]. THEY ARE NEEDING THE LAB ORDERS SENT TO THEM. PLEASE CONTACT PATIENT AND LET HIM KNOW IF THIS IS AN OPTION.    GOOD CALL BACK  [phone]

## 2021-03-08 ENCOUNTER — LAB (OUTPATIENT)
Dept: LAB | Facility: HOSPITAL | Age: 71
End: 2021-03-08

## 2021-03-08 LAB
ALBUMIN SERPL-MCNC: 4.5 G/DL (ref 3.5–5.2)
ALBUMIN/GLOB SERPL: 1.7 G/DL
ALP SERPL-CCNC: 70 U/L (ref 39–117)
ALT SERPL W P-5'-P-CCNC: 26 U/L (ref 1–41)
ANION GAP SERPL CALCULATED.3IONS-SCNC: 8.9 MMOL/L (ref 5–15)
AST SERPL-CCNC: 22 U/L (ref 1–40)
BILIRUB SERPL-MCNC: 0.3 MG/DL (ref 0–1.2)
BUN SERPL-MCNC: 18 MG/DL (ref 8–23)
BUN/CREAT SERPL: 18.8 (ref 7–25)
CALCIUM SPEC-SCNC: 9.6 MG/DL (ref 8.6–10.5)
CHLORIDE SERPL-SCNC: 103 MMOL/L (ref 98–107)
CHOLEST SERPL-MCNC: 130 MG/DL (ref 0–200)
CO2 SERPL-SCNC: 30.1 MMOL/L (ref 22–29)
CREAT SERPL-MCNC: 0.96 MG/DL (ref 0.76–1.27)
GFR SERPL CREATININE-BSD FRML MDRD: 77 ML/MIN/1.73
GLOBULIN UR ELPH-MCNC: 2.6 GM/DL
GLUCOSE SERPL-MCNC: 201 MG/DL (ref 65–99)
HBA1C MFR BLD: 7.93 % (ref 4.8–5.6)
HDLC SERPL-MCNC: 30 MG/DL (ref 40–60)
LDLC SERPL CALC-MCNC: 70 MG/DL (ref 0–100)
LDLC/HDLC SERPL: 2.17 {RATIO}
POTASSIUM SERPL-SCNC: 4.2 MMOL/L (ref 3.5–5.2)
PROT SERPL-MCNC: 7.1 G/DL (ref 6–8.5)
SODIUM SERPL-SCNC: 142 MMOL/L (ref 136–145)
TRIGL SERPL-MCNC: 175 MG/DL (ref 0–150)
VLDLC SERPL-MCNC: 30 MG/DL (ref 5–40)

## 2021-03-08 PROCEDURE — 80053 COMPREHEN METABOLIC PANEL: CPT | Performed by: PHYSICIAN ASSISTANT

## 2021-03-08 PROCEDURE — 83036 HEMOGLOBIN GLYCOSYLATED A1C: CPT | Performed by: PHYSICIAN ASSISTANT

## 2021-03-08 PROCEDURE — 80061 LIPID PANEL: CPT | Performed by: PHYSICIAN ASSISTANT

## 2021-03-08 PROCEDURE — 36415 COLL VENOUS BLD VENIPUNCTURE: CPT | Performed by: PHYSICIAN ASSISTANT

## 2021-03-16 ENCOUNTER — OFFICE VISIT (OUTPATIENT)
Dept: SLEEP MEDICINE | Facility: HOSPITAL | Age: 71
End: 2021-03-16

## 2021-03-16 VITALS
BODY MASS INDEX: 25.48 KG/M2 | HEART RATE: 82 BPM | WEIGHT: 172 LBS | DIASTOLIC BLOOD PRESSURE: 79 MMHG | HEIGHT: 69 IN | SYSTOLIC BLOOD PRESSURE: 153 MMHG

## 2021-03-16 DIAGNOSIS — G47.33 OBSTRUCTIVE SLEEP APNEA: Primary | ICD-10-CM

## 2021-03-16 PROCEDURE — G0463 HOSPITAL OUTPT CLINIC VISIT: HCPCS

## 2021-03-16 NOTE — PROGRESS NOTES
[facility]  [address]  3RD FLOOR  [facility] [address]  [phone]    PCP: Chelsea Mcelroy PA-C    Reason for visit:  Sleep disorders: MIRI    Guy is a 70 y.o.male who was seen in the Sleep Disorders Center today. Here to fu on CPAP. He is using the [employer] ffm and fits well. His spouse does not report 'snoring'. He sleeps from 11pm to 6:30am. He wakes up sometimes rested. Denies EDS.  [place] Sleepiness Scale is 4. Caffeine 2 per day. Alcohol 0 per week.    Guy  reports that he quit smoking about 49 years ago. His smoking use included cigarettes. He quit after 14.00 years of use. He has never used smokeless tobacco.    Pertinent Positive Review of Systems of acid reflux  Rest of Review of Systems was negative as recorded in Sleep Questionnaire.    Patient  has a past medical history of Arthritis, Diabetes mellitus (CMS/[facility]), H/O  Multiple facial fractures (11/1968), H/O Broken femur (11/1968), History of transfusion, Hyperlipidemia, Hypertension, Kidney stone, Neoplasm of uncertain behavior, Peptic ulceration, Plantar fasciitis, Sleep apnea, and Thrombocytosis (CMS/[facility]).     Current Medications:    Current Outpatient Medications:   •  amLODIPine (NORVASC) 10 MG tablet, Take 1 tablet by mouth Daily., Disp: 30 tablet, Rfl: 6  •  Aspirin (ASPIR-81 PO), Take  by mouth Daily., Disp: , Rfl:   •  Blood Glucose Monitoring Suppl (ONETOUCH VERIO FLEX SYSTEM) w/Device kit, 1 Device 2 (Two) Times a Day., Disp: 1 kit, Rfl: 0  •  Cholecalciferol (D3 ADULT PO), Take 2,000 Units by mouth Daily., Disp: , Rfl:   •  Farxiga 10 MG tablet, TAKE ONE TABLET BY MOUTH EVERY MORNING, Disp: 30 tablet, Rfl: 6  •  glipizide (Glucotrol) 10 MG tablet, Take 1 tablet by mouth 2 (Two) Times a Day Before Meals., Disp: 60 tablet, Rfl: 3  •  glucose blood (Accu-Chek Vika Plus) test strip, Use to test blood glucose 2 times daily Dx: e11.9, Disp: 200 each, Rfl: 3  •  glucose blood (AGAMATRIX PRESTO TEST)  "test strip, Used to check twice daily for type 2 diabetes, Disp: 100 each, Rfl: 9  •  hydroCHLOROthiazide (MICROZIDE) 12.5 MG capsule, TAKE ONE CAPSULE BY MOUTH DAILY, Disp: 90 capsule, Rfl: 3  •  Insulin Pen Needle 32G X 4 MM misc, Use once a weekly for Type 2 Diabetes, Disp: 30 each, Rfl: 0  •  lisinopril (PRINIVIL,ZESTRIL) 40 MG tablet, TAKE ONE TABLET BY MOUTH DAILY, Disp: 90 tablet, Rfl: 1  •  metFORMIN (Glucophage) 1000 MG tablet, Take 1 tablet by mouth 2 (Two) Times a Day With Meals., Disp: 60 tablet, Rfl: 3  •  metoprolol tartrate (LOPRESSOR) 25 MG tablet, Take 1 tablet by mouth 2 (Two) Times a Day., Disp: 60 tablet, Rfl: 6  •  omeprazole (priLOSEC) 20 MG capsule, Take 1 capsule by mouth Daily., Disp: 90 capsule, Rfl: 3  •  pravastatin (PRAVACHOL) 80 MG tablet, Take 1 tablet by mouth Daily., Disp: 90 tablet, Rfl: 1  •  Semaglutide,0.25 or 0.5MG/DOS, (Ozempic, 0.25 or 0.5 MG/DOSE,) 2 MG/1.5ML solution pen-injector, Inject 0.25 mg under the skin into the appropriate area as directed 1 (One) Time Per Week. Dispense 0.25 mg pen, Disp: 1.5 mL, Rfl: 0  •  vitamin B-12 (CYANOCOBALAMIN) 1000 MCG tablet, Take 1 tablet by mouth Daily., Disp: , Rfl:    also entered in Sleep Questionnaire         Vital Signs: /79   Pulse 82   Ht 175.3 cm (69\")   Wt 78 kg (172 lb)   BMI 25.40 kg/m²     Body mass index is 25.4 kg/m².       Tongue: Normal       Dentition: good       Pharynx: Posterior pharyngeal pillars are narrow    Mallampatti: III (soft and hard palate and base of uvula visible)        General: Alert. Cooperative. Well developed. No acute distress.             Head:  Normocephalic. Symmetrical. Atraumatic.              Nose: No septal deviation. No drainage.          Throat: No oral lesions. No thrush. Moist mucous membranes.    Chest Wall:  Normal shape. Symmetric expansion with respiration. No tenderness.             Neck:  Trachea midline.           Lungs:  Clear to auscultation bilaterally. No wheezes. No " rhonchi. No rales. Respirations regular, even and unlabored.            Heart:  Regular rhythm and normal rate. Normal S1 and S2. No murmur.     Abdomen:  Soft, non-tender and non-distended. Normal bowel sounds. No masses.  Extremities:  Moves all extremities well. No edema.    Psychiatric: Normal mood and affect.    Diagnostic data available to date is as below and was reviewed on current visit:  · 11/6/20  The patient tolerated the home sleep testing with monitoring time of 383 minutes. The data obtained make this a technically adequate study. The apnea hypopneas index(AHI) was 32.5 per sleep hour.  The AHI during supine position was 48.4 per sleep hour. Mean heart rate of 75.8 BPM.  Snoring was noted 21.4% of sleep time. Lowest oxygen saturation during the study was 82%. Saturation below 89% was noted for 4.4 mins.     Most current available usage data reviewed on 03/16/2021:  · 100% compliance average 7-1/2 hours AHI 10.4 set pressure 10 cm    DME Company: [employer]    Impression:  1. Obstructive sleep apnea        Plan:  Guy is compliant and no more 'snoring'. Continue with current mask but change q3 months. Will accept small leak.  I believe his leak is causing persistent elevated AHI.  Overall improved with CPAP though not always rested - states due to stress.    I reiterated the importance of effective treatment of obstructive sleep apnea with PAP machine.  Cardiovascular health risks of untreated sleep apnea were again reviewed.  Patient was asked to remain cautious if there is persistent hypersomnolence. The benefit of weight loss in reducing severity of obstructive sleep apnea was discussed.  Patient would benefit from adhering to a strict diet to achieve ideal BMI.     Change of PAP supplies regularly is important for effective use.  Change of cushion on the mask or plugs on nasal pillows along with disposable filters once every month and change of mask frame, tubing, headgear and Velcro straps every 6  months at the minimum was reiterated.    This patient is compliant with PAP machine and benefits from its use.  Apnea hypopneas index is corrected/improved.  Daytime hypersomnolence has resolved.     Patient will follow up in this clinic in 1 year APRN    Thank you for allowing me to participate in your patient's care.    Electronically signed by Ariel Augustin MD, 03/16/21, 9:21 AM EDT.    Part of this note may be an electronic transcription/translation of spoken language to printed text using the Dragon Dictation System.

## 2021-03-17 ENCOUNTER — OFFICE VISIT (OUTPATIENT)
Dept: FAMILY MEDICINE CLINIC | Facility: CLINIC | Age: 71
End: 2021-03-17

## 2021-03-17 VITALS
HEART RATE: 84 BPM | HEIGHT: 69 IN | WEIGHT: 172 LBS | SYSTOLIC BLOOD PRESSURE: 156 MMHG | DIASTOLIC BLOOD PRESSURE: 84 MMHG | TEMPERATURE: 97.5 F | BODY MASS INDEX: 25.48 KG/M2 | OXYGEN SATURATION: 97 %

## 2021-03-17 DIAGNOSIS — I10 ESSENTIAL HYPERTENSION: ICD-10-CM

## 2021-03-17 DIAGNOSIS — E78.2 MIXED HYPERLIPIDEMIA: ICD-10-CM

## 2021-03-17 DIAGNOSIS — E11.65 TYPE 2 DIABETES MELLITUS WITH HYPERGLYCEMIA, WITHOUT LONG-TERM CURRENT USE OF INSULIN (HCC): Primary | ICD-10-CM

## 2021-03-17 PROCEDURE — 99214 OFFICE O/P EST MOD 30 MIN: CPT | Performed by: PHYSICIAN ASSISTANT

## 2021-03-17 RX ORDER — METOPROLOL TARTRATE 50 MG/1
50 TABLET, FILM COATED ORAL 2 TIMES DAILY
Qty: 60 TABLET | Refills: 2 | Status: SHIPPED | OUTPATIENT
Start: 2021-03-17 | End: 2021-06-23

## 2021-03-17 RX ORDER — SEMAGLUTIDE 1.34 MG/ML
0.5 INJECTION, SOLUTION SUBCUTANEOUS WEEKLY
Qty: 1.5 ML | Refills: 0 | Status: SHIPPED | OUTPATIENT
Start: 2021-03-17 | End: 2021-05-17 | Stop reason: DRUGHIGH

## 2021-03-17 NOTE — PROGRESS NOTES
"Chief Complaint  Hypertension (management) and Diabetes (management)    Subjective          Guy Plascencia presents to [facility]  History of Present Illness  Guy is a 70 year old male who presents for Type 2 diabetes and Hypertension management. He has lost 1 pound since November 2020.  Blood sugars have been running around 170 at home.  Currently taking 0.25 mg SQ Ozempic once weekly with his oral medications.  States his blood sugars have improved since being on the medication.  Diet has not been as healthy.  States he has been eating out at least twice daily.  This is due to his hectic schedule at work.  He is an  and this is a busy time of the year for him.  Sleep has been normal for him.  Does not routinely work out.  Guy denied any chest pain, shortness of air, dizziness, cough, headache, or swelling of ankles.       Objective   Vital Signs:   /84 (BP Location: Left arm, Patient Position: Sitting, Cuff Size: Adult)   Pulse 84   Temp 97.5 °F (36.4 °C)   Ht 175.3 cm (69\")   Wt 78 kg (172 lb)   SpO2 97%   BMI 25.40 kg/m²     Physical Exam  Vitals and nursing note reviewed.   Constitutional:       Appearance: Normal appearance. He is well-developed, well-groomed and normal weight.      Interventions: Face mask in place.   HENT:      Head: Normocephalic and atraumatic.   Neck:      Thyroid: No thyroid mass, thyromegaly or thyroid tenderness.      Vascular: No carotid bruit.      Trachea: Trachea and phonation normal. No tracheal tenderness.   Cardiovascular:      Rate and Rhythm: Normal rate and regular rhythm.      Pulses: Normal pulses.      Heart sounds: Normal heart sounds, S1 normal and S2 normal. No murmur heard.     Pulmonary:      Effort: Pulmonary effort is normal.      Breath sounds: Normal breath sounds and air entry.   Abdominal:      General: Bowel sounds are normal.      Palpations: Abdomen is soft. There is no hepatomegaly.      Tenderness: " There is no abdominal tenderness.   Musculoskeletal:      Cervical back: Neck supple.      Right lower leg: No edema.      Left lower leg: No edema.   Skin:     General: Skin is warm and dry.      Capillary Refill: Capillary refill takes less than 2 seconds.   Neurological:      Mental Status: He is alert and oriented to person, place, and time.   Psychiatric:         Attention and Perception: Attention and perception normal.         Mood and Affect: Mood and affect normal.         Speech: Speech normal.         Behavior: Behavior normal. Behavior is cooperative.         Thought Content: Thought content normal.         Cognition and Memory: Cognition and memory normal.         Judgment: Judgment normal.     I was wearing a surgical mask and face shield during the entire office visit/encounter.     Result Review :     CMP    CMP 7/13/20 11/5/20 3/8/21   Glucose   201 (A)   Glucose 137 (A) 173 (A)    BUN 20 20 18   Creatinine 0.99 1.11 0.96   eGFR Non  Am 75 65 77   eGFR African Am 91 79    Sodium 144 140 142   Potassium 3.8 4.5 4.2   Chloride 104 103 103   Calcium 9.9 9.8 9.6   Total Protein 6.8 6.6    Albumin 4.70 4.50 4.50   Globulin 2.1 2.1    Total Bilirubin 0.4 0.3 0.3   Alkaline Phosphatase 69 72 70   AST (SGOT) 19 21 22   ALT (SGPT) 26 24 26   (A) Abnormal value            CBC    CBC 7/13/20 11/5/20   WBC 4.45 3.66   RBC 5.00 4.93   Hemoglobin 14.6 14.4   Hematocrit 43.7 42.3   MCV 87.4 85.8   MCH 29.2 29.2   MCHC 33.4 34.0   RDW 13.9 13.4   Platelets 115 (A) 112 (A)   (A) Abnormal value                    Lipid Panel    Lipid Panel 7/13/20 11/5/20 3/8/21   Total Cholesterol   130   Total Cholesterol 141 124    Triglycerides 272 (A) 177 (A) 175 (A)   HDL Cholesterol 29 (A) 31 (A) 30 (A)   VLDL Cholesterol 54.4 30 30   LDL Cholesterol  58 63 70   LDL/HDL Ratio 1.99  2.17   (A) Abnormal value                Most Recent A1C    HGBA1C Most Recent 3/8/21   Hemoglobin A1C 7.93 (A)   (A) Abnormal value                       Assessment and Plan    Diagnoses and all orders for this visit:    1. Type 2 diabetes mellitus with hyperglycemia, without long-term current use of insulin (CMS/[facility]) (Primary)  -     Semaglutide,0.25 or 0.5MG/DOS, (Ozempic, 0.25 or 0.5 MG/DOSE,) 2 MG/1.5ML solution pen-injector; Inject 0.5 mg under the skin into the appropriate area as directed 1 (One) Time Per Week. Dispense 0.50 mg pen  Dispense: 1.5 mL; Refill: 0    2. Mixed hyperlipidemia    3. Essential hypertension  -     metoprolol tartrate (LOPRESSOR) 50 MG tablet; Take 1 tablet by mouth 2 (Two) Times a Day.  Dispense: 60 tablet; Refill: 2    1.  Chronic improving type 2 diabetes with hyperlipidemia: I have reviewed his above blood work with him at office visit today.  A1c has improved over the past 3 months.  I will increase his Ozempic to 0.5 mg once weekly.  New prescription was sent to pharmacy.  Plan to recheck fasting lab work with A1c, CBC, CMP and a lipid profile in 3 months.  He will continue his current pravastatin medication at home.  Stressed the importance of decreasing sugar and carbohydrates in diet.  He voiced understanding.  2.  Chronic and uncontrolled hypertension: I have rechecked his blood pressure today and got 158/84 in left arm.  I will increase his Lopressor to 50 mg twice a day.  He will continue using his amlodipine 10 mg, lisinopril 40 mg and hydrochlorothiazide 12.5 mg daily.  He will return to office for blood pressure check in the next 2 weeks.      Follow Up   Return in about 3 months (around 6/17/2021), or Labs prior-DM.  Patient was given instructions and counseling regarding his condition or for health maintenance advice. Please see specific information pulled into the AVS if appropriate.     [facility]  [address] [place] [id number]  Dept: [phone]  Dept Fax: [phone]  Loc: [phone]  Loc Fax:  [phone]

## 2021-04-07 ENCOUNTER — OFFICE VISIT (OUTPATIENT)
Dept: FAMILY MEDICINE CLINIC | Facility: CLINIC | Age: 71
End: 2021-04-07

## 2021-04-07 VITALS
HEIGHT: 69 IN | TEMPERATURE: 97.8 F | DIASTOLIC BLOOD PRESSURE: 70 MMHG | HEART RATE: 80 BPM | SYSTOLIC BLOOD PRESSURE: 132 MMHG | BODY MASS INDEX: 25.33 KG/M2 | WEIGHT: 171 LBS | OXYGEN SATURATION: 98 %

## 2021-04-07 DIAGNOSIS — R19.7 DIARRHEA, UNSPECIFIED TYPE: Primary | ICD-10-CM

## 2021-04-07 PROCEDURE — 99214 OFFICE O/P EST MOD 30 MIN: CPT | Performed by: PHYSICIAN ASSISTANT

## 2021-04-07 RX ORDER — DIPHENOXYLATE HYDROCHLORIDE AND ATROPINE SULFATE 2.5; .025 MG/1; MG/1
1 TABLET ORAL 4 TIMES DAILY PRN
Qty: 20 TABLET | Refills: 0 | Status: SHIPPED | OUTPATIENT
Start: 2021-04-07

## 2021-04-07 NOTE — PROGRESS NOTES
"Chief Complaint  Diarrhea    Subjective          Guy Plascencia presents to [facility] PR[facility]  History of Present Illness  Guy is a 70-year-old male who presents with diarrhea for the past 5-6 days.  States he has had diarrhea at least 5 times daily over the past week.  His diarrhea has been loose to watery.  Denied any recent antibiotic use or foreign travel.  No new medications.  Guy has used over-the-counter Imodium without relief of his diarrhea.  States he has been stressed at work but nothing he can handle.  Sugars have been running around 200.  Has been taking his Ozempic medication for the past 3 months.  Has been taking his oral Metformin as well.  His diet is normal for him.  He does eat out quite often.  Wife eats the same food and has had no symptoms.  He got Covid tested last week due to his symptoms which was negative.  Denied any fevers, chills, abdominal pain, nausea, or vomiting.     Objective   Vital Signs:   /70   Pulse 80   Temp 97.8 °F (36.6 °C)   Ht 175.3 cm (69\")   Wt 77.6 kg (171 lb)   SpO2 98%   BMI 25.25 kg/m²     Physical Exam  Vitals and nursing note reviewed.   Constitutional:       Appearance: Normal appearance. He is well-developed, well-groomed and normal weight.      Interventions: Face mask in place.   HENT:      Head: Normocephalic and atraumatic.   Neck:      Thyroid: No thyroid mass, thyromegaly or thyroid tenderness.      Trachea: Trachea and phonation normal. No tracheal tenderness.   Cardiovascular:      Rate and Rhythm: Normal rate and regular rhythm.      Pulses: Normal pulses.      Heart sounds: Normal heart sounds, S1 normal and S2 normal. No murmur heard.     Pulmonary:      Effort: Pulmonary effort is normal.      Breath sounds: Normal breath sounds and air entry.   Abdominal:      General: Bowel sounds are normal.      Palpations: Abdomen is soft. There is no hepatomegaly.      Tenderness: There is no abdominal tenderness. "   Musculoskeletal:      Cervical back: Neck supple.      Right lower leg: No edema.      Left lower leg: No edema.   Skin:     General: Skin is warm and dry.      Capillary Refill: Capillary refill takes less than 2 seconds.   Neurological:      Mental Status: He is alert and oriented to person, place, and time.   Psychiatric:         Attention and Perception: Attention and perception normal.         Mood and Affect: Mood and affect normal.         Speech: Speech normal.         Behavior: Behavior normal. Behavior is cooperative.         Thought Content: Thought content normal.         Cognition and Memory: Cognition and memory normal.         Judgment: Judgment normal.     I was wearing a surgical mask and face shield/glasses during the entire office visit/encounter.     Result Review :                 Assessment and Plan    Diagnoses and all orders for this visit:    1. Diarrhea, unspecified type (Primary)  -     CBC & Differential  -     Basic Metabolic Panel  -     Ova & Parasite Examination - Stool, Per Rectum; Future  -     Clostridium Difficile EIA - Stool, Per Rectum  -     Stool Culture (Reference Lab) - Stool, Per Rectum  -     diphenoxylate-atropine (Lomotil) 2.5-0.025 MG per tablet; Take 1 tablet by mouth 4 (Four) Times a Day As Needed for Diarrhea.  Dispense: 20 tablet; Refill: 0    Mr. Guy Plascencia was seen in office today with new acute diarrhea.  Will check CBC and BMP today.  We will give him  stool containers for O&P, C. difficile and stool culture for further evaluation.  Have given him a short-term Lomotil prescription to use as directed.  See below for Issa information.  This is a short-term medication prescription.  He will be notified of test results when completed.  He will return to office if symptoms do not improve.      Follow Up   No follow-ups on file.  Patient was given instructions and counseling regarding his condition or for health maintenance advice. Please see specific  information pulled into the AVS if appropriate.       As part of this patient's treatment plan I am prescribing controlled substances.  The patient has been made aware of appropriate use of such medications, including potential risk of somnolence. Limited ability to drive and/or work safely, and potential for dependence or overdose.  It has also been made clear that these medications are for use by this patient only, without concomitant use of alcohol or other substances unless prescribed.  ROLAN report has been reviewed and scanned into the patient's chart.  Rolan number is [id number] dated April 5, 2021.  [facility]  [address] [address]  Dept: [phone]  Dept Fax: [phone]  Loc: [phone]  Loc Fax: [phone]

## 2021-04-08 LAB
BASOPHILS # BLD AUTO: 0.01 10*3/MM3 (ref 0–0.2)
BASOPHILS NFR BLD AUTO: 0.2 % (ref 0–1.5)
BUN SERPL-MCNC: 24 MG/DL (ref 8–23)
BUN/CREAT SERPL: 22.6 (ref 7–25)
CALCIUM SERPL-MCNC: 9.7 MG/DL (ref 8.6–10.5)
CHLORIDE SERPL-SCNC: 108 MMOL/L (ref 98–107)
CO2 SERPL-SCNC: 26.4 MMOL/L (ref 22–29)
CREAT SERPL-MCNC: 1.06 MG/DL (ref 0.76–1.27)
EOSINOPHIL # BLD AUTO: 0.07 10*3/MM3 (ref 0–0.4)
EOSINOPHIL NFR BLD AUTO: 1.7 % (ref 0.3–6.2)
ERYTHROCYTE [DISTWIDTH] IN BLOOD BY AUTOMATED COUNT: 13.9 % (ref 12.3–15.4)
GLUCOSE SERPL-MCNC: 143 MG/DL (ref 65–99)
HCT VFR BLD AUTO: 42.5 % (ref 37.5–51)
HGB BLD-MCNC: 14.4 G/DL (ref 13–17.7)
IMM GRANULOCYTES # BLD AUTO: 0.01 10*3/MM3 (ref 0–0.05)
IMM GRANULOCYTES NFR BLD AUTO: 0.2 % (ref 0–0.5)
LYMPHOCYTES # BLD AUTO: 1.2 10*3/MM3 (ref 0.7–3.1)
LYMPHOCYTES NFR BLD AUTO: 29.5 % (ref 19.6–45.3)
MCH RBC QN AUTO: 29.6 PG (ref 26.6–33)
MCHC RBC AUTO-ENTMCNC: 33.9 G/DL (ref 31.5–35.7)
MCV RBC AUTO: 87.3 FL (ref 79–97)
MONOCYTES # BLD AUTO: 0.47 10*3/MM3 (ref 0.1–0.9)
MONOCYTES NFR BLD AUTO: 11.5 % (ref 5–12)
NEUTROPHILS # BLD AUTO: 2.31 10*3/MM3 (ref 1.7–7)
NEUTROPHILS NFR BLD AUTO: 56.9 % (ref 42.7–76)
NRBC BLD AUTO-RTO: 0 /100 WBC (ref 0–0.2)
PLATELET # BLD AUTO: 129 10*3/MM3 (ref 140–450)
POTASSIUM SERPL-SCNC: 3.9 MMOL/L (ref 3.5–5.2)
RBC # BLD AUTO: 4.87 10*6/MM3 (ref 4.14–5.8)
SODIUM SERPL-SCNC: 144 MMOL/L (ref 136–145)
WBC # BLD AUTO: 4.07 10*3/MM3 (ref 3.4–10.8)

## 2021-04-09 DIAGNOSIS — R19.7 DIARRHEA OF PRESUMED INFECTIOUS ORIGIN: Primary | ICD-10-CM

## 2021-04-09 DIAGNOSIS — R19.7 DIARRHEA, UNSPECIFIED TYPE: ICD-10-CM

## 2021-04-13 LAB
BACTERIA SPEC CULT: NORMAL
BACTERIA SPEC CULT: NORMAL
C DIFF TOX A+B STL QL IA: NEGATIVE
CAMPYLOBACTER STL CULT: NORMAL
E COLI SXT STL QL IA: NEGATIVE
SALM + SHIG STL CULT: NORMAL

## 2021-04-13 RX ORDER — PRAVASTATIN SODIUM 40 MG
TABLET ORAL
Qty: 90 TABLET | Refills: 1 | OUTPATIENT
Start: 2021-04-13

## 2021-04-15 DIAGNOSIS — IMO0002 UNCONTROLLED TYPE 2 DIABETES MELLITUS WITH COMPLICATION, WITHOUT LONG-TERM CURRENT USE OF INSULIN: ICD-10-CM

## 2021-04-15 DIAGNOSIS — E78.2 MIXED HYPERLIPIDEMIA: ICD-10-CM

## 2021-04-15 RX ORDER — PRAVASTATIN SODIUM 80 MG/1
80 TABLET ORAL DAILY
Qty: 90 TABLET | Refills: 1 | Status: SHIPPED | OUTPATIENT
Start: 2021-04-15 | End: 2021-10-21

## 2021-05-14 DIAGNOSIS — E11.65 TYPE 2 DIABETES MELLITUS WITH HYPERGLYCEMIA, WITHOUT LONG-TERM CURRENT USE OF INSULIN (HCC): ICD-10-CM

## 2021-05-17 DIAGNOSIS — E11.65 TYPE 2 DIABETES MELLITUS WITH HYPERGLYCEMIA, WITHOUT LONG-TERM CURRENT USE OF INSULIN (HCC): Primary | ICD-10-CM

## 2021-05-17 RX ORDER — SEMAGLUTIDE 1.34 MG/ML
INJECTION, SOLUTION SUBCUTANEOUS
Refills: 0 | OUTPATIENT
Start: 2021-05-17

## 2021-05-17 NOTE — TELEPHONE ENCOUNTER
Please call patient and find out what his blood sugars are currently running and what dosage of Ozempic he is taking.

## 2021-05-17 NOTE — TELEPHONE ENCOUNTER
Spoke with pt. He stated he has not had anymore diarrhea. He said he is fine with increasing the dose. Advised to notify us if any issue after increasing.

## 2021-05-17 NOTE — TELEPHONE ENCOUNTER
Spoke with pt and he stated that he has been using .5mg and he stated his sugars have been averaging around 150.

## 2021-06-13 DIAGNOSIS — IMO0002 UNCONTROLLED TYPE 2 DIABETES MELLITUS WITH COMPLICATION, WITHOUT LONG-TERM CURRENT USE OF INSULIN: ICD-10-CM

## 2021-06-14 DIAGNOSIS — IMO0002 UNCONTROLLED TYPE 2 DIABETES MELLITUS WITH COMPLICATION, WITHOUT LONG-TERM CURRENT USE OF INSULIN: ICD-10-CM

## 2021-06-14 RX ORDER — GLIPIZIDE 10 MG/1
TABLET ORAL
Qty: 60 TABLET | Refills: 2 | Status: SHIPPED | OUTPATIENT
Start: 2021-06-14 | End: 2021-09-08

## 2021-06-18 ENCOUNTER — OFFICE VISIT (OUTPATIENT)
Dept: FAMILY MEDICINE CLINIC | Facility: CLINIC | Age: 71
End: 2021-06-18

## 2021-06-18 VITALS
SYSTOLIC BLOOD PRESSURE: 152 MMHG | OXYGEN SATURATION: 95 % | WEIGHT: 159.4 LBS | HEART RATE: 99 BPM | BODY MASS INDEX: 23.61 KG/M2 | DIASTOLIC BLOOD PRESSURE: 68 MMHG | TEMPERATURE: 98 F | HEIGHT: 69 IN

## 2021-06-18 DIAGNOSIS — R05.9 COUGH: ICD-10-CM

## 2021-06-18 DIAGNOSIS — J18.9 COMMUNITY ACQUIRED PNEUMONIA, UNSPECIFIED LATERALITY: Primary | ICD-10-CM

## 2021-06-18 DIAGNOSIS — M79.10 MYALGIA: ICD-10-CM

## 2021-06-18 DIAGNOSIS — R50.9 FEVER, UNSPECIFIED FEVER CAUSE: ICD-10-CM

## 2021-06-18 PROCEDURE — 99214 OFFICE O/P EST MOD 30 MIN: CPT | Performed by: FAMILY MEDICINE

## 2021-06-18 RX ORDER — GUAIFENESIN AND CODEINE PHOSPHATE 100; 10 MG/5ML; MG/5ML
5 SOLUTION ORAL 3 TIMES DAILY PRN
Qty: 236 ML | Refills: 0 | Status: SHIPPED | OUTPATIENT
Start: 2021-06-18 | End: 2021-10-22

## 2021-06-18 RX ORDER — AZITHROMYCIN 250 MG/1
TABLET, FILM COATED ORAL
Qty: 6 TABLET | Refills: 0 | Status: SHIPPED | OUTPATIENT
Start: 2021-06-18 | End: 2021-06-24

## 2021-06-18 NOTE — PROGRESS NOTES
"  Chief Complaint   Patient presents with   • Cough   • loss taste & smell   • Fever     highest 101.6 Tx tylenol     Past medical history of diabetes, hypertension, hyperlipidemia    Upper Respiratory Infection: Patient complains of symptoms of a URI. Symptoms include cough. Onset of symptoms was 2 weeks ago, gradually worsening since that time. He also c/o achiness, congestion, coryza and fever 100 for the past 2 weeks .  He is drinking moderate amounts of fluids. Evaluation to date: none. Treatment to date: none.  Ill contacts at home or school or work discussed.    Here with cough, fever, myalgias.  No shortness of breath no chest pain.  Able to eat and drink without water but has not been great about staying hydrated.  Was not vaccinated against Covid  Wife with similar symptoms    Vitals:    06/18/21 1427 06/18/21 1448   BP: 152/68 152/68   Pulse: 108 99   Temp: 98 °F (36.7 °C)    SpO2: 95%    Weight: 72.3 kg (159 lb 6.4 oz)    Height: 175.3 cm (69.02\")      Gen: NAD, alert  Ears: Tm's bulging without redness  Nose:  Congestion  Throat:  Red without exudate, some drainage, tonsils okay  Neck: no LAD  Lung: mild rales, good air movement, regular RR  Heart: RR without murmur  Skin: no rash.      Assessment/Plan   Diagnoses and all orders for this visit:    1. Community acquired pneumonia, unspecified laterality (Primary)  -     azithromycin (Zithromax Z-Aime) 250 MG tablet; Take 2 tablets the first day, then 1 tablet daily for 4 days.  Dispense: 6 tablet; Refill: 0  -     COVID-19,[facility] ROUTINE, NP/OP SWAB IN TRANSPORT MEDIA OR ESWAB 72 HR TAT - Swab, Nasopharynx; Future  -     guaiFENesin-codeine (GUAIFENESIN AC) 100-10 MG/5ML liquid; Take 5 mL by mouth 3 (Three) Times a Day As Needed for Cough.  Dispense: 236 mL; Refill: 0  -     COVID-19,[facility] ROUTINE, NP/OP SWAB IN TRANSPORT MEDIA OR ESWAB 72 HR TAT - Swab, Nasopharynx    2. Cough  -     guaiFENesin-codeine (GUAIFENESIN AC) 100-10 MG/5ML liquid; Take 5 mL " by mouth 3 (Three) Times a Day As Needed for Cough.  Dispense: 236 mL; Refill: 0    3. Fever, unspecified fever cause  -     guaiFENesin-codeine (GUAIFENESIN AC) 100-10 MG/5ML liquid; Take 5 mL by mouth 3 (Three) Times a Day As Needed for Cough.  Dispense: 236 mL; Refill: 0    4. Myalgia  -     guaiFENesin-codeine (GUAIFENESIN AC) 100-10 MG/5ML liquid; Take 5 mL by mouth 3 (Three) Times a Day As Needed for Cough.  Dispense: 236 mL; Refill: 0      Swabbing for Covid.  Treating empirically with azithromycin  Seek care in the ER if he develops any shortness of breath or worsening of symptoms  Follow-up 1 week    Increase hydration    Tylenol or Advil as needed for pain, fever, muscle aches  Plenty of fluids  Hand washing discussed  Off work or school note given if needed.  Warm tea for throat.  Pros and cons of antibiotic use discussed

## 2021-06-19 LAB
LABCORP SARS-COV-2, NAA 2 DAY TAT: NORMAL
SARS-COV-2 RNA RESP QL NAA+PROBE: DETECTED

## 2021-06-23 DIAGNOSIS — I10 ESSENTIAL HYPERTENSION: ICD-10-CM

## 2021-06-23 DIAGNOSIS — IMO0002 DIABETES MELLITUS OUT OF CONTROL: ICD-10-CM

## 2021-06-23 RX ORDER — LISINOPRIL 40 MG/1
TABLET ORAL
Qty: 30 TABLET | Refills: 0 | Status: SHIPPED | OUTPATIENT
Start: 2021-06-23 | End: 2021-07-23

## 2021-06-23 RX ORDER — AMLODIPINE BESYLATE 10 MG/1
TABLET ORAL
Qty: 30 TABLET | Refills: 5 | Status: SHIPPED | OUTPATIENT
Start: 2021-06-23 | End: 2021-11-22

## 2021-06-23 RX ORDER — METOPROLOL TARTRATE 50 MG/1
TABLET, FILM COATED ORAL
Qty: 60 TABLET | Refills: 1 | Status: SHIPPED | OUTPATIENT
Start: 2021-06-23 | End: 2021-09-07

## 2021-06-24 ENCOUNTER — HOSPITAL ENCOUNTER (EMERGENCY)
Facility: HOSPITAL | Age: 71
Discharge: HOME | End: 2021-06-24
Attending: EMERGENCY MEDICINE | Admitting: EMERGENCY MEDICINE

## 2021-06-24 ENCOUNTER — TELEPHONE (OUTPATIENT)
Dept: FAMILY MEDICINE CLINIC | Facility: CLINIC | Age: 71
End: 2021-06-24

## 2021-06-24 ENCOUNTER — APPOINTMENT (OUTPATIENT)
Dept: GENERAL RADIOLOGY | Facility: HOSPITAL | Age: 71
End: 2021-06-24

## 2021-06-24 ENCOUNTER — APPOINTMENT (OUTPATIENT)
Dept: CT IMAGING | Facility: HOSPITAL | Age: 71
End: 2021-06-24

## 2021-06-24 VITALS
HEIGHT: 70 IN | OXYGEN SATURATION: 95 % | SYSTOLIC BLOOD PRESSURE: 148 MMHG | HEART RATE: 98 BPM | TEMPERATURE: 98.8 F | BODY MASS INDEX: 22.13 KG/M2 | WEIGHT: 154.6 LBS | DIASTOLIC BLOOD PRESSURE: 82 MMHG | RESPIRATION RATE: 26 BRPM

## 2021-06-24 DIAGNOSIS — J12.82 PNEUMONIA DUE TO COVID-19 VIRUS: Primary | ICD-10-CM

## 2021-06-24 DIAGNOSIS — U07.1 PNEUMONIA DUE TO COVID-19 VIRUS: Primary | ICD-10-CM

## 2021-06-24 LAB
ALBUMIN SERPL-MCNC: 3.2 G/DL (ref 3.5–5.2)
ALBUMIN/GLOB SERPL: 0.8 G/DL
ALP SERPL-CCNC: 112 U/L (ref 39–117)
ALT SERPL W P-5'-P-CCNC: 52 U/L (ref 1–41)
ANION GAP SERPL CALCULATED.3IONS-SCNC: 13.6 MMOL/L (ref 5–15)
AST SERPL-CCNC: 53 U/L (ref 1–40)
BASOPHILS # BLD AUTO: 0.02 10*3/MM3 (ref 0–0.2)
BASOPHILS NFR BLD AUTO: 0.3 % (ref 0–1.5)
BILIRUB SERPL-MCNC: 0.6 MG/DL (ref 0–1.2)
BUN SERPL-MCNC: 24 MG/DL (ref 8–23)
BUN/CREAT SERPL: 29.3 (ref 7–25)
CALCIUM SPEC-SCNC: 9.8 MG/DL (ref 8.6–10.5)
CHLORIDE SERPL-SCNC: 96 MMOL/L (ref 98–107)
CO2 SERPL-SCNC: 27.4 MMOL/L (ref 22–29)
CREAT SERPL-MCNC: 0.82 MG/DL (ref 0.76–1.27)
D DIMER PPP FEU-MCNC: 1.02 MCGFEU/ML (ref 0–0.46)
D-LACTATE SERPL-SCNC: 1.4 MMOL/L (ref 0.5–2)
DEPRECATED RDW RBC AUTO: 38.5 FL (ref 37–54)
EOSINOPHIL # BLD AUTO: 0.02 10*3/MM3 (ref 0–0.4)
EOSINOPHIL NFR BLD AUTO: 0.3 % (ref 0.3–6.2)
ERYTHROCYTE [DISTWIDTH] IN BLOOD BY AUTOMATED COUNT: 11.9 % (ref 12.3–15.4)
FERRITIN SERPL-MCNC: 1665 NG/ML (ref 30–400)
GFR SERPL CREATININE-BSD FRML MDRD: 93 ML/MIN/1.73
GLOBULIN UR ELPH-MCNC: 3.9 GM/DL
GLUCOSE SERPL-MCNC: 227 MG/DL (ref 65–99)
HCT VFR BLD AUTO: 40.1 % (ref 37.5–51)
HGB BLD-MCNC: 13.2 G/DL (ref 13–17.7)
HOLD SPECIMEN: NORMAL
HOLD SPECIMEN: NORMAL
IMM GRANULOCYTES # BLD AUTO: 0.03 10*3/MM3 (ref 0–0.05)
IMM GRANULOCYTES NFR BLD AUTO: 0.5 % (ref 0–0.5)
LYMPHOCYTES # BLD AUTO: 0.67 10*3/MM3 (ref 0.7–3.1)
LYMPHOCYTES NFR BLD AUTO: 10.5 % (ref 19.6–45.3)
MCH RBC QN AUTO: 28.3 PG (ref 26.6–33)
MCHC RBC AUTO-ENTMCNC: 32.9 G/DL (ref 31.5–35.7)
MCV RBC AUTO: 85.9 FL (ref 79–97)
MONOCYTES # BLD AUTO: 0.47 10*3/MM3 (ref 0.1–0.9)
MONOCYTES NFR BLD AUTO: 7.3 % (ref 5–12)
NEUTROPHILS NFR BLD AUTO: 5.2 10*3/MM3 (ref 1.7–7)
NEUTROPHILS NFR BLD AUTO: 81.1 % (ref 42.7–76)
PLATELET # BLD AUTO: 236 10*3/MM3 (ref 140–450)
PMV BLD AUTO: 11.2 FL (ref 6–12)
POTASSIUM SERPL-SCNC: 3.4 MMOL/L (ref 3.5–5.2)
PROCALCITONIN SERPL-MCNC: 4.05 NG/ML (ref 0–0.25)
PROT SERPL-MCNC: 7.1 G/DL (ref 6–8.5)
QT INTERVAL: 356 MS
RBC # BLD AUTO: 4.67 10*6/MM3 (ref 4.14–5.8)
SODIUM SERPL-SCNC: 137 MMOL/L (ref 136–145)
TROPONIN T SERPL-MCNC: <0.01 NG/ML (ref 0–0.03)
WBC # BLD AUTO: 6.41 10*3/MM3 (ref 3.4–10.8)
WHOLE BLOOD HOLD SPECIMEN: NORMAL

## 2021-06-24 PROCEDURE — 84145 PROCALCITONIN (PCT): CPT | Performed by: EMERGENCY MEDICINE

## 2021-06-24 PROCEDURE — 83605 ASSAY OF LACTIC ACID: CPT | Performed by: PHYSICIAN ASSISTANT

## 2021-06-24 PROCEDURE — 99284 EMERGENCY DEPT VISIT MOD MDM: CPT

## 2021-06-24 PROCEDURE — 85025 COMPLETE CBC W/AUTO DIFF WBC: CPT | Performed by: PHYSICIAN ASSISTANT

## 2021-06-24 PROCEDURE — 82728 ASSAY OF FERRITIN: CPT | Performed by: EMERGENCY MEDICINE

## 2021-06-24 PROCEDURE — 93005 ELECTROCARDIOGRAM TRACING: CPT

## 2021-06-24 PROCEDURE — 71275 CT ANGIOGRAPHY CHEST: CPT

## 2021-06-24 PROCEDURE — 71045 X-RAY EXAM CHEST 1 VIEW: CPT

## 2021-06-24 PROCEDURE — 80053 COMPREHEN METABOLIC PANEL: CPT | Performed by: PHYSICIAN ASSISTANT

## 2021-06-24 PROCEDURE — 84484 ASSAY OF TROPONIN QUANT: CPT | Performed by: EMERGENCY MEDICINE

## 2021-06-24 PROCEDURE — 93010 ELECTROCARDIOGRAM REPORT: CPT | Performed by: INTERNAL MEDICINE

## 2021-06-24 PROCEDURE — 0 IOPAMIDOL PER 1 ML: Performed by: EMERGENCY MEDICINE

## 2021-06-24 PROCEDURE — 87040 BLOOD CULTURE FOR BACTERIA: CPT | Performed by: PHYSICIAN ASSISTANT

## 2021-06-24 PROCEDURE — 85379 FIBRIN DEGRADATION QUANT: CPT | Performed by: PHYSICIAN ASSISTANT

## 2021-06-24 RX ORDER — SODIUM CHLORIDE 0.9 % (FLUSH) 0.9 %
10 SYRINGE (ML) INJECTION AS NEEDED
Status: DISCONTINUED | OUTPATIENT
Start: 2021-06-24 | End: 2021-06-24 | Stop reason: HOSPADM

## 2021-06-24 RX ORDER — LEVOFLOXACIN 750 MG/1
750 TABLET ORAL DAILY
Qty: 5 TABLET | Refills: 0 | Status: SHIPPED | OUTPATIENT
Start: 2021-06-24 | End: 2021-06-29

## 2021-06-24 RX ADMIN — SODIUM CHLORIDE 1000 ML: 9 INJECTION, SOLUTION INTRAVENOUS at 12:52

## 2021-06-24 RX ADMIN — IOPAMIDOL 100 ML: 755 INJECTION, SOLUTION INTRAVENOUS at 12:55

## 2021-06-24 NOTE — TELEPHONE ENCOUNTER
Caller: Guy Plascencia    Relationship: Self    Best call back number: [phone]    What medication are you requesting: OXYGEN TANK & COUCH SYRUP      If a prescription is needed, what is your preferred pharmacy and phone number: [facility] [place] [facility] [address] - [phone]  - [phone]      Additional notes: PATIENT IS REQUESTING A DIFFERENT COUGH SYRUP OTHER THAN GUAIFENESIN. IT IS NOT WORKING FOR HIM. PATIENT'S WIFE IS ALSO REQUESTING OXYGEN FOR PATIENT. STATES IT WOULD HELP HIM TO BREATH WHILE SITTING UP.

## 2021-06-24 NOTE — TELEPHONE ENCOUNTER
Caller: Jessica Plascencia    Relationship to patient: Emergency Contact    Best call back number: [phone]    Patient is needing: PATIENT'S WIFE ASKED FOR KRISTINA POLANCO TO CALL HER. SHE SAID THAT PATIENT HAS BEEN DEALING WITH PNEUMONIA AND ASKED FOR KRISTINA'S ADVISE.

## 2021-06-24 NOTE — TELEPHONE ENCOUNTER
WIFE CALLED BACK SHE IS VERY WORRIED. HE GETS UP SOME COUPLE HOURS THAT'S ALL. NOT EATING BUT THE BOOST AND GATORAID. SHE IS VERY WORRIED ABOUT HIM. HIS O2 WAS GOOD IN BED 96. NOW 93. THEN HE GETS TO COUGHING HITS 90. DOES SHE NEED TO GO TO ED OR COME IN AND SEE YOU. ITS A DRY COUGH.     PLEASE ADVISE BECAUSE SHE DOES NOT KNOW WHAT ELSE TO DO

## 2021-06-24 NOTE — TELEPHONE ENCOUNTER
Returned call. Spoke with pt's wife, Jessica. She voiced understanding about pt not needing oxygen at this time and about Chelsea not being able to prescribe a different cough syrup. She stated they would schedule a f/u appointment when he is out of quarantine and feeling better.

## 2021-06-24 NOTE — TELEPHONE ENCOUNTER
have reviewed the ER report.  Oxygen level was not low enough for approval to have oxygen.  2.  Unable to send in different cough syrup  except for guaifenesin due to not being seen in office.  Would recommend following the ER instructions.

## 2021-06-24 NOTE — TELEPHONE ENCOUNTER
I would like to place orders for chest x-ray and a D-dimer.  Do the want to go to [facility] for this?

## 2021-06-29 LAB
BACTERIA SPEC AEROBE CULT: NORMAL
BACTERIA SPEC AEROBE CULT: NORMAL

## 2021-07-12 DIAGNOSIS — E78.2 MIXED HYPERLIPIDEMIA: ICD-10-CM

## 2021-07-12 DIAGNOSIS — E11.65 TYPE 2 DIABETES MELLITUS WITH HYPERGLYCEMIA, WITHOUT LONG-TERM CURRENT USE OF INSULIN (HCC): Primary | ICD-10-CM

## 2021-07-23 DIAGNOSIS — I10 ESSENTIAL HYPERTENSION: ICD-10-CM

## 2021-07-23 DIAGNOSIS — IMO0002 DIABETES MELLITUS OUT OF CONTROL: ICD-10-CM

## 2021-07-23 RX ORDER — HYDROCHLOROTHIAZIDE 12.5 MG/1
CAPSULE, GELATIN COATED ORAL
Qty: 30 CAPSULE | Refills: 2 | Status: SHIPPED | OUTPATIENT
Start: 2021-07-23 | End: 2021-10-21

## 2021-07-23 RX ORDER — LISINOPRIL 40 MG/1
TABLET ORAL
Qty: 30 TABLET | Refills: 0 | Status: SHIPPED | OUTPATIENT
Start: 2021-07-23 | End: 2021-08-23

## 2021-07-27 ENCOUNTER — TELEPHONE (OUTPATIENT)
Dept: FAMILY MEDICINE CLINIC | Facility: CLINIC | Age: 71
End: 2021-07-27

## 2021-07-27 NOTE — TELEPHONE ENCOUNTER
Pt called and stated that you wanted him to have a repeat chest x-ray to make sure his pneumonia was clear. I informed him that you were not in the office today but that I would let you know he called and would notify him when this was ordered.

## 2021-07-28 DIAGNOSIS — U07.1 PNEUMONIA DUE TO COVID-19 VIRUS: Primary | ICD-10-CM

## 2021-07-28 DIAGNOSIS — J12.82 PNEUMONIA DUE TO COVID-19 VIRUS: Primary | ICD-10-CM

## 2021-07-28 NOTE — TELEPHONE ENCOUNTER
Notify patient I have placed orders for chest x-ray at [facility].  He may go this week for testing.

## 2021-07-30 ENCOUNTER — LAB (OUTPATIENT)
Dept: LAB | Facility: HOSPITAL | Age: 71
End: 2021-07-30

## 2021-07-30 ENCOUNTER — HOSPITAL ENCOUNTER (OUTPATIENT)
Dept: GENERAL RADIOLOGY | Facility: HOSPITAL | Age: 71
Discharge: HOME | End: 2021-07-30

## 2021-07-30 DIAGNOSIS — E78.2 MIXED HYPERLIPIDEMIA: ICD-10-CM

## 2021-07-30 DIAGNOSIS — E11.65 TYPE 2 DIABETES MELLITUS WITH HYPERGLYCEMIA, WITHOUT LONG-TERM CURRENT USE OF INSULIN (HCC): ICD-10-CM

## 2021-07-30 DIAGNOSIS — J12.82 PNEUMONIA DUE TO COVID-19 VIRUS: ICD-10-CM

## 2021-07-30 DIAGNOSIS — U07.1 PNEUMONIA DUE TO COVID-19 VIRUS: ICD-10-CM

## 2021-07-30 LAB
ALBUMIN SERPL-MCNC: 4.3 G/DL (ref 3.5–5.2)
ALBUMIN/GLOB SERPL: 1.7 G/DL
ALP SERPL-CCNC: 74 U/L (ref 39–117)
ALT SERPL W P-5'-P-CCNC: 23 U/L (ref 1–41)
ANION GAP SERPL CALCULATED.3IONS-SCNC: 14.1 MMOL/L (ref 5–15)
AST SERPL-CCNC: 20 U/L (ref 1–40)
BASOPHILS # BLD AUTO: 0.01 10*3/MM3 (ref 0–0.2)
BASOPHILS NFR BLD AUTO: 0.2 % (ref 0–1.5)
BILIRUB SERPL-MCNC: 0.2 MG/DL (ref 0–1.2)
BUN SERPL-MCNC: 15 MG/DL (ref 8–23)
BUN/CREAT SERPL: 17 (ref 7–25)
CALCIUM SPEC-SCNC: 9.3 MG/DL (ref 8.6–10.5)
CHLORIDE SERPL-SCNC: 105 MMOL/L (ref 98–107)
CHOLEST SERPL-MCNC: 110 MG/DL (ref 0–200)
CO2 SERPL-SCNC: 25.9 MMOL/L (ref 22–29)
CREAT SERPL-MCNC: 0.88 MG/DL (ref 0.76–1.27)
DEPRECATED RDW RBC AUTO: 44.4 FL (ref 37–54)
EOSINOPHIL # BLD AUTO: 0.05 10*3/MM3 (ref 0–0.4)
EOSINOPHIL NFR BLD AUTO: 1.2 % (ref 0.3–6.2)
ERYTHROCYTE [DISTWIDTH] IN BLOOD BY AUTOMATED COUNT: 14 % (ref 12.3–15.4)
GFR SERPL CREATININE-BSD FRML MDRD: 85 ML/MIN/1.73
GLOBULIN UR ELPH-MCNC: 2.6 GM/DL
GLUCOSE SERPL-MCNC: 229 MG/DL (ref 65–99)
HBA1C MFR BLD: 6.76 % (ref 4.8–5.6)
HCT VFR BLD AUTO: 36.9 % (ref 37.5–51)
HDLC SERPL-MCNC: 26 MG/DL (ref 40–60)
HGB BLD-MCNC: 12.4 G/DL (ref 13–17.7)
LDLC SERPL CALC-MCNC: 53 MG/DL (ref 0–100)
LDLC/HDLC SERPL: 1.78 {RATIO}
LYMPHOCYTES # BLD AUTO: 0.99 10*3/MM3 (ref 0.7–3.1)
LYMPHOCYTES NFR BLD AUTO: 22.9 % (ref 19.6–45.3)
MCH RBC QN AUTO: 29.4 PG (ref 26.6–33)
MCHC RBC AUTO-ENTMCNC: 33.6 G/DL (ref 31.5–35.7)
MCV RBC AUTO: 87.4 FL (ref 79–97)
MONOCYTES # BLD AUTO: 0.37 10*3/MM3 (ref 0.1–0.9)
MONOCYTES NFR BLD AUTO: 8.5 % (ref 5–12)
NEUTROPHILS NFR BLD AUTO: 2.91 10*3/MM3 (ref 1.7–7)
NEUTROPHILS NFR BLD AUTO: 67.2 % (ref 42.7–76)
PLATELET # BLD AUTO: 133 10*3/MM3 (ref 140–450)
PMV BLD AUTO: 11.8 FL (ref 6–12)
POTASSIUM SERPL-SCNC: 3.5 MMOL/L (ref 3.5–5.2)
PROT SERPL-MCNC: 6.9 G/DL (ref 6–8.5)
RBC # BLD AUTO: 4.22 10*6/MM3 (ref 4.14–5.8)
SODIUM SERPL-SCNC: 145 MMOL/L (ref 136–145)
TRIGL SERPL-MCNC: 189 MG/DL (ref 0–150)
VLDLC SERPL-MCNC: 31 MG/DL (ref 5–40)
WBC # BLD AUTO: 4.33 10*3/MM3 (ref 3.4–10.8)

## 2021-07-30 PROCEDURE — 83036 HEMOGLOBIN GLYCOSYLATED A1C: CPT

## 2021-07-30 PROCEDURE — 36415 COLL VENOUS BLD VENIPUNCTURE: CPT

## 2021-07-30 PROCEDURE — 85025 COMPLETE CBC W/AUTO DIFF WBC: CPT

## 2021-07-30 PROCEDURE — 71046 X-RAY EXAM CHEST 2 VIEWS: CPT

## 2021-07-30 PROCEDURE — 80061 LIPID PANEL: CPT

## 2021-07-30 PROCEDURE — 80053 COMPREHEN METABOLIC PANEL: CPT

## 2021-08-04 DIAGNOSIS — D69.59 THROMBOCYTOPENIA DUE TO COVID-19 VIRUS: ICD-10-CM

## 2021-08-04 DIAGNOSIS — U07.1 THROMBOCYTOPENIA DUE TO COVID-19 VIRUS: ICD-10-CM

## 2021-08-04 DIAGNOSIS — D64.9 ANEMIA, UNSPECIFIED TYPE: Primary | ICD-10-CM

## 2021-08-22 DIAGNOSIS — I10 ESSENTIAL HYPERTENSION: ICD-10-CM

## 2021-08-22 DIAGNOSIS — IMO0002 DIABETES MELLITUS OUT OF CONTROL: ICD-10-CM

## 2021-08-23 RX ORDER — LISINOPRIL 40 MG/1
TABLET ORAL
Qty: 30 TABLET | Refills: 0 | Status: SHIPPED | OUTPATIENT
Start: 2021-08-23 | End: 2021-09-22 | Stop reason: SDUPTHER

## 2021-09-04 DIAGNOSIS — I10 ESSENTIAL HYPERTENSION: ICD-10-CM

## 2021-09-07 RX ORDER — METOPROLOL TARTRATE 50 MG/1
TABLET, FILM COATED ORAL
Qty: 60 TABLET | Refills: 1 | Status: SHIPPED | OUTPATIENT
Start: 2021-09-07 | End: 2021-11-08

## 2021-09-08 DIAGNOSIS — E11.65 TYPE 2 DIABETES MELLITUS WITH HYPERGLYCEMIA, WITHOUT LONG-TERM CURRENT USE OF INSULIN (HCC): ICD-10-CM

## 2021-09-08 DIAGNOSIS — IMO0002 UNCONTROLLED TYPE 2 DIABETES MELLITUS WITH COMPLICATION, WITHOUT LONG-TERM CURRENT USE OF INSULIN: ICD-10-CM

## 2021-09-08 RX ORDER — GLIPIZIDE 10 MG/1
TABLET ORAL
Qty: 60 TABLET | Refills: 2 | Status: SHIPPED | OUTPATIENT
Start: 2021-09-08 | End: 2021-09-28

## 2021-09-12 DIAGNOSIS — IMO0002 UNCONTROLLED TYPE 2 DIABETES MELLITUS WITH COMPLICATION, WITHOUT LONG-TERM CURRENT USE OF INSULIN: ICD-10-CM

## 2021-09-20 DIAGNOSIS — I10 ESSENTIAL HYPERTENSION: ICD-10-CM

## 2021-09-20 DIAGNOSIS — IMO0002 UNCONTROLLED TYPE 2 DIABETES MELLITUS WITH COMPLICATION, WITHOUT LONG-TERM CURRENT USE OF INSULIN: ICD-10-CM

## 2021-09-20 RX ORDER — METOPROLOL TARTRATE 50 MG/1
TABLET, FILM COATED ORAL
Qty: 60 TABLET | Refills: 1 | OUTPATIENT
Start: 2021-09-20

## 2021-09-20 RX ORDER — GLIPIZIDE 10 MG/1
TABLET ORAL
Qty: 60 TABLET | Refills: 2 | OUTPATIENT
Start: 2021-09-20

## 2021-09-20 NOTE — TELEPHONE ENCOUNTER
Called pharmacy to see why we were getting these request since they were just filled. Ann Marie stated that they received a message from the patients insurance that they would received a copay benefit if they got a 90 day supply. Pt is not currently due for refill as he has recently picked up refills.

## 2021-09-22 DIAGNOSIS — I10 ESSENTIAL HYPERTENSION: ICD-10-CM

## 2021-09-22 DIAGNOSIS — IMO0002 DIABETES MELLITUS OUT OF CONTROL: ICD-10-CM

## 2021-09-22 RX ORDER — LISINOPRIL 40 MG/1
40 TABLET ORAL DAILY
Qty: 30 TABLET | Refills: 0 | Status: SHIPPED | OUTPATIENT
Start: 2021-09-22 | End: 2021-10-21

## 2021-09-23 ENCOUNTER — TELEPHONE (OUTPATIENT)
Dept: FAMILY MEDICINE CLINIC | Facility: CLINIC | Age: 71
End: 2021-09-23

## 2021-09-23 NOTE — TELEPHONE ENCOUNTER
[phone] PLEASE CALL PATIENT TO TELL HIM IF HE NEEDS LABS DONE OR NEEDS AN APPT.     PLEASE ADVISE  [phone]

## 2021-09-23 NOTE — TELEPHONE ENCOUNTER
Can you please call pt and schedule a DM f/u with Chelsea. He will also need fasting labs done. Please let me know if he wants to have them here or the hospital.    Thank you

## 2021-09-24 DIAGNOSIS — E11.65 TYPE 2 DIABETES MELLITUS WITH HYPERGLYCEMIA, WITHOUT LONG-TERM CURRENT USE OF INSULIN (HCC): Primary | ICD-10-CM

## 2021-09-24 DIAGNOSIS — D64.9 ANEMIA, UNSPECIFIED TYPE: ICD-10-CM

## 2021-09-24 DIAGNOSIS — U07.1 THROMBOCYTOPENIA DUE TO COVID-19 VIRUS: ICD-10-CM

## 2021-09-24 DIAGNOSIS — D69.59 THROMBOCYTOPENIA DUE TO COVID-19 VIRUS: ICD-10-CM

## 2021-09-27 DIAGNOSIS — IMO0002 UNCONTROLLED TYPE 2 DIABETES MELLITUS WITH COMPLICATION, WITHOUT LONG-TERM CURRENT USE OF INSULIN: ICD-10-CM

## 2021-09-28 RX ORDER — GLIPIZIDE 10 MG/1
TABLET ORAL
Qty: 60 TABLET | Refills: 2 | Status: SHIPPED | OUTPATIENT
Start: 2021-09-28 | End: 2021-11-22

## 2021-10-18 ENCOUNTER — LAB (OUTPATIENT)
Dept: LAB | Facility: HOSPITAL | Age: 71
End: 2021-10-18

## 2021-10-18 DIAGNOSIS — D69.59 THROMBOCYTOPENIA DUE TO COVID-19 VIRUS: ICD-10-CM

## 2021-10-18 DIAGNOSIS — E11.65 TYPE 2 DIABETES MELLITUS WITH HYPERGLYCEMIA, WITHOUT LONG-TERM CURRENT USE OF INSULIN (HCC): ICD-10-CM

## 2021-10-18 DIAGNOSIS — D64.9 ANEMIA, UNSPECIFIED TYPE: ICD-10-CM

## 2021-10-18 DIAGNOSIS — U07.1 THROMBOCYTOPENIA DUE TO COVID-19 VIRUS: ICD-10-CM

## 2021-10-18 LAB
ALBUMIN SERPL-MCNC: 4.2 G/DL (ref 3.5–5.2)
ALBUMIN/GLOB SERPL: 1.8 G/DL
ALP SERPL-CCNC: 61 U/L (ref 39–117)
ALT SERPL W P-5'-P-CCNC: 22 U/L (ref 1–41)
ANION GAP SERPL CALCULATED.3IONS-SCNC: 10.2 MMOL/L (ref 5–15)
AST SERPL-CCNC: 23 U/L (ref 1–40)
BASOPHILS # BLD AUTO: 0.02 10*3/MM3 (ref 0–0.2)
BASOPHILS NFR BLD AUTO: 0.4 % (ref 0–1.5)
BILIRUB SERPL-MCNC: 0.4 MG/DL (ref 0–1.2)
BUN SERPL-MCNC: 18 MG/DL (ref 8–23)
BUN/CREAT SERPL: 18.6 (ref 7–25)
CALCIUM SPEC-SCNC: 9.5 MG/DL (ref 8.6–10.5)
CHLORIDE SERPL-SCNC: 105 MMOL/L (ref 98–107)
CHOLEST SERPL-MCNC: 108 MG/DL (ref 0–200)
CO2 SERPL-SCNC: 26.8 MMOL/L (ref 22–29)
CREAT SERPL-MCNC: 0.97 MG/DL (ref 0.76–1.27)
DEPRECATED RDW RBC AUTO: 43.6 FL (ref 37–54)
EOSINOPHIL # BLD AUTO: 0.07 10*3/MM3 (ref 0–0.4)
EOSINOPHIL NFR BLD AUTO: 1.4 % (ref 0.3–6.2)
ERYTHROCYTE [DISTWIDTH] IN BLOOD BY AUTOMATED COUNT: 13.8 % (ref 12.3–15.4)
GFR SERPL CREATININE-BSD FRML MDRD: 76 ML/MIN/1.73
GLOBULIN UR ELPH-MCNC: 2.3 GM/DL
GLUCOSE SERPL-MCNC: 176 MG/DL (ref 65–99)
HBA1C MFR BLD: 6.8 % (ref 4.8–5.6)
HCT VFR BLD AUTO: 40.5 % (ref 37.5–51)
HDLC SERPL-MCNC: 28 MG/DL (ref 40–60)
HGB BLD-MCNC: 13.3 G/DL (ref 13–17.7)
IMM GRANULOCYTES # BLD AUTO: 0.03 10*3/MM3 (ref 0–0.05)
IMM GRANULOCYTES NFR BLD AUTO: 0.6 % (ref 0–0.5)
LDLC SERPL CALC-MCNC: 59 MG/DL (ref 0–100)
LDLC/HDLC SERPL: 2.04 {RATIO}
LYMPHOCYTES # BLD AUTO: 1.08 10*3/MM3 (ref 0.7–3.1)
LYMPHOCYTES NFR BLD AUTO: 21.9 % (ref 19.6–45.3)
MCH RBC QN AUTO: 28.5 PG (ref 26.6–33)
MCHC RBC AUTO-ENTMCNC: 32.8 G/DL (ref 31.5–35.7)
MCV RBC AUTO: 86.7 FL (ref 79–97)
MONOCYTES # BLD AUTO: 0.44 10*3/MM3 (ref 0.1–0.9)
MONOCYTES NFR BLD AUTO: 8.9 % (ref 5–12)
NEUTROPHILS NFR BLD AUTO: 3.3 10*3/MM3 (ref 1.7–7)
NEUTROPHILS NFR BLD AUTO: 66.8 % (ref 42.7–76)
NRBC BLD AUTO-RTO: 0 /100 WBC (ref 0–0.2)
PLATELET # BLD AUTO: 124 10*3/MM3 (ref 140–450)
PMV BLD AUTO: 11.4 FL (ref 6–12)
POTASSIUM SERPL-SCNC: 3.7 MMOL/L (ref 3.5–5.2)
PROT SERPL-MCNC: 6.5 G/DL (ref 6–8.5)
RBC # BLD AUTO: 4.67 10*6/MM3 (ref 4.14–5.8)
SODIUM SERPL-SCNC: 142 MMOL/L (ref 136–145)
TRIGL SERPL-MCNC: 115 MG/DL (ref 0–150)
VLDLC SERPL-MCNC: 21 MG/DL (ref 5–40)
WBC # BLD AUTO: 4.94 10*3/MM3 (ref 3.4–10.8)

## 2021-10-18 PROCEDURE — 80053 COMPREHEN METABOLIC PANEL: CPT

## 2021-10-18 PROCEDURE — 85025 COMPLETE CBC W/AUTO DIFF WBC: CPT

## 2021-10-18 PROCEDURE — 83036 HEMOGLOBIN GLYCOSYLATED A1C: CPT

## 2021-10-18 PROCEDURE — 80061 LIPID PANEL: CPT

## 2021-10-18 PROCEDURE — 36415 COLL VENOUS BLD VENIPUNCTURE: CPT

## 2021-10-21 DIAGNOSIS — E78.2 MIXED HYPERLIPIDEMIA: ICD-10-CM

## 2021-10-21 DIAGNOSIS — I10 ESSENTIAL HYPERTENSION: ICD-10-CM

## 2021-10-21 DIAGNOSIS — IMO0002 DIABETES MELLITUS OUT OF CONTROL: ICD-10-CM

## 2021-10-21 DIAGNOSIS — IMO0002 UNCONTROLLED TYPE 2 DIABETES MELLITUS WITH COMPLICATION, WITHOUT LONG-TERM CURRENT USE OF INSULIN: ICD-10-CM

## 2021-10-21 RX ORDER — PRAVASTATIN SODIUM 80 MG/1
TABLET ORAL
Qty: 90 TABLET | Refills: 1 | Status: SHIPPED | OUTPATIENT
Start: 2021-10-21 | End: 2022-04-20

## 2021-10-21 RX ORDER — HYDROCHLOROTHIAZIDE 12.5 MG/1
CAPSULE, GELATIN COATED ORAL
Qty: 90 CAPSULE | Refills: 0 | Status: SHIPPED | OUTPATIENT
Start: 2021-10-21 | End: 2021-11-22

## 2021-10-21 RX ORDER — LISINOPRIL 40 MG/1
TABLET ORAL
Qty: 30 TABLET | Refills: 0 | Status: SHIPPED | OUTPATIENT
Start: 2021-10-21 | End: 2021-11-22

## 2021-10-22 ENCOUNTER — OFFICE VISIT (OUTPATIENT)
Dept: FAMILY MEDICINE CLINIC | Facility: CLINIC | Age: 71
End: 2021-10-22

## 2021-10-22 VITALS
HEIGHT: 70 IN | BODY MASS INDEX: 23.62 KG/M2 | TEMPERATURE: 97.5 F | SYSTOLIC BLOOD PRESSURE: 126 MMHG | HEART RATE: 80 BPM | OXYGEN SATURATION: 97 % | WEIGHT: 165 LBS | DIASTOLIC BLOOD PRESSURE: 82 MMHG | RESPIRATION RATE: 13 BRPM

## 2021-10-22 DIAGNOSIS — E11.65 TYPE 2 DIABETES MELLITUS WITH HYPERGLYCEMIA, WITHOUT LONG-TERM CURRENT USE OF INSULIN (HCC): Primary | ICD-10-CM

## 2021-10-22 DIAGNOSIS — I10 ESSENTIAL HYPERTENSION: ICD-10-CM

## 2021-10-22 DIAGNOSIS — E78.2 MIXED HYPERLIPIDEMIA: ICD-10-CM

## 2021-10-22 PROCEDURE — 99214 OFFICE O/P EST MOD 30 MIN: CPT | Performed by: PHYSICIAN ASSISTANT

## 2021-10-22 NOTE — PROGRESS NOTES
"Chief Complaint  Diabetes, Hypertension (management), and Hyperlipidemia (management)    Subjective          Guy Plascencia presents to [facility]  History of Present Illness  Guy is a 70 year old male who presents type 2 diabetes, hypercholesterolemia and hypertension management.  Blood sugars have been running better.  States he has been trying to make healthier choices with eating.  Doing well with the Ozempic medication.  States he has noticed his sugars are better controlled.  Denied any chest pain, shortness of air, cough, wheezing, vision changes or swelling of ankles.  Bowel movements have been normal without dark black tarry stools.  Has been compliant with medications.     Objective   Vital Signs:   /82   Pulse 80   Temp 97.5 °F (36.4 °C)   Resp 13   Ht 177.8 cm (70\")   Wt 74.8 kg (165 lb)   SpO2 97%   BMI 23.68 kg/m²     Physical Exam  Vitals and nursing note reviewed.   Constitutional:       Appearance: Normal appearance. He is well-developed, well-groomed and normal weight.      Interventions: Face mask in place.   HENT:      Head: Normocephalic and atraumatic.   Neck:      Thyroid: No thyroid mass, thyromegaly or thyroid tenderness.      Vascular: No carotid bruit.      Trachea: Trachea and phonation normal. No tracheal tenderness.   Cardiovascular:      Rate and Rhythm: Normal rate and regular rhythm.      Pulses: Normal pulses.      Heart sounds: Normal heart sounds, S1 normal and S2 normal. No murmur heard.      Pulmonary:      Effort: Pulmonary effort is normal.      Breath sounds: Normal breath sounds and air entry.   Abdominal:      General: Bowel sounds are normal.      Palpations: Abdomen is soft. There is no hepatomegaly.      Tenderness: There is no abdominal tenderness. There is no right CVA tenderness, left CVA tenderness, guarding or rebound. Negative signs include Edmond's sign, Rovsing's sign, McBurney's sign, psoas sign and obturator sign. "   Musculoskeletal:      Cervical back: Neck supple.      Right lower leg: No edema.      Left lower leg: No edema.   Feet:      Right foot:      Protective Sensation: 10 sites tested. 10 sites sensed.      Skin integrity: Skin integrity normal.      Toenail Condition: Right toenails are normal.      Left foot:      Protective Sensation: 10 sites tested. 10 sites sensed.      Skin integrity: Skin integrity normal.      Toenail Condition: Left toenails are normal.   Skin:     General: Skin is warm and dry.      Capillary Refill: Capillary refill takes less than 2 seconds.   Neurological:      Mental Status: He is alert and oriented to person, place, and time.   Psychiatric:         Attention and Perception: Attention and perception normal.         Mood and Affect: Mood and affect normal.         Speech: Speech normal.         Behavior: Behavior normal. Behavior is cooperative.         Thought Content: Thought content normal.         Cognition and Memory: Cognition and memory normal.         Judgment: Judgment normal.     I was wearing a surgical mask and face shield during the entire office visit/encounter.     Result Review :     CMP    CMP 6/24/21 7/30/21 10/18/21   Glucose 227 (A) 229 (A) 176 (A)   BUN 24 (A) 15 18   Creatinine 0.82 0.88 0.97   eGFR Non African Am 93 85 76   Sodium 137 145 142   Potassium 3.4 (A) 3.5 3.7   Chloride 96 (A) 105 105   Calcium 9.8 9.3 9.5   Albumin 3.20 (A) 4.30 4.20   Total Bilirubin 0.6 0.2 0.4   Alkaline Phosphatase 112 74 61   AST (SGOT) 53 (A) 20 23   ALT (SGPT) 52 (A) 23 22   (A) Abnormal value       Comments are available for some flowsheets but are not being displayed.           CBC w/diff    CBC w/Diff 6/24/21 7/30/21 10/18/21   WBC 6.41 4.33 4.94   RBC 4.67 4.22 4.67   Hemoglobin 13.2 12.4 (A) 13.3   Hematocrit 40.1 36.9 (A) 40.5   MCV 85.9 87.4 86.7   MCH 28.3 29.4 28.5   MCHC 32.9 33.6 32.8   RDW 11.9 (A) 14.0 13.8   Platelets 236 133 (A) 124 (A)   Neutrophil Rel % 81.1 (A)  67.2 66.8   Immature Granulocyte Rel % 0.5  0.6 (A)   Lymphocyte Rel % 10.5 (A) 22.9 21.9   Monocyte Rel % 7.3 8.5 8.9   Eosinophil Rel % 0.3 1.2 1.4   Basophil Rel % 0.3 0.2 0.4   (A) Abnormal value            Lipid Panel    Lipid Panel 3/8/21 7/30/21 10/18/21   Total Cholesterol 130 110 108   Triglycerides 175 (A) 189 (A) 115   HDL Cholesterol 30 (A) 26 (A) 28 (A)   VLDL Cholesterol 30 31 21   LDL Cholesterol  70 53 59   LDL/HDL Ratio 2.17 1.78 2.04   (A) Abnormal value                Most Recent A1C    HGBA1C Most Recent 10/18/21   Hemoglobin A1C 6.80 (A)   (A) Abnormal value            Microalbumin    Microalbumin 11/18/20   Microalbumin, Urine 96.4                     Assessment and Plan    Diagnoses and all orders for this visit:    1. Type 2 diabetes mellitus with hyperglycemia, without long-term current use of insulin (HCC) (Primary)    2. Mixed hyperlipidemia    3. Essential hypertension    1.  Chronic and stable type 2 diabetes with hyperlipidemia: Have reviewed above blood work with him at office visit today.  Diabetes is well controlled on current medication.  Have encouraged him to start over-the-counter cinnamon pills to see if this will help raise his HDL.  He will continue his current medication at home as directed.  Have given a diabetic eye form to take to ophthalmology.  Will return to office in 6 months for Medicare wellness.  Refused updated immunization.  2.  Chronic and stable hypertension: Doing well with his current medication.  No refills are needed at this time.  Will reevaluate in 6 months.    Follow Up   Return in about 6 months (around 4/22/2022), or labs prior, for Medicare Wellness.  Patient was given instructions and counseling regarding his condition or for health maintenance advice. Please see specific information pulled into the AVS if appropriate.     [facility]  [address] [place] [address]  [address]  Dept: [phone]  Dept Fax: [phone]  Loc: [phone]  Loc Fax: [phone]

## 2021-11-06 DIAGNOSIS — I10 ESSENTIAL HYPERTENSION: ICD-10-CM

## 2021-11-08 RX ORDER — METOPROLOL TARTRATE 50 MG/1
TABLET, FILM COATED ORAL
Qty: 180 TABLET | Refills: 2 | Status: SHIPPED | OUTPATIENT
Start: 2021-11-08 | End: 2022-08-08

## 2021-11-10 RX ORDER — SEMAGLUTIDE 1.34 MG/ML
INJECTION, SOLUTION SUBCUTANEOUS
Qty: 3 ML | Refills: 6 | Status: SHIPPED | OUTPATIENT
Start: 2021-11-10 | End: 2022-06-03

## 2021-11-22 DIAGNOSIS — I10 ESSENTIAL HYPERTENSION: ICD-10-CM

## 2021-11-22 DIAGNOSIS — IMO0002 DIABETES MELLITUS OUT OF CONTROL: ICD-10-CM

## 2021-11-22 DIAGNOSIS — IMO0002 UNCONTROLLED TYPE 2 DIABETES MELLITUS WITH COMPLICATION, WITHOUT LONG-TERM CURRENT USE OF INSULIN: ICD-10-CM

## 2021-11-22 RX ORDER — AMLODIPINE BESYLATE 10 MG/1
TABLET ORAL
Qty: 90 TABLET | Refills: 0 | Status: SHIPPED | OUTPATIENT
Start: 2021-11-22 | End: 2022-03-31

## 2021-11-22 RX ORDER — GLIPIZIDE 10 MG/1
TABLET ORAL
Qty: 180 TABLET | Refills: 0 | Status: SHIPPED | OUTPATIENT
Start: 2021-11-22 | End: 2022-04-20

## 2021-11-22 RX ORDER — LISINOPRIL 40 MG/1
TABLET ORAL
Qty: 90 TABLET | Refills: 0 | Status: SHIPPED | OUTPATIENT
Start: 2021-11-22 | End: 2022-03-02

## 2021-11-22 RX ORDER — HYDROCHLOROTHIAZIDE 12.5 MG/1
CAPSULE, GELATIN COATED ORAL
Qty: 90 CAPSULE | Refills: 0 | Status: SHIPPED | OUTPATIENT
Start: 2021-11-22 | End: 2022-06-28 | Stop reason: SDUPTHER

## 2021-11-23 RX ORDER — OMEPRAZOLE 20 MG/1
CAPSULE, DELAYED RELEASE ORAL
Qty: 90 CAPSULE | Refills: 3 | Status: SHIPPED | OUTPATIENT
Start: 2021-11-23 | End: 2023-02-22 | Stop reason: SDUPTHER

## 2022-03-02 DIAGNOSIS — I10 ESSENTIAL HYPERTENSION: ICD-10-CM

## 2022-03-02 DIAGNOSIS — IMO0002 DIABETES MELLITUS OUT OF CONTROL: ICD-10-CM

## 2022-03-02 RX ORDER — LISINOPRIL 40 MG/1
TABLET ORAL
Qty: 90 TABLET | Refills: 0 | Status: SHIPPED | OUTPATIENT
Start: 2022-03-02 | End: 2022-06-03

## 2022-03-08 DIAGNOSIS — IMO0002 UNCONTROLLED TYPE 2 DIABETES MELLITUS WITH COMPLICATION, WITHOUT LONG-TERM CURRENT USE OF INSULIN: ICD-10-CM

## 2022-03-31 RX ORDER — AMLODIPINE BESYLATE 10 MG/1
TABLET ORAL
Qty: 90 TABLET | Refills: 0 | Status: SHIPPED | OUTPATIENT
Start: 2022-03-31 | End: 2022-07-06

## 2022-04-20 ENCOUNTER — OFFICE VISIT (OUTPATIENT)
Dept: SLEEP MEDICINE | Facility: HOSPITAL | Age: 72
End: 2022-04-20

## 2022-04-20 VITALS
DIASTOLIC BLOOD PRESSURE: 77 MMHG | HEART RATE: 76 BPM | BODY MASS INDEX: 24.88 KG/M2 | HEIGHT: 69 IN | SYSTOLIC BLOOD PRESSURE: 155 MMHG | WEIGHT: 168 LBS

## 2022-04-20 DIAGNOSIS — Z78.9 DIFFICULTY USING CONTINUOUS POSITIVE AIRWAY PRESSURE (CPAP) FULL FACE MASK: ICD-10-CM

## 2022-04-20 DIAGNOSIS — E78.2 MIXED HYPERLIPIDEMIA: ICD-10-CM

## 2022-04-20 DIAGNOSIS — G47.33 OBSTRUCTIVE SLEEP APNEA: Primary | ICD-10-CM

## 2022-04-20 PROCEDURE — G0463 HOSPITAL OUTPT CLINIC VISIT: HCPCS

## 2022-04-20 RX ORDER — GLIPIZIDE 10 MG/1
TABLET ORAL
Qty: 180 TABLET | Refills: 0 | Status: SHIPPED | OUTPATIENT
Start: 2022-04-20 | End: 2022-07-21

## 2022-04-20 RX ORDER — PRAVASTATIN SODIUM 80 MG/1
TABLET ORAL
Qty: 90 TABLET | Refills: 1 | Status: SHIPPED | OUTPATIENT
Start: 2022-04-20 | End: 2022-10-19

## 2022-04-20 NOTE — PROGRESS NOTES
[facility] SLEEP MEDICINE  [address]  3RD FLOOR  [facility] [address]  [phone]    PCP: Chelsea Mcelroy PA-C    Reason for visit:  Sleep disorders: MIRI    Guy is a 71 y.o.male who was seen in the [facility] today. Annual fu. He has to keep mask very tight to prevent leaks. Has not changed style for several years. He sleeps from 11pm to 6:30am. Wakes up feeling tired, not new. He feels because he does not get enough sleep. He feels better if he sleeps in.  Uses a fullface mask.  [place] Sleepiness Scale is 5. Caffeine 5 per day. Alcohol 0 per week.    Guy  reports that he quit smoking about 50 years ago. His smoking use included cigarettes. He quit after 14.00 years of use. He has never used smokeless tobacco.    Pertinent Positive Review of Systems of PND  Rest of Review of Systems was negative as recorded in Sleep Questionnaire.    Patient  has a past medical history of Arthritis, Diabetes mellitus (HCC), H/O  Multiple facial fractures (11/1968), H/O Broken femur (11/1968), History of transfusion, Hyperlipidemia, Hypertension, Kidney stone, Neoplasm of uncertain behavior, Peptic ulceration, Plantar fasciitis, Sleep apnea, and Thrombocytosis.     Current Medications:    Current Outpatient Medications:   •  amLODIPine (NORVASC) 10 MG tablet, TAKE ONE TABLET BY MOUTH DAILY, Disp: 90 tablet, Rfl: 0  •  Ascorbic Acid (VITAMIN C PO), Take  by mouth., Disp: , Rfl:   •  Aspirin (ASPIR-81 PO), Take  by mouth Daily., Disp: , Rfl:   •  Blood Glucose Monitoring Suppl (ONETOUCH VERIO FLEX SYSTEM) w/Device kit, 1 Device 2 (Two) Times a Day., Disp: 1 kit, Rfl: 0  •  Cholecalciferol (D3 ADULT PO), Take 2,000 Units by mouth Daily., Disp: , Rfl:   •  diphenoxylate-atropine (Lomotil) 2.5-0.025 MG per tablet, Take 1 tablet by mouth 4 (Four) Times a Day As Needed for Diarrhea., Disp: 20 tablet, Rfl: 0  •  glipizide (GLUCOTROL) 10 MG tablet, TAKE ONE TABLET BY MOUTH TWICE A DAY BEFORE A MEAL,  "Disp: 180 tablet, Rfl: 0  •  glucose blood (Accu-Chek Vika Plus) test strip, Use to test blood glucose 2 times daily Dx: e11.9, Disp: 200 each, Rfl: 3  •  glucose blood (AGAMATRIX PRESTO TEST) test strip, Used to check twice daily for type 2 diabetes, Disp: 100 each, Rfl: 9  •  hydroCHLOROthiazide (MICROZIDE) 12.5 MG capsule, TAKE ONE CAPSULE BY MOUTH DAILY, Disp: 90 capsule, Rfl: 0  •  Insulin Pen Needle 32G X 4 MM misc, Use once a weekly for Type 2 Diabetes, Disp: 30 each, Rfl: 0  •  lisinopril (PRINIVIL,ZESTRIL) 40 MG tablet, TAKE ONE TABLET BY MOUTH DAILY, Disp: 90 tablet, Rfl: 0  •  metFORMIN (GLUCOPHAGE) 1000 MG tablet, TAKE ONE TABLET BY MOUTH TWICE A DAY WITH MEALS, Disp: 180 tablet, Rfl: 0  •  metoprolol tartrate (LOPRESSOR) 50 MG tablet, TAKE ONE TABLET BY MOUTH TWICE A DAY, Disp: 180 tablet, Rfl: 2  •  Multiple Vitamins-Minerals (ZINC PO), Take  by mouth., Disp: , Rfl:   •  omeprazole (priLOSEC) 20 MG capsule, TAKE ONE CAPSULE BY MOUTH DAILY, Disp: 90 capsule, Rfl: 3  •  Ozempic, 1 MG/DOSE, 4 MG/3ML solution pen-injector, INJECT 1MG UNDER THE SKIN INTO THE APPROPRIATE AREA AS DIRECTED ONE TIME PER WEEK, Disp: 3 mL, Rfl: 6  •  pravastatin (PRAVACHOL) 80 MG tablet, TAKE ONE TABLET BY MOUTH DAILY, Disp: 90 tablet, Rfl: 1  •  Semaglutide, 1 MG/DOSE, (OZEMPIC) 2 MG/1.5ML solution pen-injector, Inject 1 mg under the skin into the appropriate area as directed 1 (One) Time Per Week., Disp: 3 pen, Rfl: 1  •  vitamin B-12 (CYANOCOBALAMIN) 1000 MCG tablet, Take 1 tablet by mouth Daily., Disp: , Rfl:    also entered in Sleep Questionnaire         Vital Signs: /77   Pulse 76   Ht 175.3 cm (69\")   Wt 76.2 kg (168 lb)   BMI 24.81 kg/m²     Body mass index is 24.81 kg/m².       Tongue: Large       Dentition: good       Pharynx: Posterior pharyngeal pillars are wide   Mallampatti: IV (only hard palate visible)        General: Alert. Cooperative. Well developed. No acute distress.             Head: "  Normocephalic. Symmetrical. Atraumatic.              Nose: No septal deviation. No drainage.          Throat: No oral lesions. No thrush. Moist mucous membranes.    Chest Wall:  Normal shape. Symmetric expansion with respiration. No tenderness.             Neck:  Trachea midline.           Lungs:  Clear to auscultation bilaterally. No wheezes. No rhonchi. No rales. Respirations regular, even and unlabored.            Heart:  Regular rhythm and normal rate. Normal S1 and S2. No murmur.     Abdomen:  Soft, non-tender and non-distended. Normal bowel sounds. No masses.  Extremities:  Moves all extremities well. No edema.    Psychiatric: Normal mood and affect.    Diagnostic data available to date is as below and was reviewed on current visit:  · 11/6/20  The patient tolerated the home sleep testing with monitoring time of 383 minutes. The data obtained make this a technically adequate study. The apnea hypopneas index(AHI) was 32.5 per sleep hour.  The AHI during supine position was 48.4 per sleep hour. Mean heart rate of 75.8 BPM.  Snoring was noted 21.4% of sleep time. Lowest oxygen saturation during the study was 82%. Saturation below 89% was noted for 4.4 mins.     Most current available usage data reviewed on 04/20/2022:  · 100% compliance average 7 hours 52 minutes AHI 8.6 at pressure 10 cm    [facility]: [place]    [facility] replacement supplies as below:    full face mask      Description Replacement    Nasal PILLOWS      A 7034 Nasal Pillows  every 3 mth    A 7033 Repl Nasal Pillows  2 per mth    Nasal MASK/CUSHION      A 7034 Nasal Mask/Cushion  every 3 mth    A 7032 Repl Nasal Mask/Cushion  2 per mth    Full Face MASK     x A 7030 Full Face Mask  every 3 mth   x A 7031 Repl Face Mask  1 per mth      A 4604 Heated Tubing  every 3 mth    A 7037 Standard Tubing  every 3 mth   x A 7035 Headgear  every 3 mth   x A 7046 Repl Humidifier Chamber  every 6 yrs   x A 7038 Disposable Filters  2 per mth   x  A 7039 Non-disposable Filter  every 6 mth   x A 7036 Chin Strap  every 6 mth         No orders of the defined types were placed in this encounter.         Impression:  1. Obstructive sleep apnea    2. Difficulty using continuous positive airway pressure (CPAP) full face mask        Plan:  Guy was asked to try different style mask. Has registered for recall. May need to try lower pressure. If recall machine is auto, can also try auto pressures on the machine.    I reiterated the importance of effective treatment of obstructive sleep apnea with PAP machine.  Cardiovascular health risks of untreated sleep apnea were again reviewed.  Patient was asked to remain cautious if there is persistent hypersomnolence. The benefit of weight loss in reducing severity of obstructive sleep apnea was discussed.  Patient would benefit from adhering to a strict diet to achieve ideal BMI.     Change of PAP supplies regularly is important for effective use.  Change of cushion on the mask or plugs on nasal pillows along with disposable filters once every month and change of mask frame, tubing, headgear and Velcro straps every 6 months at the minimum was reiterated.    This patient is compliant with PAP machine and benefits from its use.  Apnea hypopneas index is corrected/improved.  Daytime hypersomnolence has resolved.     Patient will follow up in this clinic in 1 year APRN    Thank you for allowing me to participate in your patient's care.    Electronically signed by Ariel Augustin MD, 04/20/22, 9:27 AM EDT.    Part of this note may be an electronic transcription/translation of spoken language to printed text using the Dragon Dictation System.

## 2022-06-02 DIAGNOSIS — I10 ESSENTIAL HYPERTENSION: ICD-10-CM

## 2022-06-03 RX ORDER — LISINOPRIL 40 MG/1
TABLET ORAL
Qty: 90 TABLET | Refills: 0 | Status: SHIPPED | OUTPATIENT
Start: 2022-06-03 | End: 2022-09-01

## 2022-06-03 RX ORDER — SEMAGLUTIDE 1.34 MG/ML
INJECTION, SOLUTION SUBCUTANEOUS
Qty: 3 ML | Refills: 6 | Status: SHIPPED | OUTPATIENT
Start: 2022-06-03 | End: 2022-06-27

## 2022-06-20 DIAGNOSIS — E11.65 TYPE 2 DIABETES MELLITUS WITH HYPERGLYCEMIA, WITHOUT LONG-TERM CURRENT USE OF INSULIN: ICD-10-CM

## 2022-06-20 DIAGNOSIS — E78.2 MIXED HYPERLIPIDEMIA: ICD-10-CM

## 2022-06-20 DIAGNOSIS — I10 ESSENTIAL HYPERTENSION: ICD-10-CM

## 2022-06-20 DIAGNOSIS — Z00.00 MEDICARE ANNUAL WELLNESS VISIT, SUBSEQUENT: Primary | ICD-10-CM

## 2022-06-24 ENCOUNTER — LAB (OUTPATIENT)
Dept: LAB | Facility: HOSPITAL | Age: 72
End: 2022-06-24

## 2022-06-24 DIAGNOSIS — R53.82 CHRONIC FATIGUE: ICD-10-CM

## 2022-06-24 DIAGNOSIS — E78.2 MIXED HYPERLIPIDEMIA: ICD-10-CM

## 2022-06-24 DIAGNOSIS — E11.65 TYPE 2 DIABETES MELLITUS WITH HYPERGLYCEMIA, WITHOUT LONG-TERM CURRENT USE OF INSULIN: ICD-10-CM

## 2022-06-24 DIAGNOSIS — Z00.00 MEDICARE ANNUAL WELLNESS VISIT, SUBSEQUENT: ICD-10-CM

## 2022-06-24 DIAGNOSIS — I10 ESSENTIAL HYPERTENSION: ICD-10-CM

## 2022-06-24 LAB
ALBUMIN SERPL-MCNC: 4.4 G/DL (ref 3.5–5.2)
ALBUMIN UR-MCNC: 122.1 MG/DL
ALBUMIN/GLOB SERPL: 1.9 G/DL
ALP SERPL-CCNC: 70 U/L (ref 39–117)
ALT SERPL W P-5'-P-CCNC: 26 U/L (ref 1–41)
ANION GAP SERPL CALCULATED.3IONS-SCNC: 8.9 MMOL/L (ref 5–15)
AST SERPL-CCNC: 24 U/L (ref 1–40)
BASOPHILS # BLD AUTO: 0.02 10*3/MM3 (ref 0–0.2)
BASOPHILS NFR BLD AUTO: 0.4 % (ref 0–1.5)
BILIRUB SERPL-MCNC: 0.3 MG/DL (ref 0–1.2)
BUN SERPL-MCNC: 17 MG/DL (ref 8–23)
BUN/CREAT SERPL: 15.6 (ref 7–25)
CALCIUM SPEC-SCNC: 9.7 MG/DL (ref 8.6–10.5)
CHLORIDE SERPL-SCNC: 102 MMOL/L (ref 98–107)
CHOLEST SERPL-MCNC: 109 MG/DL (ref 0–200)
CO2 SERPL-SCNC: 29.1 MMOL/L (ref 22–29)
CREAT SERPL-MCNC: 1.09 MG/DL (ref 0.76–1.27)
DEPRECATED RDW RBC AUTO: 42.7 FL (ref 37–54)
EGFRCR SERPLBLD CKD-EPI 2021: 72.1 ML/MIN/1.73
EOSINOPHIL # BLD AUTO: 0.11 10*3/MM3 (ref 0–0.4)
EOSINOPHIL NFR BLD AUTO: 2.1 % (ref 0.3–6.2)
ERYTHROCYTE [DISTWIDTH] IN BLOOD BY AUTOMATED COUNT: 13.8 % (ref 12.3–15.4)
GLOBULIN UR ELPH-MCNC: 2.3 GM/DL
GLUCOSE SERPL-MCNC: 174 MG/DL (ref 65–99)
HBA1C MFR BLD: 7.1 % (ref 4.8–5.6)
HCT VFR BLD AUTO: 40.1 % (ref 37.5–51)
HDLC SERPL-MCNC: 29 MG/DL (ref 40–60)
HGB BLD-MCNC: 13.4 G/DL (ref 13–17.7)
IMM GRANULOCYTES # BLD AUTO: 0.02 10*3/MM3 (ref 0–0.05)
IMM GRANULOCYTES NFR BLD AUTO: 0.4 % (ref 0–0.5)
LDLC SERPL CALC-MCNC: 43 MG/DL (ref 0–100)
LDLC/HDLC SERPL: 1.16 {RATIO}
LYMPHOCYTES # BLD AUTO: 1.11 10*3/MM3 (ref 0.7–3.1)
LYMPHOCYTES NFR BLD AUTO: 21.1 % (ref 19.6–45.3)
MCH RBC QN AUTO: 28.8 PG (ref 26.6–33)
MCHC RBC AUTO-ENTMCNC: 33.4 G/DL (ref 31.5–35.7)
MCV RBC AUTO: 86.1 FL (ref 79–97)
MONOCYTES # BLD AUTO: 0.45 10*3/MM3 (ref 0.1–0.9)
MONOCYTES NFR BLD AUTO: 8.6 % (ref 5–12)
NEUTROPHILS NFR BLD AUTO: 3.55 10*3/MM3 (ref 1.7–7)
NEUTROPHILS NFR BLD AUTO: 67.4 % (ref 42.7–76)
NRBC BLD AUTO-RTO: 0 /100 WBC (ref 0–0.2)
PLATELET # BLD AUTO: 133 10*3/MM3 (ref 140–450)
PMV BLD AUTO: 11.9 FL (ref 6–12)
POTASSIUM SERPL-SCNC: 3.2 MMOL/L (ref 3.5–5.2)
PROT SERPL-MCNC: 6.7 G/DL (ref 6–8.5)
RBC # BLD AUTO: 4.66 10*6/MM3 (ref 4.14–5.8)
SODIUM SERPL-SCNC: 140 MMOL/L (ref 136–145)
TRIGL SERPL-MCNC: 232 MG/DL (ref 0–150)
VLDLC SERPL-MCNC: 37 MG/DL (ref 5–40)
WBC NRBC COR # BLD: 5.26 10*3/MM3 (ref 3.4–10.8)

## 2022-06-24 PROCEDURE — 84436 ASSAY OF TOTAL THYROXINE: CPT

## 2022-06-24 PROCEDURE — 80053 COMPREHEN METABOLIC PANEL: CPT

## 2022-06-24 PROCEDURE — 80061 LIPID PANEL: CPT

## 2022-06-24 PROCEDURE — 83036 HEMOGLOBIN GLYCOSYLATED A1C: CPT

## 2022-06-24 PROCEDURE — 84479 ASSAY OF THYROID (T3 OR T4): CPT

## 2022-06-24 PROCEDURE — 36415 COLL VENOUS BLD VENIPUNCTURE: CPT

## 2022-06-24 PROCEDURE — 85025 COMPLETE CBC W/AUTO DIFF WBC: CPT

## 2022-06-24 PROCEDURE — 84443 ASSAY THYROID STIM HORMONE: CPT

## 2022-06-24 PROCEDURE — 82607 VITAMIN B-12: CPT

## 2022-06-24 PROCEDURE — 82043 UR ALBUMIN QUANTITATIVE: CPT

## 2022-06-27 ENCOUNTER — OFFICE VISIT (OUTPATIENT)
Dept: FAMILY MEDICINE CLINIC | Facility: CLINIC | Age: 72
End: 2022-06-27

## 2022-06-27 VITALS
HEART RATE: 83 BPM | SYSTOLIC BLOOD PRESSURE: 140 MMHG | WEIGHT: 170 LBS | RESPIRATION RATE: 16 BRPM | BODY MASS INDEX: 25.18 KG/M2 | TEMPERATURE: 98.2 F | OXYGEN SATURATION: 99 % | DIASTOLIC BLOOD PRESSURE: 78 MMHG | HEIGHT: 69 IN

## 2022-06-27 DIAGNOSIS — Z12.11 SPECIAL SCREENING FOR MALIGNANT NEOPLASMS, COLON: ICD-10-CM

## 2022-06-27 DIAGNOSIS — R53.82 CHRONIC FATIGUE: ICD-10-CM

## 2022-06-27 DIAGNOSIS — E11.65 TYPE 2 DIABETES MELLITUS WITH HYPERGLYCEMIA, WITHOUT LONG-TERM CURRENT USE OF INSULIN: ICD-10-CM

## 2022-06-27 DIAGNOSIS — E87.6 HYPOPOTASSEMIA: ICD-10-CM

## 2022-06-27 DIAGNOSIS — Z00.00 MEDICARE ANNUAL WELLNESS VISIT, SUBSEQUENT: Primary | ICD-10-CM

## 2022-06-27 DIAGNOSIS — I10 ESSENTIAL HYPERTENSION: ICD-10-CM

## 2022-06-27 DIAGNOSIS — E78.2 MIXED HYPERLIPIDEMIA: ICD-10-CM

## 2022-06-27 DIAGNOSIS — D69.6 THROMBOCYTOPENIA: ICD-10-CM

## 2022-06-27 DIAGNOSIS — Z12.12 SCREENING FOR MALIGNANT NEOPLASM OF THE RECTUM: ICD-10-CM

## 2022-06-27 LAB
T-UPTAKE NFR SERPL: 1.05 TBI (ref 0.8–1.3)
T4 SERPL-MCNC: 6.8 MCG/DL (ref 4.5–11.7)
TSH SERPL DL<=0.05 MIU/L-ACNC: 3.17 UIU/ML (ref 0.27–4.2)
VIT B12 BLD-MCNC: 548 PG/ML (ref 211–946)

## 2022-06-27 PROCEDURE — 1126F AMNT PAIN NOTED NONE PRSNT: CPT | Performed by: PHYSICIAN ASSISTANT

## 2022-06-27 PROCEDURE — 1159F MED LIST DOCD IN RCRD: CPT | Performed by: PHYSICIAN ASSISTANT

## 2022-06-27 PROCEDURE — G0439 PPPS, SUBSEQ VISIT: HCPCS | Performed by: PHYSICIAN ASSISTANT

## 2022-06-27 PROCEDURE — 1170F FXNL STATUS ASSESSED: CPT | Performed by: PHYSICIAN ASSISTANT

## 2022-06-27 NOTE — PROGRESS NOTES
The ABCs of the Annual Wellness Visit  Subsequent Medicare Wellness Visit    Chief Complaint   Patient presents with   • Medicare Wellness-subsequent   • Diabetes     management   • Hypertension     management   • Fatigue      Subjective    History of Present Illness:  Guy Plascencia is a 71 y.o. male who presents for a Subsequent Medicare Wellness Visit, type 2 diabetes, hypertension, hyperlipidemia and fatigue management.  Guy states he has been feeling tired for the past 6 months or longer.  He thinks this may be related to his metformin medication.  Has also been having diarrhea again.  He has had this in the past with a negative GI/stool studies.  Has not been keeping a blood sugar log.  Diet has not been healthy.  States he eats all the wrong stuff.  Tends to eat more carbs.  Sleep has been normal.  Bowel movements have been loose to diarrhea without dark black tarry stools.  Denied any current chest pain, shortness of air, cough, wheezing, upper respiratory symptoms, abdominal pain, nausea, vomiting, or urinary symptoms.  Last eye exam has been within the last 3 months.    The following portions of the patient's history were reviewed and   updated as appropriate:   He  has a past medical history of Arthritis, Diabetes mellitus (HCC), H/O  Multiple facial fractures (11/1968), H/O Broken femur (11/1968), History of transfusion, Hyperlipidemia, Hypertension, Kidney stone, Neoplasm of uncertain behavior, Peptic ulceration, Plantar fasciitis, Sleep apnea, and Thrombocytosis.  He does not have any pertinent problems on file.  He  has a past surgical history that includes Other surgical history; Other surgical history; Femur Surgery (11/1968); Skin biopsy; cystoscopy ureteroscopy stone manipulation/extraction (Left, 6/29/2016); Fracture surgery; Extracorporeal shock wave lithotripsy (Right, 7/29/2016); Cosmetic surgery (11/1968); Colonoscopy; and Esophagogastroduodenoscopy (N/A, 10/2/2019).  His family history  includes Cancer in his brother and another family member; Heart disease in his father; Skin cancer (age of onset: 50) in his brother.  He  reports that he quit smoking about 50 years ago. His smoking use included cigarettes. He quit after 14.00 years of use. He has never used smokeless tobacco. He reports that he does not drink alcohol and does not use drugs.  Current Outpatient Medications   Medication Sig Dispense Refill   • amLODIPine (NORVASC) 10 MG tablet TAKE ONE TABLET BY MOUTH DAILY 90 tablet 0   • Ascorbic Acid (VITAMIN C PO) Take  by mouth.     • Aspirin (ASPIR-81 PO) Take  by mouth Daily.     • Blood Glucose Monitoring Suppl (ONETOUCH VERIO FLEX SYSTEM) w/Device kit 1 Device 2 (Two) Times a Day. 1 kit 0   • Cholecalciferol (D3 ADULT PO) Take 2,000 Units by mouth Daily.     • glipizide (GLUCOTROL) 10 MG tablet TAKE ONE TABLET BY MOUTH TWICE A DAY BEFORE A MEAL 180 tablet 0   • glucose blood (Accu-Chek Vika Plus) test strip Use to test blood glucose 2 times daily  Dx: e11.9 200 each 3   • glucose blood (AGAMATRIX PRESTO TEST) test strip Used to check twice daily for type 2 diabetes 100 each 9   • hydroCHLOROthiazide (MICROZIDE) 12.5 MG capsule TAKE ONE CAPSULE BY MOUTH DAILY 90 capsule 0   • Insulin Pen Needle 32G X 4 MM misc Use once a weekly for Type 2 Diabetes 30 each 0   • lisinopril (PRINIVIL,ZESTRIL) 40 MG tablet TAKE ONE TABLET BY MOUTH DAILY 90 tablet 0   • metFORMIN (GLUCOPHAGE) 1000 MG tablet Take 0.5 tablets by mouth 2 (Two) Times a Day With Meals. 60 tablet 0   • metoprolol tartrate (LOPRESSOR) 50 MG tablet TAKE ONE TABLET BY MOUTH TWICE A  tablet 2   • Multiple Vitamins-Minerals (ZINC PO) Take  by mouth.     • omeprazole (priLOSEC) 20 MG capsule TAKE ONE CAPSULE BY MOUTH DAILY 90 capsule 3   • pravastatin (PRAVACHOL) 80 MG tablet TAKE ONE TABLET BY MOUTH DAILY 90 tablet 1   • vitamin B-12 (CYANOCOBALAMIN) 1000 MCG tablet Take 1 tablet by mouth Daily.     • diphenoxylate-atropine  (Lomotil) 2.5-0.025 MG per tablet Take 1 tablet by mouth 4 (Four) Times a Day As Needed for Diarrhea. 20 tablet 0   • SITagliptin (Januvia) 100 MG tablet Take 1 tablet by mouth Daily. 30 tablet 3     No current facility-administered medications for this visit.     Current Outpatient Medications on File Prior to Visit   Medication Sig   • amLODIPine (NORVASC) 10 MG tablet TAKE ONE TABLET BY MOUTH DAILY   • Ascorbic Acid (VITAMIN C PO) Take  by mouth.   • Aspirin (ASPIR-81 PO) Take  by mouth Daily.   • Blood Glucose Monitoring Suppl (ONETOUCH VERIO FLEX SYSTEM) w/Device kit 1 Device 2 (Two) Times a Day.   • Cholecalciferol (D3 ADULT PO) Take 2,000 Units by mouth Daily.   • glipizide (GLUCOTROL) 10 MG tablet TAKE ONE TABLET BY MOUTH TWICE A DAY BEFORE A MEAL   • glucose blood (Accu-Chek Vika Plus) test strip Use to test blood glucose 2 times daily  Dx: e11.9   • glucose blood (AGAMATRIX PRESTO TEST) test strip Used to check twice daily for type 2 diabetes   • hydroCHLOROthiazide (MICROZIDE) 12.5 MG capsule TAKE ONE CAPSULE BY MOUTH DAILY   • Insulin Pen Needle 32G X 4 MM misc Use once a weekly for Type 2 Diabetes   • lisinopril (PRINIVIL,ZESTRIL) 40 MG tablet TAKE ONE TABLET BY MOUTH DAILY   • metoprolol tartrate (LOPRESSOR) 50 MG tablet TAKE ONE TABLET BY MOUTH TWICE A DAY   • Multiple Vitamins-Minerals (ZINC PO) Take  by mouth.   • omeprazole (priLOSEC) 20 MG capsule TAKE ONE CAPSULE BY MOUTH DAILY   • pravastatin (PRAVACHOL) 80 MG tablet TAKE ONE TABLET BY MOUTH DAILY   • vitamin B-12 (CYANOCOBALAMIN) 1000 MCG tablet Take 1 tablet by mouth Daily.   • [DISCONTINUED] metFORMIN (GLUCOPHAGE) 1000 MG tablet TAKE ONE TABLET BY MOUTH TWICE A DAY WITH MEALS   • [DISCONTINUED] Ozempic, 1 MG/DOSE, 4 MG/3ML solution pen-injector DIAL AND INJECT UNDER THE SKIN 1 MG WEEKLY   • diphenoxylate-atropine (Lomotil) 2.5-0.025 MG per tablet Take 1 tablet by mouth 4 (Four) Times a Day As Needed for Diarrhea.     No current  facility-administered medications on file prior to visit.     He is allergic to penicillins and cimetidine..    Compared to one year ago, the patient feels his physical   health is the same.    Compared to one year ago, the patient feels his mental   health is the same.    Recent Hospitalizations:  He was not admitted to the hospital during the last year.       Current Medical Providers:  Patient Care Team:  Chelsea Mcelroy PA-C as PCP - General  Chelsea Mcelroy PA-C as Referring Physician (Family Medicine)  Michelle Sullivan MD as Consulting Physician (Hematology and Oncology)    Outpatient Medications Prior to Visit   Medication Sig Dispense Refill   • amLODIPine (NORVASC) 10 MG tablet TAKE ONE TABLET BY MOUTH DAILY 90 tablet 0   • Ascorbic Acid (VITAMIN C PO) Take  by mouth.     • Aspirin (ASPIR-81 PO) Take  by mouth Daily.     • Blood Glucose Monitoring Suppl (ONETOUCH VERIO FLEX SYSTEM) w/Device kit 1 Device 2 (Two) Times a Day. 1 kit 0   • Cholecalciferol (D3 ADULT PO) Take 2,000 Units by mouth Daily.     • glipizide (GLUCOTROL) 10 MG tablet TAKE ONE TABLET BY MOUTH TWICE A DAY BEFORE A MEAL 180 tablet 0   • glucose blood (Accu-Chek Vika Plus) test strip Use to test blood glucose 2 times daily  Dx: e11.9 200 each 3   • glucose blood (AGAMATRIX PRESTO TEST) test strip Used to check twice daily for type 2 diabetes 100 each 9   • hydroCHLOROthiazide (MICROZIDE) 12.5 MG capsule TAKE ONE CAPSULE BY MOUTH DAILY 90 capsule 0   • Insulin Pen Needle 32G X 4 MM misc Use once a weekly for Type 2 Diabetes 30 each 0   • lisinopril (PRINIVIL,ZESTRIL) 40 MG tablet TAKE ONE TABLET BY MOUTH DAILY 90 tablet 0   • metoprolol tartrate (LOPRESSOR) 50 MG tablet TAKE ONE TABLET BY MOUTH TWICE A  tablet 2   • Multiple Vitamins-Minerals (ZINC PO) Take  by mouth.     • omeprazole (priLOSEC) 20 MG capsule TAKE ONE CAPSULE BY MOUTH DAILY 90 capsule 3   • pravastatin (PRAVACHOL) 80 MG tablet TAKE ONE TABLET BY MOUTH DAILY  90 tablet 1   • vitamin B-12 (CYANOCOBALAMIN) 1000 MCG tablet Take 1 tablet by mouth Daily.     • metFORMIN (GLUCOPHAGE) 1000 MG tablet TAKE ONE TABLET BY MOUTH TWICE A DAY WITH MEALS 180 tablet 0   • Ozempic, 1 MG/DOSE, 4 MG/3ML solution pen-injector DIAL AND INJECT UNDER THE SKIN 1 MG WEEKLY 3 mL 6   • diphenoxylate-atropine (Lomotil) 2.5-0.025 MG per tablet Take 1 tablet by mouth 4 (Four) Times a Day As Needed for Diarrhea. 20 tablet 0     No facility-administered medications prior to visit.       No opioid medication identified on active medication list. I have reviewed chart for other potential  high risk medication/s and harmful drug interactions in the elderly.          Aspirin is on active medication list. Aspirin use is indicated based on review of current medical condition/s. Pros and cons of this therapy have been discussed today. Benefits of this medication outweigh potential harm.  Patient has been encouraged to continue taking this medication.  .      Patient Active Problem List   Diagnosis   • Bilateral shoulder pain   • Foot pain   • Type 2 diabetes mellitus with hyperglycemia, without long-term current use of insulin (HCC)   • Eczema   • Impotence of organic origin   • Mixed hyperlipidemia   • Essential hypertension   • Hypogonadism in male   • Obstructive sleep apnea syndrome   • Thrombocytopenia (HCC)   • Vitamin D deficiency   • Neoplasm of uncertain behavior   • Left ureteral stone   • Ureterolithiasis   • Trigger middle finger of right hand   • Acute pain of left shoulder   • Neck strain   • Sleep apnea   • Special screening for malignant neoplasms, colon   • Need for Streptococcus pneumoniae vaccination   • Splenomegaly   • Steatohepatitis   • Pancreatic cyst   • Medicare annual wellness visit, subsequent   • Dysphagia     Advance Care Planning  Advance Directive is not on file.  ACP discussion was held with the patient during this visit. Patient does not have an advance directive, declines  "further assistance.    Review of Systems   Constitutional: Positive for fatigue.   Respiratory: Negative for cough, chest tightness, shortness of breath and wheezing.    Cardiovascular: Negative for chest pain, palpitations and leg swelling.   Gastrointestinal: Positive for diarrhea. Negative for abdominal pain, blood in stool, constipation, nausea and vomiting.   Neurological: Negative for dizziness, light-headedness and numbness.   Psychiatric/Behavioral: Negative for sleep disturbance.   All other systems reviewed and are negative.       Objective    Vitals:    06/27/22 0943 06/27/22 1040   BP: 160/100 140/78   BP Location:  Left arm   Patient Position:  Sitting   Cuff Size:  Adult   Pulse: 83    Resp: 16    Temp: 98.2 °F (36.8 °C)    SpO2: 99%    Weight: 77.1 kg (170 lb)    Height: 175.3 cm (69\")    PainSc: 0-No pain      Estimated body mass index is 25.1 kg/m² as calculated from the following:    Height as of this encounter: 175.3 cm (69\").    Weight as of this encounter: 77.1 kg (170 lb).    BMI is >= 25 and <30. (Overweight) The following options were offered after discussion;: weight loss educational material (shared in after visit summary)      Does the patient have evidence of cognitive impairment? No    Physical Exam  Vitals and nursing note reviewed.   Constitutional:       Appearance: Normal appearance. He is well-developed, well-groomed and normal weight.      Interventions: Face mask in place.   HENT:      Head: Normocephalic and atraumatic.      Jaw: There is normal jaw occlusion.      Right Ear: Hearing, tympanic membrane, ear canal and external ear normal.      Left Ear: Hearing, tympanic membrane, ear canal and external ear normal.      Nose: Nose normal.      Right Sinus: No maxillary sinus tenderness or frontal sinus tenderness.      Left Sinus: No maxillary sinus tenderness or frontal sinus tenderness.      Mouth/Throat:      Lips: Pink.      Mouth: Mucous membranes are moist.      Dentition: " Normal dentition.      Tongue: No lesions.      Pharynx: Oropharynx is clear. Uvula midline.      Tonsils: No tonsillar exudate.   Eyes:      General: Lids are normal.      Conjunctiva/sclera: Conjunctivae normal.      Pupils: Pupils are equal, round, and reactive to light.   Neck:      Thyroid: No thyroid mass, thyromegaly or thyroid tenderness.      Vascular: No carotid bruit.      Trachea: Trachea and phonation normal. No tracheal tenderness.   Cardiovascular:      Rate and Rhythm: Normal rate and regular rhythm.      Pulses: Normal pulses.      Heart sounds: Normal heart sounds, S1 normal and S2 normal. No murmur heard.  Pulmonary:      Effort: Pulmonary effort is normal.      Breath sounds: Normal breath sounds and air entry.   Abdominal:      General: Bowel sounds are normal.      Palpations: Abdomen is soft.      Tenderness: There is no abdominal tenderness. There is no right CVA tenderness, left CVA tenderness, guarding or rebound. Negative signs include Edmond's sign, Rovsing's sign, McBurney's sign, psoas sign and obturator sign.   Musculoskeletal:      Cervical back: Neck supple.      Right lower leg: No edema.      Left lower leg: No edema.   Feet:      Right foot:      Protective Sensation: 10 sites tested. 10 sites sensed.      Skin integrity: Skin integrity normal.      Toenail Condition: Right toenails are normal.      Left foot:      Protective Sensation: 10 sites tested. 10 sites sensed.      Skin integrity: Skin integrity normal.      Toenail Condition: Left toenails are normal.      Comments: Diabetic Foot Exam Performed and Monofilament Test Performed  Lymphadenopathy:      Cervical: No cervical adenopathy.      Right cervical: No superficial, deep or posterior cervical adenopathy.     Left cervical: No superficial, deep or posterior cervical adenopathy.   Skin:     General: Skin is warm and dry.      Capillary Refill: Capillary refill takes less than 2 seconds.   Neurological:      Mental  Status: He is alert and oriented to person, place, and time.      Deep Tendon Reflexes:      Reflex Scores:       Patellar reflexes are 2+ on the right side and 2+ on the left side.  Psychiatric:         Attention and Perception: Attention and perception normal.         Mood and Affect: Mood and affect normal.         Speech: Speech normal.         Behavior: Behavior is cooperative.         Thought Content: Thought content normal.         Cognition and Memory: Cognition and memory normal.         Judgment: Judgment normal.     I wore a facial mask, face shield, and gloves during this patient encounter.  Patient also wearing a surgical mask.  Hand hygiene performed before and after seeing the patient.    Lab Results   Component Value Date    TRIG 232 (H) 2022    HDL 29 (L) 2022    LDL 43 2022    VLDL 37 2022    HGBA1C 7.10 (H) 2022            HEALTH RISK ASSESSMENT    Smoking Status:  Social History     Tobacco Use   Smoking Status Former Smoker   • Years: 14.00   • Types: Cigarettes   • Quit date:    • Years since quittin.5   Smokeless Tobacco Never Used     Alcohol Consumption:  Social History     Substance and Sexual Activity   Alcohol Use No     Fall Risk Screen:    STEADI Fall Risk Assessment was completed, and patient is at LOW risk for falls.Assessment completed on:2022    Depression Screening:  PHQ-2/PHQ-9 Depression Screening 2022   Retired PHQ-9 Total Score -   Retired Total Score -   Little Interest or Pleasure in Doing Things 0-->not at all   Feeling Down, Depressed or Hopeless 0-->not at all   PHQ-9: Brief Depression Severity Measure Score 0       Health Habits and Functional and Cognitive Screening:  Functional & Cognitive Status 2022   Do you have difficulty preparing food and eating? No   Do you have difficulty bathing yourself, getting dressed or grooming yourself? No   Do you have difficulty using the toilet? No   Do you have difficulty moving  around from place to place? No   Do you have trouble with steps or getting out of a bed or a chair? No   Current Diet Unhealthy Diet   Dental Exam Up to date   Eye Exam Up to date   Exercise (times per week) 0 times per week   Current Exercises Include No Regular Exercise   Current Exercise Activities Include -   Do you need help using the phone?  No   Are you deaf or do you have serious difficulty hearing?  No   Do you need help with transportation? No   Do you need help shopping? No   Do you need help preparing meals?  No   Do you need help with housework?  No   Do you need help with laundry? No   Do you need help taking your medications? No   Do you need help managing money? No   Do you ever drive or ride in a car without wearing a seat belt? No   Have you felt unusual stress, anger or loneliness in the last month? No   Who do you live with? Spouse   If you need help, do you have trouble finding someone available to you? Yes   Have you been bothered in the last four weeks by sexual problems? No   Do you have difficulty concentrating, remembering or making decisions? Yes       Age-appropriate Screening Schedule:  Refer to the list below for future screening recommendations based on patient's age, sex and/or medical conditions. Orders for these recommended tests are listed in the plan section. The patient has been provided with a written plan.    Health Maintenance   Topic Date Due   • DIABETIC EYE EXAM  12/27/2022 (Originally 3/19/2022)   • ZOSTER VACCINE (1 of 2) 12/27/2022 (Originally 12/16/1999)   • HEMOGLOBIN A1C  12/24/2022   • LIPID PANEL  06/24/2023   • URINE MICROALBUMIN  06/24/2023   • DIABETIC FOOT EXAM  06/27/2023   • INFLUENZA VACCINE  Discontinued   • TDAP/TD VACCINES  Discontinued              Assessment & Plan   CMS Preventative Services Quick Reference  Risk Factors Identified During Encounter  Cardiovascular Disease  Obesity/Overweight   Polypharmacy  diabetes  The above risks/problems have been  discussed with the patient.  Follow up actions/plans if indicated are seen below in the Assessment/Plan Section.  Pertinent information has been shared with the patient in the After Visit Summary.    Diagnoses and all orders for this visit:    1. Medicare annual wellness visit, subsequent (Primary)    2. Type 2 diabetes mellitus with hyperglycemia, without long-term current use of insulin (HCC)  -     Basic Metabolic Panel; Future    3. Mixed hyperlipidemia    4. Essential hypertension    5. Thrombocytopenia (HCC)    6. Chronic fatigue  -     Thyroid Panel With TSH; Future  -     Vitamin B12; Future    7. Special screening for malignant neoplasms, colon  -     Cologuard - Stool, Per Rectum; Future    8. Screening for malignant neoplasm of the rectum  -     Cologuard - Stool, Per Rectum; Future    9. Hypopotassemia  -     Basic Metabolic Panel; Future    Other orders  -     SITagliptin (Januvia) 100 MG tablet; Take 1 tablet by mouth Daily.  Dispense: 30 tablet; Refill: 3  -     metFORMIN (GLUCOPHAGE) 1000 MG tablet; Take 0.5 tablets by mouth 2 (Two) Times a Day With Meals.  Dispense: 60 tablet; Refill: 0    1.  Annual Medicare wellness exam with hypertension: I have rechecked blood pressure and got 140/78 left arm.  Doing well with his current medication.  No refills are needed at this time.  Return to office in 6 months.  2.  Chronic and stable type 2 diabetes with hyperlipidemia: I have reviewed above blood work with him today.  A1c has increased.  States he can not afford the Ozempic.  I have asked him to stop the Ozempic and start the Januvia medication.  States he like the Januvia better.  He will decrease his dose of metformin to 500 mg twice daily to see if this helps with diarrhea.  Will consider stopping the medication in 6 weeks.  We will recheck fasting blood work at that time.  3.  Chronic and stable thrombocytopenia: Platelet count was slightly decreased.  Will monitor.  Currently asymptomatic.  We will  keep follow-up appointment with hematologist as needed.  4.  New fatigue: Guy states he has been feeling tired for the past 6 months or longer.  I will add on thyroid profile with TSH and vitamin B12 to above blood work.  He will be notified of test results when completed.  5.  Need for screening colon and rectal cancer: He is due for his Cologuard testing.  Orders have been placed.  Guy will be notified of test results when completed.  6.  New hypopotassemia: Potassium level above was decreased.  He will increase his potassium eating foods for now.  We will recheck BMP in 6 weeks.  He will be notified of test results and any medication changes.    Follow Up:   Return in about 6 months (around 12/27/2022), or labs at [facility].     An After Visit Summary and PPPS were made available to the patient.                 Patient Counseling:  --Nutrition: Stressed importance of moderation in sodium/caffeine intake, saturated fat and cholesterol.  Discussed caloric balance, sufficient intake of fresh fruits, vegetables, fiber,   calcium, iron.  --Discussed the new recommendation against daily use of baby aspirin for primary prevention in low risk patients.  --Exercise: Stressed the importance of regular exercise by incorporating into daily routine.    --Substance Abuse: Discussed cessation/primary prevention of tobacco, alcohol, or other drug use; driving or other dangerous activities under the influence.    --Dental health: Discussed importance of regular tooth brushing, flossing, and dental visits.  -- Suggested having eyes and vision checked if needed or past due.  --Immunizations reviewed.  --Discussed benefits of screening colonoscopy.      .[facility]  [address] [place] [id number]  Dept: [phone]  Dept Fax: [phone]  Loc: [phone]  Loc Fax: [phone]

## 2022-06-28 DIAGNOSIS — I10 ESSENTIAL HYPERTENSION: ICD-10-CM

## 2022-06-28 RX ORDER — HYDROCHLOROTHIAZIDE 12.5 MG/1
12.5 CAPSULE, GELATIN COATED ORAL DAILY
Qty: 90 CAPSULE | Refills: 0 | Status: SHIPPED | OUTPATIENT
Start: 2022-06-28 | End: 2022-09-23

## 2022-07-06 RX ORDER — AMLODIPINE BESYLATE 10 MG/1
TABLET ORAL
Qty: 90 TABLET | Refills: 2 | Status: SHIPPED | OUTPATIENT
Start: 2022-07-06 | End: 2023-04-04

## 2022-07-21 RX ORDER — GLIPIZIDE 10 MG/1
TABLET ORAL
Qty: 180 TABLET | Refills: 2 | Status: SHIPPED | OUTPATIENT
Start: 2022-07-21

## 2022-08-07 DIAGNOSIS — I10 ESSENTIAL HYPERTENSION: ICD-10-CM

## 2022-08-08 RX ORDER — METOPROLOL TARTRATE 50 MG/1
TABLET, FILM COATED ORAL
Qty: 180 TABLET | Refills: 2 | Status: SHIPPED | OUTPATIENT
Start: 2022-08-08

## 2022-09-01 DIAGNOSIS — I10 ESSENTIAL HYPERTENSION: ICD-10-CM

## 2022-09-01 RX ORDER — LISINOPRIL 40 MG/1
TABLET ORAL
Qty: 90 TABLET | Refills: 0 | Status: SHIPPED | OUTPATIENT
Start: 2022-09-01 | End: 2022-12-12

## 2022-09-23 DIAGNOSIS — I10 ESSENTIAL HYPERTENSION: ICD-10-CM

## 2022-09-23 RX ORDER — HYDROCHLOROTHIAZIDE 12.5 MG/1
CAPSULE, GELATIN COATED ORAL
Qty: 90 CAPSULE | Refills: 0 | Status: SHIPPED | OUTPATIENT
Start: 2022-09-23 | End: 2022-12-27

## 2022-10-19 DIAGNOSIS — E78.2 MIXED HYPERLIPIDEMIA: ICD-10-CM

## 2022-10-19 RX ORDER — PRAVASTATIN SODIUM 80 MG/1
TABLET ORAL
Qty: 90 TABLET | Refills: 1 | Status: SHIPPED | OUTPATIENT
Start: 2022-10-19 | End: 2023-01-18

## 2022-11-07 RX ORDER — SITAGLIPTIN 100 MG/1
TABLET, FILM COATED ORAL
Qty: 30 TABLET | Refills: 3 | Status: SHIPPED | OUTPATIENT
Start: 2022-11-07 | End: 2023-03-08

## 2022-12-10 DIAGNOSIS — I10 ESSENTIAL HYPERTENSION: ICD-10-CM

## 2022-12-12 RX ORDER — LISINOPRIL 40 MG/1
TABLET ORAL
Qty: 90 TABLET | Refills: 0 | Status: SHIPPED | OUTPATIENT
Start: 2022-12-12 | End: 2023-03-28

## 2022-12-22 NOTE — PROGRESS NOTES
Subjective: Left shoulder pain     Patient ID: Guy Plascencia is a 66 y.o. male.    Chief Complaint:    History of Present Illness 66-year-old male right-hand-dominant presents evaluation of his left shoulder been symptomatic for about a month.  His no history of trauma but is noted persistent and reoccurring pain discomfort in the shoulder with any type of activity.  No real pain at rest but any type of particularly overhead activity causes moderate discomfort and weakness.  He is taking Tylenol but no other anti-inflammatory medications.  Had an x-ray done at the hospital which showed some acromial clavicular arthritis but otherwise is unremarkable.  Had a similar problem in the right shoulder treated in this office 789 years ago with an injection that relieved all of his pain.       Social History     Occupational History   • Not on file.     Social History Main Topics   • Smoking status: Former Smoker     Years: 14.00     Types: Cigarettes   • Smokeless tobacco: Never Used      Comment: QUIT 1972   • Alcohol use No   • Drug use: No   • Sexual activity: Defer      Review of Systems   Constitutional: Negative for chills, diaphoresis, fever and unexpected weight change.   HENT: Negative for hearing loss, nosebleeds, sore throat and tinnitus.    Eyes: Negative for pain and visual disturbance.   Respiratory: Negative for cough, shortness of breath and wheezing.    Cardiovascular: Negative for chest pain and palpitations.   Gastrointestinal: Positive for diarrhea. Negative for abdominal pain, nausea and vomiting.   Endocrine: Negative for cold intolerance, heat intolerance and polydipsia.   Genitourinary: Positive for difficulty urinating. Negative for dysuria and hematuria.   Musculoskeletal: Positive for arthralgias and myalgias. Negative for joint swelling.   Skin: Negative for rash and wound.   Allergic/Immunologic: Negative for environmental allergies.   Neurological: Negative for dizziness, syncope and numbness.    Hematological: Does not bruise/bleed easily.   Psychiatric/Behavioral: Positive for sleep disturbance. Negative for dysphoric mood. The patient is not nervous/anxious.          Past Medical History:   Diagnosis Date   • Arthritis    • Diabetes mellitus     TYPE 2   • History of transfusion     1968   • Hyperlipidemia    • Hypertension    • Kidney stone    • Neoplasm of uncertain behavior    • Plantar fasciitis    • Sleep apnea     CPAP     Past Surgical History:   Procedure Laterality Date   • COSMETIC SURGERY      MULTIPLE FACIAL FRACTURE   • CYSTOSCOPY URETEROSCOPY STONE MANIPULATION/EXTRACTION Left 6/29/2016    Procedure: CYSTOSCOPY, LEFT URETEROSCOPY, BASKET EXTRACTION OF LEFT URETERAL STONE, LEFT STENT PLACEMENT;  Surgeon: Ish Gaitan MD;  Location: [facility] OR;  Service:    • EXTRACORPOREAL SHOCK WAVE LITHOTRIPSY (ESWL) Right 7/29/2016    Procedure: RT EXTRACORPOREAL SHOCKWAVE LITHOTRIPSY;  Surgeon: Ish Gaitan MD;  Location: [facility] OR;  Service:    • FEMUR SURGERY     • FRACTURE SURGERY      femur fx   • OTHER SURGICAL HISTORY      stents, for kidney stones   • OTHER SURGICAL HISTORY      lithotripsy   • SKIN BIOPSY       Family History   Problem Relation Age of Onset   • Heart disease Father          Objective:  Vitals:    06/21/17 1355   BP: 158/78   Pulse: 81     Last 3 weights    06/21/17  1355   Weight: 180 lb (81.6 kg)     Body mass index is 26.58 kg/(m^2).       Ortho Exam  I reviewed the x-rays done at the hospital [facility] and no leak view of the left shoulder on June 12 shows acromial clavicular arthritis otherwise no acute changes.  No prior x-rays for comparison.  On exam is alert and oriented ×3.  Head is eyes nose and throat are normocephalic pupils equal round reactive to light sclerae are clear.  Left upper extremity shows no motor deficit good distal pulses.  No sensory loss.  The skin is cool to touch.  His no swelling to the upper or lower arm is good capillary refill showing no  vascular compromise.  Abduction and extension against resistance is intact although mildly painful.  Internal rotation is limited to about 35-40° of mildly painful.  Negative liftoff.  Negative Woodstock's test.  Flexion of the elbow against resistance is intact and unremarkable.  Deltoid function is 5 over 5.  He is taking occasional Advil at times it doesn't bother stomach but is not Any Relief.  He has use mainly Tylenol for pain relief.  It is his nondominant arm but still gives him moderate discomfort with activities.    Assessment:       1. Pain    2. Subacromial bursitis          Plan: Reviewed the x-ray findings a physical findings with the patient.  Believe we are dealing with a subacromial bursitis and tendinitis.  We will begin Mobic 7.5 daily but also going to inject the shoulder with lidocaine and Celestone as he is diabetic 40.  That was accomplished the posterior portal after sterile prep without complications tolerating it well.  Postinjection instructions given to the patient particularly monitoring blood glucose.  Return to see me in 6 weeks if still symptomatic.  Discussed history implant with the patient and he is in agreement.      Large Joint Arthrocentesis  Date/Time: 6/21/2017 2:22 PM  Consent given by: patient  Site marked: site marked  Supporting Documentation  Indications: pain   Procedure Details  Location: shoulder - L subacromial bursa  Preparation: Patient was prepped and draped in the usual sterile fashion  Needle size: 22 G  Medications administered: 6 mg betamethasone acetate-betamethasone sodium phosphate 6 (3-3) MG/ML; 4 mL lidocaine PF 1% 1 %  Patient tolerance: patient tolerated the procedure well with no immediate complications             Work Status:    ROLAN query complete.    Orders:  Orders Placed This Encounter   Procedures   • Large Joint Arthrocentesis       Medications:  New Medications Ordered This Visit   Medications   • meloxicam (MOBIC) 7.5 MG tablet     Sig: Take  1 tablet by mouth Daily.     Dispense:  30 tablet     Refill:  5       Followup:  Return in about 6 weeks (around 8/2/2017).          Dragon transcription disclaimer     Much of this encounter note is an electronic transcription/translation of spoken language to printed text. The electronic translation of spoken language may permit erroneous, or at times, nonsensical words or phrases to be inadvertently transcribed. Although I have reviewed the note for such errors, some may still exist.   known

## 2022-12-26 DIAGNOSIS — I10 ESSENTIAL HYPERTENSION: ICD-10-CM

## 2022-12-27 RX ORDER — HYDROCHLOROTHIAZIDE 12.5 MG/1
CAPSULE, GELATIN COATED ORAL
Qty: 90 CAPSULE | Refills: 0 | Status: SHIPPED | OUTPATIENT
Start: 2022-12-27 | End: 2023-03-28

## 2023-01-18 ENCOUNTER — OFFICE VISIT (OUTPATIENT)
Dept: FAMILY MEDICINE CLINIC | Facility: CLINIC | Age: 73
End: 2023-01-18
Payer: MEDICARE

## 2023-01-18 VITALS
SYSTOLIC BLOOD PRESSURE: 158 MMHG | BODY MASS INDEX: 25.62 KG/M2 | WEIGHT: 173 LBS | HEIGHT: 69 IN | TEMPERATURE: 97.1 F | DIASTOLIC BLOOD PRESSURE: 72 MMHG | OXYGEN SATURATION: 98 % | HEART RATE: 63 BPM

## 2023-01-18 DIAGNOSIS — E78.2 MIXED HYPERLIPIDEMIA: ICD-10-CM

## 2023-01-18 DIAGNOSIS — B02.9 VARICELLA ZOSTER: Primary | ICD-10-CM

## 2023-01-18 PROBLEM — S16.1XXA NECK STRAIN: Status: RESOLVED | Noted: 2017-06-12 | Resolved: 2023-01-18

## 2023-01-18 PROBLEM — M25.512 ACUTE PAIN OF LEFT SHOULDER: Status: RESOLVED | Noted: 2017-06-12 | Resolved: 2023-01-18

## 2023-01-18 PROBLEM — R13.10 DYSPHAGIA: Status: RESOLVED | Noted: 2019-09-19 | Resolved: 2023-01-18

## 2023-01-18 PROCEDURE — 99213 OFFICE O/P EST LOW 20 MIN: CPT | Performed by: FAMILY MEDICINE

## 2023-01-18 RX ORDER — PRAVASTATIN SODIUM 80 MG/1
TABLET ORAL
Qty: 90 TABLET | Refills: 1 | Status: SHIPPED | OUTPATIENT
Start: 2023-01-18

## 2023-01-18 RX ORDER — VALACYCLOVIR HYDROCHLORIDE 1 G/1
1000 TABLET, FILM COATED ORAL 3 TIMES DAILY
Qty: 21 TABLET | Refills: 0 | Status: SHIPPED | OUTPATIENT
Start: 2023-01-18 | End: 2023-01-30

## 2023-01-18 NOTE — PROGRESS NOTES
"  Subjective   Guy Plascencia is a 72 y.o. male who is here for   Chief Complaint   Patient presents with   • Rash     Right shoulder and back of head x 2 weeks- itching , redness no recent change in detergents, shampoo/body wash    .     History of Present Illness   Patient comes in today with a 2-week acute rash.  Starting on his right neck went down to the right shoulder right anterior chest over the collarbone area  Itching and mild pain.    The following portions of the patient's history were reviewed and updated as appropriate: allergies, current medications, past medical history, past social history, past surgical history and problem list.    Review of Systems    Objective   Vitals:    01/18/23 1049   BP: 158/72   BP Location: Left arm   Patient Position: Sitting   Cuff Size: Adult   Pulse: 63   Temp: 97.1 °F (36.2 °C)   SpO2: 98%   Weight: 78.5 kg (173 lb)   Height: 175.3 cm (69\")      Physical Exam  Skin:     Findings: Rash present. Rash is scaling and vesicular.                Assessment & Plan   Diagnoses and all orders for this visit:    1. Varicella zoster (Primary)  -     valACYclovir (Valtrex) 1000 MG tablet; Take 1 tablet by mouth 3 (Three) Times a Day.  Dispense: 21 tablet; Refill: 0  -     hydrocortisone 2.5 % ointment; Apply 1 application topically to the appropriate area as directed 3 (Three) Times a Day As Needed for Irritation or Rash (shingels rash).  Dispense: 30 g; Refill: 0      There are no Patient Instructions on file for this visit.    There are no discontinued medications.     Return for diabetes.    Dr. Jonathan Thompson  [place], [place]    "

## 2023-01-24 DIAGNOSIS — B02.9 VARICELLA ZOSTER: ICD-10-CM

## 2023-01-24 RX ORDER — VALACYCLOVIR HYDROCHLORIDE 1 G/1
TABLET, FILM COATED ORAL
Qty: 21 TABLET | Refills: 0 | OUTPATIENT
Start: 2023-01-24

## 2023-01-26 DIAGNOSIS — B02.9 VARICELLA ZOSTER: ICD-10-CM

## 2023-01-27 RX ORDER — VALACYCLOVIR HYDROCHLORIDE 1 G/1
TABLET, FILM COATED ORAL
Qty: 21 TABLET | Refills: 0 | OUTPATIENT
Start: 2023-01-27

## 2023-01-29 DIAGNOSIS — B02.9 VARICELLA ZOSTER: ICD-10-CM

## 2023-01-30 RX ORDER — VALACYCLOVIR HYDROCHLORIDE 1 G/1
TABLET, FILM COATED ORAL
Qty: 21 TABLET | Refills: 0 | Status: SHIPPED | OUTPATIENT
Start: 2023-01-30

## 2023-02-01 ENCOUNTER — OFFICE VISIT (OUTPATIENT)
Dept: FAMILY MEDICINE CLINIC | Facility: CLINIC | Age: 73
End: 2023-02-01
Payer: MEDICARE

## 2023-02-01 VITALS
HEART RATE: 86 BPM | RESPIRATION RATE: 15 BRPM | OXYGEN SATURATION: 98 % | BODY MASS INDEX: 25.7 KG/M2 | WEIGHT: 174 LBS | SYSTOLIC BLOOD PRESSURE: 130 MMHG | DIASTOLIC BLOOD PRESSURE: 78 MMHG | TEMPERATURE: 98 F

## 2023-02-01 DIAGNOSIS — L29.9 PRURITIC DERMATITIS: Primary | ICD-10-CM

## 2023-02-01 PROCEDURE — 99213 OFFICE O/P EST LOW 20 MIN: CPT | Performed by: PHYSICIAN ASSISTANT

## 2023-02-01 RX ORDER — HYDROXYZINE HYDROCHLORIDE 25 MG/1
25 TABLET, FILM COATED ORAL 3 TIMES DAILY PRN
Qty: 60 TABLET | Refills: 0 | Status: SHIPPED | OUTPATIENT
Start: 2023-02-01 | End: 2023-02-22

## 2023-02-01 RX ORDER — CLOTRIMAZOLE AND BETAMETHASONE DIPROPIONATE 10; .64 MG/G; MG/G
1 CREAM TOPICAL 2 TIMES DAILY
Qty: 45 G | Refills: 0 | Status: SHIPPED | OUTPATIENT
Start: 2023-02-01 | End: 2023-02-14

## 2023-02-01 NOTE — PROGRESS NOTES
Chief Complaint  Rash    Subjective          Guy Plascencia presents to [facility]  History of Present Illness  Guy is a 72 year old male who presents continuing itchy rash.  States he was seen in the office on January 18, 2023 by Dr. Jonathan Thompson and diagnosed with shingles.  He had a itchy burning rash on his neck area.  He was prescribed Valtrex medication and a hydrocortisone cream which she has taken.  The rash has not improved.  Now the rash is on his chest as well as on his lower back.  Denied any change in detergents, lotions, soaps or foods.  States the rash is very itchy.  No new pets in house.  Sugars have been running high due to not eating correctly.  Review of Systems   Constitutional: Negative.    HENT: Negative.    Eyes: Negative.    Respiratory: Negative.    Cardiovascular: Negative.    Gastrointestinal: Negative.    Endocrine: Negative.    Genitourinary: Negative.    Musculoskeletal: Negative.    Skin: Positive for rash.   Allergic/Immunologic: Negative.    Neurological: Negative.    Hematological: Negative.    Psychiatric/Behavioral: Negative.    All other systems reviewed and are negative.    Objective   Vital Signs:   /78   Pulse 86   Temp 98 °F (36.7 °C)   Resp 15   Wt 78.9 kg (174 lb)   SpO2 98%   BMI 25.70 kg/m²     Physical Exam  Constitutional:       Appearance: Normal appearance. He is well-developed and overweight.   HENT:      Head: Normocephalic and atraumatic.   Skin:     General: Skin is warm.      Capillary Refill: Capillary refill takes less than 2 seconds.      Findings: Rash present.          Neurological:      Mental Status: He is alert and oriented to person, place, and time.   Psychiatric:         Attention and Perception: Attention and perception normal.         Mood and Affect: Mood and affect normal.         Speech: Speech normal.         Behavior: Behavior normal. Behavior is cooperative.         Thought Content: Thought  content normal.         Cognition and Memory: Cognition and memory normal.         Judgment: Judgment normal.     I wore a facial mask during this patient encounter.  Patient also wearing a surgical mask.  Hand hygiene performed before and after seeing the patient.     Result Review :                 Assessment and Plan    Diagnoses and all orders for this visit:    1. Pruritic dermatitis (Primary)  -     hydrOXYzine (ATARAX) 25 MG tablet; Take 1 tablet by mouth 3 (Three) Times a Day As Needed for Itching.  Dispense: 60 tablet; Refill: 0  -     clotrimazole-betamethasone (Lotrisone) 1-0.05 % cream; Apply 1 application topically to the appropriate area as directed 2 (Two) Times a Day.  Dispense: 45 g; Refill: 0    Guy was seen in office today and diagnosed with new pruritic dermatitis.  Have sent hydroxyzine and Lotrisone cream to pharmacy to use as directed.  He will stop the hydrocortisone cream that was prescribed on January 18, 2023.  I suspect this is possibly a fungal rash.  He will update status in 2 weeks.  If no improvement, will send to dermatologist.  Stressed the importance of getting his sugars under control as well.  He will follow-up in the next month for type 2 diabetes management with labs.    Follow Up   Return in about 6 weeks (around 3/15/2023), or Dm same day labs.  Patient was given instructions and counseling regarding his condition or for health maintenance advice. Please see specific information pulled into the AVS if appropriate.     [facility]  [address] [place] [id number]  Dept: [phone]  Dept Fax: [phone]  Loc: [phone]  Loc Fax: [phone]

## 2023-02-14 DIAGNOSIS — L29.9 PRURITIC DERMATITIS: ICD-10-CM

## 2023-02-14 RX ORDER — CLOTRIMAZOLE AND BETAMETHASONE DIPROPIONATE 10; .64 MG/G; MG/G
CREAM TOPICAL
Qty: 45 G | Refills: 0 | Status: SHIPPED | OUTPATIENT
Start: 2023-02-14 | End: 2023-03-15

## 2023-02-21 DIAGNOSIS — L29.9 PRURITIC DERMATITIS: ICD-10-CM

## 2023-02-22 RX ORDER — OMEPRAZOLE 20 MG/1
20 CAPSULE, DELAYED RELEASE ORAL DAILY
Qty: 90 CAPSULE | Refills: 1 | Status: SHIPPED | OUTPATIENT
Start: 2023-02-22

## 2023-02-22 RX ORDER — HYDROXYZINE HYDROCHLORIDE 25 MG/1
TABLET, FILM COATED ORAL
Qty: 60 TABLET | Refills: 3 | Status: SHIPPED | OUTPATIENT
Start: 2023-02-22

## 2023-03-08 RX ORDER — SITAGLIPTIN 100 MG/1
TABLET, FILM COATED ORAL
Qty: 30 TABLET | Refills: 3 | Status: SHIPPED | OUTPATIENT
Start: 2023-03-08

## 2023-03-14 DIAGNOSIS — L29.9 PRURITIC DERMATITIS: ICD-10-CM

## 2023-03-15 RX ORDER — CLOTRIMAZOLE AND BETAMETHASONE DIPROPIONATE 10; .64 MG/G; MG/G
CREAM TOPICAL
Qty: 45 G | Refills: 0 | Status: SHIPPED | OUTPATIENT
Start: 2023-03-15

## 2023-03-20 ENCOUNTER — TELEPHONE (OUTPATIENT)
Dept: FAMILY MEDICINE CLINIC | Facility: CLINIC | Age: 73
End: 2023-03-20
Payer: MEDICARE

## 2023-03-20 DIAGNOSIS — L29.9 PRURITIC DERMATITIS: Primary | ICD-10-CM

## 2023-03-20 RX ORDER — CLOTRIMAZOLE 1 G/ML
SOLUTION TOPICAL 2 TIMES DAILY
Qty: 15 ML | Refills: 0 | Status: SHIPPED | OUTPATIENT
Start: 2023-03-20

## 2023-03-20 NOTE — TELEPHONE ENCOUNTER
Please notify patient that his clobetasol/betamethasone cream/ointment was not approved by insurance.  Have sent new topical medication to use for rash to his pharmacy.

## 2023-03-27 DIAGNOSIS — I10 ESSENTIAL HYPERTENSION: ICD-10-CM

## 2023-03-28 RX ORDER — HYDROCHLOROTHIAZIDE 12.5 MG/1
CAPSULE, GELATIN COATED ORAL
Qty: 90 CAPSULE | Refills: 1 | Status: SHIPPED | OUTPATIENT
Start: 2023-03-28

## 2023-03-28 RX ORDER — LISINOPRIL 40 MG/1
TABLET ORAL
Qty: 90 TABLET | Refills: 1 | Status: SHIPPED | OUTPATIENT
Start: 2023-03-28

## 2023-04-04 RX ORDER — AMLODIPINE BESYLATE 10 MG/1
TABLET ORAL
Qty: 90 TABLET | Refills: 1 | Status: SHIPPED | OUTPATIENT
Start: 2023-04-04

## 2023-04-24 ENCOUNTER — OFFICE VISIT (OUTPATIENT)
Dept: SLEEP MEDICINE | Facility: HOSPITAL | Age: 73
End: 2023-04-24
Payer: MEDICARE

## 2023-04-24 VITALS
HEIGHT: 69 IN | DIASTOLIC BLOOD PRESSURE: 70 MMHG | HEART RATE: 68 BPM | OXYGEN SATURATION: 98 % | WEIGHT: 166 LBS | SYSTOLIC BLOOD PRESSURE: 166 MMHG | BODY MASS INDEX: 24.59 KG/M2

## 2023-04-24 DIAGNOSIS — G47.33 OBSTRUCTIVE SLEEP APNEA: Primary | ICD-10-CM

## 2023-04-24 DIAGNOSIS — Z78.9 DIFFICULTY WITH CPAP USE: ICD-10-CM

## 2023-04-24 PROCEDURE — G0463 HOSPITAL OUTPT CLINIC VISIT: HCPCS

## 2023-04-24 RX ORDER — ZOLPIDEM TARTRATE 5 MG/1
TABLET ORAL
Qty: 2 TABLET | Refills: 0 | Status: SHIPPED | OUTPATIENT
Start: 2023-04-24 | End: 2023-04-27

## 2023-04-24 NOTE — PROGRESS NOTES
[facility]  [address]  [place] [address]  [phone]    PCP: Chelsea Mcelroy, LEO    Reason for visit:  Sleep disorders: MIRI    Guy is a 72 y.o.male who was seen in the [facility] Center today. Got dreamstation 2 but does not like water tank empties out. He sleeps from 11pm to 8am. Using nasal mask cushions. Fits well but has to keep really tight. Has tried different styles.  Wilton Sleepiness Scale is 8. Caffeine 6 per day. Alcohol 0 per week.    Guy  reports that he quit smoking about 51 years ago. His smoking use included cigarettes. He has a 28.00 pack-year smoking history. He has never used smokeless tobacco.    Pertinent Positive Review of Systems of denies  Rest of Review of Systems was negative as recorded in Sleep Questionnaire.    Patient  has a past medical history of Arthritis, Diabetes mellitus, H/O  Multiple facial fractures (11/1968), H/O Broken femur (11/1968), History of transfusion, Hyperlipidemia, Hypertension, Kidney stone, Neoplasm of uncertain behavior, Peptic ulceration, Plantar fasciitis, Sleep apnea, and Thrombocytosis.     Current Medications:    Current Outpatient Medications:   •  amLODIPine (NORVASC) 10 MG tablet, TAKE ONE TABLET BY MOUTH DAILY, Disp: 90 tablet, Rfl: 1  •  Ascorbic Acid (VITAMIN C PO), Take  by mouth., Disp: , Rfl:   •  Aspirin (ASPIR-81 PO), Take  by mouth Daily., Disp: , Rfl:   •  Blood Glucose Monitoring Suppl (ONETOUCH VERIO FLEX SYSTEM) w/Device kit, 1 Device 2 (Two) Times a Day., Disp: 1 kit, Rfl: 0  •  Cholecalciferol (D3 ADULT PO), Take 2,000 Units by mouth Daily., Disp: , Rfl:   •  clotrimazole (LOTRIMIN) 1 % external solution, Apply  topically to the appropriate area as directed 2 (Two) Times a Day. As needed up to 2 weeks, Disp: 15 mL, Rfl: 0  •  clotrimazole-betamethasone (LOTRISONE) 1-0.05 % cream, APPLY TO AFFECTED AREA(S) TWO TIMES A DAY, Disp: 45 g, Rfl: 0  •  diphenoxylate-atropine (Lomotil)  2.5-0.025 MG per tablet, Take 1 tablet by mouth 4 (Four) Times a Day As Needed for Diarrhea., Disp: 20 tablet, Rfl: 0  •  glipizide (GLUCOTROL) 10 MG tablet, TAKE ONE TABLET BY MOUTH TWICE A DAY BEFORE A MEAL, Disp: 180 tablet, Rfl: 2  •  glucose blood (Accu-Chek Vika Plus) test strip, Use to test blood glucose 2 times daily Dx: e11.9, Disp: 200 each, Rfl: 3  •  glucose blood (AGAMATRIX PRESTO TEST) test strip, Used to check twice daily for type 2 diabetes, Disp: 100 each, Rfl: 9  •  hydroCHLOROthiazide (MICROZIDE) 12.5 MG capsule, TAKE ONE CAPSULE BY MOUTH DAILY, Disp: 90 capsule, Rfl: 1  •  hydrocortisone 2.5 % ointment, APPLY ONE APPLICATION TOPICALLY TO THE APPROPRIATE AREA AS DIRECTED THREE TIMES A DAY AS NEEDED FOR IRRITATION OR RASH (SHINGLES RASH), Disp: 28.35 g, Rfl: 0  •  hydrOXYzine (ATARAX) 25 MG tablet, TAKE ONE TABLET BY MOUTH THREE TIMES A DAY AS NEEDED FOR ITCHING, Disp: 60 tablet, Rfl: 3  •  Insulin Pen Needle 32G X 4 MM misc, Use once a weekly for Type 2 Diabetes, Disp: 30 each, Rfl: 0  •  Januvia 100 MG tablet, TAKE ONE TABLET BY MOUTH DAILY, Disp: 30 tablet, Rfl: 3  •  lisinopril (PRINIVIL,ZESTRIL) 40 MG tablet, TAKE ONE TABLET BY MOUTH DAILY, Disp: 90 tablet, Rfl: 1  •  metFORMIN (GLUCOPHAGE) 1000 MG tablet, TAKE ONE TABLET BY MOUTH TWICE A DAY WITH MEALS, Disp: 180 tablet, Rfl: 1  •  metoprolol tartrate (LOPRESSOR) 50 MG tablet, TAKE ONE TABLET BY MOUTH TWICE A DAY, Disp: 180 tablet, Rfl: 2  •  Multiple Vitamins-Minerals (ZINC PO), Take  by mouth., Disp: , Rfl:   •  omeprazole (priLOSEC) 20 MG capsule, Take 1 capsule by mouth Daily., Disp: 90 capsule, Rfl: 1  •  pravastatin (PRAVACHOL) 80 MG tablet, TAKE ONE TABLET BY MOUTH DAILY, Disp: 90 tablet, Rfl: 1  •  valACYclovir (VALTREX) 1000 MG tablet, TAKE ONE TABLET BY MOUTH THREE TIMES A DAY, Disp: 21 tablet, Rfl: 0  •  vitamin B-12 (CYANOCOBALAMIN) 1000 MCG tablet, Take 1 tablet by mouth Daily., Disp: , Rfl:   •  zolpidem (Ambien) 5 MG tablet,  "Bring to sleep lab DO NOT use at home. PLEASE take if not asleep within 30 mins of start of study., Disp: 2 tablet, Rfl: 0   also entered in Sleep Questionnaire         Vital Signs: /70   Pulse 68   Ht 175.3 cm (69\")   Wt 75.3 kg (166 lb)   SpO2 98%   BMI 24.51 kg/m²     Body mass index is 24.51 kg/m².       Tongue: Large       Dentition: good       Pharynx: Posterior pharyngeal pillars are wide   Mallampatti: III (soft and hard palate and base of uvula visible)        General: Alert. Cooperative. Well developed. No acute distress.             Head:  Normocephalic. Symmetrical. Atraumatic.              Nose: No septal deviation. No drainage.          Throat: No oral lesions. No thrush. Moist mucous membranes.    Chest Wall:  Normal shape. Symmetric expansion with respiration. No tenderness.             Neck:  Trachea midline.           Lungs:  Clear to auscultation bilaterally. No wheezes. No rhonchi. No rales. Respirations regular, even and unlabored.            Heart:  Regular rhythm and normal rate. Normal S1 and S2. No murmur.     Abdomen:  Soft, non-tender and non-distended. Normal bowel sounds. No masses.  Extremities:  Moves all extremities well. No edema.    Psychiatric: Normal mood and affect.    Diagnostic data available to date is as below and was reviewed on current visit:  • 11/6/20:  The patient tolerated the home sleep testing with monitoring time of 383 minutes. The data obtained make this a technically adequate study. The apnea hypopneas index(AHI) was 32.5 per sleep hour.  The AHI during supine position was 48.4 per sleep hour. Mean heart rate of 75.8 BPM.  Snoring was noted 21.4% of sleep time. Lowest oxygen saturation during the study was 82%. Saturation below 89% was noted for 4.4 mins.     Lab Results   Component Value Date    FERRITIN 1,665.00 (H) 06/24/2021       Most current available usage data reviewed on 04/24/2023:  · 100% usage average 8 hours AHI 17.2 with set pressure 10 " cm      DME Company: [employer]    Prescription to [facility] for replacement supplies as below:    nasal mask      Description Replacement    Nasal PILLOWS      A 7034 Nasal Pillows  every 3 mth    A 7033 Repl Nasal Pillows  2 per mth    Nasal MASK/CUSHION     x A 7034 Nasal Mask/Cushion  every 3 mth   x A 7032 Repl Nasal Mask/Cushion  2 per mth    Full Face MASK      A 7030 Full Face Mask  every 3 mth    A 7031 Repl Face Mask  1 per mth      A 4604 Heated Tubing  every 3 mth    A 7037 Standard Tubing  every 3 mth   x A 7035 Headgear  every 3 mth   x A 7046 Repl Humidifier Chamber  every 6 yrs   x A 7038 Disposable Filters  2 per mth   x A 7039 Non-disposable Filter  every 6 mth   x A 7036 Chin Strap  every 6 mth     Orders Placed This Encounter   Procedures   • Polysomnography 4 or More Parameters With CPAP     Standing Status:   Future     Standing Expiration Date:   4/24/2024     Scheduling Instructions:      Patient may take prescribed Ambien if unable to sleep within 30 mins of lights out.      CPAP or BIPAP titration only.      Do not titrate for Central Sleep Apnea. Will need separate ASV study.     Order Specific Question:   Split Night     Answer:   No     Comments:   PAP study     Order Specific Question:   May take own meds     Answer:   Yes     Order Specific Question:   Details     Answer:   O2 Implementation per Protocol     Order Specific Question:   Release to patient     Answer:   Routine Release     New Medications Ordered This Visit   Medications   • zolpidem (Ambien) 5 MG tablet     Sig: Bring to sleep lab DO NOT use at home. PLEASE take if not asleep within 30 mins of start of study.     Dispense:  2 tablet     Refill:  0       Impression:  1. Obstructive sleep apnea    2. Difficulty with CPAP use        Plan:  Guy wants a new machine.  His AHI has been creeping up.  There is no obvious leaks that I can see.  He first requires a in-lab titration then order new RESMED machine.  He does not like his  replacement device from [employer] which is a dream station.    I reiterated the importance of effective treatment of obstructive sleep apnea with PAP machine.  Cardiovascular health risks of untreated sleep apnea were again reviewed.  Patient was asked to remain cautious if there is persistent hypersomnolence. The benefit of weight loss in reducing severity of obstructive sleep apnea was discussed.  Patient would benefit from adhering to a strict diet to achieve ideal BMI.     Change of PAP supplies regularly is important for effective use.  Change of cushion on the mask or plugs on nasal pillows along with disposable filters once every month and change of mask frame, tubing, headgear and Velcro straps every 6 months at the minimum was reiterated.    This patient is compliant with PAP machine and benefits from its use.  Apnea hypopneas index is corrected/improved.  Daytime hypersomnolence has resolved.     Patient will follow up in this clinic in 1 year aprn    Thank you for allowing me to participate in your patient's care.    Electronically signed by Ariel Augustin MD, 04/24/23, 10:35 AM EDT.    Part of this note may be an electronic transcription/translation of spoken language to printed text using the Dragon Dictation System.

## 2023-04-27 ENCOUNTER — OFFICE VISIT (OUTPATIENT)
Dept: FAMILY MEDICINE CLINIC | Facility: CLINIC | Age: 73
End: 2023-04-27
Payer: MEDICARE

## 2023-04-27 VITALS
RESPIRATION RATE: 16 BRPM | TEMPERATURE: 97.8 F | HEIGHT: 69 IN | SYSTOLIC BLOOD PRESSURE: 120 MMHG | BODY MASS INDEX: 24.73 KG/M2 | OXYGEN SATURATION: 98 % | WEIGHT: 167 LBS | HEART RATE: 58 BPM | DIASTOLIC BLOOD PRESSURE: 80 MMHG

## 2023-04-27 DIAGNOSIS — I10 ESSENTIAL HYPERTENSION: ICD-10-CM

## 2023-04-27 DIAGNOSIS — E78.2 MIXED HYPERLIPIDEMIA: ICD-10-CM

## 2023-04-27 DIAGNOSIS — G25.2 COARSE TREMORS: ICD-10-CM

## 2023-04-27 DIAGNOSIS — E11.65 TYPE 2 DIABETES MELLITUS WITH HYPERGLYCEMIA, WITHOUT LONG-TERM CURRENT USE OF INSULIN: Primary | ICD-10-CM

## 2023-04-27 DIAGNOSIS — R26.9 GAIT DISTURBANCE: ICD-10-CM

## 2023-04-27 PROBLEM — Z23 NEED FOR STREPTOCOCCUS PNEUMONIAE VACCINATION: Status: RESOLVED | Noted: 2018-01-18 | Resolved: 2023-04-27

## 2023-04-27 PROCEDURE — 99214 OFFICE O/P EST MOD 30 MIN: CPT | Performed by: PHYSICIAN ASSISTANT

## 2023-04-27 PROCEDURE — 3051F HG A1C>EQUAL 7.0%<8.0%: CPT | Performed by: PHYSICIAN ASSISTANT

## 2023-04-27 PROCEDURE — 3079F DIAST BP 80-89 MM HG: CPT | Performed by: PHYSICIAN ASSISTANT

## 2023-04-27 PROCEDURE — 3074F SYST BP LT 130 MM HG: CPT | Performed by: PHYSICIAN ASSISTANT

## 2023-04-27 RX ORDER — METOPROLOL TARTRATE 50 MG/1
50 TABLET, FILM COATED ORAL 2 TIMES DAILY
Qty: 180 TABLET | Refills: 2 | Status: SHIPPED | OUTPATIENT
Start: 2023-04-27

## 2023-04-27 NOTE — PROGRESS NOTES
"Chief Complaint  Diabetes (Management), Hypertension (Management), Hyperlipidemia (Management), and Tremors (Shuffle walk and balance issues)    Subjective          Guy Plascencia presents to [facility]  History of Present Illness  Guy is a 72-year-old male who presents for type 2 diabetes, hypertension and hyperlipidemia management.  He will states he has noticed that when he walks he leans to the left or right.  Wife has noticed that he shuffles when he walks.  Also wife has noticed that his hands have a tremor.  She is concerned for possible Parkinson's disease.  Denied any recent headaches, dizziness, vertigo/dizziness, head injury or recent falls.  Guy has been taking his Lopressor medication for blood pressure issues.    Guy has lost 7 pounds since last office visit.  He has been using apple cider which is helping with blood sugars and cholesterol issues he thinks.  His fasting blood sugars this morning was 105. Guy has been compliant with his medications.  Denied any current chest pain, shortness of air, cough, wheezing, abdominal pain, GI upset, urinary symptoms or swelling of ankles.  Bowel movements have been normal without dark black tarry stools.     Objective   Vital Signs:   /80 (BP Location: Left arm, Patient Position: Sitting, Cuff Size: Adult)   Pulse 58   Temp 97.8 °F (36.6 °C)   Resp 16   Ht 175.3 cm (69\")   Wt 75.8 kg (167 lb)   SpO2 98%   BMI 24.66 kg/m²     Physical Exam  Vitals and nursing note reviewed.   Constitutional:       Appearance: Normal appearance. He is well-developed, well-groomed and normal weight.   HENT:      Head: Normocephalic and atraumatic.      Jaw: There is normal jaw occlusion.      Right Ear: Hearing, tympanic membrane, ear canal and external ear normal.      Left Ear: Hearing, tympanic membrane, ear canal and external ear normal.      Nose: Nose normal.      Right Sinus: No maxillary sinus tenderness or frontal " sinus tenderness.      Left Sinus: No maxillary sinus tenderness or frontal sinus tenderness.      Mouth/Throat:      Lips: Pink.      Mouth: Mucous membranes are moist.      Dentition: Normal dentition.      Tongue: No lesions.      Pharynx: Oropharynx is clear. Uvula midline.      Tonsils: No tonsillar exudate.   Eyes:      General: Lids are normal.      Extraocular Movements: Extraocular movements intact.      Conjunctiva/sclera: Conjunctivae normal.      Pupils: Pupils are equal, round, and reactive to light.   Neck:      Thyroid: No thyroid mass, thyromegaly or thyroid tenderness.      Vascular: No carotid bruit.      Trachea: Trachea and phonation normal. No tracheal tenderness.   Cardiovascular:      Rate and Rhythm: Normal rate and regular rhythm.      Pulses: Normal pulses.      Heart sounds: Normal heart sounds, S1 normal and S2 normal. No murmur heard.  Pulmonary:      Effort: Pulmonary effort is normal.      Breath sounds: Normal breath sounds and air entry.   Abdominal:      General: Bowel sounds are normal.      Palpations: Abdomen is soft.      Tenderness: There is no abdominal tenderness. There is no right CVA tenderness, left CVA tenderness, guarding or rebound. Negative signs include Edmond's sign, Rovsing's sign, McBurney's sign, psoas sign and obturator sign.   Musculoskeletal:      Cervical back: Neck supple.      Right lower leg: No edema.      Left lower leg: No edema.   Lymphadenopathy:      Cervical: No cervical adenopathy.      Right cervical: No superficial, deep or posterior cervical adenopathy.     Left cervical: No superficial, deep or posterior cervical adenopathy.   Skin:     General: Skin is warm and dry.      Capillary Refill: Capillary refill takes less than 2 seconds.   Neurological:      Mental Status: He is alert and oriented to person, place, and time.      Cranial Nerves: Cranial nerves 2-12 are intact.      Sensory: Sensation is intact.      Motor: Tremor present.       Coordination: Romberg sign negative. Coordination normal. Finger-Nose-Finger Test and Heel to Shin Test normal. Rapid alternating movements normal.      Gait: Gait abnormal.      Deep Tendon Reflexes:      Reflex Scores:       Patellar reflexes are 2+ on the right side and 2+ on the left side.     Comments: Guy does not  feet with walking.  He is also leaning to the left.   Psychiatric:         Attention and Perception: Attention and perception normal.         Mood and Affect: Mood and affect normal.         Speech: Speech normal.         Behavior: Behavior is cooperative.         Thought Content: Thought content normal.         Cognition and Memory: Cognition and memory normal.         Judgment: Judgment normal.        Result Review :                 Assessment and Plan    Diagnoses and all orders for this visit:    1. Type 2 diabetes mellitus with hyperglycemia, without long-term current use of insulin (Primary)  -     Comprehensive Metabolic Panel  -     CBC (No Diff)  -     Hemoglobin A1c  -     Lipid Panel With LDL / HDL Ratio  -     Ambulatory Referral for Diabetic Eye Exam-Ophthalmology  -     glucose blood test strip; Use to test blood sugar twice a day for type 2 diabetes- [employer] brand glucometer  Dispense: 100 each; Refill: 12    2. Mixed hyperlipidemia  -     Comprehensive Metabolic Panel  -     CBC (No Diff)  -     Lipid Panel With LDL / HDL Ratio    3. Essential hypertension  -     metoprolol tartrate (LOPRESSOR) 50 MG tablet; Take 1 tablet by mouth 2 (Two) Times a Day.  Dispense: 180 tablet; Refill: 2    4. Gait disturbance  -     MRI Brain Without Contrast; Future    5. Coarse tremors  -     MRI Brain Without Contrast; Future    1.  Chronic and stable type 2 diabetes with hyperlipidemia: He is fasting will have a CBC, CMP, lipid profile, and hemoglobin A1c.  I have given him a diabetic eye form to take to ophthalmologist.  I have refilled his 6 test trips to pharmacy.  Guy will be  notified of test results when completed.  I have stressed the importance of decreasing sugar and carbohydrates in diet.  I have given him a diabetic eye form to take to ophthalmologist.  2.  Chronic and stable hypertension: I have rechecked blood pressure and got 130/64 in left arm.  Doing well with medication.  I have refilled his metoprolol to pharmacy.  3.  New coarse tremors with gait disturbance: I will proceed with MRI of brain for further evaluation.  He will be notified of test results when completed.  I will consider referral to neurologist after MRI.    I spent 26 minutes caring for Guy on this date of service. This time includes time spent by me in the following activities:preparing for the visit, obtaining and/or reviewing a separately obtained history, performing a medically appropriate examination and/or evaluation , counseling and educating the patient/family/caregiver, ordering medications, tests, or procedures and documenting information in the medical record     Follow Up   Return in about 6 months (around 10/27/2023), or same day lab, for Medicare Wellness.  Patient was given instructions and counseling regarding his condition or for health maintenance advice. Please see specific information pulled into the AVS if appropriate.     [facility]  [address] [place] [id number]  Dept: [phone]  Dept Fax: [phone]  Loc: [phone]  Loc Fax: [phone]

## 2023-04-28 LAB
ALBUMIN SERPL-MCNC: 4.5 G/DL (ref 3.5–5.2)
ALBUMIN/GLOB SERPL: 2.1 G/DL
ALP SERPL-CCNC: 71 U/L (ref 39–117)
ALT SERPL-CCNC: 20 U/L (ref 1–41)
AST SERPL-CCNC: 25 U/L (ref 1–40)
BILIRUB SERPL-MCNC: 0.5 MG/DL (ref 0–1.2)
BUN SERPL-MCNC: 20 MG/DL (ref 8–23)
BUN/CREAT SERPL: 16.8 (ref 7–25)
CALCIUM SERPL-MCNC: 10.1 MG/DL (ref 8.6–10.5)
CHLORIDE SERPL-SCNC: 105 MMOL/L (ref 98–107)
CHOLEST SERPL-MCNC: 113 MG/DL (ref 0–200)
CO2 SERPL-SCNC: 30.8 MMOL/L (ref 22–29)
CREAT SERPL-MCNC: 1.19 MG/DL (ref 0.76–1.27)
EGFRCR SERPLBLD CKD-EPI 2021: 64.5 ML/MIN/1.73
ERYTHROCYTE [DISTWIDTH] IN BLOOD BY AUTOMATED COUNT: 13.7 % (ref 12.3–15.4)
GLOBULIN SER CALC-MCNC: 2.1 GM/DL
GLUCOSE SERPL-MCNC: 143 MG/DL (ref 65–99)
HBA1C MFR BLD: 7 % (ref 4.8–5.6)
HCT VFR BLD AUTO: 38.8 % (ref 37.5–51)
HDLC SERPL-MCNC: 31 MG/DL (ref 40–60)
HGB BLD-MCNC: 12.9 G/DL (ref 13–17.7)
LDLC SERPL CALC-MCNC: 59 MG/DL (ref 0–100)
LDLC/HDLC SERPL: 1.83 {RATIO}
MCH RBC QN AUTO: 27.9 PG (ref 26.6–33)
MCHC RBC AUTO-ENTMCNC: 33.2 G/DL (ref 31.5–35.7)
MCV RBC AUTO: 83.8 FL (ref 79–97)
PLATELET # BLD AUTO: 125 10*3/MM3 (ref 140–450)
POTASSIUM SERPL-SCNC: 3.8 MMOL/L (ref 3.5–5.2)
PROT SERPL-MCNC: 6.6 G/DL (ref 6–8.5)
RBC # BLD AUTO: 4.63 10*6/MM3 (ref 4.14–5.8)
SODIUM SERPL-SCNC: 144 MMOL/L (ref 136–145)
TRIGL SERPL-MCNC: 127 MG/DL (ref 0–150)
VLDLC SERPL CALC-MCNC: 23 MG/DL (ref 5–40)
WBC # BLD AUTO: 5.08 10*3/MM3 (ref 3.4–10.8)

## 2023-05-01 RX ORDER — GLIPIZIDE 10 MG/1
TABLET ORAL
Qty: 180 TABLET | Refills: 2 | Status: SHIPPED | OUTPATIENT
Start: 2023-05-01

## 2023-05-03 ENCOUNTER — TELEPHONE (OUTPATIENT)
Dept: FAMILY MEDICINE CLINIC | Facility: CLINIC | Age: 73
End: 2023-05-03

## 2023-05-03 NOTE — TELEPHONE ENCOUNTER
Caller: Guy Plascencia    Relationship: Self    Best call back number: [phone]    What form or medical record are you requesting: COPY OF LAB REPORT FROM 4/17/23    How would you like to receive the form or medical records (pick-up, mail, fax): FAX    If fax, what is the fax number: [phone]    Additional notes: PATIENT REQUESTS TO GET A COPY OF HIS LAB REPORTS FROM HIS LAST APPOINTMENT

## 2023-05-03 NOTE — TELEPHONE ENCOUNTER
PATIENT'S FAX MACHINE ISN'T WORKING WOULD LIKE THE LAB REPORT MAILED TO THE ADDRESS ON FILE   [phone]

## 2023-05-05 ENCOUNTER — HOSPITAL ENCOUNTER (OUTPATIENT)
Dept: MRI IMAGING | Facility: HOSPITAL | Age: 73
Discharge: HOME | End: 2023-05-05
Payer: MEDICARE

## 2023-05-05 DIAGNOSIS — G25.2 COARSE TREMORS: ICD-10-CM

## 2023-05-05 DIAGNOSIS — R26.9 GAIT DISTURBANCE: ICD-10-CM

## 2023-05-05 PROCEDURE — 70551 MRI BRAIN STEM W/O DYE: CPT

## 2023-05-10 DIAGNOSIS — R26.9 GAIT DISTURBANCE: Primary | ICD-10-CM

## 2023-05-10 DIAGNOSIS — R90.89 ABNORMAL BRAIN MRI: ICD-10-CM

## 2023-05-10 DIAGNOSIS — G25.2 COARSE TREMORS: ICD-10-CM

## 2023-05-26 ENCOUNTER — OFFICE VISIT (OUTPATIENT)
Dept: NEUROLOGY | Facility: CLINIC | Age: 73
End: 2023-05-26
Payer: MEDICARE

## 2023-05-26 VITALS
OXYGEN SATURATION: 97 % | HEIGHT: 69 IN | SYSTOLIC BLOOD PRESSURE: 140 MMHG | BODY MASS INDEX: 25.18 KG/M2 | HEART RATE: 59 BPM | DIASTOLIC BLOOD PRESSURE: 72 MMHG | WEIGHT: 170 LBS

## 2023-05-26 DIAGNOSIS — Z87.820 HISTORY OF TRAUMATIC BRAIN INJURY: Primary | ICD-10-CM

## 2023-05-26 DIAGNOSIS — L29.9 PRURITIC DERMATITIS: ICD-10-CM

## 2023-05-26 DIAGNOSIS — E08.41 DIABETIC MONONEUROPATHY ASSOCIATED WITH DIABETES MELLITUS DUE TO UNDERLYING CONDITION: ICD-10-CM

## 2023-05-26 RX ORDER — HYDROXYZINE HYDROCHLORIDE 25 MG/1
TABLET, FILM COATED ORAL
Qty: 60 TABLET | Refills: 3 | Status: SHIPPED | OUTPATIENT
Start: 2023-05-26
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
## 2023-06-02 ENCOUNTER — PATIENT ROUNDING (BHMG ONLY) (OUTPATIENT)
Dept: NEUROLOGY | Facility: CLINIC | Age: 73
End: 2023-06-02

## 2023-08-21 RX ORDER — OMEPRAZOLE 20 MG/1
CAPSULE, DELAYED RELEASE ORAL
Qty: 90 CAPSULE | Refills: 1 | Status: SHIPPED | OUTPATIENT
Start: 2023-08-21

## 2023-09-20 ENCOUNTER — OFFICE VISIT (OUTPATIENT)
Dept: SLEEP MEDICINE | Facility: HOSPITAL | Age: 73
End: 2023-09-20
Payer: MEDICARE

## 2023-09-20 VITALS
SYSTOLIC BLOOD PRESSURE: 162 MMHG | DIASTOLIC BLOOD PRESSURE: 78 MMHG | HEART RATE: 70 BPM | HEIGHT: 69 IN | OXYGEN SATURATION: 99 % | WEIGHT: 174 LBS | BODY MASS INDEX: 25.77 KG/M2

## 2023-09-20 DIAGNOSIS — Z78.9 DIFFICULTY WITH BIPAP USE: ICD-10-CM

## 2023-09-20 DIAGNOSIS — G47.33 OBSTRUCTIVE SLEEP APNEA: Primary | ICD-10-CM

## 2023-09-20 PROCEDURE — G0463 HOSPITAL OUTPT CLINIC VISIT: HCPCS
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
## 2023-09-21 DIAGNOSIS — I10 ESSENTIAL HYPERTENSION: ICD-10-CM

## 2023-09-21 RX ORDER — LISINOPRIL 40 MG/1
TABLET ORAL
Qty: 90 TABLET | Refills: 1 | Status: SHIPPED | OUTPATIENT
Start: 2023-09-21

## 2023-09-21 RX ORDER — HYDROCHLOROTHIAZIDE 12.5 MG/1
CAPSULE, GELATIN COATED ORAL
Qty: 90 CAPSULE | Refills: 1 | Status: SHIPPED | OUTPATIENT
Start: 2023-09-21

## 2023-10-02 DIAGNOSIS — E78.2 MIXED HYPERLIPIDEMIA: ICD-10-CM

## 2023-10-03 RX ORDER — PRAVASTATIN SODIUM 80 MG/1
TABLET ORAL
Qty: 90 TABLET | Refills: 1 | Status: SHIPPED | OUTPATIENT
Start: 2023-10-03

## 2023-10-16 RX ORDER — AMLODIPINE BESYLATE 10 MG/1
TABLET ORAL
Qty: 90 TABLET | Refills: 0 | Status: SHIPPED | OUTPATIENT
Start: 2023-10-16

## 2023-10-26 ENCOUNTER — TELEPHONE (OUTPATIENT)
Dept: FAMILY MEDICINE CLINIC | Facility: CLINIC | Age: 73
End: 2023-10-26

## 2023-10-26 NOTE — TELEPHONE ENCOUNTER
Caller: Jessica Plascencia    Relationship to patient: Emergency Contact    Best call back number: [phone]    Date of exposure: N/A    Date of positive COVID19 test: 10/26/23(HOME TEST)    COVID19 symptoms: COUGH,FEVER,BODY ACHES    Date of initial quarantine: 10/25/23    Additional information or concerns: PATIENT'S WIFE IS REQUESTING IF PATIENT COULD BE PRESCRIBED SOME COUGH MEDICINE OR WHAT SHE WOULD RECOMMEND    What is the patients preferred pharmacy: [facility] [id number]  CHEO REYNOLDS - [address] - [phone]  - [phone]  [phone]

## 2023-10-27 ENCOUNTER — HOSPITAL ENCOUNTER (INPATIENT)
Facility: HOSPITAL | Age: 73
LOS: 1 days | Discharge: HOME | DRG: 177 | End: 2023-10-29
Attending: EMERGENCY MEDICINE | Admitting: INTERNAL MEDICINE
Payer: MEDICARE

## 2023-10-27 ENCOUNTER — TELEPHONE (OUTPATIENT)
Dept: FAMILY MEDICINE CLINIC | Facility: CLINIC | Age: 73
End: 2023-10-27

## 2023-10-27 ENCOUNTER — APPOINTMENT (OUTPATIENT)
Dept: GENERAL RADIOLOGY | Facility: HOSPITAL | Age: 73
DRG: 177 | End: 2023-10-27
Payer: MEDICARE

## 2023-10-27 DIAGNOSIS — R09.02 HYPOXIA: ICD-10-CM

## 2023-10-27 DIAGNOSIS — U07.1 PNEUMONIA DUE TO COVID-19 VIRUS: Primary | ICD-10-CM

## 2023-10-27 DIAGNOSIS — U07.1 REAL TIME REVERSE TRANSCRIPTASE PCR POSITIVE FOR COVID-19 VIRUS: Primary | ICD-10-CM

## 2023-10-27 DIAGNOSIS — R05.1 ACUTE COUGH: ICD-10-CM

## 2023-10-27 DIAGNOSIS — J12.82 PNEUMONIA DUE TO COVID-19 VIRUS: Primary | ICD-10-CM

## 2023-10-27 DIAGNOSIS — I50.9 ACUTE CONGESTIVE HEART FAILURE, UNSPECIFIED HEART FAILURE TYPE: ICD-10-CM

## 2023-10-27 LAB
BASOPHILS # BLD AUTO: 0.02 10*3/MM3 (ref 0–0.2)
BASOPHILS NFR BLD AUTO: 0.4 % (ref 0–1.5)
DEPRECATED RDW RBC AUTO: 44 FL (ref 37–54)
EOSINOPHIL # BLD AUTO: 0.01 10*3/MM3 (ref 0–0.4)
EOSINOPHIL NFR BLD AUTO: 0.2 % (ref 0.3–6.2)
ERYTHROCYTE [DISTWIDTH] IN BLOOD BY AUTOMATED COUNT: 13.9 % (ref 12.3–15.4)
HCT VFR BLD AUTO: 38.8 % (ref 37.5–51)
HGB BLD-MCNC: 12.9 G/DL (ref 13–17.7)
IMM GRANULOCYTES # BLD AUTO: 0.02 10*3/MM3 (ref 0–0.05)
IMM GRANULOCYTES NFR BLD AUTO: 0.4 % (ref 0–0.5)
LYMPHOCYTES # BLD AUTO: 0.63 10*3/MM3 (ref 0.7–3.1)
LYMPHOCYTES NFR BLD AUTO: 11.4 % (ref 19.6–45.3)
MCH RBC QN AUTO: 28.1 PG (ref 26.6–33)
MCHC RBC AUTO-ENTMCNC: 33.2 G/DL (ref 31.5–35.7)
MCV RBC AUTO: 84.5 FL (ref 79–97)
MONOCYTES # BLD AUTO: 0.76 10*3/MM3 (ref 0.1–0.9)
MONOCYTES NFR BLD AUTO: 13.8 % (ref 5–12)
NEUTROPHILS NFR BLD AUTO: 4.08 10*3/MM3 (ref 1.7–7)
NEUTROPHILS NFR BLD AUTO: 73.8 % (ref 42.7–76)
PLATELET # BLD AUTO: 95 10*3/MM3 (ref 140–450)
PMV BLD AUTO: 11.6 FL (ref 6–12)
RBC # BLD AUTO: 4.59 10*6/MM3 (ref 4.14–5.8)
WBC NRBC COR # BLD: 5.52 10*3/MM3 (ref 3.4–10.8)

## 2023-10-27 PROCEDURE — 93005 ELECTROCARDIOGRAM TRACING: CPT

## 2023-10-27 PROCEDURE — 80053 COMPREHEN METABOLIC PANEL: CPT | Performed by: EMERGENCY MEDICINE

## 2023-10-27 PROCEDURE — 87040 BLOOD CULTURE FOR BACTERIA: CPT | Performed by: EMERGENCY MEDICINE

## 2023-10-27 PROCEDURE — 93010 ELECTROCARDIOGRAM REPORT: CPT | Performed by: INTERNAL MEDICINE

## 2023-10-27 PROCEDURE — 85025 COMPLETE CBC W/AUTO DIFF WBC: CPT | Performed by: EMERGENCY MEDICINE

## 2023-10-27 PROCEDURE — 71045 X-RAY EXAM CHEST 1 VIEW: CPT

## 2023-10-27 PROCEDURE — 93005 ELECTROCARDIOGRAM TRACING: CPT | Performed by: EMERGENCY MEDICINE

## 2023-10-27 PROCEDURE — 99285 EMERGENCY DEPT VISIT HI MDM: CPT

## 2023-10-27 PROCEDURE — 36415 COLL VENOUS BLD VENIPUNCTURE: CPT

## 2023-10-27 PROCEDURE — 83880 ASSAY OF NATRIURETIC PEPTIDE: CPT | Performed by: EMERGENCY MEDICINE

## 2023-10-27 PROCEDURE — 94761 N-INVAS EAR/PLS OXIMETRY MLT: CPT

## 2023-10-27 PROCEDURE — 84484 ASSAY OF TROPONIN QUANT: CPT | Performed by: EMERGENCY MEDICINE

## 2023-10-27 PROCEDURE — 83605 ASSAY OF LACTIC ACID: CPT | Performed by: EMERGENCY MEDICINE

## 2023-10-27 PROCEDURE — 94640 AIRWAY INHALATION TREATMENT: CPT

## 2023-10-27 PROCEDURE — 87636 SARSCOV2 & INF A&B AMP PRB: CPT | Performed by: EMERGENCY MEDICINE

## 2023-10-27 PROCEDURE — 94799 UNLISTED PULMONARY SVC/PX: CPT

## 2023-10-27 RX ORDER — ALBUTEROL SULFATE 90 UG/1
2 AEROSOL, METERED RESPIRATORY (INHALATION) ONCE
Status: COMPLETED | OUTPATIENT
Start: 2023-10-27 | End: 2023-10-27

## 2023-10-27 RX ORDER — DEXTROMETHORPHAN HYDROBROMIDE AND PROMETHAZINE HYDROCHLORIDE 15; 6.25 MG/5ML; MG/5ML
5 SYRUP ORAL 4 TIMES DAILY PRN
Qty: 118 ML | Refills: 0 | Status: SHIPPED | OUTPATIENT
Start: 2023-10-27

## 2023-10-27 RX ORDER — ACETAMINOPHEN 500 MG
1000 TABLET ORAL ONCE
Status: COMPLETED | OUTPATIENT
Start: 2023-10-27 | End: 2023-10-27

## 2023-10-27 RX ORDER — SODIUM CHLORIDE 0.9 % (FLUSH) 0.9 %
10 SYRINGE (ML) INJECTION AS NEEDED
Status: DISCONTINUED | OUTPATIENT
Start: 2023-10-27 | End: 2023-10-29 | Stop reason: HOSPADM

## 2023-10-27 RX ADMIN — ACETAMINOPHEN 1000 MG: 500 TABLET ORAL at 23:43

## 2023-10-27 RX ADMIN — ALBUTEROL SULFATE 2 PUFF: 90 AEROSOL, METERED RESPIRATORY (INHALATION) at 23:56

## 2023-10-27 NOTE — TELEPHONE ENCOUNTER
I have sent promethazine/dextromethorphan cough syrup to the [facility] pharmacy in [place].

## 2023-10-27 NOTE — Clinical Note
Level of Care: Telemetry [5]   Diagnosis: Pneumonia due to COVID-19 virus [[id number]]   Admitting Physician: LOREN APONTE [[id number]]   Attending Physician: LOREN APONTE [[id number]]   Isolate for COVID?: Yes [1]   Certification: I Certify That Inpatient Hospital Services Are Medically Necessary For Greater Than 2 Midnights

## 2023-10-27 NOTE — TELEPHONE ENCOUNTER
[facility] pharmacy called and asked for demographics for the patient . I couldn't find where we sent in anything so I called the patient and his wife Jessica answered . She explained that she text Dr. Caro's wife about medications because they do some work for them. Dr. Caro said he would call in cough medicine and Ivermectin . He called in promethazine w codeine and that pharmacy didn't have it . Pt 's wife said he still needed the cough medicine .  The Pharmacist stated they have Promethazine DM    I faxed over info to [facility]  .  Jessica wasn't sure if you would do this ?

## 2023-10-28 PROBLEM — J12.82 PNEUMONIA DUE TO COVID-19 VIRUS: Status: ACTIVE | Noted: 2023-10-28

## 2023-10-28 PROBLEM — U07.1 PNEUMONIA DUE TO COVID-19 VIRUS: Status: ACTIVE | Noted: 2023-10-28

## 2023-10-28 LAB
ALBUMIN SERPL-MCNC: 4.1 G/DL (ref 3.5–5.2)
ALBUMIN/GLOB SERPL: 1.2 G/DL
ALP SERPL-CCNC: 80 U/L (ref 39–117)
ALT SERPL W P-5'-P-CCNC: 22 U/L (ref 1–41)
ANION GAP SERPL CALCULATED.3IONS-SCNC: 13.9 MMOL/L (ref 5–15)
ARTERIAL PATENCY WRIST A: ABNORMAL
AST SERPL-CCNC: 31 U/L (ref 1–40)
ATMOSPHERIC PRESS: 742 MMHG
BASE EXCESS BLDA CALC-SCNC: 3.7 MMOL/L (ref 0–2)
BDY SITE: ABNORMAL
BILIRUB SERPL-MCNC: 0.6 MG/DL (ref 0–1.2)
BODY TEMPERATURE: 37 C
BUN SERPL-MCNC: 20 MG/DL (ref 8–23)
BUN/CREAT SERPL: 13.7 (ref 7–25)
CALCIUM SPEC-SCNC: 9.3 MG/DL (ref 8.6–10.5)
CHLORIDE SERPL-SCNC: 98 MMOL/L (ref 98–107)
CO2 SERPL-SCNC: 24.1 MMOL/L (ref 22–29)
CREAT SERPL-MCNC: 1.46 MG/DL (ref 0.76–1.27)
CRP SERPL-MCNC: 9.27 MG/DL (ref 0–0.5)
D-LACTATE SERPL-SCNC: 1.4 MMOL/L (ref 0.5–2)
EGFRCR SERPLBLD CKD-EPI 2021: 50.5 ML/MIN/1.73
FLUAV RNA RESP QL NAA+PROBE: NOT DETECTED
FLUBV RNA RESP QL NAA+PROBE: NOT DETECTED
GAS FLOW AIRWAY: 6 LPM
GLOBULIN UR ELPH-MCNC: 3.3 GM/DL
GLUCOSE BLDC GLUCOMTR-MCNC: 310 MG/DL (ref 70–130)
GLUCOSE BLDC GLUCOMTR-MCNC: 383 MG/DL (ref 70–130)
GLUCOSE BLDC GLUCOMTR-MCNC: 387 MG/DL (ref 70–130)
GLUCOSE BLDC GLUCOMTR-MCNC: 410 MG/DL (ref 70–130)
GLUCOSE SERPL-MCNC: 211 MG/DL (ref 65–99)
HBA1C MFR BLD: 7.1 % (ref 4.8–5.6)
HCO3 BLDA-SCNC: 26.7 MMOL/L (ref 20–26)
HGB BLDA-MCNC: 12.8 G/DL (ref 14–18)
INHALED O2 CONCENTRATION: 50 %
Lab: ABNORMAL
MODALITY: ABNORMAL
NT-PROBNP SERPL-MCNC: 3044 PG/ML (ref 0–900)
PCO2 BLDA: 34.3 MM HG (ref 35–45)
PCO2 TEMP ADJ BLD: 34.3 MM HG (ref 35–45)
PH BLDA: 7.5 PH UNITS (ref 7.35–7.45)
PH, TEMP CORRECTED: 7.5 PH UNITS (ref 7.35–7.45)
PO2 BLDA: 57.5 MM HG (ref 83–108)
PO2 TEMP ADJ BLD: 57.5 MM HG (ref 83–108)
POTASSIUM SERPL-SCNC: 3.7 MMOL/L (ref 3.5–5.2)
PROT SERPL-MCNC: 7.4 G/DL (ref 6–8.5)
QT INTERVAL: 344 MS
QTC INTERVAL: 461 MS
SAO2 % BLDCOA: 92 % (ref 94–99)
SARS-COV-2 RNA RESP QL NAA+PROBE: DETECTED
SODIUM SERPL-SCNC: 136 MMOL/L (ref 136–145)
TROPONIN T SERPL HS-MCNC: 19 NG/L
VENTILATOR MODE: ABNORMAL

## 2023-10-28 PROCEDURE — 94799 UNLISTED PULMONARY SVC/PX: CPT

## 2023-10-28 PROCEDURE — 83036 HEMOGLOBIN GLYCOSYLATED A1C: CPT | Performed by: FAMILY MEDICINE

## 2023-10-28 PROCEDURE — 36600 WITHDRAWAL OF ARTERIAL BLOOD: CPT

## 2023-10-28 PROCEDURE — 63710000001 INSULIN ASPART PER 5 UNITS: Performed by: FAMILY MEDICINE

## 2023-10-28 PROCEDURE — 25010000002 DEXAMETHASONE PER 1 MG: Performed by: EMERGENCY MEDICINE

## 2023-10-28 PROCEDURE — 82803 BLOOD GASES ANY COMBINATION: CPT

## 2023-10-28 PROCEDURE — 86140 C-REACTIVE PROTEIN: CPT | Performed by: FAMILY MEDICINE

## 2023-10-28 PROCEDURE — 25010000002 DEXAMETHASONE PER 1 MG: Performed by: FAMILY MEDICINE

## 2023-10-28 PROCEDURE — 63710000001 INSULIN DETEMIR PER 5 UNITS: Performed by: INTERNAL MEDICINE

## 2023-10-28 PROCEDURE — 94660 CPAP INITIATION&MGMT: CPT

## 2023-10-28 PROCEDURE — 63710000001 INSULIN ASPART PER 5 UNITS: Performed by: INTERNAL MEDICINE

## 2023-10-28 PROCEDURE — 25010000002 ENOXAPARIN PER 10 MG: Performed by: FAMILY MEDICINE

## 2023-10-28 PROCEDURE — 25010000002 FUROSEMIDE PER 20 MG: Performed by: EMERGENCY MEDICINE

## 2023-10-28 PROCEDURE — 94761 N-INVAS EAR/PLS OXIMETRY MLT: CPT

## 2023-10-28 PROCEDURE — 82948 REAGENT STRIP/BLOOD GLUCOSE: CPT

## 2023-10-28 RX ORDER — DEXAMETHASONE SODIUM PHOSPHATE 4 MG/ML
6 INJECTION, SOLUTION INTRA-ARTICULAR; INTRALESIONAL; INTRAMUSCULAR; INTRAVENOUS; SOFT TISSUE EVERY 12 HOURS
Status: DISCONTINUED | OUTPATIENT
Start: 2023-10-28 | End: 2023-10-28

## 2023-10-28 RX ORDER — INSULIN ASPART 100 [IU]/ML
1-200 INJECTION, SOLUTION INTRAVENOUS; SUBCUTANEOUS
Status: DISCONTINUED | OUTPATIENT
Start: 2023-10-28 | End: 2023-10-29 | Stop reason: HOSPADM

## 2023-10-28 RX ORDER — INSULIN ASPART 100 [IU]/ML
2-7 INJECTION, SOLUTION INTRAVENOUS; SUBCUTANEOUS
Status: DISCONTINUED | OUTPATIENT
Start: 2023-10-28 | End: 2023-10-28

## 2023-10-28 RX ORDER — ASPIRIN 81 MG/1
81 TABLET ORAL DAILY
Status: DISCONTINUED | OUTPATIENT
Start: 2023-10-28 | End: 2023-10-29 | Stop reason: HOSPADM

## 2023-10-28 RX ORDER — DEXAMETHASONE SODIUM PHOSPHATE 4 MG/ML
8 INJECTION, SOLUTION INTRA-ARTICULAR; INTRALESIONAL; INTRAMUSCULAR; INTRAVENOUS; SOFT TISSUE ONCE
Status: COMPLETED | OUTPATIENT
Start: 2023-10-28 | End: 2023-10-28

## 2023-10-28 RX ORDER — ASCORBIC ACID 500 MG
500 TABLET ORAL DAILY
Status: DISCONTINUED | OUTPATIENT
Start: 2023-10-28 | End: 2023-10-29 | Stop reason: HOSPADM

## 2023-10-28 RX ORDER — DEXAMETHASONE SODIUM PHOSPHATE 4 MG/ML
6 INJECTION, SOLUTION INTRA-ARTICULAR; INTRALESIONAL; INTRAMUSCULAR; INTRAVENOUS; SOFT TISSUE DAILY
Status: DISCONTINUED | OUTPATIENT
Start: 2023-10-29 | End: 2023-10-29

## 2023-10-28 RX ORDER — AMLODIPINE BESYLATE 5 MG/1
10 TABLET ORAL DAILY
Status: DISCONTINUED | OUTPATIENT
Start: 2023-10-28 | End: 2023-10-29 | Stop reason: HOSPADM

## 2023-10-28 RX ORDER — SODIUM CHLORIDE 9 MG/ML
40 INJECTION, SOLUTION INTRAVENOUS AS NEEDED
Status: DISCONTINUED | OUTPATIENT
Start: 2023-10-28 | End: 2023-10-29 | Stop reason: HOSPADM

## 2023-10-28 RX ORDER — NICOTINE POLACRILEX 4 MG
15 LOZENGE BUCCAL
Status: DISCONTINUED | OUTPATIENT
Start: 2023-10-28 | End: 2023-10-28

## 2023-10-28 RX ORDER — IBUPROFEN 600 MG/1
1 TABLET ORAL
Status: DISCONTINUED | OUTPATIENT
Start: 2023-10-28 | End: 2023-10-29 | Stop reason: HOSPADM

## 2023-10-28 RX ORDER — ACETAMINOPHEN 325 MG/1
650 TABLET ORAL EVERY 4 HOURS PRN
Status: DISCONTINUED | OUTPATIENT
Start: 2023-10-28 | End: 2023-10-29 | Stop reason: HOSPADM

## 2023-10-28 RX ORDER — ONDANSETRON 2 MG/ML
4 INJECTION INTRAMUSCULAR; INTRAVENOUS EVERY 6 HOURS PRN
Status: DISCONTINUED | OUTPATIENT
Start: 2023-10-28 | End: 2023-10-29 | Stop reason: HOSPADM

## 2023-10-28 RX ORDER — ENOXAPARIN SODIUM 100 MG/ML
40 INJECTION SUBCUTANEOUS DAILY
Status: DISCONTINUED | OUTPATIENT
Start: 2023-10-28 | End: 2023-10-29 | Stop reason: HOSPADM

## 2023-10-28 RX ORDER — DEXTROSE MONOHYDRATE 25 G/50ML
10-50 INJECTION, SOLUTION INTRAVENOUS
Status: DISCONTINUED | OUTPATIENT
Start: 2023-10-28 | End: 2023-10-29 | Stop reason: HOSPADM

## 2023-10-28 RX ORDER — ZINC SULFATE 50(220)MG
220 CAPSULE ORAL DAILY
Status: DISCONTINUED | OUTPATIENT
Start: 2023-10-28 | End: 2023-10-29 | Stop reason: HOSPADM

## 2023-10-28 RX ORDER — DEXTROSE MONOHYDRATE 25 G/50ML
25 INJECTION, SOLUTION INTRAVENOUS
Status: DISCONTINUED | OUTPATIENT
Start: 2023-10-28 | End: 2023-10-28

## 2023-10-28 RX ORDER — HYDROXYZINE HYDROCHLORIDE 25 MG/1
25 TABLET, FILM COATED ORAL 3 TIMES DAILY PRN
Status: DISCONTINUED | OUTPATIENT
Start: 2023-10-28 | End: 2023-10-29 | Stop reason: HOSPADM

## 2023-10-28 RX ORDER — FUROSEMIDE 10 MG/ML
40 INJECTION INTRAMUSCULAR; INTRAVENOUS ONCE
Status: COMPLETED | OUTPATIENT
Start: 2023-10-28 | End: 2023-10-28

## 2023-10-28 RX ORDER — ONDANSETRON 4 MG/1
4 TABLET, FILM COATED ORAL EVERY 6 HOURS PRN
Status: DISCONTINUED | OUTPATIENT
Start: 2023-10-28 | End: 2023-10-29 | Stop reason: HOSPADM

## 2023-10-28 RX ORDER — PRAVASTATIN SODIUM 20 MG
80 TABLET ORAL DAILY
Status: DISCONTINUED | OUTPATIENT
Start: 2023-10-28 | End: 2023-10-29 | Stop reason: HOSPADM

## 2023-10-28 RX ORDER — INSULIN ASPART 100 [IU]/ML
1-200 INJECTION, SOLUTION INTRAVENOUS; SUBCUTANEOUS AS NEEDED
Status: DISCONTINUED | OUTPATIENT
Start: 2023-10-28 | End: 2023-10-29 | Stop reason: HOSPADM

## 2023-10-28 RX ORDER — HYDRALAZINE HYDROCHLORIDE 20 MG/ML
10 INJECTION INTRAMUSCULAR; INTRAVENOUS EVERY 4 HOURS PRN
Status: DISCONTINUED | OUTPATIENT
Start: 2023-10-28 | End: 2023-10-29 | Stop reason: HOSPADM

## 2023-10-28 RX ORDER — IBUPROFEN 600 MG/1
1 TABLET ORAL
Status: DISCONTINUED | OUTPATIENT
Start: 2023-10-28 | End: 2023-10-28

## 2023-10-28 RX ORDER — METOPROLOL TARTRATE 50 MG/1
50 TABLET, FILM COATED ORAL 2 TIMES DAILY
Status: DISCONTINUED | OUTPATIENT
Start: 2023-10-28 | End: 2023-10-29 | Stop reason: HOSPADM

## 2023-10-28 RX ORDER — NICOTINE POLACRILEX 4 MG
15 LOZENGE BUCCAL
Status: DISCONTINUED | OUTPATIENT
Start: 2023-10-28 | End: 2023-10-29 | Stop reason: HOSPADM

## 2023-10-28 RX ORDER — GLIPIZIDE 10 MG/1
10 TABLET ORAL
Status: DISCONTINUED | OUTPATIENT
Start: 2023-10-28 | End: 2023-10-28

## 2023-10-28 RX ORDER — SODIUM CHLORIDE 0.9 % (FLUSH) 0.9 %
10 SYRINGE (ML) INJECTION EVERY 12 HOURS SCHEDULED
Status: DISCONTINUED | OUTPATIENT
Start: 2023-10-28 | End: 2023-10-29 | Stop reason: HOSPADM

## 2023-10-28 RX ORDER — PANTOPRAZOLE SODIUM 40 MG/1
40 TABLET, DELAYED RELEASE ORAL
Status: DISCONTINUED | OUTPATIENT
Start: 2023-10-28 | End: 2023-10-29 | Stop reason: HOSPADM

## 2023-10-28 RX ORDER — SODIUM CHLORIDE 0.9 % (FLUSH) 0.9 %
10 SYRINGE (ML) INJECTION AS NEEDED
Status: DISCONTINUED | OUTPATIENT
Start: 2023-10-28 | End: 2023-10-29 | Stop reason: HOSPADM

## 2023-10-28 RX ADMIN — AMLODIPINE BESYLATE 10 MG: 5 TABLET ORAL at 08:54

## 2023-10-28 RX ADMIN — INSULIN ASPART 11 UNITS: 100 INJECTION, SOLUTION INTRAVENOUS; SUBCUTANEOUS at 18:19

## 2023-10-28 RX ADMIN — FUROSEMIDE 40 MG: 10 INJECTION, SOLUTION INTRAMUSCULAR; INTRAVENOUS at 01:44

## 2023-10-28 RX ADMIN — METOPROLOL TARTRATE 50 MG: 50 TABLET, FILM COATED ORAL at 08:54

## 2023-10-28 RX ADMIN — INSULIN DETEMIR 20 UNITS: 100 INJECTION, SOLUTION SUBCUTANEOUS at 21:22

## 2023-10-28 RX ADMIN — DEXAMETHASONE SODIUM PHOSPHATE 8 MG: 4 INJECTION, SOLUTION INTRAMUSCULAR; INTRAVENOUS at 01:44

## 2023-10-28 RX ADMIN — GLIPIZIDE 10 MG: 10 TABLET ORAL at 08:57

## 2023-10-28 RX ADMIN — METOPROLOL TARTRATE 50 MG: 50 TABLET, FILM COATED ORAL at 21:24

## 2023-10-28 RX ADMIN — INSULIN ASPART 7 UNITS: 100 INJECTION, SOLUTION INTRAVENOUS; SUBCUTANEOUS at 17:14

## 2023-10-28 RX ADMIN — OXYCODONE HYDROCHLORIDE AND ACETAMINOPHEN 500 MG: 500 TABLET ORAL at 08:54

## 2023-10-28 RX ADMIN — ASPIRIN 81 MG: 81 TABLET, COATED ORAL at 08:54

## 2023-10-28 RX ADMIN — INSULIN ASPART 6 UNITS: 100 INJECTION, SOLUTION INTRAVENOUS; SUBCUTANEOUS at 12:46

## 2023-10-28 RX ADMIN — PRAVASTATIN SODIUM 80 MG: 20 TABLET ORAL at 08:54

## 2023-10-28 RX ADMIN — GLIPIZIDE 10 MG: 10 TABLET ORAL at 17:13

## 2023-10-28 RX ADMIN — Medication 10 ML: at 08:59

## 2023-10-28 RX ADMIN — ENOXAPARIN SODIUM 40 MG: 40 INJECTION SUBCUTANEOUS at 09:00

## 2023-10-28 RX ADMIN — LINAGLIPTIN 5 MG: 5 TABLET, FILM COATED ORAL at 09:08

## 2023-10-28 RX ADMIN — DEXAMETHASONE SODIUM PHOSPHATE 6 MG: 4 INJECTION, SOLUTION INTRAMUSCULAR; INTRAVENOUS at 08:54

## 2023-10-28 RX ADMIN — INSULIN ASPART 5 UNITS: 100 INJECTION, SOLUTION INTRAVENOUS; SUBCUTANEOUS at 09:08

## 2023-10-28 RX ADMIN — Medication 220 MG: at 08:54

## 2023-10-28 RX ADMIN — PANTOPRAZOLE SODIUM 40 MG: 40 TABLET, DELAYED RELEASE ORAL at 05:31

## 2023-10-28 RX ADMIN — Medication 10 ML: at 21:26

## 2023-10-28 NOTE — PLAN OF CARE
Goal Outcome Evaluation:  Plan of Care Reviewed With: patient      Patient coughing and having problem with breathing.

## 2023-10-28 NOTE — PLAN OF CARE
Goal Outcome Evaluation:  Plan of Care Reviewed With: patient        Progress: improving  Outcome Evaluation: VSS, covid+, enhanced contact isolation in place, o2 titrated per order, pt now tolerating ra. up ad gerson, sitting up in chair, continues on iv steroids and blood glucose checks, ss insulin given as ordered see e-mar. importance of self isolation and visitor policy reiterated.

## 2023-10-28 NOTE — ED PROVIDER NOTES
Subjective   History of Present Illness  Guy Plascencia is a 72-year-old white male who presents secondary to URI symptoms.  Patient had onset of symptoms 2 days ago.  The symptoms include cough, shortness of breath, fever and body aches.  Patient formed a home COVID test that was positive.  To fellow [employer] members had symptoms this past weekend.  No other ill contacts.  No GI symptoms.  Patient presents for evaluation.    History provided by:  Patient      Review of Systems   Constitutional:  Positive for fever.   HENT:  Negative for rhinorrhea.    Eyes:  Negative for redness.   Respiratory:  Positive for cough and shortness of breath.    Cardiovascular:  Negative for chest pain.   Gastrointestinal:  Negative for abdominal pain.   Genitourinary:  Negative for dysuria.   Musculoskeletal:  Positive for myalgias. Negative for back pain.   Skin:  Negative for rash.   Neurological:  Negative for syncope.   All other systems reviewed and are negative.      Past Medical History:   Diagnosis Date    Arthritis     Diabetes mellitus     TYPE 2    H/O  Multiple facial fractures 11/1968    H/O Broken femur 11/1968    History of transfusion     1968    Hyperlipidemia     Hypertension     Kidney stone     Neoplasm of uncertain behavior     Peptic ulceration     Plantar fasciitis     Sleep apnea     CPAP    Thrombocytosis        Allergies   Allergen Reactions    Penicillins Unknown - Low Severity     ALLERGY TESTING SHOWED ALL TO PCN.     Cimetidine Rash       Past Surgical History:   Procedure Laterality Date    COLONOSCOPY      COSMETIC SURGERY  11/1968    MULTIPLE FACIAL FRACTURE    CYSTOSCOPY URETEROSCOPY STONE MANIPULATION/EXTRACTION Left 6/29/2016    Procedure: CYSTOSCOPY, LEFT URETEROSCOPY, BASKET EXTRACTION OF LEFT URETERAL STONE, LEFT STENT PLACEMENT;  Surgeon: Ish Gaitan MD;  Location: [facility];  Service:     ENDOSCOPY N/A 10/2/2019    Procedure: ESOPHAGOGASTRODUODENOSCOPY with biopsies and dilatation to 20mm;   Surgeon: Marilou Mojica MD;  Location: [facility] OR;  Service: Gastroenterology    EXTRACORPOREAL SHOCK WAVE LITHOTRIPSY (ESWL) Right 2016    Procedure: RT EXTRACORPOREAL SHOCKWAVE LITHOTRIPSY;  Surgeon: Ish Gaitan MD;  Location: [facility] MAIN OR;  Service:     FEMUR SURGERY  1968    FRACTURE SURGERY      femur fx    OTHER SURGICAL HISTORY      stents, for kidney stones    OTHER SURGICAL HISTORY      lithotripsy    SKIN BIOPSY         Family History   Problem Relation Age of Onset    Heart disease Father     Cancer Other         Kidney, throat, skin    Skin cancer Brother 50    Cancer Brother     Colon polyps Neg Hx     Colon cancer Neg Hx        Social History     Socioeconomic History    Marital status:      Spouse name: Jessica    Years of education: College   Tobacco Use    Smoking status: Former     Packs/day: 2.00     Years: 14.00     Additional pack years: 0.00     Total pack years: 28.00     Types: Cigarettes     Quit date:      Years since quittin.8    Smokeless tobacco: Never   Vaping Use    Vaping Use: Never used   Substance and Sexual Activity    Alcohol use: No    Drug use: No    Sexual activity: Defer           Objective   Physical Exam  Vitals and nursing note reviewed.   Constitutional:       General: He is in acute distress (Mild).      Appearance: Normal appearance. He is well-developed. He is ill-appearing. He is not toxic-appearing or diaphoretic.      Comments: 72-year-old white male laying in bed.  Patient appears ill but nontoxic.  Vital signs notable for heart rate of 110, blood pressure 186/108, oxygen saturation 81% on room air on arrival.  Patient currently on oxygen via nasal cannula.  Sats varying between 88 and 91 percent.  Patient is friendly and cooperative.  Spouse is at bedside.   HENT:      Head: Normocephalic and atraumatic.      Right Ear: Tympanic membrane, ear canal and external ear normal.      Left Ear: Tympanic membrane, ear canal and external ear  normal.      Nose: Nose normal.      Mouth/Throat:      Mouth: Mucous membranes are moist.      Pharynx: Oropharynx is clear.   Eyes:      Extraocular Movements: Extraocular movements intact.      Conjunctiva/sclera: Conjunctivae normal.      Pupils: Pupils are equal, round, and reactive to light.   Cardiovascular:      Rate and Rhythm: Normal rate and regular rhythm.      Heart sounds: Normal heart sounds. No murmur heard.     No friction rub. No gallop.   Pulmonary:      Effort: Tachypnea and accessory muscle usage present. No respiratory distress.      Breath sounds: No stridor. Examination of the right-upper field reveals rales. Examination of the right-middle field reveals rales. Decreased breath sounds and rales (Faint) present. No wheezing or rhonchi.   Abdominal:      General: There is no distension.      Palpations: Abdomen is soft. There is no mass.      Tenderness: There is no abdominal tenderness. There is no guarding or rebound.      Hernia: No hernia is present.   Musculoskeletal:         General: Normal range of motion.      Cervical back: Normal range of motion and neck supple.   Skin:     General: Skin is warm and dry.      Capillary Refill: Capillary refill takes less than 2 seconds.      Findings: No erythema or rash.   Neurological:      General: No focal deficit present.      Mental Status: He is alert and oriented to person, place, and time.      Cranial Nerves: No cranial nerve deficit.      Deep Tendon Reflexes: Reflexes are normal and symmetric.   Psychiatric:         Mood and Affect: Mood normal.         Behavior: Behavior normal.         Procedures       EKG 12-lead  Date 10/27/2023  Time 23: 25  Sinus tachycardia with rate 108  Normal intervals  Leftward axis  Left atrial enlargement  Low voltage in multiple leads  Q waves in inferior and anterior leads indicating age-indeterminate infarct  Late R wave transition  Slight ST depression seen in lateral leads  Abnormal EKG    Unchanged from  EKG dated 9/24/2021    ED Course  ED Course as of 10/28/23 0209   Fri Oct 27, 2023   2342 Patient had home COVID test which was positive.  Currently on oxygen via nasal cannula at 2 L.  Sats in the upper 80s and low 90s.  Changing to a Venturi.  Giving albuterol 2 puffs via metered-dose inhaler.  Obtaining routine labs, EKG and chest x-ray. [SS]   Sat Oct 28, 2023   0024 CBC unremarkable.  CMP notable for glucose of 211. [SS]   0141 Glucose(!): 211 [SS]   0142 Creatinine(!): 1.46 [SS]   0142 HS Troponin T(!): 19 [SS]   0142 proBNP(!): 3,044.0 [SS]   0142 proBNP elevated at 3044.  High-sensitivity troponin mildly elevated at 19.  Lactic acid unremarkable at 1.4.  Chest x-ray shows borderline cardiomegaly, evidence of pneumonia as well as vascular congestion.  These findings consistent with COVID-pneumonia with resulting congestive heart failure.  Patient did not maintain sats on nasal cannula or on a Venturi.  However on high flow oxygen sats are now in the upper 90s.  Giving dexamethasone 8 mg IV as well as Lasix 40 mg IV.  Patient and his spouse are unvaccinated for COVID-19.  They declined offer for Paxlovid.  They also declined remdesivir.  I feel patient requires hospitalization and further care.  Discussed this case with Dr. Greenfield-hospitalist.  Patient be admitted to his service. [SS]      ED Course User Index  [SS] Artemio Kelley MD      Labs Reviewed   COVID-19 AND FLU A/B, NP SWAB IN TRANSPORT MEDIA 8-12 HR TAT - Abnormal; Notable for the following components:       Result Value    COVID19 Detected (*)     All other components within normal limits    Narrative:     Fact sheet for providers: https://www.fda.gov/media/196464/download    Fact sheet for patients: https://www.fda.gov/media/353928/download    Test performed by PCR.  Influenza A and Influenza B negative results should be considered presumptive in samples that have a positive SARS-CoV-2 result.    Competitive inhibition studies showed that  SARS-CoV-2 virus, when present at concentrations above 3.6E+04 copies/mL, can inhibit the detection and amplification of influenza A and influenza B virus RNA if present at or below 1.8E+02 copies/mL or 4.9E+02 copies/mL, respectively, and may lead to false negative influenza virus results. If co-infection with influenza A or influenza B virus is suspected in samples with a positive SARS-CoV-2 result, the sample should be re-tested with another FDA cleared, approved, or authorized influenza test, if influenza virus detection would change clinical management.   COMPREHENSIVE METABOLIC PANEL - Abnormal; Notable for the following components:    Glucose 211 (*)     Creatinine 1.46 (*)     eGFR 50.5 (*)     All other components within normal limits    Narrative:     GFR Normal >60  Chronic Kidney Disease <60  Kidney Failure <15    The GFR formula is only valid for adults with stable renal function between ages 18 and 70.   BNP (IN-HOUSE) - Abnormal; Notable for the following components:    proBNP 3,044.0 (*)     All other components within normal limits    Narrative:     This assay is used as an aid in the diagnosis of individuals suspected of having heart failure. It can be used as an aid in the diagnosis of acute decompensated heart failure (ADHF) in patients presenting with signs and symptoms of ADHF to the emergency department (ED). In addition, NT-proBNP of <300 pg/mL indicates ADHF is not likely.    Age Range Result Interpretation  NT-proBNP Concentration (pg/mL:      <50             Positive            >450                   Gray                 300-450                    Negative             <300    50-75           Positive            >900                  Gray                300-900                  Negative            <300      >75             Positive            >1800                  Gray                300-1800                  Negative            <300   SINGLE HSTROPONIN T - Abnormal; Notable for the  following components:    HS Troponin T 19 (*)     All other components within normal limits    Narrative:     High Sensitive Troponin T Reference Range:  <10.0 ng/L- Negative Female for AMI  <15.0 ng/L- Negative Male for AMI  >=10 - Abnormal Female indicating possible myocardial injury.  >=15 - Abnormal Male indicating possible myocardial injury.   Clinicians would have to utilize clinical acumen, EKG, Troponin, and serial changes to determine if it is an Acute Myocardial Infarction or myocardial injury due to an underlying chronic condition.        CBC WITH AUTO DIFFERENTIAL - Abnormal; Notable for the following components:    Hemoglobin 12.9 (*)     Platelets 95 (*)     Lymphocyte % 11.4 (*)     Monocyte % 13.8 (*)     Eosinophil % 0.2 (*)     Lymphocytes, Absolute 0.63 (*)     All other components within normal limits   LACTIC ACID, PLASMA - Normal   COVID PRE-OP / PRE-PROCEDURE SCREENING ORDER (NO ISOLATION)    Narrative:     The following orders were created for panel order COVID PRE-OP / PRE-PROCEDURE SCREENING ORDER (NO ISOLATION) - Swab, Nasopharynx.  Procedure                               Abnormality         Status                     ---------                               -----------         ------                     COVID-19 and FLU A/B PCR...[[id number]]  Abnormal            Final result                 Please view results for these tests on the individual orders.   BLOOD CULTURE   BLOOD CULTURE   BLOOD GAS, ARTERIAL   CBC AND DIFFERENTIAL    Narrative:     The following orders were created for panel order CBC & Differential.  Procedure                               Abnormality         Status                     ---------                               -----------         ------                     CBC Auto Differential[[id number]]        Abnormal            Final result                 Please view results for these tests on the individual orders.     XR Chest 1 View    Result Date:  10/28/2023  Narrative: XR CHEST 1 VW-, 10/28/2023  HISTORY: Shortness of air. COVID-positive.  COMPARISON: *  Chest 7/30/2021  FINDINGS: Single frontal view(s) of the chest. Patchy opacities scattered throughout both lungs, slightly worse on the right than left. No pleural effusions. No pneumothorax. Heart size is borderline. Mediastinum is unremarkable. Mild central vascular prominence.      Impression: Borderline heart size with mild central vascular prominence and patchy opacities scattered throughout both lungs. Correlate for symptoms of pneumonia versus early congestive failure.  This report was finalized on 10/28/2023 12:17 AM by Dr. Mk Gilmore MD.       My differential diagnosis for dyspnea includes but is not limited to:  Asthma, COPD, COVID-19, pneumonia, pulmonary embolus, acute respiratory distress syndrome, RSV, pneumothorax, pleural effusion, pulmonary fibrosis, congestive heart failure, myocardial infarction, DKA, uremia, acidosis, sepsis, anemia, drug related, hyperventilation, CNS disease                                         Medical Decision Making  Amount and/or Complexity of Data Reviewed  Labs: ordered.  Radiology: ordered.  ECG/medicine tests: ordered.    Risk  OTC drugs.  Prescription drug management.        Final diagnoses:   Pneumonia due to COVID-19 virus   Acute congestive heart failure, unspecified heart failure type   Hypoxia       ED Disposition  ED Disposition       ED Disposition   Decision to Admit    Condition   --    Comment   Level of Care: Telemetry [5]   Admitting Physician: LOREN APONTE [[id number]]   Attending Physician: LOREN APONTE [[id number]]   Patient Class: Inpatient [101]                 No follow-up provider specified.       Medication List      No changes were made to your prescriptions during this visit.            Artemio Kelley MD  10/28/23 0209

## 2023-10-28 NOTE — H&P
[facility]     PCP: Chelsea Mcelroy PA-C    CODE status: Full     CHIEF COMPLAINT: SOB     HISTORY OF PRESENT ILLNESS:    73 y/o male with hx of DM II, GERD, HTN, and MIRI presents to [facility] for evaluation of SOB and low oxygen sats at home. Patient says he started feeling bad about 3 days ago with body aches and chills.  He then developed a dry cough.  This evening, he checked his oxygen saturation and it was in the 80s, prompting him to come to the ED.  In the ED, his PO2 via ABG was 57%.  He was placed on NC then ultimately HFNC in order to get his sats >90%.  He denies chest pain, nausea, vomiting, diarrhea, melena.  He does admit to mild LE edema.        Past Medical History:   Diagnosis Date    Arthritis     Diabetes mellitus     TYPE 2    GERD (gastroesophageal reflux disease)     H/O  Multiple facial fractures 11/1968    H/O Broken femur 11/1968    History of transfusion     1968    Hyperlipidemia     Hypertension     Kidney stone     Neoplasm of uncertain behavior     Peptic ulceration     Plantar fasciitis     Sleep apnea     CPAP    Thrombocytosis      Past Surgical History:   Procedure Laterality Date    COLONOSCOPY      COSMETIC SURGERY  11/1968    MULTIPLE FACIAL FRACTURE    CYSTOSCOPY URETEROSCOPY STONE MANIPULATION/EXTRACTION Left 6/29/2016    Procedure: CYSTOSCOPY, LEFT URETEROSCOPY, BASKET EXTRACTION OF LEFT URETERAL STONE, LEFT STENT PLACEMENT;  Surgeon: Ish Gaitan MD;  Location: [facility] OR;  Service:     ENDOSCOPY N/A 10/2/2019    Procedure: ESOPHAGOGASTRODUODENOSCOPY with biopsies and dilatation to 20mm;  Surgeon: Marilou Mojica MD;  Location: [facility] OR;  Service: Gastroenterology    EXTRACORPOREAL SHOCK WAVE LITHOTRIPSY (ESWL) Right 7/29/2016    Procedure: RT EXTRACORPOREAL SHOCKWAVE LITHOTRIPSY;  Surgeon: Ish Gaitan MD;  Location: [facility] OR;  Service:     FEMUR SURGERY  11/1968    FRACTURE SURGERY      femur fx    OTHER  SURGICAL HISTORY      stents, for kidney stones    OTHER SURGICAL HISTORY      lithotripsy    SKIN BIOPSY       Family History   Problem Relation Age of Onset    Heart disease Father     Cancer Other         Kidney, throat, skin    Skin cancer Brother 50    Cancer Brother     Colon polyps Neg Hx     Colon cancer Neg Hx      Social History     Tobacco Use    Smoking status: Former     Packs/day: 2.00     Years: 14.00     Additional pack years: 0.00     Total pack years: 28.00     Types: Cigarettes     Quit date:      Years since quittin.8    Smokeless tobacco: Never   Vaping Use    Vaping Use: Never used   Substance Use Topics    Alcohol use: No    Drug use: No     Medications Prior to Admission   Medication Sig Dispense Refill Last Dose    amLODIPine (NORVASC) 10 MG tablet TAKE ONE TABLET BY MOUTH DAILY 90 tablet 0 10/27/2023 at 0900    APPLE CIDER VINEGAR PO Take  by mouth.   10/24/2023 at 0900    Ascorbic Acid (VITAMIN C PO) Take  by mouth.       Aspirin (ASPIR-81 PO) Take  by mouth Daily.   10/27/2023 at 0900    Blood Glucose Monitoring Suppl (ONETOUCH VERIO FLEX SYSTEM) w/Device kit 1 Device 2 (Two) Times a Day. 1 kit 0 10/27/2023    Cholecalciferol (D3 ADULT PO) Take 2,000 Units by mouth Daily.   10/27/2023 at 0900    Cyanocobalamin (B-12 PO) Take  by mouth.   10/27/2023 at 0900    glipizide (GLUCOTROL) 10 MG tablet TAKE ONE TABLET BY MOUTH TWICE A DAY BEFORE A MEAL 180 tablet 2 10/27/2023 at 2100    glucose blood test strip Use to test blood sugar twice a day for type 2 diabetes- [employer] brand glucometer 100 each 12     hydroCHLOROthiazide (MICROZIDE) 12.5 MG capsule TAKE ONE CAPSULE BY MOUTH DAILY 90 capsule 1 10/27/2023 at 0900    hydrOXYzine (ATARAX) 25 MG tablet TAKE ONE TABLET BY MOUTH THREE TIMES A DAY AS NEEDED FOR ITCHING 60 tablet 3 10/27/2023 at 0900    Insulin Pen Needle 32G X 4 MM misc Use once a weekly for Type 2 Diabetes 30 each 0     Januvia 100 MG tablet TAKE ONE TABLET BY MOUTH DAILY  "30 tablet 3 10/27/2023 at 0900    lisinopril (PRINIVIL,ZESTRIL) 40 MG tablet TAKE ONE TABLET BY MOUTH DAILY 90 tablet 1 10/27/2023 at 0900    metFORMIN (GLUCOPHAGE) 1000 MG tablet TAKE ONE TABLET BY MOUTH TWICE A DAY WITH MEALS 180 tablet 0 10/27/2023 at 2100    metoprolol tartrate (LOPRESSOR) 50 MG tablet Take 1 tablet by mouth 2 (Two) Times a Day. 180 tablet 2 10/27/2023    Multiple Vitamins-Minerals (ZINC PO) Take  by mouth.   10/27/2023 at 099    omeprazole (priLOSEC) 20 MG capsule TAKE ONE CAPSULE BY MOUTH DAILY 90 capsule 1 10/27/2023 at 099    pravastatin (PRAVACHOL) 80 MG tablet TAKE ONE TABLET BY MOUTH DAILY 90 tablet 1 10/27/2023 at 0900    promethazine-dextromethorphan (PROMETHAZINE-DM) 6.25-15 MG/5ML syrup Take 5 mL by mouth 4 (Four) Times a Day As Needed for Cough. 118 mL 0      Allergies:  Penicillins and Cimetidine    Immunization History   Administered Date(s) Administered    Pneumococcal Conjugate 13-Valent (PCV13) 01/18/2018    Pneumococcal Polysaccharide (PPSV23) 01/31/2019       REVIEW OF SYSTEMS:  All ROS negative except for stated up in HPI    Vital Signs  Temp:  [98.2 °F (36.8 °C)-101.2 °F (38.4 °C)] 99.4 °F (37.4 °C)  Heart Rate:  [] 92  Resp:  [18-26] 26  BP: (159-186)/() 166/80  Flowsheet Rows      Flowsheet Row First Filed Value   Admission Height 175.3 cm (69\") Documented at 10/27/2023 2323   Admission Weight 78.6 kg (173 lb 4.8 oz) Documented at 10/27/2023 2323               Physical Exam:  General: sitting up in bed; on HFNC oxygen   HENT: Head is atraumatic, normocephalic  Eyes: Vision is grossly intact. Pupils appear equal and round.   Cardiovascular: RRR, S1-S2  Respiratory: Coarse and diminished breath sounds bilaterally; no wheezing   Abdominal/GI: Soft, nontender, bowel sounds present. No rebound or guarding present.   Extremities: trace bilateral LE edema   Musculoskeletal: 5/5 MS upper and lower extremities  Skin: Warm and dry.   Psych: Mood and affect are " appropriate. Cooperative with exam.   Neuro: CN II-XII intact; no focal motor deficits        Results Review:    I reviewed the patient's new clinical results.  Lab Results (most recent)       Procedure Component Value Units Date/Time    Blood Gas, Arterial - [[id number]]  (Abnormal) Collected: 10/28/23 0010    Specimen: Arterial Blood Updated: 10/28/23 0231     Site Left Brachial     Marcus's Test N/A     pH, Arterial 7.499 pH units      Comment: 83 Value above reference range        pCO2, Arterial 34.3 mm Hg      Comment: 84 Value below reference range        pO2, Arterial 57.5 mm Hg      Comment: 84 Value below reference range        HCO3, Arterial 26.7 mmol/L      Comment: 83 Value above reference range        Base Excess, Arterial 3.7 mmol/L      Comment: 83 Value above reference range        O2 Saturation, Arterial 92.0 %      Comment: 84 Value below reference range        Hemoglobin, Blood Gas 12.8 g/dL      Comment: 84 Value below reference range        Temperature 37.0 C      Barometric Pressure for Blood Gas 742 mmHg      Modality Venturi mask     FIO2 50 %      Flow Rate 6.0 lpm      Ventilator Mode --     Collected by [id number]     Comment: Meter: [id number]     :  [id number]        pCO2, Temperature Corrected 34.3 mm Hg      pH, Temp Corrected 7.499 pH Units      pO2, Temperature Corrected 57.5 mm Hg     BNP [[id number]]  (Abnormal) Collected: 10/27/23 2331    Specimen: Blood Updated: 10/28/23 0044     proBNP 3,044.0 pg/mL     Narrative:      This assay is used as an aid in the diagnosis of individuals suspected of having heart failure. It can be used as an aid in the diagnosis of acute decompensated heart failure (ADHF) in patients presenting with signs and symptoms of ADHF to the emergency department (ED). In addition, NT-proBNP of <300 pg/mL indicates ADHF is not likely.    Age Range Result Interpretation  NT-proBNP Concentration (pg/mL:      <50             Positive            >450                    Gray                 300-450                    Negative             <300    50-75           Positive            >900                  Gray                300-900                  Negative            <300      >75             Positive            >1800                  Gray                300-1800                  Negative            <300    COVID PRE-OP / PRE-PROCEDURE SCREENING ORDER (NO ISOLATION) - Swab, Nasopharynx [[id number]]  (Abnormal) Collected: 10/27/23 2336    Specimen: Swab from Nasopharynx Updated: 10/28/23 0018    Narrative:      The following orders were created for panel order COVID PRE-OP / PRE-PROCEDURE SCREENING ORDER (NO ISOLATION) - Swab, Nasopharynx.  Procedure                               Abnormality         Status                     ---------                               -----------         ------                     COVID-19 and FLU A/B PCR...[[id number]]  Abnormal            Final result                 Please view results for these tests on the individual orders.    COVID-19 and FLU A/B PCR - Swab, Nasopharynx [[id number]]  (Abnormal) Collected: 10/27/23 2336    Specimen: Swab from Nasopharynx Updated: 10/28/23 0018     COVID19 Detected     Influenza A PCR Not Detected     Influenza B PCR Not Detected    Narrative:      Fact sheet for providers: https://www.fda.gov/media/406839/download    Fact sheet for patients: https://www.fda.gov/media/608593/download    Test performed by PCR.  Influenza A and Influenza B negative results should be considered presumptive in samples that have a positive SARS-CoV-2 result.    Competitive inhibition studies showed that SARS-CoV-2 virus, when present at concentrations above 3.6E+04 copies/mL, can inhibit the detection and amplification of influenza A and influenza B virus RNA if present at or below 1.8E+02 copies/mL or 4.9E+02 copies/mL, respectively, and may lead to false negative influenza virus results. If co-infection with influenza A or  influenza B virus is suspected in samples with a positive SARS-CoV-2 result, the sample should be re-tested with another FDA cleared, approved, or authorized influenza test, if influenza virus detection would change clinical management.    Single High Sensitivity Troponin T [[id number]]  (Abnormal) Collected: 10/27/23 2331    Specimen: Blood Updated: 10/28/23 0010     HS Troponin T 19 ng/L     Narrative:      High Sensitive Troponin T Reference Range:  <10.0 ng/L- Negative Female for AMI  <15.0 ng/L- Negative Male for AMI  >=10 - Abnormal Female indicating possible myocardial injury.  >=15 - Abnormal Male indicating possible myocardial injury.   Clinicians would have to utilize clinical acumen, EKG, Troponin, and serial changes to determine if it is an Acute Myocardial Infarction or myocardial injury due to an underlying chronic condition.         Comprehensive Metabolic Panel [[id number]]  (Abnormal) Collected: 10/27/23 2331    Specimen: Blood Updated: 10/28/23 0007     Glucose 211 mg/dL      BUN 20 mg/dL      Creatinine 1.46 mg/dL      Sodium 136 mmol/L      Potassium 3.7 mmol/L      Chloride 98 mmol/L      CO2 24.1 mmol/L      Calcium 9.3 mg/dL      Total Protein 7.4 g/dL      Albumin 4.1 g/dL      ALT (SGPT) 22 U/L      AST (SGOT) 31 U/L      Alkaline Phosphatase 80 U/L      Total Bilirubin 0.6 mg/dL      Globulin 3.3 gm/dL      A/G Ratio 1.2 g/dL      BUN/Creatinine Ratio 13.7     Anion Gap 13.9 mmol/L      eGFR 50.5 mL/min/1.73     Narrative:      GFR Normal >60  Chronic Kidney Disease <60  Kidney Failure <15    The GFR formula is only valid for adults with stable renal function between ages 18 and 70.    Lactic Acid, Plasma [[id number]]  (Normal) Collected: 10/27/23 2331    Specimen: Blood Updated: 10/28/23 0005     Lactate 1.4 mmol/L     CBC & Differential [[id number]]  (Abnormal) Collected: 10/27/23 2331    Specimen: Blood Updated: 10/27/23 2349    Narrative:      The following orders were created for panel  order CBC & Differential.  Procedure                               Abnormality         Status                     ---------                               -----------         ------                     CBC Auto Differential[[id number]]        Abnormal            Final result                 Please view results for these tests on the individual orders.    CBC Auto Differential [[id number]]  (Abnormal) Collected: 10/27/23 2331    Specimen: Blood Updated: 10/27/23 2349     WBC 5.52 10*3/mm3      RBC 4.59 10*6/mm3      Hemoglobin 12.9 g/dL      Hematocrit 38.8 %      MCV 84.5 fL      MCH 28.1 pg      MCHC 33.2 g/dL      RDW 13.9 %      RDW-SD 44.0 fl      MPV 11.6 fL      Platelets 95 10*3/mm3      Neutrophil % 73.8 %      Lymphocyte % 11.4 %      Monocyte % 13.8 %      Eosinophil % 0.2 %      Basophil % 0.4 %      Immature Grans % 0.4 %      Neutrophils, Absolute 4.08 10*3/mm3      Lymphocytes, Absolute 0.63 10*3/mm3      Monocytes, Absolute 0.76 10*3/mm3      Eosinophils, Absolute 0.01 10*3/mm3      Basophils, Absolute 0.02 10*3/mm3      Immature Grans, Absolute 0.02 10*3/mm3     Blood Culture - Blood, Arm, Left [[id number]] Collected: 10/27/23 2331    Specimen: Blood from Arm, Left Updated: 10/27/23 2345    Blood Culture - Blood, Arm, Left [[id number]] Collected: 10/27/23 2334    Specimen: Blood from Arm, Left Updated: 10/27/23 2344            Imaging Results (Most Recent)       Procedure Component Value Units Date/Time    XR Chest 1 View [[id number]] Collected: 10/28/23 0015     Updated: 10/28/23 0019    Narrative:      XR CHEST 1 VW-, 10/28/2023     HISTORY:  Shortness of air. COVID-positive.     COMPARISON:  *  Chest 7/30/2021     FINDINGS:  Single frontal view(s) of the chest. Patchy opacities scattered  throughout both lungs, slightly worse on the right than left. No pleural  effusions. No pneumothorax. Heart size is borderline. Mediastinum is  unremarkable. Mild central vascular prominence.       Impression:       Borderline heart size with mild central vascular prominence and patchy  opacities scattered throughout both lungs. Correlate for symptoms of  pneumonia versus early congestive failure.     This report was finalized on 10/28/2023 12:17 AM by Dr. Mk Gilmore MD.               ECG/EMG Results (most recent)       Procedure Component Value Units Date/Time    ECG 12 Lead ED Triage Standing Order; SOA [[id number]] Collected: 10/27/23 2325     Updated: 10/27/23 2326     QT Interval 344 ms      QTC Interval 461 ms     Narrative:      HEART RATE= 108  bpm  RR Interval= 556  ms  NV Interval= 176  ms  P Horizontal Axis=   deg  P Front Axis= 60  deg  QRSD Interval= 73  ms  QT Interval= 344  ms  QTcB= 461  ms  QRS Axis= 2  deg  T Wave Axis= -11  deg  - ABNORMAL ECG -  Sinus tachycardia  Probable left atrial enlargement  Anteroseptal infarct, age indeterminate  Minimal ST depression, inferior leads  Electronically Signed By:   Date and Time of Study: 2023-10-27 23:25:32              Assessment & Plan     Acute Hypoxic respiratory failure   Secondary to COVID-19 PNA  ABG with PO2 of 51 on arrival; placed on high flow oxygen in ED to keep sats >90%  Continue HFNC  IV Decadron BID  Patient declines any Remdesivir or other treatment at this time besides steroids  IS to bedside  Vitamin C and Zinc      Elevated creatinine  Up to 1.45 from baseline 1.19  Hold lisinopril and HCTZ  Monitor closely     HTN  Continue metoprolol and Norvasc  Hold lisinopril and HCTZ  Monitor  Prn IV Hydralazine for systolic >170    DM II  Continue home glipizide  Hold metformin   Accuchecks with SSI  Monitor sugars closely with addition of steroids     Elevated proBNP  No hx of HF  CXR reviewed; suspect from COVID-19 PNA  Will check ECHO to evaluate EF   Monitor volume status and consider diuresis if develops edema ww      Thrombocytopenia  Likely from COVID-19  Monitor closely     DVT PPX: Lovenox; monitor platelets            Wander Greenfield  DO  10/28/23  03:06 EDT        At [facility], we believe that sharing information builds trust and better relationships. You are receiving this note because you recently visited [facility]. It is possible you will see health information before a provider has talked with you about it. This kind of information can be easy to misunderstand. To help you fully understand what it means for your health, we urge you to discuss this note with your provider.

## 2023-10-29 ENCOUNTER — APPOINTMENT (OUTPATIENT)
Dept: ULTRASOUND IMAGING | Facility: HOSPITAL | Age: 73
DRG: 177 | End: 2023-10-29
Payer: MEDICARE

## 2023-10-29 ENCOUNTER — READMISSION MANAGEMENT (OUTPATIENT)
Dept: CALL CENTER | Facility: HOSPITAL | Age: 73
End: 2023-10-29
Payer: MEDICARE

## 2023-10-29 VITALS
RESPIRATION RATE: 18 BRPM | BODY MASS INDEX: 25.46 KG/M2 | OXYGEN SATURATION: 99 % | WEIGHT: 171.9 LBS | HEART RATE: 72 BPM | TEMPERATURE: 95.6 F | DIASTOLIC BLOOD PRESSURE: 81 MMHG | SYSTOLIC BLOOD PRESSURE: 161 MMHG | HEIGHT: 69 IN

## 2023-10-29 PROBLEM — D89.831 CYTOKINE RELEASE SYNDROME, GRADE 1: Status: ACTIVE | Noted: 2023-10-29

## 2023-10-29 LAB
ANION GAP SERPL CALCULATED.3IONS-SCNC: 13.2 MMOL/L (ref 5–15)
ANION GAP SERPL CALCULATED.3IONS-SCNC: 9.7 MMOL/L (ref 5–15)
BACTERIA UR QL AUTO: ABNORMAL /HPF
BASOPHILS # BLD AUTO: 0.01 10*3/MM3 (ref 0–0.2)
BASOPHILS NFR BLD AUTO: 0.2 % (ref 0–1.5)
BILIRUB UR QL STRIP: NEGATIVE
BUN SERPL-MCNC: 35 MG/DL (ref 8–23)
BUN SERPL-MCNC: 37 MG/DL (ref 8–23)
BUN/CREAT SERPL: 21.3 (ref 7–25)
BUN/CREAT SERPL: 23.6 (ref 7–25)
CALCIUM SPEC-SCNC: 8.7 MG/DL (ref 8.6–10.5)
CALCIUM SPEC-SCNC: 9.8 MG/DL (ref 8.6–10.5)
CHLORIDE SERPL-SCNC: 98 MMOL/L (ref 98–107)
CHLORIDE SERPL-SCNC: 98 MMOL/L (ref 98–107)
CLARITY UR: CLEAR
CO2 SERPL-SCNC: 27.8 MMOL/L (ref 22–29)
CO2 SERPL-SCNC: 29.3 MMOL/L (ref 22–29)
COLOR UR: YELLOW
CREAT SERPL-MCNC: 1.48 MG/DL (ref 0.76–1.27)
CREAT SERPL-MCNC: 1.74 MG/DL (ref 0.76–1.27)
CREAT UR-MCNC: 64.6 MG/DL
CRP SERPL-MCNC: 11.86 MG/DL (ref 0–0.5)
DEPRECATED RDW RBC AUTO: 41.9 FL (ref 37–54)
EGFRCR SERPLBLD CKD-EPI 2021: 40.9 ML/MIN/1.73
EGFRCR SERPLBLD CKD-EPI 2021: 49.6 ML/MIN/1.73
EOSINOPHIL # BLD AUTO: 0 10*3/MM3 (ref 0–0.4)
EOSINOPHIL NFR BLD AUTO: 0 % (ref 0.3–6.2)
ERYTHROCYTE [DISTWIDTH] IN BLOOD BY AUTOMATED COUNT: 13.6 % (ref 12.3–15.4)
GLUCOSE BLDC GLUCOMTR-MCNC: 133 MG/DL (ref 70–130)
GLUCOSE BLDC GLUCOMTR-MCNC: 204 MG/DL (ref 70–130)
GLUCOSE SERPL-MCNC: 182 MG/DL (ref 65–99)
GLUCOSE SERPL-MCNC: 287 MG/DL (ref 65–99)
GLUCOSE UR STRIP-MCNC: ABNORMAL MG/DL
HCT VFR BLD AUTO: 36.6 % (ref 37.5–51)
HGB BLD-MCNC: 12.1 G/DL (ref 13–17.7)
HGB UR QL STRIP.AUTO: ABNORMAL
HYALINE CASTS UR QL AUTO: ABNORMAL /LPF
IMM GRANULOCYTES # BLD AUTO: 0.02 10*3/MM3 (ref 0–0.05)
IMM GRANULOCYTES NFR BLD AUTO: 0.4 % (ref 0–0.5)
KETONES UR QL STRIP: NEGATIVE
LEUKOCYTE ESTERASE UR QL STRIP.AUTO: NEGATIVE
LYMPHOCYTES # BLD AUTO: 0.79 10*3/MM3 (ref 0.7–3.1)
LYMPHOCYTES NFR BLD AUTO: 16.3 % (ref 19.6–45.3)
MCH RBC QN AUTO: 27.9 PG (ref 26.6–33)
MCHC RBC AUTO-ENTMCNC: 33.1 G/DL (ref 31.5–35.7)
MCV RBC AUTO: 84.3 FL (ref 79–97)
MONOCYTES # BLD AUTO: 0.61 10*3/MM3 (ref 0.1–0.9)
MONOCYTES NFR BLD AUTO: 12.6 % (ref 5–12)
NEUTROPHILS NFR BLD AUTO: 3.43 10*3/MM3 (ref 1.7–7)
NEUTROPHILS NFR BLD AUTO: 70.5 % (ref 42.7–76)
NITRITE UR QL STRIP: NEGATIVE
NRBC BLD AUTO-RTO: 0 /100 WBC (ref 0–0.2)
NT-PROBNP SERPL-MCNC: 1224 PG/ML (ref 0–900)
PH UR STRIP.AUTO: 6 [PH] (ref 5–8)
PLATELET # BLD AUTO: 79 10*3/MM3 (ref 140–450)
PMV BLD AUTO: 12.9 FL (ref 6–12)
POTASSIUM SERPL-SCNC: 3.1 MMOL/L (ref 3.5–5.2)
POTASSIUM SERPL-SCNC: 3.5 MMOL/L (ref 3.5–5.2)
PROT UR QL STRIP: ABNORMAL
RBC # BLD AUTO: 4.34 10*6/MM3 (ref 4.14–5.8)
RBC # UR STRIP: ABNORMAL /HPF
REF LAB TEST METHOD: ABNORMAL
SODIUM SERPL-SCNC: 137 MMOL/L (ref 136–145)
SODIUM SERPL-SCNC: 139 MMOL/L (ref 136–145)
SODIUM UR-SCNC: 21 MMOL/L
SP GR UR STRIP: 1.02 (ref 1–1.03)
SQUAMOUS #/AREA URNS HPF: ABNORMAL /HPF
UROBILINOGEN UR QL STRIP: ABNORMAL
WBC # UR STRIP: ABNORMAL /HPF
WBC NRBC COR # BLD: 4.86 10*3/MM3 (ref 3.4–10.8)

## 2023-10-29 PROCEDURE — 76775 US EXAM ABDO BACK WALL LIM: CPT

## 2023-10-29 PROCEDURE — 94618 PULMONARY STRESS TESTING: CPT

## 2023-10-29 PROCEDURE — 63710000001 INSULIN ASPART PER 5 UNITS: Performed by: INTERNAL MEDICINE

## 2023-10-29 PROCEDURE — 86140 C-REACTIVE PROTEIN: CPT | Performed by: FAMILY MEDICINE

## 2023-10-29 PROCEDURE — 81001 URINALYSIS AUTO W/SCOPE: CPT | Performed by: INTERNAL MEDICINE

## 2023-10-29 PROCEDURE — 80048 BASIC METABOLIC PNL TOTAL CA: CPT | Performed by: FAMILY MEDICINE

## 2023-10-29 PROCEDURE — 85025 COMPLETE CBC W/AUTO DIFF WBC: CPT | Performed by: FAMILY MEDICINE

## 2023-10-29 PROCEDURE — 25010000002 ENOXAPARIN PER 10 MG: Performed by: FAMILY MEDICINE

## 2023-10-29 PROCEDURE — 82948 REAGENT STRIP/BLOOD GLUCOSE: CPT

## 2023-10-29 PROCEDURE — 84300 ASSAY OF URINE SODIUM: CPT | Performed by: INTERNAL MEDICINE

## 2023-10-29 PROCEDURE — 83880 ASSAY OF NATRIURETIC PEPTIDE: CPT | Performed by: INTERNAL MEDICINE

## 2023-10-29 PROCEDURE — 82570 ASSAY OF URINE CREATININE: CPT | Performed by: INTERNAL MEDICINE

## 2023-10-29 PROCEDURE — 80048 BASIC METABOLIC PNL TOTAL CA: CPT | Performed by: INTERNAL MEDICINE

## 2023-10-29 PROCEDURE — 63710000001 DEXAMETHASONE PER 0.25 MG: Performed by: INTERNAL MEDICINE

## 2023-10-29 RX ORDER — POTASSIUM CHLORIDE 20 MEQ/1
40 TABLET, EXTENDED RELEASE ORAL ONCE
Status: COMPLETED | OUTPATIENT
Start: 2023-10-29 | End: 2023-10-29

## 2023-10-29 RX ORDER — POTASSIUM CHLORIDE 20 MEQ/1
TABLET, EXTENDED RELEASE ORAL
Status: COMPLETED
Start: 2023-10-29 | End: 2023-10-29

## 2023-10-29 RX ORDER — SODIUM CHLORIDE AND POTASSIUM CHLORIDE 150; 900 MG/100ML; MG/100ML
100 INJECTION, SOLUTION INTRAVENOUS CONTINUOUS
Status: DISCONTINUED | OUTPATIENT
Start: 2023-10-29 | End: 2023-10-29

## 2023-10-29 RX ORDER — DEXAMETHASONE 4 MG/1
6 TABLET ORAL
Status: DISCONTINUED | OUTPATIENT
Start: 2023-10-29 | End: 2023-10-29 | Stop reason: HOSPADM

## 2023-10-29 RX ORDER — POTASSIUM CHLORIDE 750 MG/1
20 TABLET, FILM COATED, EXTENDED RELEASE ORAL DAILY
Qty: 6 TABLET | Refills: 0 | Status: SHIPPED | OUTPATIENT
Start: 2023-10-29 | End: 2023-11-01

## 2023-10-29 RX ORDER — HYDRALAZINE HYDROCHLORIDE 25 MG/1
25 TABLET, FILM COATED ORAL 3 TIMES DAILY
Qty: 90 TABLET | Refills: 0 | Status: SHIPPED | OUTPATIENT
Start: 2023-10-29 | End: 2023-11-28

## 2023-10-29 RX ORDER — DEXAMETHASONE 6 MG/1
6 TABLET ORAL
Qty: 6 TABLET | Refills: 0 | Status: SHIPPED | OUTPATIENT
Start: 2023-10-30 | End: 2023-11-05

## 2023-10-29 RX ADMIN — INSULIN ASPART 6 UNITS: 100 INJECTION, SOLUTION INTRAVENOUS; SUBCUTANEOUS at 08:56

## 2023-10-29 RX ADMIN — METOPROLOL TARTRATE 50 MG: 50 TABLET, FILM COATED ORAL at 08:56

## 2023-10-29 RX ADMIN — ENOXAPARIN SODIUM 40 MG: 40 INJECTION SUBCUTANEOUS at 09:57

## 2023-10-29 RX ADMIN — AMLODIPINE BESYLATE 10 MG: 5 TABLET ORAL at 08:56

## 2023-10-29 RX ADMIN — LINAGLIPTIN 5 MG: 5 TABLET, FILM COATED ORAL at 08:56

## 2023-10-29 RX ADMIN — DEXAMETHASONE 6 MG: 4 TABLET ORAL at 08:57

## 2023-10-29 RX ADMIN — Medication 10 ML: at 08:58

## 2023-10-29 RX ADMIN — Medication 220 MG: at 08:56

## 2023-10-29 RX ADMIN — INSULIN ASPART 4 UNITS: 100 INJECTION, SOLUTION INTRAVENOUS; SUBCUTANEOUS at 12:54

## 2023-10-29 RX ADMIN — OXYCODONE HYDROCHLORIDE AND ACETAMINOPHEN 500 MG: 500 TABLET ORAL at 08:56

## 2023-10-29 RX ADMIN — ASPIRIN 81 MG: 81 TABLET, COATED ORAL at 08:56

## 2023-10-29 RX ADMIN — POTASSIUM CHLORIDE 40 MEQ: 20 TABLET, EXTENDED RELEASE ORAL at 11:18

## 2023-10-29 RX ADMIN — PRAVASTATIN SODIUM 80 MG: 20 TABLET ORAL at 08:56

## 2023-10-29 RX ADMIN — POTASSIUM CHLORIDE 40 MEQ: 1500 TABLET, EXTENDED RELEASE ORAL at 11:18

## 2023-10-29 RX ADMIN — PANTOPRAZOLE SODIUM 40 MG: 40 TABLET, DELAYED RELEASE ORAL at 06:24

## 2023-10-29 NOTE — DISCHARGE INSTRUCTIONS
Follow-up with PCP in 1 week.  Follow-up lab work in 4 days.  Drink lots of fluids.  Continue with steroid treatment.  Will need follow-up chest x-ray in 6 weeks, PCP to arrange.  Hold metformin, Microzide and lisinopril until seen PCP.  Will prescribe hydralazine instead which does not affect the kidneys.  Follow-up blood pressure check and appointment with PCP next week.  Return to the hospital for worsening shortness of breath.

## 2023-10-29 NOTE — PROCEDURES
Exercise Oximetry    Patient Name:Guy Plascencia   MRN: [id number]   Date: 10/29/23             ROOM AIR Baseline at rest   SpO2%                 99   Heart Rate                 66   Blood Pressure       Oxygen Baseline at rest   Oxygen (LPM)    SpO2%     Heart Rate     Blood Pressure       EXERCISE  SpO2% HR Supplemental Oxygen, LPM   1 MINUTE      100         71    2 MINUTES      100         76    3 MINUTES       100        75    4 MINUTES       100        69    5 MINUTES         99        72    6 MINUTES       100        72      Recovery       Time to Baseline/Recovery (minutes)           1 minute   SpO2 %             100   HR               68   Oxygen       Distance Walked (meters)            131.7       Pre Post   Dyspnea (Brittny Scale)                  2                     4           Fatigue (Brittny Scale)                  2                     4     Comments:

## 2023-10-29 NOTE — DISCHARGE PLACEMENT REQUEST
"Rosina Plascencia (72 y.o. Male)       Date of Birth   1950    Social Security Number       Address   [address]  [place] [address]    Home Phone   [phone]    MRN   [id number]       Oriental orthodox   None    Marital Status                               Admission Date   10/27/23    Admission Type   Emergency    Admitting Provider   Wander Greenfield DO    Attending Provider   Eliecer Jones DO    Department, Room/Bed   [facility] MED SURG, 1412/1       Discharge Date       Discharge Disposition       Discharge Destination                                 Attending Provider: Eliecer Jones DO    Allergies: Penicillins, Cimetidine    Isolation: Enh Drop/Con   Infection: COVID (confirmed) (10/28/23)   Code Status: CPR    Ht: 175.3 cm (69.02\")   Wt: 78 kg (171 lb 14.4 oz)    Admission Cmt: None   Principal Problem: Pneumonia due to COVID-19 virus [U07.1,J12.82]                   Active Insurance as of 10/27/2023       Primary Coverage       Payor Plan Insurance Group Employer/Plan Group    MEDICARE MEDICARE A & B        Payor Plan Address Payor Plan Phone Number Payor Plan Fax Number Effective Dates    [address] [phone]  12/1/2014 - None Entered    [facility] [id number]         Subscriber Name Subscriber Birth Date Member ID       ROSINA PLASCENCIA 1950 [id number]               Secondary Coverage       Payor Plan Insurance Group Employer/Plan Group    [facility] SUPP [id number]       Payor Plan Address Payor Plan Phone Number Payor Plan Fax Number Effective Dates    [address]   1/1/2015 - None Entered    [facility] [id number]         Subscriber Name Subscriber Birth Date Member ID       ROSINA PLASCENCIA 1950 [id number]                     Emergency Contacts        (Rel.) Home Phone Work Phone Mobile Phone    Jessica Plascencia (Spouse) -- -- [phone]                "

## 2023-10-29 NOTE — CASE MANAGEMENT/SOCIAL WORK
Case Management Discharge Note      Final Note: dc home         Selected Continued Care - Discharged on 10/29/2023 Admission date: 10/27/2023 - Discharge disposition: Home or Self Care      Destination    No services have been selected for the patient.                Durable Medical Equipment    No services have been selected for the patient.                Dialysis/Infusion    No services have been selected for the patient.                Home Medical Care    No services have been selected for the patient.                Therapy    No services have been selected for the patient.                Community Resources    No services have been selected for the patient.                Community & DME    No services have been selected for the patient.                         Final Discharge Disposition Code: 01 - home or self-care

## 2023-10-29 NOTE — PLAN OF CARE
Goal Outcome Evaluation:  Plan of Care Reviewed With: patient        Progress: improving  Outcome Evaluation: VSS, tolerating room air, hospital cpap during night, independent, anticipates being discharged

## 2023-10-29 NOTE — CASE MANAGEMENT/SOCIAL WORK
Continued Stay Note  JES Henry     Patient Name: Guy Plascencia  MRN: [id number]  Today's Date: 10/29/2023    Admit Date: 10/27/2023    Plan: DC home with spouse   Discharge Plan       Row Name 10/29/23 1100       Plan    Plan DC home with spouse    Plan Comments CCP spoke to pt, introduced self, role and reviewed dc plan.  Pt lives in a ranch style home with his wife.  He has 2 steps to get into the front door and 12 steps to the basement.  He can manage the steps if he takes his time.  Pt says he is independent with ADL's, drives and helps out with household duties at times.  He still is able to drive.  His only DME is his Cpap he got from [facility].  His pharmacy is [facility] in [place] and has no issues with copays.  he has never been to rehab or used HH.  His plan is to return home.  His wife will take him home on dc.  CCP will follow. Thanks, Pamela PAREDES                   Discharge Codes    No documentation.                       Pamela Abbasi

## 2023-10-29 NOTE — PLAN OF CARE
Goal Outcome Evaluation:  Plan of Care Reviewed With: patient        Progress: improving  Outcome Evaluation: VSS, tolerating ra, completed walking oximetry with RT, denies soa, up ad gerson.

## 2023-10-29 NOTE — DISCHARGE SUMMARY
[facility] HOSPITALIST   DISCHARGE SUMMARY      Name:  Guy Plascencia   Age:  72 y.o.  Sex:  male  :  1950  MRN:  [id number]   Visit Number:  [id number]    Admission Date:  10/27/2023  Date of Discharge:  10/29/2023  Primary Care Physician:  Chelsea Mcelroy PA-C    Important issues to note:    Follow-up BMP early next week.  Follow-up with PCP next week as well.    Discharge Diagnoses:     1.  Acute Hypoxic respiratory failure Secondary to COVID-19 PNA  2.  COVID 19 pneumonia, POA  3.  Renal insufficiency, improved.  Held lisinopril, hydrochlorothiazide  4.  Essential hypertension, added hydralazine as other medications held  5.  Diabetes mellitus type 2  6.  Elevated proBNP with no history of heart failure.  BNP improved.    Problem List:     Active Hospital Problems    Diagnosis  POA    **Pneumonia due to COVID-19 virus [U07.1, J12.82]  Yes    Cytokine release syndrome, grade 1 [D89.831]  No      Resolved Hospital Problems   No resolved problems to display.     Presenting Problem:    Chief Complaint   Patient presents with    Shortness of Breath      Consults:     Consulting Physician(s)                     None              Procedures Performed:        History of presenting illness/Hospital Course:    72-year-old male with diabetes type 2, GERD, hypertension, MIRI came with shortness of breath and low oxygen sats at home.  Symptoms of been going on for 3 days.  O2 saturation was in the 80s, and showed a PO2 of 57.  He was placed on high flow nasal cannula and admitted.  He was placed on Decadron 6 mg twice daily.  He dramatically quickly improved, quicker than anticipated.  His creatinine was mildly increased at 1.46, with a baseline around 1.2.  He states he is urinating well.  BNP was elevated on admission, however this improved dramatically as well without Lasix.    Would recommend following up with his PCP regarding the mildly elevated creatinine.  Also follow-up with PCP  regarding further work-up with elevated BNP.  At this point the patient is asymptomatic.  Denies any chest pressure, shortness of breath, nausea, vomiting, pain.  His hemoglobin A1c is 7.10.  He appears stable and wants to go home.  We will have him follow-up with PCP.  Continue Decadron for 1 more week 6 mg.  Hold lisinopril and hydrochlorothiazide due to renal failure.  Added hydralazine 25 mg 3 times daily for elevated blood pressure.  He is to follow-up with his PCP next week for blood pressure check.  Also get a BMP in 3 days to see how his creatinine is.  He is otherwise stable for discharge completely asymptomatic, afebrile, no increased WBC count, and no requirements for oxygen    Vital Signs:    Temp:  [95.6 °F (35.3 °C)-97.5 °F (36.4 °C)] 95.6 °F (35.3 °C)  Heart Rate:  [55-83] 72  Resp:  [16-20] 18  BP: (146-181)/(76-84) 161/81    Physical Exam:    General Appearance:  Alert and cooperative.  Mild distress   Head:  Atraumatic and normocephalic.   Eyes: Conjunctivae and sclerae normal, no icterus. No pallor.   Ears:  Ears with no abnormalities noted.   Throat: No oral lesions, no thrush, oral mucosa moist.   Neck: Supple, trachea midline, no thyromegaly.   Back:   No kyphoscoliosis present. No tenderness to palpation.   Lungs:   Breath sounds heard bilaterally equally.  No crackles or wheezing. No Pleural rub or bronchial breathing.   Heart:  Normal S1 and S2, no murmur, no gallop, no rub. No JVD.   Abdomen:   Normal bowel sounds, no masses, no organomegaly. Soft, nontender, nondistended, no rebound tenderness.   Extremities: Supple, no edema, no cyanosis, no clubbing.   Pulses: Pulses palpable bilaterally.   Skin: No bleeding or rash.   Neurologic: Alert and oriented x 3. No facial asymmetry. Moves all four limbs. No tremors.     Pertinent Lab Results:     Results from last 7 days   Lab Units 10/29/23  1006 10/29/23  0346 10/27/23  2331   SODIUM mmol/L 139 137 136   POTASSIUM mmol/L 3.1* 3.5 3.7    CHLORIDE mmol/L 98 98 98   CO2 mmol/L 27.8 29.3* 24.1   BUN mg/dL 35* 37* 20   CREATININE mg/dL 1.48* 1.74* 1.46*   CALCIUM mg/dL 9.8 8.7 9.3   BILIRUBIN mg/dL  --   --  0.6   ALK PHOS U/L  --   --  80   ALT (SGPT) U/L  --   --  22   AST (SGOT) U/L  --   --  31   GLUCOSE mg/dL 182* 287* 211*     Results from last 7 days   Lab Units 10/29/23  0346 10/27/23  2331   WBC 10*3/mm3 4.86 5.52   HEMOGLOBIN g/dL 12.1* 12.9*   HEMATOCRIT % 36.6* 38.8   PLATELETS 10*3/mm3 79* 95*         Results from last 7 days   Lab Units 10/27/23  2331   HSTROP T ng/L 19*     Results from last 7 days   Lab Units 10/29/23  1006   PROBNP pg/mL 1,224.0*             Results from last 7 days   Lab Units 10/28/23  0010   PH, ARTERIAL pH units 7.499*   PO2 ART mm Hg 57.5*   PCO2, ARTERIAL mm Hg 34.3*   HCO3 ART mmol/L 26.7*     Results from last 7 days   Lab Units 10/27/23  2334 10/27/23  2331   BLOODCX  No growth at 24 hours No growth at 24 hours       Pertinent Radiology Results:    Imaging Results (All)       Procedure Component Value Units Date/Time    XR Chest 1 View [[id number]] Collected: 10/28/23 0015     Updated: 10/28/23 0019    Narrative:      XR CHEST 1 VW-, 10/28/2023     HISTORY:  Shortness of air. COVID-positive.     COMPARISON:  *  Chest 7/30/2021     FINDINGS:  Single frontal view(s) of the chest. Patchy opacities scattered  throughout both lungs, slightly worse on the right than left. No pleural  effusions. No pneumothorax. Heart size is borderline. Mediastinum is  unremarkable. Mild central vascular prominence.       Impression:      Borderline heart size with mild central vascular prominence and patchy  opacities scattered throughout both lungs. Correlate for symptoms of  pneumonia versus early congestive failure.     This report was finalized on 10/28/2023 12:17 AM by Dr. Mk Gilmore MD.               Echo:      Condition on Discharge:      Stable.    Code status during the hospital stay:    Code Status and Medical  Interventions:   Ordered at: 10/28/23 1733     Code Status (Patient has no pulse and is not breathing):    CPR (Attempt to Resuscitate)     Medical Interventions (Patient has pulse or is breathing):    Full Support     Discharge Disposition:    Home or Self Care    Discharge Medications:       Discharge Medications        New Medications        Instructions Start Date   dexAMETHasone 6 MG tablet  Commonly known as: DECADRON   6 mg, Oral, Daily With Breakfast   Start Date: October 30, 2023     hydrALAZINE 25 MG tablet  Commonly known as: APRESOLINE   25 mg, Oral, 3 Times Daily             Continue These Medications        Instructions Start Date   amLODIPine 10 MG tablet  Commonly known as: NORVASC   TAKE ONE TABLET BY MOUTH DAILY      APPLE CIDER VINEGAR PO   Oral      ASPIR-81 PO   Oral, Daily      B-12 PO   Oral      D3 ADULT PO   2,000 Units, Oral, Daily      glipizide 10 MG tablet  Commonly known as: GLUCOTROL   TAKE ONE TABLET BY MOUTH TWICE A DAY BEFORE A MEAL      glucose blood test strip   Use to test blood sugar twice a day for type 2 diabetes- [employer] brand glucometer      hydrOXYzine 25 MG tablet  Commonly known as: ATARAX   TAKE ONE TABLET BY MOUTH THREE TIMES A DAY AS NEEDED FOR ITCHING      Insulin Pen Needle 32G X 4 MM misc   Use once a weekly for Type 2 Diabetes      Januvia 100 MG tablet  Generic drug: SITagliptin   TAKE ONE TABLET BY MOUTH DAILY      metoprolol tartrate 50 MG tablet  Commonly known as: LOPRESSOR   50 mg, Oral, 2 Times Daily      omeprazole 20 MG capsule  Commonly known as: priLOSEC   TAKE ONE CAPSULE BY MOUTH DAILY      OneTouch Verio Flex System w/Device kit   1 Device, Does not apply, 2 Times Daily      pravastatin 80 MG tablet  Commonly known as: PRAVACHOL   TAKE ONE TABLET BY MOUTH DAILY      promethazine-dextromethorphan 6.25-15 MG/5ML syrup  Commonly known as: PROMETHAZINE-DM   5 mL, Oral, 4 Times Daily PRN      VITAMIN C PO   Oral      ZINC PO   Oral             Stop These  Medications      hydroCHLOROthiazide 12.5 MG capsule  Commonly known as: MICROZIDE     lisinopril 40 MG tablet  Commonly known as: PRINIVIL,ZESTRIL     metFORMIN 1000 MG tablet  Commonly known as: GLUCOPHAGE            Discharge Diet:     Diet Instructions       Diet: Diabetic Diets; Consistent Carbohydrate; Thin (IDDSI 0)      Discharge Diet: Diabetic Diets    Diabetic Diet: Consistent Carbohydrate    Fluid Consistency: Thin (IDDSI 0)          Activity at Discharge:     Activity Instructions       Activity as Tolerated            Follow-up Appointments:     Follow-up Information       Chelsea Mcelroy PA-C Follow up in 1 week(s).    Specialty: Family Medicine  Contact information:  [address] [facility] [address]  [phone]                           Future Appointments   Date Time Provider Department Center   4/29/2024 10:00 AM Ariel Augustin MD [facility] None     Test Results Pending at Discharge:    Pending Labs       Order Current Status    C-reactive Protein In process    Creatinine Urine Random (kidney function) GFR component - Urine, Clean Catch In process    Blood Culture - Blood, Arm, Left Preliminary result    Blood Culture - Blood, Arm, Left Preliminary result               Eliecer Jones DO  10/29/23  12:13 EDT    Time: I spent 25 minutes on this discharge activity which included: face-to-face encounter with the patient, reviewing the data in the system, coordination of the care with the nursing staff as well as consultants, documentation, and entering orders.     Dictated utilizing Dragon dictation.  25

## 2023-10-29 NOTE — OUTREACH NOTE
Prep Survey      Flowsheet Row Responses   [facility] facility patient discharged from? [place]   Is LACE score < 7 ? No   Eligibility [facility] [place] [place]   Date of Admission 10/28/23   Date of Discharge 10/29/23   Discharge Disposition Home or Self Care   Discharge diagnosis Pneumonia due to COVID-19 virus   Does the patient have one of the following disease processes/diagnoses(primary or secondary)? Pneumonia   Does the patient have Home health ordered? No   Is there a DME ordered? No   Prep survey completed? Yes            Kristie APPIAH - Registered Nurse

## 2023-10-30 ENCOUNTER — TRANSITIONAL CARE MANAGEMENT TELEPHONE ENCOUNTER (OUTPATIENT)
Dept: CALL CENTER | Facility: HOSPITAL | Age: 73
End: 2023-10-30
Payer: MEDICARE

## 2023-10-30 NOTE — PROGRESS NOTES
Notified by lab of positive blood culture.  The other culture is negative.  I suspect this is a contaminant (Staph epi) so will pass this off to the next team to monitor for change.

## 2023-10-30 NOTE — OUTREACH NOTE
Call Center TCM Note      Flowsheet Row Responses   [facility] patient discharged from? [place]   Does the patient have one of the following disease processes/diagnoses(primary or secondary)? Pneumonia   TCM attempt successful? Yes   Call start time 1447   Call end time 1449   Discharge diagnosis Pneumonia due to COVID-19 virus   Meds reviewed with patient/caregiver? Yes   Is the patient having any side effects they believe may be caused by any medication additions or changes? No   Does the patient have all medications ordered at discharge? Yes   Is the patient taking all medications as directed (includes completed medication regime)? Yes   Does the patient have an appointment with their PCP within 7-14 days of discharge? No   Nursing Interventions Assisted patient with making appointment per protocol   Has home health visited the patient within 72 hours of discharge? N/A   Pulse Ox monitoring None   Psychosocial issues? Yes   Did the patient receive a copy of their discharge instructions? Yes   Nursing interventions Reviewed instructions with patient   What is the patient's perception of their health status since discharge? Improving   If the patient is a current smoker, are they able to teach back resources for cessation? Not a smoker   Is the patient/caregiver able to teach back the hierarchy of who to call/visit for symptoms/problems? PCP, Specialist, Home health nurse, Urgent Care, ED, 911 Yes   Is the patient/caregiver able to teach back signs and symptoms of worsening condition: Fever/chills, Shortness of breath, Chest pain   TCM call completed? Yes   Call end time 1449   Would this patient benefit from a Referral to Amb Social Work? No   Is the patient interested in additional calls from an ambulatory ? No            Padmini Trotter LPN    10/30/2023, 14:51 EDT

## 2023-10-31 LAB
BACTERIA SPEC AEROBE CULT: ABNORMAL
GRAM STN SPEC: ABNORMAL
ISOLATED FROM: ABNORMAL

## 2023-11-01 RX ORDER — SITAGLIPTIN 100 MG/1
100 TABLET, FILM COATED ORAL DAILY
Qty: 30 TABLET | Refills: 0 | Status: SHIPPED | OUTPATIENT
Start: 2023-11-01

## 2023-11-02 LAB — BACTERIA SPEC AEROBE CULT: NORMAL

## 2023-11-07 ENCOUNTER — READMISSION MANAGEMENT (OUTPATIENT)
Dept: CALL CENTER | Facility: HOSPITAL | Age: 73
End: 2023-11-07
Payer: MEDICARE

## 2023-11-07 NOTE — OUTREACH NOTE
COPD/PN Week 2 Survey      Flowsheet Row Responses   [facility] facility patient discharged from? [place]   Does the patient have one of the following disease processes/diagnoses(primary or secondary)? Pneumonia   Week 2 attempt successful? No   Unsuccessful attempts Attempt 1            Magda Hardin Licensed Nurse

## 2023-11-08 ENCOUNTER — OFFICE VISIT (OUTPATIENT)
Dept: FAMILY MEDICINE CLINIC | Facility: CLINIC | Age: 73
End: 2023-11-08
Payer: MEDICARE

## 2023-11-08 VITALS
HEART RATE: 76 BPM | SYSTOLIC BLOOD PRESSURE: 140 MMHG | DIASTOLIC BLOOD PRESSURE: 70 MMHG | TEMPERATURE: 98 F | HEIGHT: 69 IN | OXYGEN SATURATION: 97 % | RESPIRATION RATE: 15 BRPM | BODY MASS INDEX: 25.62 KG/M2 | WEIGHT: 173 LBS

## 2023-11-08 DIAGNOSIS — Z09 HOSPITAL DISCHARGE FOLLOW-UP: Primary | ICD-10-CM

## 2023-11-08 DIAGNOSIS — U07.1 PNEUMONIA DUE TO COVID-19 VIRUS: ICD-10-CM

## 2023-11-08 DIAGNOSIS — I50.9 CONGESTIVE HEART FAILURE, UNSPECIFIED HF CHRONICITY, UNSPECIFIED HEART FAILURE TYPE: ICD-10-CM

## 2023-11-08 DIAGNOSIS — J12.82 PNEUMONIA DUE TO COVID-19 VIRUS: ICD-10-CM

## 2023-11-08 NOTE — PROGRESS NOTES
Transitional Care Follow Up Visit  Subjective     Guy Plascencia is a 72 y.o. male who presents for a transitional care management visit.    Within 48 business hours after discharge our office contacted him via telephone to coordinate his care and needs.      I reviewed and discussed the details of that call along with the discharge summary, hospital problems, inpatient lab results, inpatient diagnostic studies, and consultation reports with Guy.     Current outpatient and discharge medications have been reconciled for the patient.  Reviewed by: Chelsea Mcelroy PA-C          10/29/2023     2:57 PM   Date of TCM Phone Call   Hospital [facility]   Date of Admission 10/28/2023   Date of Discharge 10/29/2023   Discharge Disposition Home or Self Care     Risk for Readmission (LACE) Score: 10 (10/29/2023  6:00 AM)      History of Present Illness   Course During Hospital Stay: Guy is a 72-year-old male who presents for hospital discharge follow-up from [facility].  States he was diagnosed with COVID-19 around October 26, 2023.  States his symptoms worsen with decreased oxygen levels at home.  He proceeded through the ER where he was diagnosed with acute hypoxic respiratory failure secondary to COVID.  States he was admitted to [facility] on October 27, 2023 with discharge date of October 29, 2023.  States he was taken off the metformin, lisinopril and hydrochlorothiazide medications due to renal insufficiency.  He was also diagnosed with elevated BNP at admission.  He was told to follow-up with primary care after discharge for repeat testing.  States his values were improving at discharge.  States he was discharged on steroids for a few days.  States his blood sugars have been running high due to the steroid medication for his COVID-pneumonia.  Overall his appetite has been normal.  Sleep has been slightly restless due to the COVID and also not getting the right CPAP machine supplies.   He is hoping he will get the new supplies today.  Bowel movements have been normal.  Overall he is feeling better but still tired.  Denied any current fevers, chills, cough, wheezing, shortness of air, chest pain, GI upset, dizziness or swelling of ankles.  Has been compliant with all his other medications.     The following portions of the patient's history were reviewed and updated as appropriate: He  has a past medical history of Arthritis, Diabetes mellitus, GERD (gastroesophageal reflux disease), H/O  Multiple facial fractures (11/1968), H/O Broken femur (11/1968), History of transfusion, Hyperlipidemia, Hypertension, Kidney stone, Neoplasm of uncertain behavior, Peptic ulceration, Plantar fasciitis, Sleep apnea, and Thrombocytosis.  He does not have any pertinent problems on file.  He  has a past surgical history that includes Other surgical history; Other surgical history; Femur Surgery (11/1968); Skin biopsy; cystoscopy ureteroscopy stone manipulation/extraction (Left, 6/29/2016); Fracture surgery; Extracorporeal shock wave lithotripsy (Right, 7/29/2016); Cosmetic surgery (11/1968); Colonoscopy; and Esophagogastroduodenoscopy (N/A, 10/2/2019).  His family history includes Cancer in his brother and another family member; Heart disease in his father; Skin cancer (age of onset: 50) in his brother.  He  reports that he quit smoking about 51 years ago. His smoking use included cigarettes. He has a 28.00 pack-year smoking history. He has never used smokeless tobacco. He reports that he does not drink alcohol and does not use drugs.  Current Outpatient Medications   Medication Sig Dispense Refill    amLODIPine (NORVASC) 10 MG tablet TAKE ONE TABLET BY MOUTH DAILY 90 tablet 0    APPLE CIDER VINEGAR PO Take  by mouth.      Ascorbic Acid (VITAMIN C PO) Take  by mouth.      Aspirin (ASPIR-81 PO) Take  by mouth Daily.      Blood Glucose Monitoring Suppl (ONETOUCH VERIO FLEX SYSTEM) w/Device kit 1 Device 2 (Two) Times a  Day. 1 kit 0    Cholecalciferol (D3 ADULT PO) Take 2,000 Units by mouth Daily.      Cyanocobalamin (B-12 PO) Take  by mouth.      glipizide (GLUCOTROL) 10 MG tablet TAKE ONE TABLET BY MOUTH TWICE A DAY BEFORE A MEAL 180 tablet 2    glucose blood test strip Use to test blood sugar twice a day for type 2 diabetes- [employer] brand glucometer 100 each 12    hydrALAZINE (APRESOLINE) 25 MG tablet Take 1 tablet by mouth 3 (Three) Times a Day for 30 days. 90 tablet 0    hydrOXYzine (ATARAX) 25 MG tablet TAKE ONE TABLET BY MOUTH THREE TIMES A DAY AS NEEDED FOR ITCHING 60 tablet 3    Insulin Pen Needle 32G X 4 MM misc Use once a weekly for Type 2 Diabetes 30 each 0    metoprolol tartrate (LOPRESSOR) 50 MG tablet Take 1 tablet by mouth 2 (Two) Times a Day. 180 tablet 2    Multiple Vitamins-Minerals (ZINC PO) Take  by mouth.      omeprazole (priLOSEC) 20 MG capsule TAKE ONE CAPSULE BY MOUTH DAILY 90 capsule 1    pravastatin (PRAVACHOL) 80 MG tablet TAKE ONE TABLET BY MOUTH DAILY 90 tablet 1    promethazine-dextromethorphan (PROMETHAZINE-DM) 6.25-15 MG/5ML syrup Take 5 mL by mouth 4 (Four) Times a Day As Needed for Cough. 118 mL 0    SITagliptin (Januvia) 100 MG tablet TAKE 1 TABLET BY MOUTH DAILY 30 tablet 0     No current facility-administered medications for this visit.     Current Outpatient Medications on File Prior to Visit   Medication Sig    amLODIPine (NORVASC) 10 MG tablet TAKE ONE TABLET BY MOUTH DAILY    APPLE CIDER VINEGAR PO Take  by mouth.    Ascorbic Acid (VITAMIN C PO) Take  by mouth.    Aspirin (ASPIR-81 PO) Take  by mouth Daily.    Blood Glucose Monitoring Suppl (ONETOUCH VERIO FLEX SYSTEM) w/Device kit 1 Device 2 (Two) Times a Day.    Cholecalciferol (D3 ADULT PO) Take 2,000 Units by mouth Daily.    Cyanocobalamin (B-12 PO) Take  by mouth.    glipizide (GLUCOTROL) 10 MG tablet TAKE ONE TABLET BY MOUTH TWICE A DAY BEFORE A MEAL    glucose blood test strip Use to test blood sugar twice a day for type 2 diabetes-  [employer] brand glucometer    hydrALAZINE (APRESOLINE) 25 MG tablet Take 1 tablet by mouth 3 (Three) Times a Day for 30 days.    hydrOXYzine (ATARAX) 25 MG tablet TAKE ONE TABLET BY MOUTH THREE TIMES A DAY AS NEEDED FOR ITCHING    Insulin Pen Needle 32G X 4 MM misc Use once a weekly for Type 2 Diabetes    metoprolol tartrate (LOPRESSOR) 50 MG tablet Take 1 tablet by mouth 2 (Two) Times a Day.    Multiple Vitamins-Minerals (ZINC PO) Take  by mouth.    omeprazole (priLOSEC) 20 MG capsule TAKE ONE CAPSULE BY MOUTH DAILY    pravastatin (PRAVACHOL) 80 MG tablet TAKE ONE TABLET BY MOUTH DAILY    promethazine-dextromethorphan (PROMETHAZINE-DM) 6.25-15 MG/5ML syrup Take 5 mL by mouth 4 (Four) Times a Day As Needed for Cough.    SITagliptin (Januvia) 100 MG tablet TAKE 1 TABLET BY MOUTH DAILY     No current facility-administered medications on file prior to visit.     He is allergic to penicillins and cimetidine..      Current outpatient and discharge medications have been reconciled for the patient.  Reviewed by: Chelsea Mcelroy PA-C  Review of Systems        ED to Hosp-Admission (Discharged) with Eliecer Jones DO; Artemio Kelley MD (10/27/2023)    Objective   Physical Exam  Vitals and nursing note reviewed.   Constitutional:       Appearance: Normal appearance. He is well-developed, well-groomed and overweight.   HENT:      Head: Normocephalic and atraumatic.      Jaw: There is normal jaw occlusion.      Right Ear: Hearing, tympanic membrane, ear canal and external ear normal.      Left Ear: Hearing, tympanic membrane, ear canal and external ear normal.      Nose: Nose normal.      Right Sinus: No maxillary sinus tenderness or frontal sinus tenderness.      Left Sinus: No maxillary sinus tenderness or frontal sinus tenderness.      Mouth/Throat:      Lips: Pink.      Mouth: Mucous membranes are moist.      Dentition: Normal dentition.      Tongue: No lesions.      Pharynx: Oropharynx is clear. Uvula midline.       Tonsils: No tonsillar exudate.   Eyes:      General: Lids are normal.      Conjunctiva/sclera: Conjunctivae normal.      Pupils: Pupils are equal, round, and reactive to light.   Neck:      Vascular: No carotid bruit.      Trachea: Trachea and phonation normal. No tracheal tenderness.   Cardiovascular:      Rate and Rhythm: Normal rate and regular rhythm.      Pulses: Normal pulses.      Heart sounds: Normal heart sounds, S1 normal and S2 normal. No murmur heard.  Pulmonary:      Effort: Pulmonary effort is normal.      Breath sounds: Normal breath sounds and air entry.   Abdominal:      General: Bowel sounds are normal.      Palpations: Abdomen is soft.      Tenderness: There is no abdominal tenderness. There is no right CVA tenderness, left CVA tenderness, guarding or rebound. Negative signs include Edmond's sign, Rovsing's sign, McBurney's sign, psoas sign and obturator sign.   Musculoskeletal:      Cervical back: Neck supple.      Right lower leg: No edema.      Left lower leg: No edema.   Lymphadenopathy:      Cervical: No cervical adenopathy.      Right cervical: No superficial, deep or posterior cervical adenopathy.     Left cervical: No superficial, deep or posterior cervical adenopathy.   Skin:     General: Skin is warm and dry.      Capillary Refill: Capillary refill takes less than 2 seconds.   Neurological:      Mental Status: He is alert and oriented to person, place, and time.   Psychiatric:         Attention and Perception: Attention and perception normal.         Mood and Affect: Mood and affect normal.         Speech: Speech normal.         Behavior: Behavior is cooperative.         Thought Content: Thought content normal.         Cognition and Memory: Cognition and memory normal.         Judgment: Judgment normal.           Assessment & Plan   Problems Addressed this Visit          Health Encounters    Hospital discharge follow-up - Primary       Other    Pneumonia due to COVID-19 virus    Relevant  Orders    Basic Metabolic Panel    BNP ([facility] Only)     Other Visit Diagnoses       Congestive heart failure, unspecified HF chronicity, unspecified heart failure type        Relevant Orders    Basic Metabolic Panel    BNP ([facility] Only)          Diagnoses         Codes Comments    Hospital discharge follow-up    -  Primary ICD-10-CM: Z09  ICD-9-CM: V67.59     Pneumonia due to COVID-19 virus     ICD-10-CM: U07.1, J12.82  ICD-9-CM: 480.8, 079.89     Congestive heart failure, unspecified HF chronicity, unspecified heart failure type     ICD-10-CM: I50.9  ICD-9-CM: 428.0           Guy was seen in office today for hospital discharge follow-up from [facility].  I have reviewed his discharge summary, imaging and lab results with him.  We will recheck BMP and BNP today.  He will continue current medication at home as directed.  He will be notified of test results and any medication changes.  He will schedule follow-up appointment for his Medicare wellness and type 2 diabetes management.          [facility]  [address] [place] [id number]  Dept: [phone]  Dept Fax: [phone]  Loc: [phone]  Loc Fax: [phone]

## 2023-11-09 LAB
BNP SERPL-MCNC: 136 PG/ML (ref 0–100)
BUN SERPL-MCNC: 18 MG/DL (ref 8–27)
BUN/CREAT SERPL: 15 (ref 10–24)
CALCIUM SERPL-MCNC: 9.6 MG/DL (ref 8.6–10.2)
CHLORIDE SERPL-SCNC: 104 MMOL/L (ref 96–106)
CO2 SERPL-SCNC: 25 MMOL/L (ref 20–29)
CREAT SERPL-MCNC: 1.17 MG/DL (ref 0.76–1.27)
EGFRCR SERPLBLD CKD-EPI 2021: 66 ML/MIN/1.73
GLUCOSE SERPL-MCNC: 284 MG/DL (ref 70–99)
POTASSIUM SERPL-SCNC: 4.9 MMOL/L (ref 3.5–5.2)
SODIUM SERPL-SCNC: 142 MMOL/L (ref 134–144)

## 2023-11-16 DIAGNOSIS — L30.8 PRURITIC DERMATITIS: ICD-10-CM

## 2023-11-17 RX ORDER — HYDROXYZINE HYDROCHLORIDE 25 MG/1
TABLET, FILM COATED ORAL
Qty: 60 TABLET | Refills: 3 | Status: SHIPPED | OUTPATIENT
Start: 2023-11-17

## 2023-12-11 RX ORDER — HYDRALAZINE HYDROCHLORIDE 25 MG/1
25 TABLET, FILM COATED ORAL 3 TIMES DAILY
Qty: 90 TABLET | Refills: 0 | Status: SHIPPED | OUTPATIENT
Start: 2023-12-11 | End: 2024-01-10

## 2023-12-11 RX ORDER — SITAGLIPTIN 100 MG/1
100 TABLET, FILM COATED ORAL DAILY
Qty: 90 TABLET | Refills: 1 | Status: SHIPPED | OUTPATIENT
Start: 2023-12-11

## 2024-01-15 RX ORDER — HYDRALAZINE HYDROCHLORIDE 25 MG/1
25 TABLET, FILM COATED ORAL 3 TIMES DAILY
Qty: 90 TABLET | Refills: 0 | Status: SHIPPED | OUTPATIENT
Start: 2024-01-15

## 2024-01-15 RX ORDER — AMLODIPINE BESYLATE 10 MG/1
10 TABLET ORAL DAILY
Qty: 90 TABLET | Refills: 0 | Status: SHIPPED | OUTPATIENT
Start: 2024-01-15

## 2024-01-19 ENCOUNTER — TELEPHONE (OUTPATIENT)
Dept: FAMILY MEDICINE CLINIC | Facility: CLINIC | Age: 74
End: 2024-01-19

## 2024-01-19 ENCOUNTER — OFFICE VISIT (OUTPATIENT)
Dept: FAMILY MEDICINE CLINIC | Facility: CLINIC | Age: 74
End: 2024-01-19
Payer: MEDICARE

## 2024-01-19 ENCOUNTER — HOSPITAL ENCOUNTER (OUTPATIENT)
Dept: GENERAL RADIOLOGY | Facility: HOSPITAL | Age: 74
Discharge: HOME | End: 2024-01-19
Admitting: PHYSICIAN ASSISTANT
Payer: MEDICARE

## 2024-01-19 VITALS
HEIGHT: 69 IN | RESPIRATION RATE: 15 BRPM | WEIGHT: 177 LBS | SYSTOLIC BLOOD PRESSURE: 132 MMHG | TEMPERATURE: 97.8 F | DIASTOLIC BLOOD PRESSURE: 76 MMHG | OXYGEN SATURATION: 97 % | HEART RATE: 80 BPM | BODY MASS INDEX: 26.22 KG/M2

## 2024-01-19 DIAGNOSIS — R05.3 CHRONIC COUGH: ICD-10-CM

## 2024-01-19 DIAGNOSIS — L85.3 DRY SKIN DERMATITIS: ICD-10-CM

## 2024-01-19 DIAGNOSIS — E78.2 MIXED HYPERLIPIDEMIA: ICD-10-CM

## 2024-01-19 DIAGNOSIS — N39.0 URINARY TRACT INFECTION WITHOUT HEMATURIA, SITE UNSPECIFIED: ICD-10-CM

## 2024-01-19 DIAGNOSIS — Z00.00 MEDICARE ANNUAL WELLNESS VISIT, SUBSEQUENT: Primary | ICD-10-CM

## 2024-01-19 DIAGNOSIS — R06.09 DYSPNEA ON EXERTION: ICD-10-CM

## 2024-01-19 DIAGNOSIS — R05.3 CHRONIC COUGH: Primary | ICD-10-CM

## 2024-01-19 DIAGNOSIS — E11.65 TYPE 2 DIABETES MELLITUS WITH HYPERGLYCEMIA, WITHOUT LONG-TERM CURRENT USE OF INSULIN: ICD-10-CM

## 2024-01-19 DIAGNOSIS — I10 ESSENTIAL HYPERTENSION: ICD-10-CM

## 2024-01-19 DIAGNOSIS — Z12.5 SCREENING FOR MALIGNANT NEOPLASM OF PROSTATE: ICD-10-CM

## 2024-01-19 DIAGNOSIS — R82.998 FOAMY URINE: ICD-10-CM

## 2024-01-19 DIAGNOSIS — E55.9 VITAMIN D DEFICIENCY: ICD-10-CM

## 2024-01-19 LAB
BILIRUB BLD-MCNC: NEGATIVE MG/DL
CLARITY, POC: CLEAR
COLOR UR: YELLOW
GLUCOSE UR STRIP-MCNC: NEGATIVE MG/DL
KETONES UR QL: NEGATIVE
LEUKOCYTE EST, POC: ABNORMAL
NITRITE UR-MCNC: POSITIVE MG/ML
PH UR: 5 [PH] (ref 5–8)
PROT UR STRIP-MCNC: ABNORMAL MG/DL
RBC # UR STRIP: ABNORMAL /UL
SP GR UR: 1.01 (ref 1–1.03)
UROBILINOGEN UR QL: NORMAL

## 2024-01-19 PROCEDURE — 71046 X-RAY EXAM CHEST 2 VIEWS: CPT

## 2024-01-19 RX ORDER — ALBUTEROL SULFATE 90 UG/1
2 AEROSOL, METERED RESPIRATORY (INHALATION) EVERY 4 HOURS PRN
Qty: 18 G | Refills: 0 | Status: SHIPPED | OUTPATIENT
Start: 2024-01-19

## 2024-01-19 RX ORDER — SULFAMETHOXAZOLE AND TRIMETHOPRIM 800; 160 MG/1; MG/1
1 TABLET ORAL 2 TIMES DAILY
Qty: 14 TABLET | Refills: 0 | Status: SHIPPED | OUTPATIENT
Start: 2024-01-19

## 2024-01-19 RX ORDER — TRIAMCINOLONE ACETONIDE 0.25 MG/G
1 OINTMENT TOPICAL 2 TIMES DAILY
Qty: 15 G | Refills: 0 | Status: SHIPPED | OUTPATIENT
Start: 2024-01-19

## 2024-01-19 NOTE — TELEPHONE ENCOUNTER
Hub staff attempted to follow warm transfer process and was unsuccessful     Caller: Guy Plascencia    Relationship to patient: Self    Best call back number: [phone]     Patient is needing: PATIENT WAS RETURNING A MISSED CALL TO THE OFFICE. POSSIBLE LAB RESULTS.

## 2024-01-19 NOTE — PROGRESS NOTES
"Chief Complaint  Medicare Wellness-subsequent    Subjective     {Problem List  Visit Diagnosis   Encounters  Notes  Medications  Labs  Result Review Imaging  Media :23}     Guy Plascencia presents to [facility]  History of Present Illness       Objective   Vital Signs:   /76   Pulse 80   Temp 97.8 °F (36.6 °C)   Ht 175.3 cm (69.02\")   Wt 80.3 kg (177 lb)   SpO2 97%   BMI 26.13 kg/m²     Physical Exam   Result Review :{Labs  Result Review  Imaging  Med Tab  Media :23}   {The following data was reviewed by (Optional):57048}  {Ambulatory Labs (Optional):26444}  {Data reviewed (Optional):98127:::1}          Assessment and Plan {CC Problem List  Visit Diagnosis  ROS  Review (Popup)  Health Maintenance  Quality  BestPractice  Medications  SmartSets  SnapShot Encounters  Media :23}   Diagnoses and all orders for this visit:    1. Medicare annual wellness visit, subsequent (Primary)    2. Type 2 diabetes mellitus with hyperglycemia, without long-term current use of insulin    3. Mixed hyperlipidemia    4. Essential hypertension    5. Vitamin D deficiency    6. Screening for malignant neoplasm of prostate      {Time Spent (Optional):27675}  Follow Up {Instructions Charge Capture  Follow-up Communications :23}  No follow-ups on file.  Patient was given instructions and counseling regarding his condition or for health maintenance advice. Please see specific information pulled into the AVS if appropriate.     [facility]  [address] [address]  Dept: [phone]  Dept Fax: [phone]  Loc: [phone]  Loc Fax: [phone]        "

## 2024-01-19 NOTE — PROGRESS NOTES
The ABCs of the Annual Wellness Visit  Subsequent Medicare Wellness Visit  Chief Complaint   Patient presents with    Medicare Wellness-subsequent    Diabetes     management    Hyperlipidemia     management    Hypertension     management       Subjective    Guy Plascencia is a 73 y.o. male who presents for a Subsequent Medicare Wellness Visit, type 2 diabetes, hyperlipidemia and hypertension management.  States his blood sugars have been running high around 200 recently.  Diet has not been as healthy.  Tends to eat out a lot.  Sleep has not been as good due to getting a new CPAP machine.  He is waiting on new mass to arrive.  Bowel movements have been daily and frequent.  He thinks this may be related to the metformin.  Denied any dark black tarry stools.  He is fasting today.    The following portions of the patient's history were reviewed and   updated as appropriate: He  has a past medical history of Arthritis, Diabetes mellitus, GERD (gastroesophageal reflux disease), H/O  Multiple facial fractures (11/1968), H/O Broken femur (11/1968), History of transfusion, Hyperlipidemia, Hypertension, Kidney stone, Neoplasm of uncertain behavior, Peptic ulceration, Plantar fasciitis, Sleep apnea, and Thrombocytosis.  He does not have any pertinent problems on file.  He  has a past surgical history that includes Other surgical history; Other surgical history; Femur Surgery (11/1968); Skin biopsy; cystoscopy ureteroscopy stone manipulation/extraction (Left, 6/29/2016); Fracture surgery; Extracorporeal shock wave lithotripsy (Right, 7/29/2016); Cosmetic surgery (11/1968); Colonoscopy; and Esophagogastroduodenoscopy (N/A, 10/2/2019).  His family history includes Cancer in his brother and another family member; Heart disease in his father; Skin cancer (age of onset: 50) in his brother.  He  reports that he quit smoking about 52 years ago. His smoking use included cigarettes. He has a 28.00 pack-year smoking history. He has never  used smokeless tobacco. He reports that he does not drink alcohol and does not use drugs.  Current Outpatient Medications   Medication Sig Dispense Refill    amLODIPine (NORVASC) 10 MG tablet TAKE 1 TABLET BY MOUTH DAILY 90 tablet 0    APPLE CIDER VINEGAR PO Take  by mouth.      Ascorbic Acid (VITAMIN C PO) Take  by mouth.      Aspirin (ASPIR-81 PO) Take  by mouth Daily.      Blood Glucose Monitoring Suppl (ONETOUCH VERIO FLEX SYSTEM) w/Device kit 1 Device 2 (Two) Times a Day. 1 kit 0    Cholecalciferol (D3 ADULT PO) Take 2,000 Units by mouth Daily.      Cyanocobalamin (B-12 PO) Take  by mouth.      glipizide (GLUCOTROL) 10 MG tablet TAKE ONE TABLET BY MOUTH TWICE A DAY BEFORE A MEAL 180 tablet 2    glucose blood test strip Use to test blood sugar twice a day for type 2 diabetes- [employer] brand glucometer 100 each 12    hydrALAZINE (APRESOLINE) 25 MG tablet TAKE ONE TABLET BY MOUTH THREE TIMES A DAY 90 tablet 0    hydrOXYzine (ATARAX) 25 MG tablet TAKE ONE TABLET BY MOUTH THREE TIMES A DAY AS NEEDED FOR ITCHING 60 tablet 3    Insulin Pen Needle 32G X 4 MM misc Use once a weekly for Type 2 Diabetes 30 each 0    metFORMIN (GLUCOPHAGE) 1000 MG tablet TAKE 1 TABLET BY MOUTH TWICE A DAY WITH A MEAL 180 tablet 0    metoprolol tartrate (LOPRESSOR) 50 MG tablet Take 1 tablet by mouth 2 (Two) Times a Day. 180 tablet 2    Multiple Vitamins-Minerals (ZINC PO) Take  by mouth.      omeprazole (priLOSEC) 20 MG capsule TAKE ONE CAPSULE BY MOUTH DAILY 90 capsule 1    pravastatin (PRAVACHOL) 80 MG tablet TAKE ONE TABLET BY MOUTH DAILY 90 tablet 1    SITagliptin (Januvia) 100 MG tablet TAKE 1 TABLET BY MOUTH DAILY 90 tablet 1    sulfamethoxazole-trimethoprim (BACTRIM DS,SEPTRA DS) 800-160 MG per tablet Take 1 tablet by mouth 2 (Two) Times a Day. 14 tablet 0    triamcinolone (KENALOG) 0.025 % ointment Apply 1 application  topically to the appropriate area as directed 2 (Two) Times a Day. 15 g 0     No current facility-administered  medications for this visit.     Current Outpatient Medications on File Prior to Visit   Medication Sig    amLODIPine (NORVASC) 10 MG tablet TAKE 1 TABLET BY MOUTH DAILY    APPLE CIDER VINEGAR PO Take  by mouth.    Ascorbic Acid (VITAMIN C PO) Take  by mouth.    Aspirin (ASPIR-81 PO) Take  by mouth Daily.    Blood Glucose Monitoring Suppl (ONETOUCH VERIO FLEX SYSTEM) w/Device kit 1 Device 2 (Two) Times a Day.    Cholecalciferol (D3 ADULT PO) Take 2,000 Units by mouth Daily.    Cyanocobalamin (B-12 PO) Take  by mouth.    glipizide (GLUCOTROL) 10 MG tablet TAKE ONE TABLET BY MOUTH TWICE A DAY BEFORE A MEAL    glucose blood test strip Use to test blood sugar twice a day for type 2 diabetes- [employer] brand glucometer    hydrALAZINE (APRESOLINE) 25 MG tablet TAKE ONE TABLET BY MOUTH THREE TIMES A DAY    hydrOXYzine (ATARAX) 25 MG tablet TAKE ONE TABLET BY MOUTH THREE TIMES A DAY AS NEEDED FOR ITCHING    Insulin Pen Needle 32G X 4 MM misc Use once a weekly for Type 2 Diabetes    metFORMIN (GLUCOPHAGE) 1000 MG tablet TAKE 1 TABLET BY MOUTH TWICE A DAY WITH A MEAL    metoprolol tartrate (LOPRESSOR) 50 MG tablet Take 1 tablet by mouth 2 (Two) Times a Day.    Multiple Vitamins-Minerals (ZINC PO) Take  by mouth.    omeprazole (priLOSEC) 20 MG capsule TAKE ONE CAPSULE BY MOUTH DAILY    pravastatin (PRAVACHOL) 80 MG tablet TAKE ONE TABLET BY MOUTH DAILY    SITagliptin (Januvia) 100 MG tablet TAKE 1 TABLET BY MOUTH DAILY    [DISCONTINUED] promethazine-dextromethorphan (PROMETHAZINE-DM) 6.25-15 MG/5ML syrup Take 5 mL by mouth 4 (Four) Times a Day As Needed for Cough.     No current facility-administered medications on file prior to visit.     He is allergic to penicillins and cimetidine..    Compared to one year ago, the patient feels his physical   health is worse.    Compared to one year ago, the patient feels his mental   health is the same.    Recent Hospitalizations:  This patient has had a [facility] admission record on  file within the last 365 days.    Current Medical Providers:  Patient Care Team:  Chelsea Mcelroy PA-C as PCP - General  Michelle Sullivan MD as Consulting Physician (Hematology and Oncology)    Outpatient Medications Prior to Visit   Medication Sig Dispense Refill    amLODIPine (NORVASC) 10 MG tablet TAKE 1 TABLET BY MOUTH DAILY 90 tablet 0    APPLE CIDER VINEGAR PO Take  by mouth.      Ascorbic Acid (VITAMIN C PO) Take  by mouth.      Aspirin (ASPIR-81 PO) Take  by mouth Daily.      Blood Glucose Monitoring Suppl (ONETOUCH VERIO FLEX SYSTEM) w/Device kit 1 Device 2 (Two) Times a Day. 1 kit 0    Cholecalciferol (D3 ADULT PO) Take 2,000 Units by mouth Daily.      Cyanocobalamin (B-12 PO) Take  by mouth.      glipizide (GLUCOTROL) 10 MG tablet TAKE ONE TABLET BY MOUTH TWICE A DAY BEFORE A MEAL 180 tablet 2    glucose blood test strip Use to test blood sugar twice a day for type 2 diabetes- [employer] brand glucometer 100 each 12    hydrALAZINE (APRESOLINE) 25 MG tablet TAKE ONE TABLET BY MOUTH THREE TIMES A DAY 90 tablet 0    hydrOXYzine (ATARAX) 25 MG tablet TAKE ONE TABLET BY MOUTH THREE TIMES A DAY AS NEEDED FOR ITCHING 60 tablet 3    Insulin Pen Needle 32G X 4 MM misc Use once a weekly for Type 2 Diabetes 30 each 0    metFORMIN (GLUCOPHAGE) 1000 MG tablet TAKE 1 TABLET BY MOUTH TWICE A DAY WITH A MEAL 180 tablet 0    metoprolol tartrate (LOPRESSOR) 50 MG tablet Take 1 tablet by mouth 2 (Two) Times a Day. 180 tablet 2    Multiple Vitamins-Minerals (ZINC PO) Take  by mouth.      omeprazole (priLOSEC) 20 MG capsule TAKE ONE CAPSULE BY MOUTH DAILY 90 capsule 1    pravastatin (PRAVACHOL) 80 MG tablet TAKE ONE TABLET BY MOUTH DAILY 90 tablet 1    SITagliptin (Januvia) 100 MG tablet TAKE 1 TABLET BY MOUTH DAILY 90 tablet 1    promethazine-dextromethorphan (PROMETHAZINE-DM) 6.25-15 MG/5ML syrup Take 5 mL by mouth 4 (Four) Times a Day As Needed for Cough. 118 mL 0     No facility-administered medications prior to visit.  "      No opioid medication identified on active medication list. I have reviewed chart for other potential  high risk medication/s and harmful drug interactions in the elderly.        Aspirin is on active medication list. Aspirin use is indicated based on review of current medical condition/s. Pros and cons of this therapy have been discussed today. Benefits of this medication outweigh potential harm.  Patient has been encouraged to continue taking this medication.  .      Patient Active Problem List   Diagnosis    Bilateral shoulder pain    Type 2 diabetes mellitus with hyperglycemia, without long-term current use of insulin    Eczema    Impotence of organic origin    Mixed hyperlipidemia    Essential hypertension    Hypogonadism in male    Obstructive sleep apnea syndrome    Thrombocytopenia    Vitamin D deficiency    Trigger middle finger of right hand    Sleep apnea    Special screening for malignant neoplasms, colon    Splenomegaly    Steatohepatitis    Pancreatic cyst    Medicare annual wellness visit, subsequent    Pneumonia due to COVID-19 virus    Cytokine release syndrome, grade 1    Hospital discharge follow-up     Advance Care Planning   Advance Care Planning     Advance Directive is not on file.  ACP discussion was held with the patient during this visit. Patient does not have an advance directive, information provided.     Objective    Vitals:    01/19/24 0936   BP: 132/76   Pulse: 80   Resp: 15   Temp: 97.8 °F (36.6 °C)   SpO2: 97%   Weight: 80.3 kg (177 lb)   Height: 175.3 cm (69.02\")     Estimated body mass index is 26.13 kg/m² as calculated from the following:    Height as of this encounter: 175.3 cm (69.02\").    Weight as of this encounter: 80.3 kg (177 lb).    BMI is >= 25 and <30. (Overweight) The following options were offered after discussion;: weight loss educational material (shared in after visit summary), exercise counseling/recommendations, nutrition counseling/recommendations, " pharmacological intervention options, and information on healthy weight added to patient's after visit summary       Does the patient have evidence of cognitive impairment? No    Lab Results   Component Value Date    HGBA1C 7.10 (H) 10/28/2023       Results for orders placed or performed in visit on 24   POC Urinalysis Dipstick    Specimen: Urine   Result Value Ref Range    Color Yellow Yellow, Straw, Dark Yellow, Ashley    Clarity, UA Clear Clear    Glucose, UA Negative Negative mg/dL    Bilirubin Negative Negative    Ketones, UA Negative Negative    Specific Gravity  1.015 1.005 - 1.030    Blood, UA Small (A) Negative    pH, Urine 5.0 5.0 - 8.0    Protein,  mg/dL (A) Negative mg/dL    Urobilinogen, UA Normal Normal, 0.2 E.U./dL    Leukocytes Small (1+) (A) Negative    Nitrite, UA Positive (A) Negative          HEALTH RISK ASSESSMENT    Smoking Status:  Social History     Tobacco Use   Smoking Status Former    Packs/day: 2.00    Years: 14.00    Additional pack years: 0.00    Total pack years: 28.00    Types: Cigarettes    Quit date:     Years since quittin.0   Smokeless Tobacco Never     Alcohol Consumption:  Social History     Substance and Sexual Activity   Alcohol Use No     Fall Risk Screen:    STEADI Fall Risk Assessment was completed, and patient is at LOW risk for falls.Assessment completed on:2024    Depression Screenin/19/2024     9:41 AM   PHQ-2/PHQ-9 Depression Screening   Little Interest or Pleasure in Doing Things 0-->not at all   Feeling Down, Depressed or Hopeless 0-->not at all   PHQ-9: Brief Depression Severity Measure Score 0       Health Habits and Functional and Cognitive Screenin/19/2024     9:40 AM   Functional & Cognitive Status   Do you have difficulty preparing food and eating? No   Do you have difficulty bathing yourself, getting dressed or grooming yourself? No   Do you have difficulty using the toilet? No   Do you have difficulty moving around  from place to place? No   Do you have trouble with steps or getting out of a bed or a chair? No   Current Diet Well Balanced Diet   Dental Exam Up to date   Eye Exam Up to date   Exercise (times per week) 0 times per week   Current Exercises Include No Regular Exercise   Do you need help using the phone?  No   Are you deaf or do you have serious difficulty hearing?  No   Do you need help to go to places out of walking distance? No   Do you need help shopping? No   Do you need help preparing meals?  No   Do you need help with housework?  No   Do you need help with laundry? No   Do you need help taking your medications? No   Do you need help managing money? No   Do you ever drive or ride in a car without wearing a seat belt? No       Age-appropriate Screening Schedule:  Refer to the list below for future screening recommendations based on patient's age, sex and/or medical conditions. Orders for these recommended tests are listed in the plan section. The patient has been provided with a written plan.    Health Maintenance   Topic Date Due    URINE MICROALBUMIN  06/24/2023    ZOSTER VACCINE (1 of 2) 04/15/2024 (Originally [birth year]/16/1999)    COVID-19 Vaccine (1) 06/27/2024 (Originally 6/16/1951)    LIPID PANEL  04/27/2024    HEMOGLOBIN A1C  04/28/2024    DIABETIC EYE EXAM  01/04/2025    ANNUAL WELLNESS VISIT  01/19/2025    DIABETIC FOOT EXAM  01/19/2025    BMI FOLLOWUP  01/19/2025    COLORECTAL CANCER SCREENING  07/28/2025    HEPATITIS C SCREENING  Completed    Pneumococcal Vaccine 65+  Completed    AAA SCREEN (ONE-TIME)  Completed    INFLUENZA VACCINE  Discontinued    TDAP/TD VACCINES  Discontinued                  CMS Preventative Services Quick Reference  Risk Factors Identified During Encounter  Polypharmacy: Medication List reviewed and Medications are appropriate for patient  The above risks/problems have been discussed with the patient.  Pertinent information has been shared with the patient in the After Visit  "Summary.  An After Visit Summary and PPPS were made available to the patient.    Follow Up:   Next Medicare Wellness visit to be scheduled in 1 year.       Additional E&M Note during same encounter follows:  Patient has multiple medical problems which are significant and separately identifiable that require additional work above and beyond the Medicare Wellness Visit.      Chief Complaint  Medicare Wellness-subsequent, Diabetes (management), Hyperlipidemia (management), and Hypertension (management)    Subjective        HPI  Guy Plascencia is also being seen today for foamy urine with itchy skin on lower legs and back.  States he has noticed that the symptoms have been occurring for the past several weeks.  States he is scratched his leg so much she has sores.  He use good moisturizing soap.  Also has noticed some shortness of breath when going up and down stairs.  States this has been occurring for the past month.  Had COVID back in end of October.  States he has had a lingering cough that occurs off and on.  States the cough is dry.  Denied any wheezing, chest pain, fevers, chills, upper respiratory symptoms, nausea, vomiting, abdominal pain, dysuria, urine frequency, urine urgency, penile discharge or dizziness.  Has had some swelling in the left ankle area off and on at the end of the day.         Objective   Vital Signs:  /76   Pulse 80   Temp 97.8 °F (36.6 °C)   Resp 15   Ht 175.3 cm (69.02\")   Wt 80.3 kg (177 lb)   SpO2 97%   BMI 26.13 kg/m²     Physical Exam  Vitals and nursing note reviewed.   Constitutional:       Appearance: Normal appearance. He is well-developed, well-groomed and overweight.      Interventions: Face mask in place.   HENT:      Head: Normocephalic and atraumatic.      Jaw: There is normal jaw occlusion.      Right Ear: Hearing, tympanic membrane, ear canal and external ear normal.      Left Ear: Hearing, tympanic membrane, ear canal and external ear normal.      Nose: Nose " normal.      Right Sinus: No maxillary sinus tenderness or frontal sinus tenderness.      Left Sinus: No maxillary sinus tenderness or frontal sinus tenderness.      Mouth/Throat:      Lips: Pink.      Mouth: Mucous membranes are moist.      Dentition: Normal dentition.      Tongue: No lesions.      Pharynx: Oropharynx is clear. Uvula midline.      Tonsils: No tonsillar exudate.   Eyes:      General: Lids are normal.      Conjunctiva/sclera: Conjunctivae normal.      Pupils: Pupils are equal, round, and reactive to light.   Neck:      Thyroid: No thyroid mass, thyromegaly or thyroid tenderness.      Vascular: No carotid bruit.      Trachea: Trachea and phonation normal. No tracheal tenderness.   Cardiovascular:      Rate and Rhythm: Normal rate and regular rhythm.      Pulses: Normal pulses.      Heart sounds: Normal heart sounds, S1 normal and S2 normal. No murmur heard.  Pulmonary:      Effort: Pulmonary effort is normal.      Breath sounds: Normal breath sounds and air entry.   Abdominal:      General: Bowel sounds are normal.      Palpations: Abdomen is soft.      Tenderness: There is no abdominal tenderness. There is no right CVA tenderness, left CVA tenderness, guarding or rebound. Negative signs include Edmond's sign, Rovsing's sign, McBurney's sign, psoas sign and obturator sign.   Musculoskeletal:      Cervical back: Neck supple.      Right lower leg: No edema.      Left lower leg: No edema.   Feet:      Right foot:      Protective Sensation: 10 sites tested.  10 sites sensed.      Skin integrity: Skin integrity normal.      Toenail Condition: Right toenails are normal.      Left foot:      Protective Sensation: 10 sites tested.  10 sites sensed.      Skin integrity: Skin integrity normal.      Toenail Condition: Left toenails are normal.      Comments: Diabetic Foot Exam Performed and Monofilament Test Performed    Lymphadenopathy:      Cervical: No cervical adenopathy.      Right cervical: No superficial,  deep or posterior cervical adenopathy.     Left cervical: No superficial, deep or posterior cervical adenopathy.   Skin:     General: Skin is warm and dry.      Capillary Refill: Capillary refill takes less than 2 seconds.   Neurological:      Mental Status: He is alert and oriented to person, place, and time.      Deep Tendon Reflexes:      Reflex Scores:       Patellar reflexes are 2+ on the right side and 2+ on the left side.  Psychiatric:         Attention and Perception: Attention and perception normal.         Mood and Affect: Mood and affect normal.         Speech: Speech normal.         Behavior: Behavior is cooperative.         Thought Content: Thought content normal.         Cognition and Memory: Cognition and memory normal.         Judgment: Judgment normal.                ECG 12 Lead (06/24/2021 11:19)    ECG 12 Lead    Date/Time: 1/19/2024 10:21 AM  Performed by: Chelsea Mcelroy PA-C    Authorized by: Chelsea Mcelroy PA-C  Comparison: compared with previous ECG from 6/24/2021  Similar to previous ECG  Rhythm: sinus rhythm  Rate: normal  BPM: 75  Conduction: conduction normal  ST Segments: ST segments normal  QRS axis: normal  Other findings comments: Nonspecific T wave abnormality    Clinical impression: non-specific ECG  Comments: Indication: Medicare wellness exam with hypertension with dyspnea with exertion  SC int:  164 ms  QRS dur:  80 ms  QT/QTc:  403/432 ms                Assessment and Plan   Diagnoses and all orders for this visit:    1. Medicare annual wellness visit, subsequent (Primary)  -     CBC (No Diff)  -     Comprehensive Metabolic Panel  -     Lipid Panel With LDL / HDL Ratio  -     ECG 12 Lead    2. Type 2 diabetes mellitus with hyperglycemia, without long-term current use of insulin  -     CBC (No Diff)  -     Comprehensive Metabolic Panel  -     Hemoglobin A1c  -     Lipid Panel With LDL / HDL Ratio  -     Microalbumin / Creatinine Urine Ratio - Urine, Clean Catch    3. Mixed  hyperlipidemia  -     CBC (No Diff)  -     Comprehensive Metabolic Panel  -     Lipid Panel With LDL / HDL Ratio    4. Essential hypertension  -     ECG 12 Lead    5. Vitamin D deficiency  -     Vitamin D,25-Hydroxy    6. Screening for malignant neoplasm of prostate  -     PSA Screen    7. Foamy urine  -     POC Urinalysis Dipstick    8. Dry skin dermatitis  -     triamcinolone (KENALOG) 0.025 % ointment; Apply 1 application  topically to the appropriate area as directed 2 (Two) Times a Day.  Dispense: 15 g; Refill: 0    9. Chronic cough  -     XR Chest PA & Lateral; Future    10. Dyspnea on exertion  -     ECG 12 Lead  -     XR Chest PA & Lateral; Future    11. Urinary tract infection without hematuria, site unspecified  -     Urine Culture - Urine, Urine, Clean Catch  -     sulfamethoxazole-trimethoprim (BACTRIM DS,SEPTRA DS) 800-160 MG per tablet; Take 1 tablet by mouth 2 (Two) Times a Day.  Dispense: 14 tablet; Refill: 0      1.  Annual sequential Medicare wellness exam with hypertension: Blood pressure was in therapeutic range.  Will continue current medication at home as directed.  Follow-up in 6 months.  2.  Chronic and stable type 2 diabetes with hyperlipidemia: He is fasting will have a CBC, CMP, lipid profile, urine microalbumin and an A1c collected today.  Will consider changing metformin to another medication after test results are completed.  Follow-up in 3 months.  Instructed him to decrease his sugar, carbohydrates and fatty food intake.  3.  New foamy urine: In office urinalysis was positive for leukocytes, nitrates and red cells.  Diagnosed with UTI and placed on Bactrim antibiotic.  Urine culture sent to the laboratory for further evaluation.  He will be notified of test results when completed.  4.  Chronic cough with chronic dyspnea with exertion: In office EKG was similar to EKG performed on June 24, 2021.  Showed normal sinus rhythm.  Will check chest x-ray today due to chronic cough and  shortness of breath.  He will be notified of test results.  Will consider referral to cardiology in future if warranted.  Will monitor for now.  5.  Chronic dry skin dermatitis: Had sent triamcinolone ointment to pharmacy to use topically twice daily.  He will return to office if no improvement.  6.  Chronic and stable vitamin D deficiency: Will check vitamin D level today.  7.  For screening prostate cancer: We will check PSA today.     I spent 40 minutes caring for Guy on this date of service. This time includes time spent by me in the following activities:preparing for the visit, reviewing tests, obtaining and/or reviewing a separately obtained history, performing a medically appropriate examination and/or evaluation , counseling and educating the patient/family/caregiver, ordering medications, tests, or procedures, and documenting information in the medical record  Follow Up   Return in about 3 months (around 4/19/2024), or Dm labs today and same day in 3 months.  Patient was given instructions and counseling regarding his condition or for health maintenance advice. Please see specific information pulled into the AVS if appropriate.       Patient Counseling:  --Nutrition: Stressed importance of moderation in sodium/caffeine intake, saturated fat and cholesterol.  Discussed caloric balance, sufficient intake of fresh fruits, vegetables, fiber,   calcium, iron.  --Discussed the new recommendation against daily use of baby aspirin for primary prevention in low risk patients.  --Exercise: Stressed the importance of regular exercise by incorporating into daily routine.    --Substance Abuse: Discussed cessation/primary prevention of tobacco, alcohol, or other drug use; driving or other dangerous activities under the influence.    --Dental health: Discussed importance of regular tooth brushing, flossing, and dental visits.  -- Suggested having eyes and vision checked if needed or past due.  --Immunizations  reviewed.  --Discussed benefits of screening colonoscopy.    [facility]  [address] [address]  Dept: [phone]  Dept Fax: [phone]  Loc: [phone]  Loc Fax: [phone]

## 2024-01-20 LAB
25(OH)D3+25(OH)D2 SERPL-MCNC: 35.5 NG/ML (ref 30–100)
ALBUMIN SERPL-MCNC: 4.2 G/DL (ref 3.5–5.2)
ALBUMIN/CREAT UR: 2462 MG/G CREAT (ref 0–29)
ALBUMIN/GLOB SERPL: 1.8 G/DL
ALP SERPL-CCNC: 95 U/L (ref 39–117)
ALT SERPL-CCNC: 22 U/L (ref 1–41)
AST SERPL-CCNC: 20 U/L (ref 1–40)
BILIRUB SERPL-MCNC: 0.4 MG/DL (ref 0–1.2)
BUN SERPL-MCNC: 21 MG/DL (ref 8–23)
BUN/CREAT SERPL: 15 (ref 7–25)
CALCIUM SERPL-MCNC: 9.6 MG/DL (ref 8.6–10.5)
CHLORIDE SERPL-SCNC: 107 MMOL/L (ref 98–107)
CHOLEST SERPL-MCNC: 119 MG/DL (ref 0–200)
CO2 SERPL-SCNC: 28 MMOL/L (ref 22–29)
CREAT SERPL-MCNC: 1.4 MG/DL (ref 0.76–1.27)
CREAT UR-MCNC: 112.3 MG/DL
EGFRCR SERPLBLD CKD-EPI 2021: 52.7 ML/MIN/1.73
ERYTHROCYTE [DISTWIDTH] IN BLOOD BY AUTOMATED COUNT: 14.5 % (ref 12.3–15.4)
GLOBULIN SER CALC-MCNC: 2.3 GM/DL
GLUCOSE SERPL-MCNC: 150 MG/DL (ref 65–99)
HBA1C MFR BLD: 7.7 % (ref 4.8–5.6)
HCT VFR BLD AUTO: 38.3 % (ref 37.5–51)
HDLC SERPL-MCNC: 36 MG/DL (ref 40–60)
HGB BLD-MCNC: 12.4 G/DL (ref 13–17.7)
LDLC SERPL CALC-MCNC: 58 MG/DL (ref 0–100)
LDLC/HDLC SERPL: 1.51 {RATIO}
MCH RBC QN AUTO: 27.3 PG (ref 26.6–33)
MCHC RBC AUTO-ENTMCNC: 32.4 G/DL (ref 31.5–35.7)
MCV RBC AUTO: 84.2 FL (ref 79–97)
MICROALBUMIN UR-MCNC: 2764.3 UG/ML
PLATELET # BLD AUTO: 127 10*3/MM3 (ref 140–450)
POTASSIUM SERPL-SCNC: 3.9 MMOL/L (ref 3.5–5.2)
PROT SERPL-MCNC: 6.5 G/DL (ref 6–8.5)
PSA SERPL-MCNC: 1.01 NG/ML (ref 0–4)
RBC # BLD AUTO: 4.55 10*6/MM3 (ref 4.14–5.8)
SODIUM SERPL-SCNC: 146 MMOL/L (ref 136–145)
TRIGL SERPL-MCNC: 144 MG/DL (ref 0–150)
VLDLC SERPL CALC-MCNC: 25 MG/DL (ref 5–40)
WBC # BLD AUTO: 5.1 10*3/MM3 (ref 3.4–10.8)

## 2024-01-23 DIAGNOSIS — R79.89 ABNORMAL BLOOD CREATININE LEVEL: ICD-10-CM

## 2024-01-23 DIAGNOSIS — R80.8 OTHER PROTEINURIA: ICD-10-CM

## 2024-01-23 DIAGNOSIS — E11.65 TYPE 2 DIABETES MELLITUS WITH HYPERGLYCEMIA, WITHOUT LONG-TERM CURRENT USE OF INSULIN: Primary | ICD-10-CM

## 2024-01-23 RX ORDER — ORAL SEMAGLUTIDE 3 MG/1
3 TABLET ORAL
Qty: 30 TABLET | Refills: 0 | COMMUNITY
Start: 2024-01-23

## 2024-02-02 ENCOUNTER — TELEPHONE (OUTPATIENT)
Dept: FAMILY MEDICINE CLINIC | Facility: CLINIC | Age: 74
End: 2024-02-02

## 2024-02-02 DIAGNOSIS — E11.65 TYPE 2 DIABETES MELLITUS WITH HYPERGLYCEMIA, WITHOUT LONG-TERM CURRENT USE OF INSULIN: Primary | ICD-10-CM

## 2024-02-02 RX ORDER — ORAL SEMAGLUTIDE 7 MG/1
7 TABLET ORAL EVERY MORNING
Qty: 30 TABLET | Refills: 0 | Status: SHIPPED | OUTPATIENT
Start: 2024-02-02

## 2024-02-02 NOTE — TELEPHONE ENCOUNTER
Spoke with patients wife, advised her of increase dose and to let us know if cost is to much. She asked if he should repeat labs to re check the creatinine level?

## 2024-02-02 NOTE — TELEPHONE ENCOUNTER
1.  Please notify patient that I have sent Rybelsus 7 mg to his pharmacy.  Please let us know if this is not cost effective.  Also take the Rybelsus 30 minutes prior to taking any other medications.  Update blood sugar log in 7-10 days.  2.  The nephrologist appointment is due to abnormal creatinine level.

## 2024-02-02 NOTE — TELEPHONE ENCOUNTER
Please notify patient that he has had elevated creatinine levels for the past year or longer off and on.  Would like him to see a nephrologist.  Have them keep the appointment.  Plan to repeat fasting labs in 3 months.

## 2024-02-02 NOTE — TELEPHONE ENCOUNTER
I called pt to ask per your lab result note if he would be ok with trying a higher dose of Rybelsus, he is fine with that but does not have a mail order everything goes to [facility] . Also explained the nephrology referral to him.

## 2024-02-02 NOTE — TELEPHONE ENCOUNTER
Caller: Guy Plascencia    Relationship: Self    Best call back number: [phone] (Home) DURING WORK HOURS -[phone] MOBILE    What is the best time to reach you: ANYTIME, ASAP    Who are you requesting to speak with (clinical staff, provider,  specific staff member): CLINICAL STAFF/KRISTINA POLANCO    Do you know the name of the person who called: Guy Plascencia    What was the call regarding: PATIENT STATES HE IS CONCERNED THAT THE MEDICATION Semaglutide (Rybelsus) 3 MG tablet  IS NOT WORKING WELL FOR HIM BECAUSE HIS BLOOD SUGAR HAS BEEN IN 'S FOR THE LAST 3 DAYS AND THIS MORNING IT , PATIENT IS ASKING WHAT KRISTINA POLANCO THINKS HE NEEDS TO DO ABOUT THIS    PATIENT IS ALSO ASKING ABOUT A LETTER HE RECEIVED REGARDING AN APPT WITH A NEPHROLOGIST THAT HE WAS NOT AWARE OF, PATIENT IS ASKING WHAT THIS IS ABOUT    PLEASE ADVISE PATIENT REGARDING THESE THINGS ASAP    Is it okay if the provider responds through MyChart: PLEASE CALL PATIENT BACK REGARDING THIS ASAP

## 2024-02-05 RX ORDER — GLIPIZIDE 10 MG/1
TABLET ORAL
Qty: 180 TABLET | Refills: 2 | Status: SHIPPED | OUTPATIENT
Start: 2024-02-05

## 2024-02-18 DIAGNOSIS — I10 ESSENTIAL HYPERTENSION: ICD-10-CM

## 2024-02-18 RX ORDER — METOPROLOL TARTRATE 50 MG/1
50 TABLET, FILM COATED ORAL 2 TIMES DAILY
Qty: 180 TABLET | Refills: 2 | Status: SHIPPED | OUTPATIENT
Start: 2024-02-18

## 2024-02-18 RX ORDER — HYDRALAZINE HYDROCHLORIDE 25 MG/1
25 TABLET, FILM COATED ORAL 3 TIMES DAILY
Qty: 90 TABLET | Refills: 0 | Status: SHIPPED | OUTPATIENT
Start: 2024-02-18

## 2024-02-26 RX ORDER — OMEPRAZOLE 20 MG/1
20 CAPSULE, DELAYED RELEASE ORAL DAILY
Qty: 90 CAPSULE | Refills: 1 | Status: SHIPPED | OUTPATIENT
Start: 2024-02-26

## 2024-03-17 DIAGNOSIS — E11.65 TYPE 2 DIABETES MELLITUS WITH HYPERGLYCEMIA, WITHOUT LONG-TERM CURRENT USE OF INSULIN: ICD-10-CM

## 2024-03-18 RX ORDER — ORAL SEMAGLUTIDE 7 MG/1
TABLET ORAL
Qty: 30 TABLET | Refills: 0 | Status: SHIPPED | OUTPATIENT
Start: 2024-03-18

## 2024-03-18 RX ORDER — HYDRALAZINE HYDROCHLORIDE 25 MG/1
25 TABLET, FILM COATED ORAL 3 TIMES DAILY
Qty: 90 TABLET | Refills: 0 | Status: SHIPPED | OUTPATIENT
Start: 2024-03-18

## 2024-04-01 DIAGNOSIS — E78.2 MIXED HYPERLIPIDEMIA: ICD-10-CM

## 2024-04-01 RX ORDER — PRAVASTATIN SODIUM 80 MG/1
80 TABLET ORAL DAILY
Qty: 90 TABLET | Refills: 0 | Status: SHIPPED | OUTPATIENT
Start: 2024-04-01

## 2024-04-11 RX ORDER — HYDRALAZINE HYDROCHLORIDE 25 MG/1
25 TABLET, FILM COATED ORAL 3 TIMES DAILY
Qty: 90 TABLET | Refills: 0 | Status: SHIPPED | OUTPATIENT
Start: 2024-04-11

## 2024-04-11 RX ORDER — AMLODIPINE BESYLATE 10 MG/1
10 TABLET ORAL DAILY
Qty: 90 TABLET | Refills: 0 | Status: SHIPPED | OUTPATIENT
Start: 2024-04-11

## 2024-04-17 ENCOUNTER — APPOINTMENT (OUTPATIENT)
Dept: GENERAL RADIOLOGY | Facility: HOSPITAL | Age: 74
End: 2024-04-17
Payer: MEDICARE

## 2024-04-17 ENCOUNTER — HOSPITAL ENCOUNTER (EMERGENCY)
Facility: HOSPITAL | Age: 74
Discharge: HOME | End: 2024-04-17
Attending: STUDENT IN AN ORGANIZED HEALTH CARE EDUCATION/TRAINING PROGRAM
Payer: MEDICARE

## 2024-04-17 VITALS
HEART RATE: 80 BPM | RESPIRATION RATE: 18 BRPM | HEIGHT: 69 IN | OXYGEN SATURATION: 97 % | WEIGHT: 164.9 LBS | BODY MASS INDEX: 24.42 KG/M2 | DIASTOLIC BLOOD PRESSURE: 96 MMHG | SYSTOLIC BLOOD PRESSURE: 178 MMHG | TEMPERATURE: 98 F

## 2024-04-17 DIAGNOSIS — I10 HYPERTENSION, UNSPECIFIED TYPE: ICD-10-CM

## 2024-04-17 DIAGNOSIS — N18.9 CHRONIC KIDNEY DISEASE, UNSPECIFIED CKD STAGE: ICD-10-CM

## 2024-04-17 DIAGNOSIS — R07.9 CHEST PAIN, UNSPECIFIED TYPE: Primary | ICD-10-CM

## 2024-04-17 LAB
ALBUMIN SERPL-MCNC: 4.1 G/DL (ref 3.5–5.2)
ALBUMIN/GLOB SERPL: 1.3 G/DL
ALP SERPL-CCNC: 111 U/L (ref 39–117)
ALT SERPL W P-5'-P-CCNC: 24 U/L (ref 1–41)
ANION GAP SERPL CALCULATED.3IONS-SCNC: 14.4 MMOL/L (ref 5–15)
AST SERPL-CCNC: 24 U/L (ref 1–40)
BASOPHILS # BLD AUTO: 0.02 10*3/MM3 (ref 0–0.2)
BASOPHILS NFR BLD AUTO: 0.3 % (ref 0–1.5)
BILIRUB SERPL-MCNC: 0.3 MG/DL (ref 0–1.2)
BUN SERPL-MCNC: 18 MG/DL (ref 8–23)
BUN/CREAT SERPL: 11.3 (ref 7–25)
CALCIUM SPEC-SCNC: 9.9 MG/DL (ref 8.6–10.5)
CHLORIDE SERPL-SCNC: 102 MMOL/L (ref 98–107)
CO2 SERPL-SCNC: 23.6 MMOL/L (ref 22–29)
CREAT SERPL-MCNC: 1.6 MG/DL (ref 0.76–1.27)
DEPRECATED RDW RBC AUTO: 44.2 FL (ref 37–54)
EGFRCR SERPLBLD CKD-EPI 2021: 44.9 ML/MIN/1.73
EOSINOPHIL # BLD AUTO: 0.08 10*3/MM3 (ref 0–0.4)
EOSINOPHIL NFR BLD AUTO: 1.1 % (ref 0.3–6.2)
ERYTHROCYTE [DISTWIDTH] IN BLOOD BY AUTOMATED COUNT: 15.1 % (ref 12.3–15.4)
GLOBULIN UR ELPH-MCNC: 3.2 GM/DL
GLUCOSE SERPL-MCNC: 108 MG/DL (ref 65–99)
HCT VFR BLD AUTO: 40.9 % (ref 37.5–51)
HGB BLD-MCNC: 13.3 G/DL (ref 13–17.7)
HOLD SPECIMEN: NORMAL
HOLD SPECIMEN: NORMAL
IMM GRANULOCYTES # BLD AUTO: 0.02 10*3/MM3 (ref 0–0.05)
IMM GRANULOCYTES NFR BLD AUTO: 0.3 % (ref 0–0.5)
LYMPHOCYTES # BLD AUTO: 1.53 10*3/MM3 (ref 0.7–3.1)
LYMPHOCYTES NFR BLD AUTO: 20.7 % (ref 19.6–45.3)
MCH RBC QN AUTO: 26.4 PG (ref 26.6–33)
MCHC RBC AUTO-ENTMCNC: 32.5 G/DL (ref 31.5–35.7)
MCV RBC AUTO: 81.2 FL (ref 79–97)
MONOCYTES # BLD AUTO: 0.61 10*3/MM3 (ref 0.1–0.9)
MONOCYTES NFR BLD AUTO: 8.3 % (ref 5–12)
NEUTROPHILS NFR BLD AUTO: 5.12 10*3/MM3 (ref 1.7–7)
NEUTROPHILS NFR BLD AUTO: 69.3 % (ref 42.7–76)
NRBC BLD AUTO-RTO: 0 /100 WBC (ref 0–0.2)
PLAT MORPH BLD: NORMAL
PLATELET # BLD AUTO: 128 10*3/MM3 (ref 140–450)
PMV BLD AUTO: 11.4 FL (ref 6–12)
POTASSIUM SERPL-SCNC: 3.5 MMOL/L (ref 3.5–5.2)
PROT SERPL-MCNC: 7.3 G/DL (ref 6–8.5)
RBC # BLD AUTO: 5.04 10*6/MM3 (ref 4.14–5.8)
RBC MORPH BLD: NORMAL
SODIUM SERPL-SCNC: 140 MMOL/L (ref 136–145)
TROPONIN T SERPL HS-MCNC: 17 NG/L
WBC MORPH BLD: NORMAL
WBC NRBC COR # BLD AUTO: 7.38 10*3/MM3 (ref 3.4–10.8)
WHOLE BLOOD HOLD COAG: NORMAL
WHOLE BLOOD HOLD SPECIMEN: NORMAL

## 2024-04-17 PROCEDURE — 85025 COMPLETE CBC W/AUTO DIFF WBC: CPT | Performed by: STUDENT IN AN ORGANIZED HEALTH CARE EDUCATION/TRAINING PROGRAM

## 2024-04-17 PROCEDURE — 99284 EMERGENCY DEPT VISIT MOD MDM: CPT

## 2024-04-17 PROCEDURE — 80053 COMPREHEN METABOLIC PANEL: CPT | Performed by: STUDENT IN AN ORGANIZED HEALTH CARE EDUCATION/TRAINING PROGRAM

## 2024-04-17 PROCEDURE — 93005 ELECTROCARDIOGRAM TRACING: CPT

## 2024-04-17 PROCEDURE — 85007 BL SMEAR W/DIFF WBC COUNT: CPT | Performed by: STUDENT IN AN ORGANIZED HEALTH CARE EDUCATION/TRAINING PROGRAM

## 2024-04-17 PROCEDURE — 93010 ELECTROCARDIOGRAM REPORT: CPT | Performed by: INTERNAL MEDICINE

## 2024-04-17 PROCEDURE — 93005 ELECTROCARDIOGRAM TRACING: CPT | Performed by: STUDENT IN AN ORGANIZED HEALTH CARE EDUCATION/TRAINING PROGRAM

## 2024-04-17 PROCEDURE — 84484 ASSAY OF TROPONIN QUANT: CPT | Performed by: STUDENT IN AN ORGANIZED HEALTH CARE EDUCATION/TRAINING PROGRAM

## 2024-04-17 PROCEDURE — 71046 X-RAY EXAM CHEST 2 VIEWS: CPT

## 2024-04-17 RX ORDER — SODIUM CHLORIDE 0.9 % (FLUSH) 0.9 %
10 SYRINGE (ML) INJECTION AS NEEDED
Status: DISCONTINUED | OUTPATIENT
Start: 2024-04-17 | End: 2024-04-18 | Stop reason: HOSPADM

## 2024-04-17 RX ORDER — CYCLOBENZAPRINE HCL 5 MG
5 TABLET ORAL 3 TIMES DAILY PRN
Qty: 15 TABLET | Refills: 0 | Status: SHIPPED | OUTPATIENT
Start: 2024-04-17 | End: 2024-04-22

## 2024-04-17 RX ORDER — METOPROLOL TARTRATE 50 MG/1
50 TABLET, FILM COATED ORAL ONCE
Status: COMPLETED | OUTPATIENT
Start: 2024-04-17 | End: 2024-04-17

## 2024-04-17 RX ORDER — CYCLOBENZAPRINE HCL 10 MG
5 TABLET ORAL ONCE
Status: COMPLETED | OUTPATIENT
Start: 2024-04-17 | End: 2024-04-17

## 2024-04-17 RX ADMIN — CYCLOBENZAPRINE 5 MG: 10 TABLET, FILM COATED ORAL at 22:47

## 2024-04-17 RX ADMIN — METOPROLOL TARTRATE 50 MG: 50 TABLET, FILM COATED ORAL at 21:45

## 2024-04-18 DIAGNOSIS — L30.8 PRURITIC DERMATITIS: ICD-10-CM

## 2024-04-18 DIAGNOSIS — E11.65 TYPE 2 DIABETES MELLITUS WITH HYPERGLYCEMIA, WITHOUT LONG-TERM CURRENT USE OF INSULIN: ICD-10-CM

## 2024-04-18 RX ORDER — ORAL SEMAGLUTIDE 7 MG/1
TABLET ORAL
Qty: 30 TABLET | Refills: 0 | Status: SHIPPED | OUTPATIENT
Start: 2024-04-18

## 2024-04-18 RX ORDER — HYDROXYZINE HYDROCHLORIDE 25 MG/1
TABLET, FILM COATED ORAL
Qty: 60 TABLET | Refills: 1 | Status: SHIPPED | OUTPATIENT
Start: 2024-04-18

## 2024-04-18 NOTE — ED PROVIDER NOTES
Subjective   History of Present Illness  Pt is a 73 y.o. male with PMH as listed who presents for   Chief Complaint   Patient presents with    Chest Pain         Patient is a 73-year-old male presents for midsternal chest pain for the past several hours stating that it started at 730 this morning when he woke up and his left arm them came over to the middle of his chest at about noon today.  He has been sharp and stabbing in nature.  Is worse with deep inspiration, denies any shortness of breath cough fever runny nose congestion or other URI symptoms.  Has no other new complaints at this time.  States that he missed his second dose of metoprolol this evening, hypertensive to 189 systolic at this time.  Will give him his home dose.      Review of Systems    Past Medical History:   Diagnosis Date    Arthritis     Diabetes mellitus     TYPE 2    GERD (gastroesophageal reflux disease)     H/O  Multiple facial fractures 11/1968    H/O Broken femur 11/1968    History of transfusion     1968    Hyperlipidemia     Hypertension     Kidney stone     Neoplasm of uncertain behavior     Peptic ulceration     Plantar fasciitis     Sleep apnea     CPAP    Thrombocytosis        Allergies   Allergen Reactions    Metformin GI Intolerance     Abnormal creatinine    Penicillins Unknown - Low Severity     ALLERGY TESTING SHOWED ALL TO PCN.     Cimetidine Rash       Past Surgical History:   Procedure Laterality Date    COLONOSCOPY      COSMETIC SURGERY  11/1968    MULTIPLE FACIAL FRACTURE    CYSTOSCOPY URETEROSCOPY STONE MANIPULATION/EXTRACTION Left 6/29/2016    Procedure: CYSTOSCOPY, LEFT URETEROSCOPY, BASKET EXTRACTION OF LEFT URETERAL STONE, LEFT STENT PLACEMENT;  Surgeon: Ish Gaitan MD;  Location: [facility] OR;  Service:     ENDOSCOPY N/A 10/2/2019    Procedure: ESOPHAGOGASTRODUODENOSCOPY with biopsies and dilatation to 20mm;  Surgeon: Marilou Mojica MD;  Location: [facility] OR;  Service: Gastroenterology    EXTRACORPOREAL SHOCK  WAVE LITHOTRIPSY (ESWL) Right 2016    Procedure: RT EXTRACORPOREAL SHOCKWAVE LITHOTRIPSY;  Surgeon: Ish Gaitan MD;  Location: [facility] OR;  Service:     FEMUR SURGERY  1968    FRACTURE SURGERY      femur fx    OTHER SURGICAL HISTORY      stents, for kidney stones    OTHER SURGICAL HISTORY      lithotripsy    SKIN BIOPSY         Family History   Problem Relation Age of Onset    Heart disease Father     Cancer Other         Kidney, throat, skin    Skin cancer Brother 50    Cancer Brother     Colon polyps Neg Hx     Colon cancer Neg Hx        Social History     Socioeconomic History    Marital status:      Spouse name: Jsesica    Years of education: College   Tobacco Use    Smoking status: Former     Current packs/day: 0.00     Average packs/day: 2.0 packs/day for 14.0 years (28.0 ttl pk-yrs)     Types: Cigarettes     Start date:      Quit date:      Years since quittin.3    Smokeless tobacco: Never   Vaping Use    Vaping status: Never Used   Substance and Sexual Activity    Alcohol use: No    Drug use: No    Sexual activity: Defer           Objective   Physical Exam  Constitutional:       Appearance: Normal appearance.   HENT:      Head: Normocephalic and atraumatic.      Mouth/Throat:      Mouth: Mucous membranes are moist.      Pharynx: Oropharynx is clear.   Eyes:      Conjunctiva/sclera: Conjunctivae normal.   Cardiovascular:      Rate and Rhythm: Normal rate and regular rhythm.      Heart sounds: Normal heart sounds.   Pulmonary:      Effort: Pulmonary effort is normal.      Breath sounds: Normal breath sounds.   Chest:      Chest wall: No tenderness.   Abdominal:      General: Abdomen is flat.      Palpations: Abdomen is soft.   Musculoskeletal:      Cervical back: Neck supple.   Skin:     General: Skin is warm and dry.   Neurological:      Mental Status: He is alert.   Psychiatric:         Mood and Affect: Mood normal.         Procedures           ED Course  ED Course as of  04/17/24 2234 Wed Apr 17, 2024 2134 Patient presents for chest pain, exam is unremarkable.  Will obtain CBC, CMP, troponin, EKG and chest x-ray.  Will treat with his home dose of Lopressor. [JF]   2229 Patient blood pressure improving with Lopressor, creatinine 1.6 which is slightly increased from 1.4 several weeks ago.  Troponin is negative, rest of lab workup unremarkable.  Chest x-ray shows no acute process and EKG shows no ST changes.  Discussed with patient plan for discharge with PCP and cardiology follow-up.  Given patient's risk factors and family history we will have patient see cardiology for reassessment.  Patient requesting medication, given pain was initially in shoulder and he still has shoulder pain we will attempt Flexeril now and prescription for Flexeril sent to patient's pharmacy.  Patient encouraged to continue taking his Lopressor as prescribed.  Patient understands and agrees with plan of care.  All questions answered. [JF]   2232 Low suspicion for cardiac cause given sharp stabbing in nature with no associated nausea vomiting diaphoresis shortness of breath and pain mostly with deep inspiration.  Suspicion for pulmonary embolism given no tachycardia no risk factors for DVT or PE and no shortness of breath or hypoxia.  Patient has had recent lymphadenopathy and URI, possible that patient has mild pleurisy, unable to give NSAIDs due to patient's CKD.  Due to patient's shoulder pain and persistent shoulder pain try Flexeril as treatment and have him follow-up with PCP and cardiology. [JF]      ED Course User Index  [JF] Guy Abrams MD                HEART Score: 5                              Medical Decision Making  My differential diagnosis for chest pain includes but is not limited to:  Muscle strain, costochondritis, myositis, pleurisy, rib fracture, intercostal neuritis, herpes zoster, tumor, myocardial infarction, coronary syndrome, unstable angina, angina, aortic dissection,  mitral valve prolapse, pericarditis, palpitations, pulmonary embolus, pneumonia, pneumothorax, lung cancer, GERD, esophagitis, esophageal spasm      Problems Addressed:  Chest pain, unspecified type: complicated acute illness or injury  Chronic kidney disease, unspecified CKD stage: complicated acute illness or injury  Hypertension, unspecified type: complicated acute illness or injury    Amount and/or Complexity of Data Reviewed  Labs: ordered.     Details: Lab work unremarkable for any acute findings, CKD continues to progress based on prior lab work, troponin negative.  Radiology: ordered.     Details: Chest x-ray shows no acute pulmonary process based on my interpretation  ECG/medicine tests: ordered.     Details: EKG  4/17/2024 at 2118  Rhythm sinus, rate 92  Low voltage, LAD, normal intervals, within limitations of low voltage reviewed no ST changes  EKG interpreted by me contemporaneously by me with care    Risk  Prescription drug management.        Final diagnoses:   Chest pain, unspecified type   Hypertension, unspecified type   Chronic kidney disease, unspecified CKD stage       ED Disposition  ED Disposition       ED Disposition   Discharge    Condition   Stable    Comment   --               Chelsea Mcelroy, PAWilfredC  [address] [facility] [place] [address] [phone]    Schedule an appointment as soon as possible for a visit in 2 days  For re-evaluation    [facility] CARDIOLOGY  [address] [facility] [address] [phone]  Call in 1 day  to establish care, For re-evaluation         Medication List        New Prescriptions      cyclobenzaprine 5 MG tablet  Commonly known as: FLEXERIL  Take 1 tablet by mouth 3 (Three) Times a Day As Needed for Muscle Spasms for up to 5 days.               Where to Get Your Medications        These medications were sent to [facility] [id number] - [place] - [address] - [phone]  - [phone] FX  [address],  [place] [address]      Phone: [phone]   cyclobenzaprine 5 MG tablet            Guy Abrams MD  04/17/24 8232

## 2024-04-19 LAB
QT INTERVAL: 296 MS
QTC INTERVAL: 368 MS

## 2024-04-22 ENCOUNTER — OFFICE VISIT (OUTPATIENT)
Dept: FAMILY MEDICINE CLINIC | Facility: CLINIC | Age: 74
End: 2024-04-22
Payer: MEDICARE

## 2024-04-22 VITALS
BODY MASS INDEX: 24.73 KG/M2 | DIASTOLIC BLOOD PRESSURE: 78 MMHG | HEART RATE: 78 BPM | SYSTOLIC BLOOD PRESSURE: 136 MMHG | HEIGHT: 69 IN | WEIGHT: 167 LBS | TEMPERATURE: 98.4 F | RESPIRATION RATE: 15 BRPM | OXYGEN SATURATION: 99 %

## 2024-04-22 DIAGNOSIS — I10 ESSENTIAL HYPERTENSION: ICD-10-CM

## 2024-04-22 DIAGNOSIS — R07.9 CHEST PAIN, UNSPECIFIED TYPE: ICD-10-CM

## 2024-04-22 DIAGNOSIS — E78.2 MIXED HYPERLIPIDEMIA: ICD-10-CM

## 2024-04-22 DIAGNOSIS — E11.65 TYPE 2 DIABETES MELLITUS WITH HYPERGLYCEMIA, WITHOUT LONG-TERM CURRENT USE OF INSULIN: Primary | ICD-10-CM

## 2024-04-22 PROCEDURE — 99214 OFFICE O/P EST MOD 30 MIN: CPT | Performed by: PHYSICIAN ASSISTANT

## 2024-04-22 PROCEDURE — 3075F SYST BP GE 130 - 139MM HG: CPT | Performed by: PHYSICIAN ASSISTANT

## 2024-04-22 PROCEDURE — 3051F HG A1C>EQUAL 7.0%<8.0%: CPT | Performed by: PHYSICIAN ASSISTANT

## 2024-04-22 PROCEDURE — 3078F DIAST BP <80 MM HG: CPT | Performed by: PHYSICIAN ASSISTANT

## 2024-04-22 NOTE — PROGRESS NOTES
"Chief Complaint  Diabetes (management), Hypertension (management), and Hyperlipidemia (management)    Subjective          Guy Plascencia presents to [facility]  History of Present Illness  Guy is a 73-year-old male who presents for type 2 diabetes, hypertension, hyperlipidemia and follow-up from ER for chest pain.  Guy states he started having some mid sternal chest pain on April 17, 2024.  States the pain was going down his left arm and in the middle of his chest.  Describes the pain as a sharp stabbing pain.  It was worse when he took deep breaths.  States he had forgotten to take his last Metoprolol medication.  States he was diagnosed with pleurisy and told to follow-up with cardiology.  He has not had an appointment made yet for cardiologist.  Kostas states he has a seen an improvement with his symptoms.    Guy states his blood sugars have been running around 110-140 at home until he was recently sick.  He is lost 10 pounds since January 2024.  States diet is about the same.  Sleep has been normal.  Bowel movements are daily.  States they have improved since stopping the metformin.  He has been compliant with medication.  Denied any current chest pain, shortness of air, cough, wheezing, abdominal pain, GI upset or swelling of ankles.     Objective   Vital Signs:   /78 (BP Location: Left arm, Patient Position: Sitting, Cuff Size: Adult)   Pulse 78   Temp 98.4 °F (36.9 °C)   Resp 15   Ht 175.3 cm (69\")   Wt 75.8 kg (167 lb)   SpO2 99%   BMI 24.66 kg/m²     Physical Exam  Vitals and nursing note reviewed.   Constitutional:       Appearance: Normal appearance. He is well-developed, well-groomed and normal weight.   HENT:      Head: Normocephalic and atraumatic.   Neck:      Thyroid: No thyroid mass, thyromegaly or thyroid tenderness.      Vascular: No carotid bruit.      Trachea: Trachea and phonation normal. No tracheal tenderness.   Cardiovascular:      Rate " and Rhythm: Normal rate and regular rhythm.      Pulses: Normal pulses.      Heart sounds: Normal heart sounds, S1 normal and S2 normal. No murmur heard.  Pulmonary:      Effort: Pulmonary effort is normal.      Breath sounds: Normal breath sounds and air entry.   Abdominal:      General: Bowel sounds are normal.      Palpations: Abdomen is soft. There is no hepatomegaly.      Tenderness: There is no abdominal tenderness. There is no right CVA tenderness, left CVA tenderness, guarding or rebound. Negative signs include Edmond's sign, Rovsing's sign, McBurney's sign, psoas sign and obturator sign.   Musculoskeletal:      Cervical back: Neck supple.      Right lower leg: No edema.      Left lower leg: No edema.   Skin:     General: Skin is warm and dry.      Capillary Refill: Capillary refill takes less than 2 seconds.   Neurological:      Mental Status: He is alert and oriented to person, place, and time.   Psychiatric:         Attention and Perception: Attention and perception normal.         Mood and Affect: Mood and affect normal.         Speech: Speech normal.         Behavior: Behavior normal. Behavior is cooperative.         Thought Content: Thought content normal.         Cognition and Memory: Cognition and memory normal.         Judgment: Judgment normal.        Result Review :          ED with Guy Abrams MD (04/17/2024)           Assessment and Plan    Diagnoses and all orders for this visit:    1. Type 2 diabetes mellitus with hyperglycemia, without long-term current use of insulin (Primary)  -     Hemoglobin A1c  -     Lipid Panel With LDL / HDL Ratio  -     Microalbumin / Creatinine Urine Ratio - Urine, Clean Catch  -     Basic Metabolic Panel    2. Mixed hyperlipidemia  -     Lipid Panel With LDL / HDL Ratio  -     Basic Metabolic Panel    3. Essential hypertension    4. Chest pain, unspecified type  -     Ambulatory Referral to Cardiology    Chronic and stable type 2 diabetes with hyperglycemia  without insulin management with hyperlipidemia: He is fasting and will have a BMP, urine microalbumin, A1c and a lipid profile collected today.  He will be notified of test results when completed.  Continue current medication at home as directed.  Plan to return to office in 4 months.  Chronic and stable hypertension: I rechecked blood pressure and got 132/70 and left arm.  Continue his current amlodipine and lopressor medication at home as directed.  New Atypical chest pain: I reviewed above ER report from [facility] on April 17, 2024.  I have placed a referral to cardiologist.    I spent 30 minutes caring for Guy on this date of service. This time includes time spent by me in the following activities:preparing for the visit, reviewing tests, obtaining and/or reviewing a separately obtained history, performing a medically appropriate examination and/or evaluation , counseling and educating the patient/family/caregiver, ordering medications, tests, or procedures, referring and communicating with other health care professionals , and documenting information in the medical record  Follow Up   Return in about 4 months (around 8/22/2024), or labs today- same day labs in 4 months.  Patient was given instructions and counseling regarding his condition or for health maintenance advice. Please see specific information pulled into the AVS if appropriate.     LEO Jade [facility]  [address] [place] [id number]  Dept: [phone]  Dept Fax: [phone]  Loc: [phone]  Loc Fax: [phone]

## 2024-04-23 LAB
ALBUMIN/CREAT UR: 2056 MG/G CREAT (ref 0–29)
BUN SERPL-MCNC: 18 MG/DL (ref 8–27)
BUN/CREAT SERPL: 13 (ref 10–24)
CALCIUM SERPL-MCNC: 9.7 MG/DL (ref 8.6–10.2)
CHLORIDE SERPL-SCNC: 108 MMOL/L (ref 96–106)
CHOLEST SERPL-MCNC: 117 MG/DL (ref 100–199)
CO2 SERPL-SCNC: 25 MMOL/L (ref 20–29)
CREAT SERPL-MCNC: 1.43 MG/DL (ref 0.76–1.27)
CREAT UR-MCNC: 60.7 MG/DL
EGFRCR SERPLBLD CKD-EPI 2021: 51 ML/MIN/1.73
GLUCOSE SERPL-MCNC: 115 MG/DL (ref 70–99)
HBA1C MFR BLD: 7.4 % (ref 4.8–5.6)
HDLC SERPL-MCNC: 36 MG/DL
LDLC SERPL CALC-MCNC: 62 MG/DL (ref 0–99)
LDLC/HDLC SERPL: 1.7 RATIO (ref 0–3.6)
MICROALBUMIN UR-MCNC: 1247.8 UG/ML
POTASSIUM SERPL-SCNC: 4 MMOL/L (ref 3.5–5.2)
SODIUM SERPL-SCNC: 146 MMOL/L (ref 134–144)
TRIGL SERPL-MCNC: 101 MG/DL (ref 0–149)
VLDLC SERPL CALC-MCNC: 19 MG/DL (ref 5–40)

## 2024-04-24 ENCOUNTER — TELEPHONE (OUTPATIENT)
Dept: FAMILY MEDICINE CLINIC | Facility: CLINIC | Age: 74
End: 2024-04-24
Payer: MEDICARE

## 2024-05-09 DIAGNOSIS — E11.65 TYPE 2 DIABETES MELLITUS WITH HYPERGLYCEMIA, WITHOUT LONG-TERM CURRENT USE OF INSULIN: ICD-10-CM

## 2024-05-10 RX ORDER — BLOOD-GLUCOSE METER
EACH MISCELLANEOUS
Qty: 100 EACH | Refills: 12 | Status: SHIPPED | OUTPATIENT
Start: 2024-05-10

## 2024-05-24 NOTE — PROGRESS NOTES
RM:________    Referral Provider: Chelsea Mcelroy PA-C Scherzer, Janet L, PA-C    NEW PATIENT/ CONSULT  PREVIOUS CARDIOLOGIST: ______________________________    CARDIAC TESTING: __________________________________________________    : 1950   AGE: 73 y.o.    2024  REASON FOR VISIT/  CC:      WT: ____________ BP: __________L __________R/ HR_______________    ALLERGIES:  Metformin, Penicillins, and Cimetidine  SMOKING HISTORY  Social History     Tobacco Use    Smoking status: Former     Current packs/day: 0.00     Average packs/day: 2.0 packs/day for 14.0 years (28.0 ttl pk-yrs)     Types: Cigarettes     Start date:      Quit date: 1972     Years since quittin.4    Smokeless tobacco: Never   Vaping Use    Vaping status: Never Used   Substance Use Topics    Alcohol use: No    Drug use: No          H/O: MI_____   STROKE________   GOUT_____   ANEMIA______     CAROTID________ HIV____ CAD_______ HYPERCHOL _____    H/O: CHF _____   RF____ DM___ HTN_______PVD____THYROID DISEASE_______    PMH: GI ____   HEPATITIS ___ KIDNEY DISEASE ___ LUNG DISEASE _______     SLEEP APNEA ____ BLOOD CLOTS ____ DVT ____ VEIN STRIPPING ___________      STOP BANG _________ (CARDIO ONCOLOGY ONLY)    CANCER _________________________________ CHEMO/ RADIATION__________

## 2024-05-26 DIAGNOSIS — E11.65 TYPE 2 DIABETES MELLITUS WITH HYPERGLYCEMIA, WITHOUT LONG-TERM CURRENT USE OF INSULIN: ICD-10-CM

## 2024-05-27 RX ORDER — ORAL SEMAGLUTIDE 7 MG/1
TABLET ORAL
Qty: 30 TABLET | Refills: 0 | Status: SHIPPED | OUTPATIENT
Start: 2024-05-27

## 2024-05-27 RX ORDER — HYDRALAZINE HYDROCHLORIDE 25 MG/1
25 TABLET, FILM COATED ORAL 3 TIMES DAILY
Qty: 90 TABLET | Refills: 0 | Status: SHIPPED | OUTPATIENT
Start: 2024-05-27

## 2024-05-28 ENCOUNTER — OFFICE VISIT (OUTPATIENT)
Dept: CARDIOLOGY | Facility: CLINIC | Age: 74
End: 2024-05-28
Payer: MEDICARE

## 2024-05-28 VITALS
BODY MASS INDEX: 24.29 KG/M2 | WEIGHT: 164 LBS | DIASTOLIC BLOOD PRESSURE: 72 MMHG | HEIGHT: 69 IN | SYSTOLIC BLOOD PRESSURE: 140 MMHG | HEART RATE: 76 BPM

## 2024-05-28 DIAGNOSIS — E11.65 TYPE 2 DIABETES MELLITUS WITH HYPERGLYCEMIA, WITHOUT LONG-TERM CURRENT USE OF INSULIN: ICD-10-CM

## 2024-05-28 DIAGNOSIS — E78.2 MIXED HYPERLIPIDEMIA: ICD-10-CM

## 2024-05-28 DIAGNOSIS — I10 ESSENTIAL HYPERTENSION: ICD-10-CM

## 2024-05-28 DIAGNOSIS — R07.89 CHEST PAIN, ATYPICAL: Primary | ICD-10-CM

## 2024-05-28 DIAGNOSIS — N18.31 STAGE 3A CHRONIC KIDNEY DISEASE: ICD-10-CM

## 2024-05-28 PROBLEM — J12.82 PNEUMONIA DUE TO COVID-19 VIRUS: Status: RESOLVED | Noted: 2023-10-28 | Resolved: 2024-05-28

## 2024-05-28 PROBLEM — D89.831 CYTOKINE RELEASE SYNDROME, GRADE 1: Status: RESOLVED | Noted: 2023-10-29 | Resolved: 2024-05-28

## 2024-05-28 PROBLEM — Z09 HOSPITAL DISCHARGE FOLLOW-UP: Status: RESOLVED | Noted: 2023-11-08 | Resolved: 2024-05-28

## 2024-05-28 PROBLEM — U07.1 PNEUMONIA DUE TO COVID-19 VIRUS: Status: RESOLVED | Noted: 2023-10-28 | Resolved: 2024-05-28

## 2024-05-28 PROCEDURE — 1160F RVW MEDS BY RX/DR IN RCRD: CPT | Performed by: INTERNAL MEDICINE

## 2024-05-28 PROCEDURE — 3077F SYST BP >= 140 MM HG: CPT | Performed by: INTERNAL MEDICINE

## 2024-05-28 PROCEDURE — 1159F MED LIST DOCD IN RCRD: CPT | Performed by: INTERNAL MEDICINE

## 2024-05-28 PROCEDURE — 99204 OFFICE O/P NEW MOD 45 MIN: CPT | Performed by: INTERNAL MEDICINE

## 2024-05-28 PROCEDURE — 93000 ELECTROCARDIOGRAM COMPLETE: CPT | Performed by: INTERNAL MEDICINE

## 2024-05-28 PROCEDURE — 3078F DIAST BP <80 MM HG: CPT | Performed by: INTERNAL MEDICINE

## 2024-05-28 NOTE — PROGRESS NOTES
Date of Office Visit: 24  Encounter Provider: Royal Chanel MD  Place of Service: [facility] CARDIOLOGY  Patient Name: Guy Plascencia  :1950    Chief Complaint   Patient presents with    Chest Pain   :     HPI:     Mr. Plascencia is 73 y.o. and presents today for evaluation after a recent ED visit.     He has history of type 2 diabetes, hypertension, and hyperlipidemia.  His father had a history of nonpremature coronary disease.    He was in the emergency department approximately 5 weeks ago with chest pain that lasted most of the day.  It was in the left upper chest, around the shoulder and armpit area.  Nothing seemed to make it better or worse.  It was not pleuritic.  It was not positional or exertional.  It did not make him short of breath, diaphoretic, or nauseated.  He says he cannot really describe the quality of the discomfort, just that it was a pain.  High-sensitivity troponin was normal.  His EKG was poor quality but was not overtly ischemic.    Since then, he has not had any chest discomfort but he has had some exertional dyspnea when going up a flight of stairs to quickly.  He denies symptoms at rest.  He denies syncope, lightheadedness, palpitations, leg swelling, orthopnea, or PND.            Past Medical History:   Diagnosis Date    Eczema 2016    GERD (gastroesophageal reflux disease)     H/O  Multiple facial fractures 1968    H/O Broken femur 1968    History of transfusion         Hyperlipidemia     Hypertension     Hypogonadism in male 2016    Kidney stone     Neoplasm of uncertain behavior     Osteoarthritis     Pancreatic cyst 2018    Peptic ulceration     Plantar fasciitis     Sleep apnea     CPAP    Splenomegaly 2018    Stage 3a chronic kidney disease 2024    Steatohepatitis 2018    Thrombocytopenia 2016    Thrombocytosis     Trigger middle finger of right hand 02/15/2017    Type 2 diabetes mellitus         Past Surgical History:   Procedure Laterality Date    COLONOSCOPY      COSMETIC SURGERY  1968    MULTIPLE FACIAL FRACTURE    CYSTOSCOPY URETEROSCOPY STONE MANIPULATION/EXTRACTION Left 2016    Procedure: CYSTOSCOPY, LEFT URETEROSCOPY, BASKET EXTRACTION OF LEFT URETERAL STONE, LEFT STENT PLACEMENT;  Surgeon: Ish Gaitan MD;  Location: [facility] OR;  Service:     ENDOSCOPY N/A 10/02/2019    Procedure: ESOPHAGOGASTRODUODENOSCOPY with biopsies and dilatation to 20mm;  Surgeon: Marilou Mojica MD;  Location: [facility] OR;  Service: Gastroenterology    EXTRACORPOREAL SHOCK WAVE LITHOTRIPSY (ESWL) Right 2016    Procedure: RT EXTRACORPOREAL SHOCKWAVE LITHOTRIPSY;  Surgeon: Ish Gaitan MD;  Location: [facility] MAIN OR;  Service:     FEMUR SURGERY  1968    FRACTURE SURGERY      femur fx    OTHER SURGICAL HISTORY      stents, for kidney stones    OTHER SURGICAL HISTORY      lithotripsy    SKIN BIOPSY         Social History     Socioeconomic History    Marital status:      Spouse name: Jessica    Years of education: College   Tobacco Use    Smoking status: Former     Current packs/day: 0.00     Average packs/day: 2.0 packs/day for 14.0 years (28.0 ttl pk-yrs)     Types: Cigarettes     Start date: 1958     Quit date:      Years since quittin.4     Passive exposure: Never    Smokeless tobacco: Never    Tobacco comments:     I have not smoked for 50+ years   Vaping Use    Vaping status: Never Used   Substance and Sexual Activity    Alcohol use: No    Drug use: No    Sexual activity: Not Currently     Partners: Female       Family History   Problem Relation Age of Onset    Heart disease Father             Heart attack Father 50    Skin cancer Brother 50    Cancer Brother     Cancer Other         Kidney, throat, skin    Colon polyps Neg Hx     Colon cancer Neg Hx        Review of Systems   Cardiovascular:  Positive for dyspnea on exertion.   Musculoskeletal:  Positive for  arthritis.   All other systems reviewed and are negative.      Allergies   Allergen Reactions    Metformin GI Intolerance     Abnormal creatinine    Penicillins Unknown - Low Severity     ALLERGY TESTING SHOWED ALL TO PCN.     Cimetidine Rash         Current Outpatient Medications:     amLODIPine (NORVASC) 10 MG tablet, TAKE 1 TABLET BY MOUTH DAILY, Disp: 90 tablet, Rfl: 0    APPLE CIDER VINEGAR PO, Take  by mouth., Disp: , Rfl:     Ascorbic Acid (VITAMIN C PO), Take  by mouth., Disp: , Rfl:     Aspirin (ASPIR-81 PO), Take  by mouth Daily., Disp: , Rfl:     BENFOTIAMINE PO, Take  by mouth 2 (Two) Times a Day., Disp: , Rfl:     Blood Glucose Monitoring Suppl (ONETOUCH VERIO FLEX SYSTEM) w/Device kit, 1 Device 2 (Two) Times a Day., Disp: 1 kit, Rfl: 0    Cholecalciferol (D3 ADULT PO), Take 2,000 Units by mouth Daily., Disp: , Rfl:     Cyanocobalamin (B-12 PO), Take  by mouth., Disp: , Rfl:     dapagliflozin Propanediol 10 MG tablet, Take 10 mg by mouth Daily., Disp: , Rfl:     glipizide (GLUCOTROL) 10 MG tablet, TAKE ONE TABLET BY MOUTH TWICE A DAY BEFORE A MEAL, Disp: 180 tablet, Rfl: 2    glucose blood ([employer] Glucose Test) test strip, USE TO TEST BLOOD SUGAR TWO TIMES A DAY FOR TYPE 2 DIABETES, Disp: 100 each, Rfl: 12    hydrALAZINE (APRESOLINE) 25 MG tablet, TAKE ONE TABLET BY MOUTH THREE TIMES A DAY, Disp: 90 tablet, Rfl: 0    hydrOXYzine (ATARAX) 25 MG tablet, TAKE ONE TABLET BY MOUTH THREE TIMES A DAY AS NEEDED FOR ITCHING, Disp: 60 tablet, Rfl: 1    metoprolol tartrate (LOPRESSOR) 50 MG tablet, TAKE ONE TABLET BY MOUTH TWICE A DAY, Disp: 180 tablet, Rfl: 2    Multiple Vitamins-Minerals (ZINC PO), Take  by mouth., Disp: , Rfl:     omeprazole (priLOSEC) 20 MG capsule, TAKE 1 CAPSULE BY MOUTH DAILY, Disp: 90 capsule, Rfl: 1    pravastatin (PRAVACHOL) 80 MG tablet, TAKE 1 TABLET BY MOUTH DAILY, Disp: 90 tablet, Rfl: 0    Rybelsus 7 MG tablet, TAKE ONE TABLET BY MOUTH DAILY ON AN EMPTY STOMACH WITH NO  "MORE THAN 4 OZ. OF PLAIN WATER. WAIT AT LEAST 30 MIN. BEFORE FOOD, BEVERAGE, OR OTHER MEDICATIONS., Disp: 30 tablet, Rfl: 0    ZINC METHIONATE PO, Take  by mouth Daily., Disp: , Rfl:       Objective:     Vitals:    05/28/24 1445   BP: 140/72   Pulse: 76   Weight: 74.4 kg (164 lb)   Height: 175.3 cm (69\")     Body mass index is 24.22 kg/m².    Vitals reviewed.   Constitutional:       Appearance: Healthy appearance. Well-developed and not in distress.   Eyes:      Conjunctiva/sclera: Conjunctivae normal.   HENT:      Head: Normocephalic.      Nose: Nose normal.   Neck:      Thyroid: Thyroid normal.      Vascular: No JVD. JVD normal.      Lymphadenopathy: No cervical adenopathy.   Pulmonary:      Effort: Pulmonary effort is normal.      Breath sounds: Normal breath sounds.   Cardiovascular:      Normal rate. Regular rhythm.      Murmurs: There is no murmur.   Pulses:     Intact distal pulses.   Edema:     Peripheral edema absent.   Abdominal:      Palpations: Abdomen is soft.      Tenderness: There is no abdominal tenderness.   Musculoskeletal: Normal range of motion.      Cervical back: Normal range of motion. Skin:     General: Skin is warm and dry.   Neurological:      General: No focal deficit present.      Mental Status: Alert and oriented to person, place, and time.      Cranial Nerves: No cranial nerve deficit.   Psychiatric:         Behavior: Behavior normal.         Thought Content: Thought content normal.         Judgment: Judgment normal.           ECG 12 Lead    Date/Time: 5/28/2024 2:52 PM  Performed by: Royal Chanel MD    Authorized by: Royal Chanel MD  Comparison: compared with previous ECG   Similar to previous ECG  Rhythm: sinus rhythm  Conduction: conduction normal  ST Flattening: all  T flattening: I, aVL, aVF, III and II  Other findings: non-specific ST-T wave changes    Clinical impression: non-specific ECG            Assessment:       Diagnosis Plan   1. Chest pain, atypical  ECG 12 Lead    " Adult Stress Echo W/ Cont or Stress Agent if Necessary Per Protocol      2. Type 2 diabetes mellitus with hyperglycemia, without long-term current use of insulin  Adult Stress Echo W/ Cont or Stress Agent if Necessary Per Protocol      3. Essential hypertension  Adult Stress Echo W/ Cont or Stress Agent if Necessary Per Protocol      4. Mixed hyperlipidemia  Adult Stress Echo W/ Cont or Stress Agent if Necessary Per Protocol      5. Stage 3a chronic kidney disease  Adult Stress Echo W/ Cont or Stress Agent if Necessary Per Protocol             Plan:       Mr Plascencia is a 73-year-old man with numerous risk factors for heart disease (male sex, age, CKD, diabetes, hypertension, hyperlipidemia) who had an episode of very atypical chest pain approximately 5 weeks ago.  His EKG is nonspecific but unchanged.  His high-sensitivity troponin was normal.  The pain is so atypical that I do not feel that it requires further workup.  However, he does report some exertional dyspnea, so I have recommended a stress echocardiogram for further evaluation.  If that is normal, he can follow-up with his PCP.    Sincerely,       Royal Chanel MD

## 2024-05-28 NOTE — H&P (VIEW-ONLY)
Date of Office Visit: 24  Encounter Provider: Royal Chanel MD  Place of Service: [facility] CARDIOLOGY  Patient Name: Guy Plascencia  :1950    Chief Complaint   Patient presents with    Chest Pain   :     HPI:     Mr. Plascencia is 73 y.o. and presents today for evaluation after a recent ED visit.     He has history of type 2 diabetes, hypertension, and hyperlipidemia.  His father had a history of nonpremature coronary disease.    He was in the emergency department approximately 5 weeks ago with chest pain that lasted most of the day.  It was in the left upper chest, around the shoulder and armpit area.  Nothing seemed to make it better or worse.  It was not pleuritic.  It was not positional or exertional.  It did not make him short of breath, diaphoretic, or nauseated.  He says he cannot really describe the quality of the discomfort, just that it was a pain.  High-sensitivity troponin was normal.  His EKG was poor quality but was not overtly ischemic.    Since then, he has not had any chest discomfort but he has had some exertional dyspnea when going up a flight of stairs to quickly.  He denies symptoms at rest.  He denies syncope, lightheadedness, palpitations, leg swelling, orthopnea, or PND.            Past Medical History:   Diagnosis Date    Eczema 2016    GERD (gastroesophageal reflux disease)     H/O  Multiple facial fractures 1968    H/O Broken femur 1968    History of transfusion         Hyperlipidemia     Hypertension     Hypogonadism in male 2016    Kidney stone     Neoplasm of uncertain behavior     Osteoarthritis     Pancreatic cyst 2018    Peptic ulceration     Plantar fasciitis     Sleep apnea     CPAP    Splenomegaly 2018    Stage 3a chronic kidney disease 2024    Steatohepatitis 2018    Thrombocytopenia 2016    Thrombocytosis     Trigger middle finger of right hand 02/15/2017    Type 2 diabetes mellitus         Past Surgical History:   Procedure Laterality Date    COLONOSCOPY      COSMETIC SURGERY  1968    MULTIPLE FACIAL FRACTURE    CYSTOSCOPY URETEROSCOPY STONE MANIPULATION/EXTRACTION Left 2016    Procedure: CYSTOSCOPY, LEFT URETEROSCOPY, BASKET EXTRACTION OF LEFT URETERAL STONE, LEFT STENT PLACEMENT;  Surgeon: Ish Gaitan MD;  Location: [facility] OR;  Service:     ENDOSCOPY N/A 10/02/2019    Procedure: ESOPHAGOGASTRODUODENOSCOPY with biopsies and dilatation to 20mm;  Surgeon: Marilou Mojica MD;  Location: [facility] OR;  Service: Gastroenterology    EXTRACORPOREAL SHOCK WAVE LITHOTRIPSY (ESWL) Right 2016    Procedure: RT EXTRACORPOREAL SHOCKWAVE LITHOTRIPSY;  Surgeon: Ish Gaitan MD;  Location: [facility] MAIN OR;  Service:     FEMUR SURGERY  1968    FRACTURE SURGERY      femur fx    OTHER SURGICAL HISTORY      stents, for kidney stones    OTHER SURGICAL HISTORY      lithotripsy    SKIN BIOPSY         Social History     Socioeconomic History    Marital status:      Spouse name: Jessica    Years of education: College   Tobacco Use    Smoking status: Former     Current packs/day: 0.00     Average packs/day: 2.0 packs/day for 14.0 years (28.0 ttl pk-yrs)     Types: Cigarettes     Start date: 1958     Quit date:      Years since quittin.4     Passive exposure: Never    Smokeless tobacco: Never    Tobacco comments:     I have not smoked for 50+ years   Vaping Use    Vaping status: Never Used   Substance and Sexual Activity    Alcohol use: No    Drug use: No    Sexual activity: Not Currently     Partners: Female       Family History   Problem Relation Age of Onset    Heart disease Father             Heart attack Father 50    Skin cancer Brother 50    Cancer Brother     Cancer Other         Kidney, throat, skin    Colon polyps Neg Hx     Colon cancer Neg Hx        Review of Systems   Cardiovascular:  Positive for dyspnea on exertion.   Musculoskeletal:  Positive for  arthritis.   All other systems reviewed and are negative.      Allergies   Allergen Reactions    Metformin GI Intolerance     Abnormal creatinine    Penicillins Unknown - Low Severity     ALLERGY TESTING SHOWED ALL TO PCN.     Cimetidine Rash         Current Outpatient Medications:     amLODIPine (NORVASC) 10 MG tablet, TAKE 1 TABLET BY MOUTH DAILY, Disp: 90 tablet, Rfl: 0    APPLE CIDER VINEGAR PO, Take  by mouth., Disp: , Rfl:     Ascorbic Acid (VITAMIN C PO), Take  by mouth., Disp: , Rfl:     Aspirin (ASPIR-81 PO), Take  by mouth Daily., Disp: , Rfl:     BENFOTIAMINE PO, Take  by mouth 2 (Two) Times a Day., Disp: , Rfl:     Blood Glucose Monitoring Suppl (ONETOUCH VERIO FLEX SYSTEM) w/Device kit, 1 Device 2 (Two) Times a Day., Disp: 1 kit, Rfl: 0    Cholecalciferol (D3 ADULT PO), Take 2,000 Units by mouth Daily., Disp: , Rfl:     Cyanocobalamin (B-12 PO), Take  by mouth., Disp: , Rfl:     dapagliflozin Propanediol 10 MG tablet, Take 10 mg by mouth Daily., Disp: , Rfl:     glipizide (GLUCOTROL) 10 MG tablet, TAKE ONE TABLET BY MOUTH TWICE A DAY BEFORE A MEAL, Disp: 180 tablet, Rfl: 2    glucose blood ([employer] Glucose Test) test strip, USE TO TEST BLOOD SUGAR TWO TIMES A DAY FOR TYPE 2 DIABETES, Disp: 100 each, Rfl: 12    hydrALAZINE (APRESOLINE) 25 MG tablet, TAKE ONE TABLET BY MOUTH THREE TIMES A DAY, Disp: 90 tablet, Rfl: 0    hydrOXYzine (ATARAX) 25 MG tablet, TAKE ONE TABLET BY MOUTH THREE TIMES A DAY AS NEEDED FOR ITCHING, Disp: 60 tablet, Rfl: 1    metoprolol tartrate (LOPRESSOR) 50 MG tablet, TAKE ONE TABLET BY MOUTH TWICE A DAY, Disp: 180 tablet, Rfl: 2    Multiple Vitamins-Minerals (ZINC PO), Take  by mouth., Disp: , Rfl:     omeprazole (priLOSEC) 20 MG capsule, TAKE 1 CAPSULE BY MOUTH DAILY, Disp: 90 capsule, Rfl: 1    pravastatin (PRAVACHOL) 80 MG tablet, TAKE 1 TABLET BY MOUTH DAILY, Disp: 90 tablet, Rfl: 0    Rybelsus 7 MG tablet, TAKE ONE TABLET BY MOUTH DAILY ON AN EMPTY STOMACH WITH NO  "MORE THAN 4 OZ. OF PLAIN WATER. WAIT AT LEAST 30 MIN. BEFORE FOOD, BEVERAGE, OR OTHER MEDICATIONS., Disp: 30 tablet, Rfl: 0    ZINC METHIONATE PO, Take  by mouth Daily., Disp: , Rfl:       Objective:     Vitals:    05/28/24 1445   BP: 140/72   Pulse: 76   Weight: 74.4 kg (164 lb)   Height: 175.3 cm (69\")     Body mass index is 24.22 kg/m².    Vitals reviewed.   Constitutional:       Appearance: Healthy appearance. Well-developed and not in distress.   Eyes:      Conjunctiva/sclera: Conjunctivae normal.   HENT:      Head: Normocephalic.      Nose: Nose normal.   Neck:      Thyroid: Thyroid normal.      Vascular: No JVD. JVD normal.      Lymphadenopathy: No cervical adenopathy.   Pulmonary:      Effort: Pulmonary effort is normal.      Breath sounds: Normal breath sounds.   Cardiovascular:      Normal rate. Regular rhythm.      Murmurs: There is no murmur.   Pulses:     Intact distal pulses.   Edema:     Peripheral edema absent.   Abdominal:      Palpations: Abdomen is soft.      Tenderness: There is no abdominal tenderness.   Musculoskeletal: Normal range of motion.      Cervical back: Normal range of motion. Skin:     General: Skin is warm and dry.   Neurological:      General: No focal deficit present.      Mental Status: Alert and oriented to person, place, and time.      Cranial Nerves: No cranial nerve deficit.   Psychiatric:         Behavior: Behavior normal.         Thought Content: Thought content normal.         Judgment: Judgment normal.           ECG 12 Lead    Date/Time: 5/28/2024 2:52 PM  Performed by: Royal Chanel MD    Authorized by: Royal Chanel MD  Comparison: compared with previous ECG   Similar to previous ECG  Rhythm: sinus rhythm  Conduction: conduction normal  ST Flattening: all  T flattening: I, aVL, aVF, III and II  Other findings: non-specific ST-T wave changes    Clinical impression: non-specific ECG            Assessment:       Diagnosis Plan   1. Chest pain, atypical  ECG 12 Lead    " Adult Stress Echo W/ Cont or Stress Agent if Necessary Per Protocol      2. Type 2 diabetes mellitus with hyperglycemia, without long-term current use of insulin  Adult Stress Echo W/ Cont or Stress Agent if Necessary Per Protocol      3. Essential hypertension  Adult Stress Echo W/ Cont or Stress Agent if Necessary Per Protocol      4. Mixed hyperlipidemia  Adult Stress Echo W/ Cont or Stress Agent if Necessary Per Protocol      5. Stage 3a chronic kidney disease  Adult Stress Echo W/ Cont or Stress Agent if Necessary Per Protocol             Plan:       Mr Plascencia is a 73-year-old man with numerous risk factors for heart disease (male sex, age, CKD, diabetes, hypertension, hyperlipidemia) who had an episode of very atypical chest pain approximately 5 weeks ago.  His EKG is nonspecific but unchanged.  His high-sensitivity troponin was normal.  The pain is so atypical that I do not feel that it requires further workup.  However, he does report some exertional dyspnea, so I have recommended a stress echocardiogram for further evaluation.  If that is normal, he can follow-up with his PCP.    Sincerely,       Royal Chanel MD

## 2024-06-06 ENCOUNTER — HOSPITAL ENCOUNTER (OUTPATIENT)
Dept: CARDIOLOGY | Facility: HOSPITAL | Age: 74
Discharge: HOME | End: 2024-06-06
Payer: MEDICARE

## 2024-06-06 VITALS
DIASTOLIC BLOOD PRESSURE: 60 MMHG | HEIGHT: 69 IN | BODY MASS INDEX: 24.16 KG/M2 | SYSTOLIC BLOOD PRESSURE: 155 MMHG | HEART RATE: 82 BPM | WEIGHT: 163.14 LBS

## 2024-06-06 DIAGNOSIS — E78.2 MIXED HYPERLIPIDEMIA: ICD-10-CM

## 2024-06-06 DIAGNOSIS — R07.89 CHEST PAIN, ATYPICAL: ICD-10-CM

## 2024-06-06 DIAGNOSIS — N18.31 STAGE 3A CHRONIC KIDNEY DISEASE: ICD-10-CM

## 2024-06-06 DIAGNOSIS — I10 ESSENTIAL HYPERTENSION: ICD-10-CM

## 2024-06-06 DIAGNOSIS — E11.65 TYPE 2 DIABETES MELLITUS WITH HYPERGLYCEMIA, WITHOUT LONG-TERM CURRENT USE OF INSULIN: ICD-10-CM

## 2024-06-06 LAB
AORTIC DIMENSIONLESS INDEX: 0.7 (DI)
ASCENDING AORTA: 2.7 CM
BH CV ECHO MEAS - AO MAX PG: 13.1 MMHG
BH CV ECHO MEAS - AO MEAN PG: 7 MMHG
BH CV ECHO MEAS - AO V2 MAX: 181 CM/SEC
BH CV ECHO MEAS - AO V2 VTI: 32.1 CM
BH CV ECHO MEAS - AVA(I,D): 2.06 CM2
BH CV ECHO MEAS - EDV(CUBED): 79.5 ML
BH CV ECHO MEAS - EDV(MOD-SP2): 86 ML
BH CV ECHO MEAS - EDV(MOD-SP4): 103 ML
BH CV ECHO MEAS - EF(MOD-BP): 66.3 %
BH CV ECHO MEAS - EF(MOD-SP2): 62.8 %
BH CV ECHO MEAS - EF(MOD-SP4): 67 %
BH CV ECHO MEAS - ESV(CUBED): 20.4 ML
BH CV ECHO MEAS - ESV(MOD-SP2): 32 ML
BH CV ECHO MEAS - ESV(MOD-SP4): 34 ML
BH CV ECHO MEAS - FS: 36.5 %
BH CV ECHO MEAS - IVS/LVPW: 1 CM
BH CV ECHO MEAS - IVSD: 1.3 CM
BH CV ECHO MEAS - LAT PEAK E' VEL: 9.4 CM/SEC
BH CV ECHO MEAS - LV DIASTOLIC VOL/BSA (35-75): 54.4 CM2
BH CV ECHO MEAS - LV MASS(C)D: 207.8 GRAMS
BH CV ECHO MEAS - LV MAX PG: 6.2 MMHG
BH CV ECHO MEAS - LV MEAN PG: 3 MMHG
BH CV ECHO MEAS - LV SYSTOLIC VOL/BSA (12-30): 18 CM2
BH CV ECHO MEAS - LV V1 MAX: 124 CM/SEC
BH CV ECHO MEAS - LV V1 VTI: 22.4 CM
BH CV ECHO MEAS - LVIDD: 4.3 CM
BH CV ECHO MEAS - LVIDS: 2.7 CM
BH CV ECHO MEAS - LVOT AREA: 3 CM2
BH CV ECHO MEAS - LVOT DIAM: 1.94 CM
BH CV ECHO MEAS - LVPWD: 1.3 CM
BH CV ECHO MEAS - MED PEAK E' VEL: 9.9 CM/SEC
BH CV ECHO MEAS - MV A MAX VEL: 93.4 CM/SEC
BH CV ECHO MEAS - MV DEC SLOPE: 647.8 CM/SEC2
BH CV ECHO MEAS - MV DEC TIME: 0.19 SEC
BH CV ECHO MEAS - MV E MAX VEL: 75.7 CM/SEC
BH CV ECHO MEAS - MV E/A: 0.81
BH CV ECHO MEAS - MV MAX PG: 3.9 MMHG
BH CV ECHO MEAS - MV MEAN PG: 1.99 MMHG
BH CV ECHO MEAS - MV P1/2T: 45.2 MSEC
BH CV ECHO MEAS - MV V2 VTI: 24.3 CM
BH CV ECHO MEAS - MVA(P1/2T): 4.9 CM2
BH CV ECHO MEAS - MVA(VTI): 2.7 CM2
BH CV ECHO MEAS - PA ACC TIME: 0.07 SEC
BH CV ECHO MEAS - PA V2 MAX: 116 CM/SEC
BH CV ECHO MEAS - QP/QS: 0.83
BH CV ECHO MEAS - RAP SYSTOLE: 3 MMHG
BH CV ECHO MEAS - RV MAX PG: 4.5 MMHG
BH CV ECHO MEAS - RV V1 MAX: 106.4 CM/SEC
BH CV ECHO MEAS - RV V1 VTI: 21.2 CM
BH CV ECHO MEAS - RVOT DIAM: 1.81 CM
BH CV ECHO MEAS - RVSP: 31 MMHG
BH CV ECHO MEAS - SV(LVOT): 66.2 ML
BH CV ECHO MEAS - SV(MOD-SP2): 54 ML
BH CV ECHO MEAS - SV(MOD-SP4): 69 ML
BH CV ECHO MEAS - SV(RVOT): 54.6 ML
BH CV ECHO MEAS - SVI(LVOT): 35 ML/M2
BH CV ECHO MEAS - SVI(MOD-SP2): 28.5 ML/M2
BH CV ECHO MEAS - SVI(MOD-SP4): 36.5 ML/M2
BH CV ECHO MEAS - TR MAX PG: 28.4 MMHG
BH CV ECHO MEAS - TR MAX VEL: 266.6 CM/SEC
BH CV ECHO MEASUREMENTS AVERAGE E/E' RATIO: 7.84
BH CV ECHO SHUNT ASSESSMENT PERFORMED (HIDDEN SCRIPTING): 1
BH CV STRESS BP STAGE 1: NORMAL
BH CV STRESS DURATION MIN STAGE 1: 3
BH CV STRESS DURATION MIN STAGE 2: 1
BH CV STRESS DURATION SEC STAGE 1: 0
BH CV STRESS DURATION SEC STAGE 2: 49
BH CV STRESS ECHO POST STRESS EJECTION FRACTION EF: 72 %
BH CV STRESS GRADE STAGE 1: 10
BH CV STRESS GRADE STAGE 2: 12
BH CV STRESS HR STAGE 1: 129
BH CV STRESS HR STAGE 2: 148
BH CV STRESS METS STAGE 1: 5
BH CV STRESS METS STAGE 2: 6.8
BH CV STRESS PROTOCOL 1: NORMAL
BH CV STRESS RECOVERY BP: NORMAL MMHG
BH CV STRESS RECOVERY HR: 99 BPM
BH CV STRESS SPEED STAGE 1: 1.7
BH CV STRESS SPEED STAGE 2: 2.5
BH CV STRESS STAGE 1: 1
BH CV STRESS STAGE 2: 2
BH CV XLRA - RV BASE: 3 CM
BH CV XLRA - RV LENGTH: 7.8 CM
BH CV XLRA - RV MID: 2.36 CM
BH CV XLRA - TDI S': 17.4 CM/SEC
LEFT ATRIUM VOLUME INDEX: 26.3 ML/M2
MAXIMAL PREDICTED HEART RATE: 146 BPM
PERCENT MAX PREDICTED HR: 101.37 %
SINUS: 2.9 CM
STRESS BASELINE BP: NORMAL MMHG
STRESS BASELINE HR: 83 BPM
STRESS O2 SAT REST: 95 %
STRESS PERCENT HR: 119 %
STRESS POST ESTIMATED WORKLOAD: 6.8 METS
STRESS POST EXERCISE DUR MIN: 4 MIN
STRESS POST EXERCISE DUR SEC: 50 SEC
STRESS POST PEAK BP: NORMAL MMHG
STRESS POST PEAK HR: 148 BPM
STRESS TARGET HR: 124 BPM

## 2024-06-06 PROCEDURE — 93350 STRESS TTE ONLY: CPT

## 2024-06-06 PROCEDURE — 93320 DOPPLER ECHO COMPLETE: CPT

## 2024-06-06 PROCEDURE — 93325 DOPPLER ECHO COLOR FLOW MAPG: CPT

## 2024-06-06 PROCEDURE — 25510000001 PERFLUTREN (DEFINITY) 8.476 MG IN SODIUM CHLORIDE (PF) 0.9 % 10 ML INJECTION: Performed by: INTERNAL MEDICINE

## 2024-06-06 PROCEDURE — 93017 CV STRESS TEST TRACING ONLY: CPT

## 2024-06-06 PROCEDURE — 93356 MYOCRD STRAIN IMG SPCKL TRCK: CPT

## 2024-06-06 RX ADMIN — SODIUM CHLORIDE 4 ML: 9 INJECTION INTRAMUSCULAR; INTRAVENOUS; SUBCUTANEOUS at 13:18

## 2024-06-06 NOTE — PROGRESS NOTES
I am covering Dr. Chanel's in basket today because she is out of the office.     I will asked Dr. Chanel to review upon her return and advise further recommendations.

## 2024-06-11 ENCOUNTER — TELEPHONE (OUTPATIENT)
Dept: CARDIOLOGY | Facility: CLINIC | Age: 74
End: 2024-06-11
Payer: MEDICARE

## 2024-06-11 DIAGNOSIS — R94.39 ABNORMAL STRESS ECHO: ICD-10-CM

## 2024-06-11 DIAGNOSIS — R06.09 DYSPNEA ON EXERTION: ICD-10-CM

## 2024-06-11 DIAGNOSIS — R07.89 CHEST PAIN, ATYPICAL: Primary | ICD-10-CM

## 2024-06-11 NOTE — TELEPHONE ENCOUNTER
Hospital scheduling, please call his cell first and then his office if he doesn't answer. Please see order for cath; I ordered labs which he can do in [facility].     His stress echo is abnormal and it's high risk, as it shows LAD territory ischemia. His chest pain was extremely atypical but I mainly did the stress for his dyspnea/fatigue. I do think we have to proceed with a cath given the high risk nature.

## 2024-06-12 PROBLEM — R07.89 CHEST PAIN, ATYPICAL: Status: ACTIVE | Noted: 2024-06-11

## 2024-06-12 PROBLEM — R94.39 ABNORMAL STRESS ECHO: Status: ACTIVE | Noted: 2024-06-11

## 2024-06-12 PROBLEM — R06.09 DYSPNEA ON EXERTION: Status: ACTIVE | Noted: 2024-06-11

## 2024-06-13 ENCOUNTER — LAB (OUTPATIENT)
Dept: LAB | Facility: HOSPITAL | Age: 74
End: 2024-06-13
Payer: MEDICARE

## 2024-06-13 DIAGNOSIS — R06.09 DYSPNEA ON EXERTION: ICD-10-CM

## 2024-06-13 DIAGNOSIS — R94.39 ABNORMAL STRESS ECHO: ICD-10-CM

## 2024-06-13 DIAGNOSIS — R07.89 CHEST PAIN, ATYPICAL: ICD-10-CM

## 2024-06-13 LAB
ANION GAP SERPL CALCULATED.3IONS-SCNC: 10 MMOL/L (ref 5–15)
BUN SERPL-MCNC: 21 MG/DL (ref 8–23)
BUN/CREAT SERPL: 13.9 (ref 7–25)
CALCIUM SPEC-SCNC: 9.3 MG/DL (ref 8.6–10.5)
CHLORIDE SERPL-SCNC: 109 MMOL/L (ref 98–107)
CO2 SERPL-SCNC: 25 MMOL/L (ref 22–29)
CREAT SERPL-MCNC: 1.51 MG/DL (ref 0.76–1.27)
DEPRECATED RDW RBC AUTO: 45.1 FL (ref 37–54)
EGFRCR SERPLBLD CKD-EPI 2021: 48.2 ML/MIN/1.73
ERYTHROCYTE [DISTWIDTH] IN BLOOD BY AUTOMATED COUNT: 15.1 % (ref 12.3–15.4)
GLUCOSE SERPL-MCNC: 227 MG/DL (ref 65–99)
HCT VFR BLD AUTO: 38.3 % (ref 37.5–51)
HGB BLD-MCNC: 12.3 G/DL (ref 13–17.7)
MCH RBC QN AUTO: 26.5 PG (ref 26.6–33)
MCHC RBC AUTO-ENTMCNC: 32.1 G/DL (ref 31.5–35.7)
MCV RBC AUTO: 82.4 FL (ref 79–97)
PLATELET # BLD AUTO: 118 10*3/MM3 (ref 140–450)
PMV BLD AUTO: 12.2 FL (ref 6–12)
POTASSIUM SERPL-SCNC: 3.6 MMOL/L (ref 3.5–5.2)
RBC # BLD AUTO: 4.65 10*6/MM3 (ref 4.14–5.8)
SODIUM SERPL-SCNC: 144 MMOL/L (ref 136–145)
WBC NRBC COR # BLD AUTO: 6.58 10*3/MM3 (ref 3.4–10.8)

## 2024-06-13 PROCEDURE — 80048 BASIC METABOLIC PNL TOTAL CA: CPT

## 2024-06-13 PROCEDURE — 36415 COLL VENOUS BLD VENIPUNCTURE: CPT

## 2024-06-13 PROCEDURE — 85027 COMPLETE CBC AUTOMATED: CPT

## 2024-06-17 ENCOUNTER — HOSPITAL ENCOUNTER (OUTPATIENT)
Facility: HOSPITAL | Age: 74
Setting detail: HOSPITAL OUTPATIENT SURGERY
Discharge: HOME | End: 2024-06-17
Attending: STUDENT IN AN ORGANIZED HEALTH CARE EDUCATION/TRAINING PROGRAM | Admitting: STUDENT IN AN ORGANIZED HEALTH CARE EDUCATION/TRAINING PROGRAM
Payer: MEDICARE

## 2024-06-17 VITALS
HEART RATE: 91 BPM | DIASTOLIC BLOOD PRESSURE: 79 MMHG | TEMPERATURE: 96.9 F | HEIGHT: 69 IN | RESPIRATION RATE: 16 BRPM | SYSTOLIC BLOOD PRESSURE: 147 MMHG | OXYGEN SATURATION: 96 % | BODY MASS INDEX: 24.44 KG/M2 | WEIGHT: 165 LBS

## 2024-06-17 DIAGNOSIS — Z95.5 PRESENCE OF DRUG-ELUTING STENT IN LEFT CIRCUMFLEX CORONARY ARTERY: ICD-10-CM

## 2024-06-17 DIAGNOSIS — R07.89 CHEST PAIN, ATYPICAL: ICD-10-CM

## 2024-06-17 DIAGNOSIS — R94.39 ABNORMAL STRESS ECHO: ICD-10-CM

## 2024-06-17 DIAGNOSIS — Z95.5 STATUS POST INSERTION OF DRUG-ELUTING STENT INTO LEFT ANTERIOR DESCENDING (LAD) ARTERY: Primary | ICD-10-CM

## 2024-06-17 DIAGNOSIS — R06.09 DYSPNEA ON EXERTION: ICD-10-CM

## 2024-06-17 LAB
GLUCOSE BLDC GLUCOMTR-MCNC: 206 MG/DL (ref 70–130)
GLUCOSE BLDC GLUCOMTR-MCNC: 235 MG/DL (ref 70–130)

## 2024-06-17 PROCEDURE — 25010000002 FENTANYL CITRATE (PF) 50 MCG/ML SOLUTION: Performed by: STUDENT IN AN ORGANIZED HEALTH CARE EDUCATION/TRAINING PROGRAM

## 2024-06-17 PROCEDURE — 25510000001 IOPAMIDOL PER 1 ML: Performed by: STUDENT IN AN ORGANIZED HEALTH CARE EDUCATION/TRAINING PROGRAM

## 2024-06-17 PROCEDURE — C1874 STENT, COATED/COV W/DEL SYS: HCPCS | Performed by: STUDENT IN AN ORGANIZED HEALTH CARE EDUCATION/TRAINING PROGRAM

## 2024-06-17 PROCEDURE — 85347 COAGULATION TIME ACTIVATED: CPT

## 2024-06-17 PROCEDURE — 92978 ENDOLUMINL IVUS OCT C 1ST: CPT | Performed by: STUDENT IN AN ORGANIZED HEALTH CARE EDUCATION/TRAINING PROGRAM

## 2024-06-17 PROCEDURE — C1753 CATH, INTRAVAS ULTRASOUND: HCPCS | Performed by: STUDENT IN AN ORGANIZED HEALTH CARE EDUCATION/TRAINING PROGRAM

## 2024-06-17 PROCEDURE — C1769 GUIDE WIRE: HCPCS | Performed by: STUDENT IN AN ORGANIZED HEALTH CARE EDUCATION/TRAINING PROGRAM

## 2024-06-17 PROCEDURE — 25010000002 NICARDIPINE 2.5 MG/ML SOLUTION: Performed by: STUDENT IN AN ORGANIZED HEALTH CARE EDUCATION/TRAINING PROGRAM

## 2024-06-17 PROCEDURE — C1887 CATHETER, GUIDING: HCPCS | Performed by: STUDENT IN AN ORGANIZED HEALTH CARE EDUCATION/TRAINING PROGRAM

## 2024-06-17 PROCEDURE — C1724 CATH, TRANS ATHEREC,ROTATION: HCPCS | Performed by: STUDENT IN AN ORGANIZED HEALTH CARE EDUCATION/TRAINING PROGRAM

## 2024-06-17 PROCEDURE — 25010000002 LABETALOL 5 MG/ML SOLUTION: Performed by: STUDENT IN AN ORGANIZED HEALTH CARE EDUCATION/TRAINING PROGRAM

## 2024-06-17 PROCEDURE — 25010000002 HEPARIN (PORCINE) PER 1000 UNITS: Performed by: STUDENT IN AN ORGANIZED HEALTH CARE EDUCATION/TRAINING PROGRAM

## 2024-06-17 PROCEDURE — C1725 CATH, TRANSLUMIN NON-LASER: HCPCS | Performed by: STUDENT IN AN ORGANIZED HEALTH CARE EDUCATION/TRAINING PROGRAM

## 2024-06-17 PROCEDURE — 93799 UNLISTED CV SVC/PROCEDURE: CPT | Performed by: STUDENT IN AN ORGANIZED HEALTH CARE EDUCATION/TRAINING PROGRAM

## 2024-06-17 PROCEDURE — C1894 INTRO/SHEATH, NON-LASER: HCPCS | Performed by: STUDENT IN AN ORGANIZED HEALTH CARE EDUCATION/TRAINING PROGRAM

## 2024-06-17 PROCEDURE — 92933 PRQ TRLML C ATHRC ST ANGIOP1: CPT | Performed by: STUDENT IN AN ORGANIZED HEALTH CARE EDUCATION/TRAINING PROGRAM

## 2024-06-17 PROCEDURE — C9602 PERC D-E COR STENT ATHER S: HCPCS | Performed by: STUDENT IN AN ORGANIZED HEALTH CARE EDUCATION/TRAINING PROGRAM

## 2024-06-17 PROCEDURE — 82948 REAGENT STRIP/BLOOD GLUCOSE: CPT

## 2024-06-17 PROCEDURE — 93571 IV DOP VEL&/PRESS C FLO 1ST: CPT | Performed by: STUDENT IN AN ORGANIZED HEALTH CARE EDUCATION/TRAINING PROGRAM

## 2024-06-17 PROCEDURE — 92928 PRQ TCAT PLMT NTRAC ST 1 LES: CPT | Performed by: STUDENT IN AN ORGANIZED HEALTH CARE EDUCATION/TRAINING PROGRAM

## 2024-06-17 PROCEDURE — 25810000003 SODIUM CHLORIDE 0.9 % SOLUTION: Performed by: STUDENT IN AN ORGANIZED HEALTH CARE EDUCATION/TRAINING PROGRAM

## 2024-06-17 PROCEDURE — C9600 PERC DRUG-EL COR STENT SING: HCPCS | Performed by: STUDENT IN AN ORGANIZED HEALTH CARE EDUCATION/TRAINING PROGRAM

## 2024-06-17 PROCEDURE — 25010000002 MIDAZOLAM PER 1 MG: Performed by: STUDENT IN AN ORGANIZED HEALTH CARE EDUCATION/TRAINING PROGRAM

## 2024-06-17 PROCEDURE — 93458 L HRT ARTERY/VENTRICLE ANGIO: CPT | Performed by: STUDENT IN AN ORGANIZED HEALTH CARE EDUCATION/TRAINING PROGRAM

## 2024-06-17 DEVICE — XIENCE SKYPOINT™ EVEROLIMUS ELUTING CORONARY STENT SYSTEM 2.50 MM X 33 MM / RAPID-EXCHANGE
Type: IMPLANTABLE DEVICE | Site: CORONARY | Status: FUNCTIONAL
Brand: XIENCE SKYPOINT™

## 2024-06-17 DEVICE — XIENCE SKYPOINT™ EVEROLIMUS ELUTING CORONARY STENT SYSTEM 2.50 MM X 15 MM / RAPID-EXCHANGE
Type: IMPLANTABLE DEVICE | Site: CORONARY | Status: FUNCTIONAL
Brand: XIENCE SKYPOINT™

## 2024-06-17 DEVICE — XIENCE SKYPOINT™ EVEROLIMUS ELUTING CORONARY STENT SYSTEM 3.00 MM X 28 MM / RAPID-EXCHANGE
Type: IMPLANTABLE DEVICE | Site: CORONARY | Status: FUNCTIONAL
Brand: XIENCE SKYPOINT™

## 2024-06-17 DEVICE — XIENCE SKYPOINT™ EVEROLIMUS ELUTING CORONARY STENT SYSTEM 3.00 MM X 48 MM / RAPID-EXCHANGE
Type: IMPLANTABLE DEVICE | Site: CORONARY | Status: FUNCTIONAL
Brand: XIENCE SKYPOINT™

## 2024-06-17 RX ORDER — CLOPIDOGREL BISULFATE 75 MG/1
75 TABLET ORAL DAILY
Qty: 90 TABLET | Refills: 3 | Status: SHIPPED | OUTPATIENT
Start: 2024-06-17

## 2024-06-17 RX ORDER — ACETAMINOPHEN 500 MG
1000 TABLET ORAL ONCE
Status: COMPLETED | OUTPATIENT
Start: 2024-06-17 | End: 2024-06-17

## 2024-06-17 RX ORDER — VERAPAMIL HYDROCHLORIDE 2.5 MG/ML
INJECTION, SOLUTION INTRAVENOUS
Status: DISCONTINUED | OUTPATIENT
Start: 2024-06-17 | End: 2024-06-17 | Stop reason: HOSPADM

## 2024-06-17 RX ORDER — LIDOCAINE HYDROCHLORIDE 20 MG/ML
INJECTION, SOLUTION INFILTRATION; PERINEURAL
Status: DISCONTINUED | OUTPATIENT
Start: 2024-06-17 | End: 2024-06-17 | Stop reason: HOSPADM

## 2024-06-17 RX ORDER — NICARDIPINE HYDROCHLORIDE 2.5 MG/ML
INJECTION INTRAVENOUS
Status: DISCONTINUED | OUTPATIENT
Start: 2024-06-17 | End: 2024-06-17 | Stop reason: HOSPADM

## 2024-06-17 RX ORDER — NITROGLYCERIN 0.4 MG/1
TABLET SUBLINGUAL
Qty: 45 TABLET | Refills: 11 | Status: SHIPPED | OUTPATIENT
Start: 2024-06-17

## 2024-06-17 RX ORDER — FENTANYL CITRATE 50 UG/ML
INJECTION, SOLUTION INTRAMUSCULAR; INTRAVENOUS
Status: DISCONTINUED | OUTPATIENT
Start: 2024-06-17 | End: 2024-06-17 | Stop reason: HOSPADM

## 2024-06-17 RX ORDER — LABETALOL HYDROCHLORIDE 5 MG/ML
20 INJECTION, SOLUTION INTRAVENOUS ONCE AS NEEDED
Status: COMPLETED | OUTPATIENT
Start: 2024-06-17 | End: 2024-06-17

## 2024-06-17 RX ORDER — ACETAMINOPHEN 325 MG/1
650 TABLET ORAL EVERY 4 HOURS PRN
Status: DISCONTINUED | OUTPATIENT
Start: 2024-06-17 | End: 2024-06-17 | Stop reason: HOSPADM

## 2024-06-17 RX ORDER — SODIUM CHLORIDE 0.9 % (FLUSH) 0.9 %
10 SYRINGE (ML) INJECTION EVERY 12 HOURS SCHEDULED
Status: DISCONTINUED | OUTPATIENT
Start: 2024-06-17 | End: 2024-06-17 | Stop reason: HOSPADM

## 2024-06-17 RX ORDER — SODIUM CHLORIDE 9 MG/ML
100 INJECTION, SOLUTION INTRAVENOUS CONTINUOUS
Status: DISCONTINUED | OUTPATIENT
Start: 2024-06-18 | End: 2024-06-17 | Stop reason: HOSPADM

## 2024-06-17 RX ORDER — SODIUM CHLORIDE 9 MG/ML
75 INJECTION, SOLUTION INTRAVENOUS CONTINUOUS
Status: DISCONTINUED | OUTPATIENT
Start: 2024-06-17 | End: 2024-06-17 | Stop reason: HOSPADM

## 2024-06-17 RX ORDER — HEPARIN SODIUM 1000 [USP'U]/ML
INJECTION, SOLUTION INTRAVENOUS; SUBCUTANEOUS
Status: DISCONTINUED | OUTPATIENT
Start: 2024-06-17 | End: 2024-06-17 | Stop reason: HOSPADM

## 2024-06-17 RX ORDER — MIDAZOLAM HYDROCHLORIDE 1 MG/ML
INJECTION INTRAMUSCULAR; INTRAVENOUS
Status: DISCONTINUED | OUTPATIENT
Start: 2024-06-17 | End: 2024-06-17 | Stop reason: HOSPADM

## 2024-06-17 RX ORDER — PANTOPRAZOLE SODIUM 40 MG/1
40 TABLET, DELAYED RELEASE ORAL DAILY
Qty: 90 TABLET | Refills: 3 | Status: SHIPPED | OUTPATIENT
Start: 2024-06-17

## 2024-06-17 RX ADMIN — ACETAMINOPHEN 1000 MG: 500 TABLET ORAL at 12:43

## 2024-06-17 RX ADMIN — LABETALOL HYDROCHLORIDE 20 MG: 5 INJECTION, SOLUTION INTRAVENOUS at 15:10

## 2024-06-17 RX ADMIN — SODIUM CHLORIDE 224.4 ML: 9 INJECTION, SOLUTION INTRAVENOUS at 08:44

## 2024-06-17 NOTE — Clinical Note
Rotational atherectomy was performed. 1st Pass rate = 138 RPM. 1st Pass duration = 13 seconds. 2nd Pass rate = 143 RPM. 2nd Pass duration = 18 seconds. 3rd Pass rate = 150 RPM. 3rd Pass duration = 16 seconds.

## 2024-06-17 NOTE — Clinical Note
First balloon inflation max pressure = 12 christi. First balloon inflation duration = 8 seconds. Second inflation of balloon - Max pressure = 12 christi. 2nd Inflation of balloon - Duration = 6 seconds. Third inflation of balloon - Max pressure = 14 christi. 3rd Inflation of balloon - Duration = 5 seconds. Fourth inflation of balloon - Max pressure = 16 christi. 4th Inflation of balloon - Duration = 7 seconds.

## 2024-06-17 NOTE — DISCHARGE INSTRUCTIONS
[facility]  [address] [place] [address]    Coronary Angioplasty with or without  Stent (Radial Approach) After Care    Refer to this sheet in the next few weeks. These instructions provide you with information on caring for yourself after your procedure. Your health care provider may also give you more specific instructions. Your treatment has been planned according to current medical practices, but problems sometimes occur. Call your health care provider if you have any problems or questions after your procedure.       Home Care Instructions:  You may shower the day after the procedure. Remove the bandage (dressing) and gently wash the site with plain soap and water. Gently pat the site dry. You may apply a band aid daily for 2 days if desired.    Do not apply powder or lotion to the site.  Do not submerge the affected site in water for 3 to 5 days or until the site is completely healed.   Do not flex or bend at the wrist with affected arm for 24 hours.  Do not lift, push or pull anything over 5 pounds for 5 days after your procedure or as directed by your physician.  For a reference, a gallon of milk weighs 8 pounds.    Do not operate machinery or power tools for 24 hours.  Inspect the site at least twice daily. You may notice some bruising at the site and it may be tender for 1 to 2 weeks.     Increase your fluid intake for the next 2 days.    Keep arm elevated for 24 hours.  For the remainder of the day, keep your arm in the “Pledge of Allegiance” position when up and about.    Limit your activity for the next 48 hours and avoid strenuous activity as directed by your physician.   Cardiac Rehab may or may not be ordered.  Please consult with your physician  You may drive 24 hours after the procedure unless otherwise instructed by your caregiver.  A responsible adult should be with you for the first 24 hours after you arrive home.   Do not make any important legal decisions or sign legal  papers for 24 hours. Do not drink alcohol for 24 hours.    Take medicines only as directed by your health care provider.  Blood thinners may be prescribed after your procedure to improve blood flow through the stent.    Metformin or any medications containing Metformin should not be taken for 48 hours after your procedure.    Eat a heart-healthy diet. This should include plenty of fresh fruits and vegetables. Meat should be lean cuts. Avoid the following types of food:    Food that is high in salt.    Canned or highly processed food.    Food that is high in saturated fat or sugar.    Fried food.    Make any other lifestyle changes recommended by your health care provider. This may include:    Not using any tobacco products including cigarettes, chewing tobacco, or electronic cigarettes.   Managing your weight.    Getting regular exercise.    Managing your blood pressure.    Limiting your alcohol intake.    Managing other health problems, such as diabetes.    If you need an MRI after your heart stent was placed, be sure to tell the health care provider who orders the MRI that you have a heart stent.    Keep all follow-up visits as directed by your health care provider.      Call Your Doctor If:    You have unusual pain at the radial/ulnar (wrist) site.  You have redness, warmth, swelling, or pain at the radial/ulnar (wrist) site.  You have drainage (other than a small amount of blood on the dressing).  `You have chills or a fever > 101.  Your arm becomes pale or dark, cool, tingly, or numb.  You develop chest pain, shortness of breath, feel faint or pass out.    You have heavy bleeding from the site.  If you do, hold pressure on the site for 20 minutes.  If the bleeding stops, apply a fresh bandage and call your cardiologist.  However, if you continue to have bleeding, maintain pressure and call 911.    You have any symptoms of a stroke.  Remember BE FAST  B-balance. Sudden trouble walking or loss of  balance.  E-eyes.  Sudden changes in how you see or a sudden onset of a very bad headache.   F-face. Sudden weakness or loss of feeling of the face or facial droop on one side.   A-arms Sudden weakness or numbness in one arm. One arm drifts down if they are both held out in front of you. This happens suddenly and usually on one side of the body.   S-speech.  Sudden trouble speaking, slurred speech or trouble understanding what people are saying.   T-time  Time to call emergency services.  Write down the symptoms and the time they started.

## 2024-06-17 NOTE — Clinical Note
First balloon inflation max pressure = 12 christi. First balloon inflation duration = 6 seconds. Second inflation of balloon - Max pressure = 12 christi. 2nd Inflation of balloon - Duration = 5 seconds. Third inflation of balloon - Max pressure = 12 christi. 3rd Inflation of balloon - Duration = 6 seconds.

## 2024-06-17 NOTE — Clinical Note
First balloon inflation max pressure = 14 christi. First balloon inflation duration = 8 seconds. Second inflation of balloon - Max pressure = 20 christi. 2nd Inflation of balloon - Duration = 8 seconds.

## 2024-06-17 NOTE — PERIOPERATIVE NURSING NOTE
Dr Cox at bedside explaining and showing cardiac cath/PCI images with patient and family. Questions answered.

## 2024-06-17 NOTE — Clinical Note
First balloon inflation max pressure = 16 christi. First balloon inflation duration = 6 seconds. Second inflation of balloon - Max pressure = 20 christi. 2nd Inflation of balloon - Duration = 7 seconds.

## 2024-06-17 NOTE — Clinical Note
Hemostasis started on the right radial artery. R-Band was used in achieving hemostasis. Radial compression device applied to vessel. Hemostasis achieved successfully. Closure device additional comment: 14cc vasc band

## 2024-06-17 NOTE — Clinical Note
First balloon inflation max pressure = 18 christi. First balloon inflation duration = 6 seconds. Second inflation of balloon - Max pressure = 20 christi. 2nd Inflation of balloon - Duration = 6 seconds.

## 2024-06-17 NOTE — Clinical Note
First balloon inflation max pressure = 16 christi. First balloon inflation duration = 6 seconds. Second inflation of balloon - Max pressure = 16 christi. 2nd Inflation of balloon - Duration = 6 seconds.

## 2024-06-17 NOTE — Clinical Note
First balloon inflation max pressure = 18 christi. First balloon inflation duration = 6 seconds. Second inflation of balloon - Max pressure = 20 christi. 2nd Inflation of balloon - Duration = 4 seconds. Third inflation of balloon - Max pressure = 20 christi. 3rd Inflation of balloon - Duration = 7 seconds. Fourth inflation of balloon - Max pressure = 20 christi. 4th Inflation of balloon - Duration = 5 seconds.

## 2024-06-17 NOTE — Clinical Note
First balloon inflation max pressure = 18 christi. First balloon inflation duration = 5 seconds. Second inflation of balloon - Max pressure = 18 christi. 2nd Inflation of balloon - Duration = 5 seconds. Third inflation of balloon - Max pressure = 18 christi. 3rd Inflation of balloon - Duration = 6 seconds. Fourth inflation of balloon - Max pressure = 16 christi. 4th Inflation of balloon - Duration = 6 seconds.

## 2024-06-17 NOTE — INTERVAL H&P NOTE
H&P updated. The patient was examined and the following changes are noted:  Stress echo with anterior ischemia.

## 2024-06-18 LAB
ACT BLD: 281 SECONDS (ref 82–152)
ACT BLD: 287 SECONDS (ref 82–152)
ACT BLD: 287 SECONDS (ref 82–152)
ACT BLD: 403 SECONDS (ref 82–152)

## 2024-06-18 NOTE — PROGRESS NOTES
I called to make sure he's doing okay, he feels well. Please arrange follow up in one week with NM in [place].    Jermain sewell

## 2024-06-19 ENCOUNTER — TELEPHONE (OUTPATIENT)
Dept: FAMILY MEDICINE CLINIC | Facility: CLINIC | Age: 74
End: 2024-06-19
Payer: MEDICARE

## 2024-06-19 DIAGNOSIS — E11.65 TYPE 2 DIABETES MELLITUS WITH HYPERGLYCEMIA, WITHOUT LONG-TERM CURRENT USE OF INSULIN: ICD-10-CM

## 2024-06-19 NOTE — TELEPHONE ENCOUNTER
Called pt asking if he wanted to est with another provider in the office due to Chelsea retiring. Pt requested the soonest appt with a female for him and his wife I suggested Meg and he agreed. I put pt on the list

## 2024-06-20 RX ORDER — ORAL SEMAGLUTIDE 7 MG/1
7 TABLET ORAL DAILY
Qty: 90 TABLET | Refills: 0 | Status: SHIPPED | OUTPATIENT
Start: 2024-06-20

## 2024-07-02 ENCOUNTER — OFFICE VISIT (OUTPATIENT)
Dept: CARDIOLOGY | Facility: CLINIC | Age: 74
End: 2024-07-02
Payer: MEDICARE

## 2024-07-02 VITALS
WEIGHT: 163.4 LBS | BODY MASS INDEX: 24.2 KG/M2 | SYSTOLIC BLOOD PRESSURE: 132 MMHG | RESPIRATION RATE: 18 BRPM | DIASTOLIC BLOOD PRESSURE: 60 MMHG | HEART RATE: 77 BPM | OXYGEN SATURATION: 98 % | HEIGHT: 69 IN

## 2024-07-02 DIAGNOSIS — Z95.5 STATUS POST INSERTION OF DRUG-ELUTING STENT INTO LEFT ANTERIOR DESCENDING (LAD) ARTERY: Primary | ICD-10-CM

## 2024-07-02 DIAGNOSIS — E78.2 MIXED HYPERLIPIDEMIA: ICD-10-CM

## 2024-07-02 DIAGNOSIS — I10 ESSENTIAL HYPERTENSION: ICD-10-CM

## 2024-07-02 DIAGNOSIS — I25.10 CORONARY ARTERY DISEASE INVOLVING NATIVE CORONARY ARTERY OF NATIVE HEART WITHOUT ANGINA PECTORIS: ICD-10-CM

## 2024-07-02 DIAGNOSIS — E11.65 TYPE 2 DIABETES MELLITUS WITH HYPERGLYCEMIA, WITHOUT LONG-TERM CURRENT USE OF INSULIN: ICD-10-CM

## 2024-07-02 DIAGNOSIS — N18.31 STAGE 3A CHRONIC KIDNEY DISEASE: ICD-10-CM

## 2024-07-02 NOTE — PROGRESS NOTES
CARDIOLOGY        Patient Name: Guy Plascencia  :1950  Age: 73 y.o.  Primary Cardiologist: Royal Chanel MD  Encounter Provider:  Jonnathan Bowie PA-C    Date of Service: 24        CHIEF COMPLAINT / REASON FOR OFFICE VISIT       Hospital follow-up    HISTORY OF PRESENT ILLNESS       HPI  Guy Plascencia is a 73 y.o. male who presents today for hospital follow-up.     Pt has a  history significant for diabetes, hypertension, hyperlipidemia, CKD, and nonpremature CAD presents for hospital follow-up.  Patient was seen in office on 2024 for ER follow-up.  Patient had chest pain this past today 5 weeks prior.  He had workup in the ER that showed negative troponins.  He had complained of exertional dyspnea when he was in the office.  He had no lightheadedness, palpitations, orthopnea, or leg swelling.  He was scheduled for stress echocardiogram on 2024. His stress echocardiogram showed evidence for myocardial ischemia where he underwent cardiac catheterization on 2024.  He underwent a successful PCI to severely calcified, RFR positive LAD with rotational atherectomy and overlapping 3.0 x 28mm and 3.0 x 48mm Xience Skypoint drug-eluting stents.  He also had Successful PCI to severe ostial circumflex and proximal OM2 stenoses with overlapping 2.5 x 15mm, 2.5 x 15mm, and 2.5 x 33mm Xience Skypoint drug-eluting stents..  He is here for follow-up.    Patient has been doing well since his heart catheterization and stent placement.  Patient has not had any issues.  Patient denies any chest pain, shortness of breath, palpitations, edema to his legs, orthopnea, or any bleeding issues.  Patient is currently try to get cardiac rehab set up here at [place].        Below is a brief summary of patients pertinent cardiac findings:  He has history of type 2 diabetes, hypertension, and hyperlipidemia.  His father had a history of nonpremature coronary disease.     He was in the emergency department  "approximately 5 weeks ago with chest pain that lasted most of the day.  It was in the left upper chest, around the shoulder and armpit area.  Nothing seemed to make it better or worse.  It was not pleuritic.  It was not positional or exertional.  It did not make him short of breath, diaphoretic, or nauseated.  He says he cannot really describe the quality of the discomfort, just that it was a pain.  High-sensitivity troponin was normal.  His EKG was poor quality but was not overtly ischemic.    The following portions of the patient's history were reviewed and updated as appropriate: allergies, current medications, past family history, past medical history, past social history, past surgical history and problem list.      VITAL SIGNS     Visit Vitals  /60 (BP Location: Right arm, Patient Position: Sitting, Cuff Size: Adult)   Pulse 77   Resp 18   Ht 175.3 cm (69\")   Wt 74.1 kg (163 lb 6.4 oz)   SpO2 98%   BMI 24.13 kg/m²       @[email or web address]  Wt Readings from Last 3 Encounters:   07/02/24 74.1 kg (163 lb 6.4 oz)   06/17/24 74.8 kg (165 lb)   06/06/24 74 kg (163 lb 2.3 oz)     Body mass index is 24.13 kg/m².        PHYSICAL EXAMINATION     Constitutional:       General: Awake. Not in acute distress.     Appearance: Not in distress.   Pulmonary:      Effort: Pulmonary effort is normal.      Breath sounds: Normal breath sounds.   Cardiovascular:      Normal rate. Regular rhythm.      Murmurs: There is no murmur.   Edema:     Peripheral edema absent.   Musculoskeletal:        Arms:       Comments: Small puncture site to right volar aspect of wrist.  No erythema or drainage.  Small bruising distally noted. Skin:     General: Skin is warm.   Neurological:      Mental Status: Alert.   Psychiatric:         Behavior: Behavior is cooperative.           REVIEWED DATA       ECG 12 Lead    Date/Time: 7/2/2024 9:32 AM  Performed by: Jonnathan Bowie PA-C    Authorized by: Jonnathan Bowie PA-C  Comparison: " compared with previous ECG   Similar to previous ECG  Rhythm: sinus rhythm  Rate: normal  BPM: 76  T inversion: I, aVL, V5 and V6  QRS axis: normal  Other findings: non-specific ST-T wave changes    Clinical impression: abnormal EKG  Comments: EKG similar to 5/20/2024          Cardiac Procedures:    Adult stress echo on 6/6/2024  Interpretation Summary       Left ventricular systolic function is normal. Left ventricular ejection fraction appears to be 66 - 70%.    Left ventricular wall thickness is consistent with mild concentric hypertrophy.    Left ventricular diastolic function was normal.    Saline test results are negative.    Abnormal stress echo with echocardiographic evidence for myocardial ischemia. The following left ventricular wall segments are hypokinetic at peak stress: apical anterior, apical septal, apex hypokinetic and mid anteroseptal.    1 mm horizontal ST depression is noted in the inferolateral leads at peak stress.  Abnormal stress study which is evaluated in the setting of a hypertensive response to stress.    Cardiac catheterization on 6/17/2024  Conclusions:  Successful IVUS guided PCI to severely calcified, RFR positive LAD with rotational atherectomy and overlapping 3.0 x 28mm and 3.0 x 48mm Xience Skypoint drug-eluting stents (postdilated from the ostium to the mid segment with a 3.5 mm NC to high-pressure)  Successful PCI to severe ostial circumflex and proximal OM2 stenoses with overlapping 2.5 x 15mm, 2.5 x 15mm, and 2.5 x 33mm Xience Skypoint drug-eluting stents.     Recommendations:   Continue DAPT for at least 6 months but consider indefinitely given location and number of stents  Continue aggressive risk factor modification    Lipid Panel          1/19/2024    10:20 4/22/2024    11:21   Lipid Panel   Total Cholesterol 119  117    Triglycerides 144  101    HDL Cholesterol 36  36    VLDL Cholesterol 25  19    LDL Cholesterol  58  62    LDL/HDL Ratio 1.51  1.7        Lab Results    Component Value Date     06/13/2024     (H) 04/22/2024    K 3.6 06/13/2024    K 4.0 04/22/2024     (H) 06/13/2024     (H) 04/22/2024    CO2 25.0 06/13/2024    CO2 25 04/22/2024    BUN 21 06/13/2024    BUN 18 04/22/2024    CREATININE 1.51 (H) 06/13/2024    CREATININE 1.43 (H) 04/22/2024    EGFRIFNONA 76 10/18/2021    EGFRIFNONA 85 07/30/2021    EGFRIFAFRI 83 04/07/2021    EGFRIFAFRI 79 11/05/2020    GLUCOSE 227 (H) 06/13/2024    GLUCOSE 115 (H) 04/22/2024    CALCIUM 9.3 06/13/2024    CALCIUM 9.7 04/22/2024    PROTENTOTREF 6.5 01/19/2024    PROTENTOTREF 6.6 04/27/2023    ALBUMIN 4.1 04/17/2024    ALBUMIN 4.2 01/19/2024    BILITOT 0.3 04/17/2024    BILITOT 0.4 01/19/2024    AST 24 04/17/2024    AST 20 01/19/2024    ALT 24 04/17/2024    ALT 22 01/19/2024     Lab Results   Component Value Date    WBC 6.58 06/13/2024    WBC 7.38 04/17/2024    HGB 12.3 (L) 06/13/2024    HGB 13.3 04/17/2024    HCT 38.3 06/13/2024    HCT 40.9 04/17/2024    MCV 82.4 06/13/2024    MCV 81.2 04/17/2024     (L) 06/13/2024     (L) 04/17/2024     Lab Results   Component Value Date    PROBNP 1,224.0 (H) 10/29/2023    PROBNP 3,044.0 (H) 10/27/2023    .0 (H) 11/08/2023     Lab Results   Component Value Date    TROPONINT 17 04/17/2024     Lab Results   Component Value Date    TSH 3.170 06/24/2022           ASSESSMENT & PLAN     Diagnoses and all orders for this visit:    1. Status post insertion of drug-eluting stent into left anterior descending (LAD) artery (Primary)  S/p PCI to severely calcified, RFR positive LAD with rotational atherectomy and overlapping 3.0 x 28mm and 3.0 x 48mm Xience Skypoint drug-eluting stents.  PCI to severe ostial circumflex and proximal OM2 stenoses with overlapping 2.5 x 15mm, 2.5 x 15mm, and 2.5 x 33mm Xience Skypoint drug-eluting stents on 6/17/24.  Currently on 81 mg aspirin, Plavix, Farxiga, metoprolol, and pravastatin.    The patient presents today for a 1 week post  cardiac catheterization visit.  The following items were reviewed/discussed in clinic:    [x] Check Cath Site  [x] Medication Education for GDMT     [x] Exercise    [x] Cardiac Rehab        2. Mixed hyperlipidemia   Currently on pravastatin 80 mg daily.    3. Essential hypertension   Currently on amlodipine and metoprolol    4. Type 2 diabetes mellitus with hyperglycemia, without long-term current use of insulin   Currently on glipizide, Farxiga, and Ozempic.    5. Stage 3a chronic kidney disease      Patient doing well overall.  Patient has no complaints.  Will have him call to set up his cardiac rehab here at [place].  I will arrange a 3-month echocardiogram along with follow-up with Dr. Chanel in 3 months.  Patient to call office with any questions or concerns.    Return in about 3 months (around 10/2/2024) for Dr. Chanel.              MEDICATIONS         Discharge Medications            Accurate as of July 2, 2024  9:47 AM. If you have any questions, ask your nurse or doctor.                Continue These Medications        Instructions Start Date   amLODIPine 10 MG tablet  Commonly known as: NORVASC   10 mg, Oral, Daily      APPLE CIDER VINEGAR PO   Oral      ASPIR-81 PO   Oral, Daily      B-12 PO   1 tablet, Oral, Weekly      BENFOTIAMINE PO   Oral, 2 Times Daily      clopidogrel 75 MG tablet  Commonly known as: PLAVIX   75 mg, Oral, Daily      D3 ADULT PO   2,000 Units, Oral, Daily      dapagliflozin Propanediol 10 MG tablet   10 mg, Oral, Daily      glipizide 10 MG tablet  Commonly known as: GLUCOTROL   TAKE ONE TABLET BY MOUTH TWICE A DAY BEFORE A MEAL      hydrOXYzine 25 MG tablet  Commonly known as: ATARAX   TAKE ONE TABLET BY MOUTH THREE TIMES A DAY AS NEEDED FOR ITCHING      [facility] Glucose Test test strip  Generic drug: glucose blood   USE TO TEST BLOOD SUGAR TWO TIMES A DAY FOR TYPE 2 DIABETES      metoprolol tartrate 50 MG tablet  Commonly known as: LOPRESSOR   50 mg, Oral, 2 Times Daily       nitroglycerin 0.4 MG SL tablet  Commonly known as: NITROSTAT   1 under the tongue as needed for angina, may repeat q5mins for up three doses      OneTouch Verio Flex System w/Device kit   1 Device, Does not apply, 2 Times Daily      pantoprazole 40 MG EC tablet  Commonly known as: Protonix   40 mg, Oral, Daily      pravastatin 80 MG tablet  Commonly known as: PRAVACHOL   80 mg, Oral, Daily      Rybelsus 7 MG tablet  Generic drug: Semaglutide   7 mg, Oral, Daily      VITAMIN C PO   Oral      ZINC METHIONATE PO   Oral, Daily      ZINC PO   Oral                   **Dragon Disclaimer:   Much of this encounter note is an electronic transcription/translation of spoken language to printed text. The electronic translation of spoken language may permit erroneous, or at times, nonsensical words or phrases to be inadvertently transcribed. Although I have reviewed the note for such errors, some may still exist.

## 2024-07-03 ENCOUNTER — TELEPHONE (OUTPATIENT)
Dept: CARDIAC REHAB | Facility: HOSPITAL | Age: 74
End: 2024-07-03
Payer: MEDICARE

## 2024-07-12 DIAGNOSIS — E78.2 MIXED HYPERLIPIDEMIA: ICD-10-CM

## 2024-07-12 RX ORDER — PRAVASTATIN SODIUM 80 MG/1
80 TABLET ORAL DAILY
Qty: 90 TABLET | Refills: 0 | Status: SHIPPED | OUTPATIENT
Start: 2024-07-12

## 2024-07-12 RX ORDER — AMLODIPINE BESYLATE 10 MG/1
10 TABLET ORAL DAILY
Qty: 90 TABLET | Refills: 0 | Status: SHIPPED | OUTPATIENT
Start: 2024-07-12

## 2024-07-12 NOTE — TELEPHONE ENCOUNTER
Incoming Refill Request      Medication requested (name and dose):   amLODIPine (NORVASC) 10 MG tablet  10 mg, Daily     pravastatin (PRAVACHOL) 80 MG tablet  80 mg, Daily       Pharm[facility] where request should be sent[id number] [place] [facility] [address] - [phone] PH - [phone]     Additional details provided by patient: PATIENT SAID HE KNOWS KRISTINA POLANCO IS NOT LONGER THERE BUT SAID HE WAS TOLD HE WOULD BE SET UP WITH A NEW PROVIDER COMING IN AUGUST    Best call back number: [phone]    Does the patient have less than a 3 day supply:  [x] Yes  [] No    Carlos Anaya Rep  07/12/24, 13:40 EDT

## 2024-07-15 ENCOUNTER — TELEPHONE (OUTPATIENT)
Dept: FAMILY MEDICINE CLINIC | Facility: CLINIC | Age: 74
End: 2024-07-15
Payer: MEDICARE

## 2024-07-15 DIAGNOSIS — E78.2 MIXED HYPERLIPIDEMIA: ICD-10-CM

## 2024-07-15 NOTE — TELEPHONE ENCOUNTER
Looks like these medications were not e-scribed, they were set to print. Can you please resend to [facility].    Yes I see that now.  I will move them to the normal function and send them an E scribe

## 2024-07-15 NOTE — TELEPHONE ENCOUNTER
[facility] called stating that they have sent requests for Amlodipine & Pravastatin & have not heard anything yet.   Please advise.

## 2024-07-16 RX ORDER — PRAVASTATIN SODIUM 80 MG/1
80 TABLET ORAL DAILY
Qty: 90 TABLET | Refills: 0 | Status: SHIPPED | OUTPATIENT
Start: 2024-07-16

## 2024-07-16 RX ORDER — AMLODIPINE BESYLATE 10 MG/1
10 TABLET ORAL DAILY
Qty: 90 TABLET | Refills: 0 | Status: SHIPPED | OUTPATIENT
Start: 2024-07-16

## 2024-07-24 ENCOUNTER — TREATMENT (OUTPATIENT)
Dept: CARDIAC REHAB | Facility: HOSPITAL | Age: 74
End: 2024-07-24
Payer: MEDICARE

## 2024-07-24 DIAGNOSIS — Z95.5 S/P DRUG ELUTING CORONARY STENT PLACEMENT: Primary | ICD-10-CM

## 2024-07-24 LAB — GLUCOSE BLDC GLUCOMTR-MCNC: 149 MG/DL (ref 70–130)

## 2024-07-24 PROCEDURE — 93797 PHYS/QHP OP CAR RHAB WO ECG: CPT

## 2024-07-24 PROCEDURE — 93798 PHYS/QHP OP CAR RHAB W/ECG: CPT

## 2024-07-24 PROCEDURE — 82948 REAGENT STRIP/BLOOD GLUCOSE: CPT

## 2024-07-25 ENCOUNTER — APPOINTMENT (OUTPATIENT)
Dept: CT IMAGING | Facility: HOSPITAL | Age: 74
End: 2024-07-25
Payer: MEDICARE

## 2024-07-25 ENCOUNTER — HOSPITAL ENCOUNTER (OUTPATIENT)
Facility: HOSPITAL | Age: 74
Setting detail: OBSERVATION
Discharge: HOME | End: 2024-07-26
Attending: EMERGENCY MEDICINE | Admitting: HOSPITALIST
Payer: MEDICARE

## 2024-07-25 DIAGNOSIS — E86.0 DEHYDRATION: ICD-10-CM

## 2024-07-25 DIAGNOSIS — R19.7 DIARRHEA OF PRESUMED INFECTIOUS ORIGIN: Primary | ICD-10-CM

## 2024-07-25 DIAGNOSIS — R50.9 FEVER, UNSPECIFIED FEVER CAUSE: ICD-10-CM

## 2024-07-25 PROBLEM — K52.9 COLITIS: Status: ACTIVE | Noted: 2024-07-25

## 2024-07-25 LAB
ALBUMIN SERPL-MCNC: 3.2 G/DL (ref 3.5–5.2)
ALBUMIN/GLOB SERPL: 1.2 G/DL
ALP SERPL-CCNC: 114 U/L (ref 39–117)
ALT SERPL W P-5'-P-CCNC: 21 U/L (ref 1–41)
ANION GAP SERPL CALCULATED.3IONS-SCNC: 12.4 MMOL/L (ref 5–15)
AST SERPL-CCNC: 21 U/L (ref 1–40)
BACTERIA UR QL AUTO: ABNORMAL /HPF
BASOPHILS # BLD AUTO: 0.04 10*3/MM3 (ref 0–0.2)
BASOPHILS NFR BLD AUTO: 0.2 % (ref 0–1.5)
BILIRUB SERPL-MCNC: 0.5 MG/DL (ref 0–1.2)
BILIRUB UR QL STRIP: ABNORMAL
BUN SERPL-MCNC: 16 MG/DL (ref 8–23)
BUN/CREAT SERPL: 10.1 (ref 7–25)
CALCIUM SPEC-SCNC: 8.9 MG/DL (ref 8.6–10.5)
CHLORIDE SERPL-SCNC: 102 MMOL/L (ref 98–107)
CK SERPL-CCNC: 42 U/L (ref 20–200)
CLARITY UR: CLEAR
CO2 SERPL-SCNC: 21.6 MMOL/L (ref 22–29)
COLOR UR: ABNORMAL
CREAT SERPL-MCNC: 1.59 MG/DL (ref 0.76–1.27)
D-LACTATE SERPL-SCNC: 1.6 MMOL/L (ref 0.5–2)
D-LACTATE SERPL-SCNC: 2.5 MMOL/L (ref 0.5–2)
DEPRECATED RDW RBC AUTO: 42 FL (ref 37–54)
EGFRCR SERPLBLD CKD-EPI 2021: 45.3 ML/MIN/1.73
EOSINOPHIL # BLD AUTO: 0.01 10*3/MM3 (ref 0–0.4)
EOSINOPHIL NFR BLD AUTO: 0.1 % (ref 0.3–6.2)
ERYTHROCYTE [DISTWIDTH] IN BLOOD BY AUTOMATED COUNT: 14.4 % (ref 12.3–15.4)
FLUAV RNA RESP QL NAA+PROBE: NOT DETECTED
FLUBV RNA RESP QL NAA+PROBE: NOT DETECTED
GLOBULIN UR ELPH-MCNC: 2.6 GM/DL
GLUCOSE SERPL-MCNC: 176 MG/DL (ref 65–99)
GLUCOSE UR STRIP-MCNC: ABNORMAL MG/DL
HCT VFR BLD AUTO: 40.4 % (ref 37.5–51)
HGB BLD-MCNC: 13.1 G/DL (ref 13–17.7)
HGB UR QL STRIP.AUTO: ABNORMAL
HYALINE CASTS UR QL AUTO: ABNORMAL /LPF
IMM GRANULOCYTES # BLD AUTO: 0.12 10*3/MM3 (ref 0–0.05)
IMM GRANULOCYTES NFR BLD AUTO: 0.7 % (ref 0–0.5)
KETONES UR QL STRIP: ABNORMAL
LEUKOCYTE ESTERASE UR QL STRIP.AUTO: NEGATIVE
LIPASE SERPL-CCNC: 13 U/L (ref 13–60)
LYMPHOCYTES # BLD AUTO: 0.57 10*3/MM3 (ref 0.7–3.1)
LYMPHOCYTES NFR BLD AUTO: 3.3 % (ref 19.6–45.3)
MAGNESIUM SERPL-MCNC: 2.1 MG/DL (ref 1.6–2.4)
MCH RBC QN AUTO: 26.2 PG (ref 26.6–33)
MCHC RBC AUTO-ENTMCNC: 32.4 G/DL (ref 31.5–35.7)
MCV RBC AUTO: 80.8 FL (ref 79–97)
MONOCYTES # BLD AUTO: 1.58 10*3/MM3 (ref 0.1–0.9)
MONOCYTES NFR BLD AUTO: 9.1 % (ref 5–12)
MYOGLOBIN SERPL-MCNC: 73.8 NG/ML (ref 28–72)
NEUTROPHILS NFR BLD AUTO: 15 10*3/MM3 (ref 1.7–7)
NEUTROPHILS NFR BLD AUTO: 86.6 % (ref 42.7–76)
NITRITE UR QL STRIP: NEGATIVE
NRBC BLD AUTO-RTO: 0 /100 WBC (ref 0–0.2)
PH UR STRIP.AUTO: <=5 [PH] (ref 4.5–8)
PLATELET # BLD AUTO: 171 10*3/MM3 (ref 140–450)
PMV BLD AUTO: 10.9 FL (ref 6–12)
POTASSIUM SERPL-SCNC: 3.8 MMOL/L (ref 3.5–5.2)
PROT SERPL-MCNC: 5.8 G/DL (ref 6–8.5)
PROT UR QL STRIP: ABNORMAL
RBC # BLD AUTO: 5 10*6/MM3 (ref 4.14–5.8)
RBC # UR STRIP: ABNORMAL /HPF
REF LAB TEST METHOD: ABNORMAL
RSV RNA RESP QL NAA+PROBE: NOT DETECTED
SARS-COV-2 RNA RESP QL NAA+PROBE: NOT DETECTED
SODIUM SERPL-SCNC: 136 MMOL/L (ref 136–145)
SP GR UR STRIP: 1.02 (ref 1–1.03)
SQUAMOUS #/AREA URNS HPF: ABNORMAL /HPF
TSH SERPL DL<=0.05 MIU/L-ACNC: 2.27 UIU/ML (ref 0.27–4.2)
UROBILINOGEN UR QL STRIP: ABNORMAL
WBC # UR STRIP: ABNORMAL /HPF
WBC NRBC COR # BLD AUTO: 17.32 10*3/MM3 (ref 3.4–10.8)

## 2024-07-25 PROCEDURE — 25010000002 METRONIDAZOLE 500 MG/100ML SOLUTION: Performed by: EMERGENCY MEDICINE

## 2024-07-25 PROCEDURE — 96361 HYDRATE IV INFUSION ADD-ON: CPT

## 2024-07-25 PROCEDURE — 25810000003 SODIUM CHLORIDE 0.9 % SOLUTION: Performed by: HOSPITALIST

## 2024-07-25 PROCEDURE — 81001 URINALYSIS AUTO W/SCOPE: CPT | Performed by: EMERGENCY MEDICINE

## 2024-07-25 PROCEDURE — 96365 THER/PROPH/DIAG IV INF INIT: CPT

## 2024-07-25 PROCEDURE — G0378 HOSPITAL OBSERVATION PER HR: HCPCS

## 2024-07-25 PROCEDURE — 93010 ELECTROCARDIOGRAM REPORT: CPT | Performed by: INTERNAL MEDICINE

## 2024-07-25 PROCEDURE — 93005 ELECTROCARDIOGRAM TRACING: CPT | Performed by: EMERGENCY MEDICINE

## 2024-07-25 PROCEDURE — 25810000003 SODIUM CHLORIDE 0.9 % SOLUTION: Performed by: EMERGENCY MEDICINE

## 2024-07-25 PROCEDURE — 74176 CT ABD & PELVIS W/O CONTRAST: CPT

## 2024-07-25 PROCEDURE — 83735 ASSAY OF MAGNESIUM: CPT | Performed by: EMERGENCY MEDICINE

## 2024-07-25 PROCEDURE — 99285 EMERGENCY DEPT VISIT HI MDM: CPT

## 2024-07-25 PROCEDURE — 87040 BLOOD CULTURE FOR BACTERIA: CPT | Performed by: HOSPITALIST

## 2024-07-25 PROCEDURE — 82550 ASSAY OF CK (CPK): CPT | Performed by: EMERGENCY MEDICINE

## 2024-07-25 PROCEDURE — 80053 COMPREHEN METABOLIC PANEL: CPT | Performed by: EMERGENCY MEDICINE

## 2024-07-25 PROCEDURE — 85025 COMPLETE CBC W/AUTO DIFF WBC: CPT | Performed by: EMERGENCY MEDICINE

## 2024-07-25 PROCEDURE — 84443 ASSAY THYROID STIM HORMONE: CPT | Performed by: EMERGENCY MEDICINE

## 2024-07-25 PROCEDURE — 83690 ASSAY OF LIPASE: CPT | Performed by: EMERGENCY MEDICINE

## 2024-07-25 PROCEDURE — 83605 ASSAY OF LACTIC ACID: CPT | Performed by: EMERGENCY MEDICINE

## 2024-07-25 PROCEDURE — 25810000003 LACTATED RINGERS SOLUTION: Performed by: EMERGENCY MEDICINE

## 2024-07-25 PROCEDURE — 83874 ASSAY OF MYOGLOBIN: CPT | Performed by: EMERGENCY MEDICINE

## 2024-07-25 PROCEDURE — 87637 SARSCOV2&INF A&B&RSV AMP PRB: CPT | Performed by: EMERGENCY MEDICINE

## 2024-07-25 RX ORDER — BISACODYL 10 MG
10 SUPPOSITORY, RECTAL RECTAL DAILY PRN
Status: DISCONTINUED | OUTPATIENT
Start: 2024-07-25 | End: 2024-07-26 | Stop reason: HOSPADM

## 2024-07-25 RX ORDER — CIPROFLOXACIN 500 MG/1
500 TABLET, FILM COATED ORAL ONCE
Status: COMPLETED | OUTPATIENT
Start: 2024-07-25 | End: 2024-07-25

## 2024-07-25 RX ORDER — METRONIDAZOLE 500 MG/100ML
500 INJECTION, SOLUTION INTRAVENOUS EVERY 8 HOURS
Status: DISCONTINUED | OUTPATIENT
Start: 2024-07-26 | End: 2024-07-26

## 2024-07-25 RX ORDER — ACETAMINOPHEN 500 MG
1000 TABLET ORAL ONCE
Status: COMPLETED | OUTPATIENT
Start: 2024-07-25 | End: 2024-07-25

## 2024-07-25 RX ORDER — ENOXAPARIN SODIUM 100 MG/ML
40 INJECTION SUBCUTANEOUS DAILY
Status: DISCONTINUED | OUTPATIENT
Start: 2024-07-26 | End: 2024-07-26 | Stop reason: HOSPADM

## 2024-07-25 RX ORDER — SODIUM CHLORIDE 0.9 % (FLUSH) 0.9 %
10 SYRINGE (ML) INJECTION EVERY 12 HOURS SCHEDULED
Status: DISCONTINUED | OUTPATIENT
Start: 2024-07-26 | End: 2024-07-26 | Stop reason: HOSPADM

## 2024-07-25 RX ORDER — BISACODYL 5 MG/1
5 TABLET, DELAYED RELEASE ORAL DAILY PRN
Status: DISCONTINUED | OUTPATIENT
Start: 2024-07-25 | End: 2024-07-26 | Stop reason: HOSPADM

## 2024-07-25 RX ORDER — AMOXICILLIN 250 MG
2 CAPSULE ORAL 2 TIMES DAILY PRN
Status: DISCONTINUED | OUTPATIENT
Start: 2024-07-25 | End: 2024-07-26 | Stop reason: HOSPADM

## 2024-07-25 RX ORDER — METRONIDAZOLE 500 MG/1
500 TABLET ORAL 3 TIMES DAILY
Qty: 30 TABLET | Refills: 0 | Status: SHIPPED | OUTPATIENT
Start: 2024-07-25 | End: 2024-07-26 | Stop reason: HOSPADM

## 2024-07-25 RX ORDER — POLYETHYLENE GLYCOL 3350 17 G/17G
17 POWDER, FOR SOLUTION ORAL DAILY PRN
Status: DISCONTINUED | OUTPATIENT
Start: 2024-07-25 | End: 2024-07-26 | Stop reason: HOSPADM

## 2024-07-25 RX ORDER — SODIUM CHLORIDE 0.9 % (FLUSH) 0.9 %
10 SYRINGE (ML) INJECTION AS NEEDED
Status: DISCONTINUED | OUTPATIENT
Start: 2024-07-25 | End: 2024-07-26 | Stop reason: HOSPADM

## 2024-07-25 RX ORDER — METRONIDAZOLE 500 MG/100ML
500 INJECTION, SOLUTION INTRAVENOUS ONCE
Status: COMPLETED | OUTPATIENT
Start: 2024-07-25 | End: 2024-07-25

## 2024-07-25 RX ORDER — SODIUM CHLORIDE 9 MG/ML
40 INJECTION, SOLUTION INTRAVENOUS AS NEEDED
Status: DISCONTINUED | OUTPATIENT
Start: 2024-07-25 | End: 2024-07-26 | Stop reason: HOSPADM

## 2024-07-25 RX ORDER — CIPROFLOXACIN 500 MG/1
500 TABLET, FILM COATED ORAL 2 TIMES DAILY
Qty: 20 TABLET | Refills: 0 | Status: SHIPPED | OUTPATIENT
Start: 2024-07-25 | End: 2024-07-26 | Stop reason: HOSPADM

## 2024-07-25 RX ORDER — SODIUM CHLORIDE 9 MG/ML
100 INJECTION, SOLUTION INTRAVENOUS CONTINUOUS
Status: DISCONTINUED | OUTPATIENT
Start: 2024-07-26 | End: 2024-07-26 | Stop reason: HOSPADM

## 2024-07-25 RX ORDER — NITROGLYCERIN 0.4 MG/1
0.4 TABLET SUBLINGUAL
Status: DISCONTINUED | OUTPATIENT
Start: 2024-07-25 | End: 2024-07-26 | Stop reason: HOSPADM

## 2024-07-25 RX ORDER — ZINC SULFATE 50(220)MG
220 CAPSULE ORAL DAILY
COMMUNITY

## 2024-07-25 RX ORDER — MULTIPLE VITAMINS W/ MINERALS TAB 9MG-400MCG
1 TAB ORAL DAILY
COMMUNITY

## 2024-07-25 RX ADMIN — ACETAMINOPHEN 1000 MG: 500 TABLET ORAL at 20:30

## 2024-07-25 RX ADMIN — Medication 10 ML: at 23:39

## 2024-07-25 RX ADMIN — CIPROFLOXACIN HYDROCHLORIDE 500 MG: 500 TABLET, FILM COATED ORAL at 19:49

## 2024-07-25 RX ADMIN — SODIUM CHLORIDE, POTASSIUM CHLORIDE, SODIUM LACTATE AND CALCIUM CHLORIDE 1000 ML: 600; 310; 30; 20 INJECTION, SOLUTION INTRAVENOUS at 15:57

## 2024-07-25 RX ADMIN — Medication 10 ML: at 19:37

## 2024-07-25 RX ADMIN — ACETAMINOPHEN 1000 MG: 500 TABLET ORAL at 16:13

## 2024-07-25 RX ADMIN — METRONIDAZOLE 500 MG: 500 INJECTION, SOLUTION INTRAVENOUS at 19:49

## 2024-07-25 RX ADMIN — SODIUM CHLORIDE 100 ML/HR: 9 INJECTION, SOLUTION INTRAVENOUS at 23:39

## 2024-07-25 RX ADMIN — SODIUM CHLORIDE 1000 ML: 9 INJECTION, SOLUTION INTRAVENOUS at 21:10

## 2024-07-26 ENCOUNTER — READMISSION MANAGEMENT (OUTPATIENT)
Dept: CALL CENTER | Facility: HOSPITAL | Age: 74
End: 2024-07-26
Payer: MEDICARE

## 2024-07-26 ENCOUNTER — APPOINTMENT (OUTPATIENT)
Dept: CARDIAC REHAB | Facility: HOSPITAL | Age: 74
End: 2024-07-26
Payer: MEDICARE

## 2024-07-26 VITALS
BODY MASS INDEX: 24.56 KG/M2 | TEMPERATURE: 98.9 F | RESPIRATION RATE: 20 BRPM | HEIGHT: 69 IN | OXYGEN SATURATION: 94 % | SYSTOLIC BLOOD PRESSURE: 138 MMHG | HEART RATE: 118 BPM | DIASTOLIC BLOOD PRESSURE: 56 MMHG | WEIGHT: 165.79 LBS

## 2024-07-26 PROBLEM — K52.9 COLITIS: Status: RESOLVED | Noted: 2024-07-25 | Resolved: 2024-07-26

## 2024-07-26 PROBLEM — A04.72 C. DIFFICILE COLITIS: Status: ACTIVE | Noted: 2024-07-26

## 2024-07-26 LAB
ADV 40+41 DNA STL QL NAA+NON-PROBE: NOT DETECTED
ANION GAP SERPL CALCULATED.3IONS-SCNC: 14.2 MMOL/L (ref 5–15)
ASTRO TYP 1-8 RNA STL QL NAA+NON-PROBE: NOT DETECTED
BASOPHILS # BLD AUTO: 0.06 10*3/MM3 (ref 0–0.2)
BASOPHILS NFR BLD AUTO: 0.3 % (ref 0–1.5)
BUN SERPL-MCNC: 16 MG/DL (ref 8–23)
BUN/CREAT SERPL: 10.7 (ref 7–25)
C CAYETANENSIS DNA STL QL NAA+NON-PROBE: NOT DETECTED
C COLI+JEJ+UPSA DNA STL QL NAA+NON-PROBE: NOT DETECTED
C DIFF GDH + TOXINS A+B STL QL IA.RAPID: POSITIVE
C DIFF GDH + TOXINS A+B STL QL IA.RAPID: POSITIVE
CALCIUM SPEC-SCNC: 8.9 MG/DL (ref 8.6–10.5)
CHLORIDE SERPL-SCNC: 105 MMOL/L (ref 98–107)
CO2 SERPL-SCNC: 18.8 MMOL/L (ref 22–29)
CREAT SERPL-MCNC: 1.5 MG/DL (ref 0.76–1.27)
CRYPTOSP DNA STL QL NAA+NON-PROBE: NOT DETECTED
D-LACTATE SERPL-SCNC: 0.9 MMOL/L (ref 0.5–2)
D-LACTATE SERPL-SCNC: 1.3 MMOL/L (ref 0.5–2)
DEPRECATED RDW RBC AUTO: 42.2 FL (ref 37–54)
E HISTOLYT DNA STL QL NAA+NON-PROBE: NOT DETECTED
EAEC PAA PLAS AGGR+AATA ST NAA+NON-PRB: NOT DETECTED
EC STX1+STX2 GENES STL QL NAA+NON-PROBE: NOT DETECTED
EGFRCR SERPLBLD CKD-EPI 2021: 48.6 ML/MIN/1.73
EOSINOPHIL # BLD AUTO: 0 10*3/MM3 (ref 0–0.4)
EOSINOPHIL NFR BLD AUTO: 0 % (ref 0.3–6.2)
EPEC EAE GENE STL QL NAA+NON-PROBE: NOT DETECTED
ERYTHROCYTE [DISTWIDTH] IN BLOOD BY AUTOMATED COUNT: 14.3 % (ref 12.3–15.4)
ETEC LTA+ST1A+ST1B TOX ST NAA+NON-PROBE: NOT DETECTED
G LAMBLIA DNA STL QL NAA+NON-PROBE: NOT DETECTED
GLUCOSE BLDC GLUCOMTR-MCNC: 116 MG/DL (ref 70–130)
GLUCOSE SERPL-MCNC: 93 MG/DL (ref 65–99)
HCT VFR BLD AUTO: 38.5 % (ref 37.5–51)
HGB BLD-MCNC: 12.3 G/DL (ref 13–17.7)
IMM GRANULOCYTES # BLD AUTO: 0.23 10*3/MM3 (ref 0–0.05)
IMM GRANULOCYTES NFR BLD AUTO: 1.1 % (ref 0–0.5)
LYMPHOCYTES # BLD AUTO: 1.11 10*3/MM3 (ref 0.7–3.1)
LYMPHOCYTES NFR BLD AUTO: 5.1 % (ref 19.6–45.3)
MAGNESIUM SERPL-MCNC: 2.1 MG/DL (ref 1.6–2.4)
MCH RBC QN AUTO: 26.1 PG (ref 26.6–33)
MCHC RBC AUTO-ENTMCNC: 31.9 G/DL (ref 31.5–35.7)
MCV RBC AUTO: 81.7 FL (ref 79–97)
MONOCYTES # BLD AUTO: 2.06 10*3/MM3 (ref 0.1–0.9)
MONOCYTES NFR BLD AUTO: 9.5 % (ref 5–12)
NEUTROPHILS NFR BLD AUTO: 18.25 10*3/MM3 (ref 1.7–7)
NEUTROPHILS NFR BLD AUTO: 84 % (ref 42.7–76)
NOROVIRUS GI+II RNA STL QL NAA+NON-PROBE: NOT DETECTED
NRBC BLD AUTO-RTO: 0 /100 WBC (ref 0–0.2)
P SHIGELLOIDES DNA STL QL NAA+NON-PROBE: NOT DETECTED
PLATELET # BLD AUTO: 166 10*3/MM3 (ref 140–450)
PMV BLD AUTO: 11 FL (ref 6–12)
POTASSIUM SERPL-SCNC: 3.7 MMOL/L (ref 3.5–5.2)
QT INTERVAL: 297 MS
QT INTERVAL: 325 MS
QTC INTERVAL: 427 MS
QTC INTERVAL: 442 MS
RBC # BLD AUTO: 4.71 10*6/MM3 (ref 4.14–5.8)
RVA RNA STL QL NAA+NON-PROBE: NOT DETECTED
S ENT+BONG DNA STL QL NAA+NON-PROBE: NOT DETECTED
SAPO I+II+IV+V RNA STL QL NAA+NON-PROBE: NOT DETECTED
SHIGELLA SP+EIEC IPAH ST NAA+NON-PROBE: NOT DETECTED
SODIUM SERPL-SCNC: 138 MMOL/L (ref 136–145)
V CHOL+PARA+VUL DNA STL QL NAA+NON-PROBE: NOT DETECTED
V CHOLERAE DNA STL QL NAA+NON-PROBE: NOT DETECTED
WBC NRBC COR # BLD AUTO: 21.71 10*3/MM3 (ref 3.4–10.8)
Y ENTEROCOL DNA STL QL NAA+NON-PROBE: NOT DETECTED

## 2024-07-26 PROCEDURE — 80048 BASIC METABOLIC PNL TOTAL CA: CPT | Performed by: HOSPITALIST

## 2024-07-26 PROCEDURE — G0378 HOSPITAL OBSERVATION PER HR: HCPCS

## 2024-07-26 PROCEDURE — 93005 ELECTROCARDIOGRAM TRACING: CPT | Performed by: HOSPITALIST

## 2024-07-26 PROCEDURE — 96361 HYDRATE IV INFUSION ADD-ON: CPT

## 2024-07-26 PROCEDURE — 93010 ELECTROCARDIOGRAM REPORT: CPT | Performed by: INTERNAL MEDICINE

## 2024-07-26 PROCEDURE — 87324 CLOSTRIDIUM AG IA: CPT | Performed by: HOSPITALIST

## 2024-07-26 PROCEDURE — 96366 THER/PROPH/DIAG IV INF ADDON: CPT

## 2024-07-26 PROCEDURE — 96367 TX/PROPH/DG ADDL SEQ IV INF: CPT

## 2024-07-26 PROCEDURE — 85025 COMPLETE CBC W/AUTO DIFF WBC: CPT | Performed by: HOSPITALIST

## 2024-07-26 PROCEDURE — 25010000002 CEFTRIAXONE PER 250 MG: Performed by: HOSPITALIST

## 2024-07-26 PROCEDURE — 83605 ASSAY OF LACTIC ACID: CPT | Performed by: HOSPITALIST

## 2024-07-26 PROCEDURE — 83735 ASSAY OF MAGNESIUM: CPT | Performed by: HOSPITALIST

## 2024-07-26 PROCEDURE — 87507 IADNA-DNA/RNA PROBE TQ 12-25: CPT | Performed by: HOSPITALIST

## 2024-07-26 PROCEDURE — 99239 HOSP IP/OBS DSCHRG MGMT >30: CPT | Performed by: INTERNAL MEDICINE

## 2024-07-26 PROCEDURE — 87449 NOS EACH ORGANISM AG IA: CPT | Performed by: HOSPITALIST

## 2024-07-26 PROCEDURE — 25010000002 METRONIDAZOLE 500 MG/100ML SOLUTION: Performed by: HOSPITALIST

## 2024-07-26 PROCEDURE — 82948 REAGENT STRIP/BLOOD GLUCOSE: CPT

## 2024-07-26 RX ORDER — PRAVASTATIN SODIUM 20 MG
80 TABLET ORAL DAILY
Status: DISCONTINUED | OUTPATIENT
Start: 2024-07-26 | End: 2024-07-26 | Stop reason: HOSPADM

## 2024-07-26 RX ORDER — HYDROXYZINE HYDROCHLORIDE 25 MG/1
25 TABLET, FILM COATED ORAL 3 TIMES DAILY PRN
Status: DISCONTINUED | OUTPATIENT
Start: 2024-07-26 | End: 2024-07-26 | Stop reason: HOSPADM

## 2024-07-26 RX ORDER — DEXTROSE MONOHYDRATE 25 G/50ML
10-50 INJECTION, SOLUTION INTRAVENOUS
Status: DISCONTINUED | OUTPATIENT
Start: 2024-07-26 | End: 2024-07-26 | Stop reason: HOSPADM

## 2024-07-26 RX ORDER — CHOLESTYRAMINE LIGHT 4 G/5.7G
1 POWDER, FOR SUSPENSION ORAL EVERY 12 HOURS SCHEDULED
Qty: 6 PACKET | Refills: 0 | Status: SHIPPED | OUTPATIENT
Start: 2024-07-26 | End: 2024-07-29 | Stop reason: SDUPTHER

## 2024-07-26 RX ORDER — NICOTINE POLACRILEX 4 MG
15 LOZENGE BUCCAL
Status: DISCONTINUED | OUTPATIENT
Start: 2024-07-26 | End: 2024-07-26 | Stop reason: HOSPADM

## 2024-07-26 RX ORDER — CLOPIDOGREL BISULFATE 75 MG/1
75 TABLET ORAL DAILY
Status: DISCONTINUED | OUTPATIENT
Start: 2024-07-26 | End: 2024-07-26 | Stop reason: HOSPADM

## 2024-07-26 RX ORDER — IBUPROFEN 600 MG/1
1 TABLET ORAL
Status: DISCONTINUED | OUTPATIENT
Start: 2024-07-26 | End: 2024-07-26 | Stop reason: HOSPADM

## 2024-07-26 RX ORDER — VANCOMYCIN HYDROCHLORIDE 125 MG/1
125 CAPSULE ORAL EVERY 6 HOURS SCHEDULED
Qty: 40 CAPSULE | Refills: 0 | Status: SHIPPED | OUTPATIENT
Start: 2024-07-26 | End: 2024-08-05

## 2024-07-26 RX ORDER — METOPROLOL TARTRATE 50 MG/1
50 TABLET, FILM COATED ORAL 2 TIMES DAILY
Status: DISCONTINUED | OUTPATIENT
Start: 2024-07-26 | End: 2024-07-26 | Stop reason: HOSPADM

## 2024-07-26 RX ORDER — ACETAMINOPHEN 500 MG
1000 TABLET ORAL ONCE
Status: COMPLETED | OUTPATIENT
Start: 2024-07-26 | End: 2024-07-26

## 2024-07-26 RX ORDER — ASPIRIN 81 MG/1
81 TABLET ORAL DAILY
Status: DISCONTINUED | OUTPATIENT
Start: 2024-07-26 | End: 2024-07-26 | Stop reason: HOSPADM

## 2024-07-26 RX ORDER — AMLODIPINE BESYLATE 5 MG/1
10 TABLET ORAL DAILY
Status: DISCONTINUED | OUTPATIENT
Start: 2024-07-26 | End: 2024-07-26 | Stop reason: HOSPADM

## 2024-07-26 RX ORDER — INSULIN LISPRO 100 [IU]/ML
1-200 INJECTION, SOLUTION INTRAVENOUS; SUBCUTANEOUS
Status: DISCONTINUED | OUTPATIENT
Start: 2024-07-26 | End: 2024-07-26 | Stop reason: HOSPADM

## 2024-07-26 RX ORDER — INSULIN LISPRO 100 [IU]/ML
1-200 INJECTION, SOLUTION INTRAVENOUS; SUBCUTANEOUS AS NEEDED
Status: DISCONTINUED | OUTPATIENT
Start: 2024-07-26 | End: 2024-07-26 | Stop reason: HOSPADM

## 2024-07-26 RX ORDER — CHOLESTYRAMINE LIGHT 4 G/5.7G
1 POWDER, FOR SUSPENSION ORAL EVERY 12 HOURS SCHEDULED
Status: DISCONTINUED | OUTPATIENT
Start: 2024-07-26 | End: 2024-07-26 | Stop reason: HOSPADM

## 2024-07-26 RX ORDER — VANCOMYCIN HYDROCHLORIDE 125 MG/1
125 CAPSULE ORAL EVERY 6 HOURS SCHEDULED
Status: DISCONTINUED | OUTPATIENT
Start: 2024-07-26 | End: 2024-07-26 | Stop reason: HOSPADM

## 2024-07-26 RX ADMIN — ASPIRIN 81 MG: 81 TABLET, COATED ORAL at 09:49

## 2024-07-26 RX ADMIN — METOPROLOL TARTRATE 50 MG: 50 TABLET, FILM COATED ORAL at 09:49

## 2024-07-26 RX ADMIN — PRAVASTATIN SODIUM 80 MG: 20 TABLET ORAL at 09:19

## 2024-07-26 RX ADMIN — ACETAMINOPHEN 1000 MG: 500 TABLET ORAL at 01:41

## 2024-07-26 RX ADMIN — AMLODIPINE BESYLATE 10 MG: 5 TABLET ORAL at 09:49

## 2024-07-26 RX ADMIN — Medication 10 ML: at 09:21

## 2024-07-26 RX ADMIN — SODIUM CHLORIDE 1000 MG: 9 INJECTION, SOLUTION INTRAVENOUS at 00:55

## 2024-07-26 RX ADMIN — CLOPIDOGREL BISULFATE 75 MG: 75 TABLET ORAL at 09:49

## 2024-07-26 RX ADMIN — METRONIDAZOLE 500 MG: 500 INJECTION, SOLUTION INTRAVENOUS at 00:20

## 2024-07-26 RX ADMIN — VANCOMYCIN HYDROCHLORIDE 125 MG: 125 CAPSULE ORAL at 09:49

## 2024-07-26 NOTE — SIGNIFICANT NOTE
07/26/24 0658   Clinician Notification   Reason for Communication Critical lab value  (cdiff test positive, see labs)   Clinician Name    Clinician Role Hospitalist   Method of Communication Call   Response See orders  (reports he will put his orders in)

## 2024-07-26 NOTE — H&P
[facility] Medicine Services      Patient Name: Guy Plascencia  : 1950  MRN: [id number]  Primary Care Physician:  Nuzhat, No Known  Date of admission: 2024      Subjective      Chief Complaint: Nausea vomiting and diarrhea    History of Present Illness: Guy Plascencia is a 73 y.o. male who presented to [facility] on 2024 complaining of having diarrhea for last 2 weeks, patient started spiking fever in the last 3 days.  Patient says he is having loose stools 6-8 times in the day.  Patient underwent workup in ER which revealed elevated WBC count, lactic acid within normal limit, patient noted running fever greater than 100, patient underwent CT abdomen revealing diffuse colitis, following this we were asked to admit the patient for further care.  Patient received IV antibiotics in ER.  Patient denies for any nausea vomiting, denies for any cough, sore throat or for any other symptoms.  Patient tested negative for COVID and flu in ER.  Patient also given a history of having CAD stent 3 weeks ago, patient on Plavix.      Review of Systems   All other systems reviewed and are negative.       Personal History     Past Medical History:   Diagnosis Date    Eczema 2016    GERD (gastroesophageal reflux disease)     H/O  Multiple facial fractures 1968    H/O Broken femur 1968    History of transfusion         Hyperlipidemia     Hypertension     Hypogonadism in male 2016    Kidney stone     Neoplasm of uncertain behavior     Osteoarthritis     Pancreatic cyst 2018    Peptic ulceration     Plantar fasciitis     Sleep apnea     CPAP    Splenomegaly 2018    Stage 3a chronic kidney disease 2024    Steatohepatitis 2018    Thrombocytopenia 2016    Thrombocytosis     Trigger middle finger of right hand 02/15/2017    Type 2 diabetes mellitus        Past Surgical History:   Procedure Laterality Date    CARDIAC CATHETERIZATION  N/A 6/17/2024    Procedure: Coronary angiography;  Surgeon: Myron Cox MD;  Location:  [facility] C[facility] INVASIVE [facility]ATION;  Service: Cardiovascular;  Laterality: N/A;  NO LV GRAM    CARDIAC CATHETERIZATION N/A 6/17/2024    Procedure: Left heart cath;  Surgeon: Myron Cox MD;  Location:  [facility] INVASIVE [facility];  Service: Cardiovascular;  Laterality: N/A;  NO LV GRAM    CARDIAC CATHETERIZATION N/A 6/17/2024    Procedure: Resting Full Cycle Ratio;  Surgeon: Myron Cox MD;  Location:  [facility] C[facility] INVASIVE LOCATION;  Service: Cardiovascular;  Laterality: N/A;    CARDIAC CATHETERIZATION N/A 6/17/2024    Procedure: Atherectomy-coronary;  Surgeon: Myron Cox MD;  Location:  [facility] INVAS[facility]ATION;  Service: Cardiovascular;  Laterality: N/A;    CARDIAC CATHETERIZATION N/A 6/17/2024    Procedure: Stent BEBETO coronary;  Surgeon: Myron Cox MD;  Location: [facility] CATH INVASIVE LOC[facility];  Service: Cardiovascular;  Laterality: N/A;    CARDIAC CATHETERIZATION N/A 6/17/2024    Procedure: Percutaneous Coronary Intervention;  Surgeon: Myron Cox MD;  [facility] C[facility];  Service: Cardiovascular;  Laterality: N/A;    COLONOSCOPY      COSMETIC SURGERY  11/1968    MULTIPLE FACIAL FRACTURE    CYSTOSCOPY URETEROSCOPY STONE MANIPULATION/EXTRACTION Left 06/29/2016    Procedure: CYSTOSCOPY, LEFT URETEROSCOPY, BASKET EXTRACTION OF LEFT URETERAL STONE, LEFT STENT PLACEMENT;  Surgeon: Ish Gaitan MD;  Location: [facility] OR;  Service:     ENDOSCOPY N/A 10/02/2019    Procedure: ESOPHAGOGASTRODUODENOSCOPY with biopsies and dilatation to 20mm;  Surgeon: Marilou Mojica MD;  Location: [facility] OR;  Service: Gastroenterology    EXTRACORPOREAL SHOCK WAVE LITHOTRIPSY (ESWL) Right 07/29/2016    Procedure: RT EXTRACORPOREAL SHOCKWAVE LITHOTRIPSY;  Surgeon: Ish Gaitan MD;  Location: [facility] MAIN OR;  Service:     FEMUR SURGERY  11/1968    FRACTURE SURGERY      femur fx    INTERVENTIONAL RADIOLOGY PROCEDURE N/A  6/17/2024    Procedure: Intravascular Ultrasound;  Surgeon: Myron Cox MD;  Location: [facility] INVASIVE LOCATION;  Service: Cardiovascular;  Laterality: N/A;    OTHER SURGICAL HISTORY      stents, for kidney stones    OTHER SURGICAL HISTORY      lithotripsy    SKIN BIOPSY         Family History: family history includes Cancer in his brother and another family member; Heart attack (age of onset: 50) in his father; Heart disease in his father; Skin cancer (age of onset: 50) in his brother. Otherwise pertinent FHx was reviewed and not pertinent to current issue.    Social History:  reports that he quit smoking about 52 years ago. His smoking use included cigarettes. He started smoking about 66 years ago. He has a 28 pack-year smoking history. He has never been exposed to tobacco smoke. He has never used smokeless tobacco. He reports that he does not drink alcohol and does not use drugs.    Home Medications:  Prior to Admission Medications       Prescriptions Last Dose Informant Patient Reported? Taking?    amLODIPine (NORVASC) 10 MG tablet   No No    Take 1 tablet by mouth Daily.    APPLE CIDER VINEGAR PO   Yes No    Take  by mouth.    Ascorbic Acid (VITAMIN C PO)   Yes No    Take  by mouth.    Aspirin (ASPIR-81 PO)   Yes No    Take  by mouth Daily.    BENFOTIAMINE PO   Yes No    Take  by mouth 2 (Two) Times a Day.    Blood Glucose Monitoring Suppl (ONETOUCH VERIO FLEX SYSTEM) w/Device kit   No No    1 Device 2 (Two) Times a Day.    Cholecalciferol (D3 ADULT PO)   Yes No    Take 2,000 Units by mouth Daily.    clopidogrel (PLAVIX) 75 MG tablet   No No    Take 1 tablet by mouth Daily.    Cyanocobalamin (B-12 PO)   Yes No    Take 1 tablet by mouth 1 (One) Time Per Week.    dapagliflozin Propanediol 10 MG tablet   Yes No    Take 10 mg by mouth Daily.    glipizide (GLUCOTROL) 10 MG tablet   No No    TAKE ONE TABLET BY MOUTH TWICE A DAY BEFORE A MEAL    glucose blood ([employer] Glucose Test) test strip   No No     USE TO TEST BLOOD SUGAR TWO TIMES A DAY FOR TYPE 2 DIABETES    hydrOXYzine (ATARAX) 25 MG tablet   No No    TAKE ONE TABLET BY MOUTH THREE TIMES A DAY AS NEEDED FOR ITCHING    metoprolol tartrate (LOPRESSOR) 50 MG tablet   No No    TAKE ONE TABLET BY MOUTH TWICE A DAY    Multiple Vitamins-Minerals (ZINC PO)   Yes No    Take  by mouth.    nitroglycerin (NITROSTAT) 0.4 MG SL tablet   No No    1 under the tongue as needed for angina, may repeat q5mins for up three doses    pantoprazole (Protonix) 40 MG EC tablet   No No    Take 1 tablet by mouth Daily.    pravastatin (PRAVACHOL) 80 MG tablet   No No    Take 1 tablet by mouth Daily.    Semaglutide (Rybelsus) 7 MG tablet   No No    Take 7 mg by mouth Daily.    ZINC METHIONATE PO   Yes No    Take  by mouth Daily.              Allergies:  Allergies   Allergen Reactions    Metformin GI Intolerance     Abnormal creatinine    Penicillins Unknown - Low Severity     ALLERGY TESTING SHOWED ALL TO PCN.     Cimetidine Rash       Objective      Vitals:   Temp:  [100.9 °F (38.3 °C)-101.7 °F (38.7 °C)] 101.7 °F (38.7 °C)  Heart Rate:  [] 124  Resp:  [18-22] 22  BP: (147-181)/(72-88) 149/74    Physical Exam  Vitals and nursing note reviewed.   Constitutional:       General: He is not in acute distress.     Appearance: Normal appearance. He is well-developed. He is not ill-appearing, toxic-appearing or diaphoretic.   HENT:      Head: Normocephalic and atraumatic.      Right Ear: Ear canal and external ear normal.      Left Ear: Ear canal and external ear normal.      Nose: Nose normal. No congestion or rhinorrhea.      Mouth/Throat:      Mouth: Mucous membranes are moist.      Pharynx: No oropharyngeal exudate.   Eyes:      General: No scleral icterus.        Right eye: No discharge.         Left eye: No discharge.      Extraocular Movements: Extraocular movements intact.      Conjunctiva/sclera: Conjunctivae normal.      Pupils: Pupils are equal, round, and reactive to light.    Neck:      Thyroid: No thyromegaly.      Vascular: No carotid bruit or JVD.      Trachea: No tracheal deviation.   Cardiovascular:      Rate and Rhythm: Normal rate and regular rhythm.      Pulses: Normal pulses.      Heart sounds: Normal heart sounds. No murmur heard.     No friction rub. No gallop.   Pulmonary:      Effort: Pulmonary effort is normal. No respiratory distress.      Breath sounds: Normal breath sounds. No stridor. No wheezing, rhonchi or rales.   Chest:      Chest wall: No tenderness.   Abdominal:      General: Bowel sounds are normal. There is no distension.      Palpations: Abdomen is soft. There is no mass.      Tenderness: There is no abdominal tenderness. There is no guarding or rebound.      Hernia: No hernia is present.   Musculoskeletal:         General: No swelling, tenderness, deformity or signs of injury. Normal range of motion.      Cervical back: Normal range of motion and neck supple. No rigidity. No muscular tenderness.      Right lower leg: No edema.      Left lower leg: No edema.   Lymphadenopathy:      Cervical: No cervical adenopathy.   Skin:     General: Skin is warm and dry.      Coloration: Skin is not jaundiced or pale.      Findings: No bruising, erythema or rash.   Neurological:      General: No focal deficit present.      Mental Status: He is alert and oriented to person, place, and time. Mental status is at baseline.      Cranial Nerves: No cranial nerve deficit.      Sensory: No sensory deficit.      Motor: No weakness or abnormal muscle tone.      Coordination: Coordination normal.   Psychiatric:         Mood and Affect: Mood normal.         Behavior: Behavior normal.         Thought Content: Thought content normal.         Judgment: Judgment normal.          Emotional Behavior:               Judgment and Insight: Normal              Mental Status:  Alertness  Normal              Memory:  Normal              Mood and Affect:                               Depression   None                          Anxiety  None     Debilities:              Physical Weakness  None              Handicaps  None              Disabilities  None              Agitation  None    Result Review    Result Review:  I have personally reviewed the results from the time of this admission to 7/25/2024 22:07 EDT and agree with these findings:  [x]  Laboratory  [x]  Microbiology  [x]  Radiology  []  EKG/Telemetry   []  Cardiology/Vascular   []  Pathology  []  Old records  []  Other:  Most notable findings include:       Assessment & Plan        Active Hospital Problems:  Active Hospital Problems    Diagnosis     **Colitis      Plan:     Home medication not reconciled yet, waiting on nursing to reconcile first.    Acute diffuse colitis concern for infectious etiology, rule out C. difficile.  Will check stool PCR and C. difficile for toxin, IV Rocephin and IV Flagyl for now, IV fluids, monitor WBC count, GI consultation.    CAD with a stent, patient on aspirin, Plavix, metoprolol and statin, will continue.    GERD, continue PPI.    Diabetes mellitus type 2, will hold on SGLT2 inhibitors, monitor Accu-Cheks, ADA diet, sliding scale.    DVT prophylaxis with Lovenox subcu.      VTE Prophylaxis:  Pharmacologic VTE prophylaxis orders are signed & held.          CODE STATUS:    Code Status (Patient has no pulse and is not breathing): CPR (Attempt to Resuscitate)  Medical Interventions (Patient has pulse or is breathing): Full Support    Admission Status:  I believe this patient meets inpatient status.    I discussed the patient's findings and my recommendations with patient.    This patient has been examined wearing appropriate Personal Protective Equipment and discussed with hospital infection control department. 07/25/24      Signature:

## 2024-07-26 NOTE — ED NOTES
Nursing report ED to floor  Guy Plascencia  73 y.o.  male    HPI :   Chief Complaint   Patient presents with    Weakness - Generalized    Diarrhea       Admitting doctor:   Collin Elena MD    Admitting diagnosis:   The primary encounter diagnosis was Diarrhea of presumed infectious origin. Diagnoses of Fever, unspecified fever cause and Dehydration were also pertinent to this visit.    Code status:   Current Code Status       Date Active Code Status Order ID Comments User Context       7/25/2024 2206 CPR (Attempt to Resuscitate) [id number]  Collin Elena MD ED        Question Answer    Code Status (Patient has no pulse and is not breathing) CPR (Attempt to Resuscitate)    Medical Interventions (Patient has pulse or is breathing) Full Support                    Allergies:   Metformin, Penicillins, and Cimetidine    Isolation:   No active isolations    Intake and Output  No intake or output data in the 24 hours ending 07/25/24 2210    Weight:       07/25/24  1551   Weight: 72.6 kg (160 lb)       Most recent vitals:   Vitals:    07/25/24 1930 07/25/24 2015 07/25/24 2110 07/25/24 2130   BP: (!) 181/83 179/88 172/80 149/74   BP Location: Right arm Right arm Right arm Right arm   Patient Position: Lying Lying Sitting Sitting   Pulse: 116 (!) 122 (!) 133 (!) 124   Resp:   22    Temp:  (!) 101.7 °F (38.7 °C)     TempSrc:  Oral     SpO2: 98%      Weight:       Height:           Active LDAs/IV Access:   Lines, Drains & Airways       Active LDAs       Name Placement date Placement time Site Days    Peripheral IV 07/25/24 1555 Left Antecubital 07/25/24  1555  Antecubital  less than 1                    Labs (abnormal labs have a star):   Labs Reviewed   MYOGLOBIN, SERUM - Abnormal; Notable for the following components:       Result Value    Myoglobin 73.8 (*)     All other components within normal limits    Narrative:     Results may be falsely decreased if patient taking Biotin.     COMPREHENSIVE METABOLIC PANEL  - Abnormal; Notable for the following components:    Glucose 176 (*)     Creatinine 1.59 (*)     CO2 21.6 (*)     Total Protein 5.8 (*)     Albumin 3.2 (*)     eGFR 45.3 (*)     All other components within normal limits    Narrative:     GFR Normal >60  Chronic Kidney Disease <60  Kidney Failure <15    The GFR formula is only valid for adults with stable renal function between ages 18 and 70.   URINALYSIS W/ MICROSCOPIC IF INDICATED (NO CULTURE) - Abnormal; Notable for the following components:    Color, UA Dark Yellow (*)     Glucose, UA >=1000 mg/dL (3+) (*)     Ketones, UA 40 mg/dL (2+) (*)     Bilirubin, UA Small (1+) (*)     Blood, UA Trace (*)     Protein, UA >=300 mg/dL (3+) (*)     All other components within normal limits   CBC WITH AUTO DIFFERENTIAL - Abnormal; Notable for the following components:    WBC 17.32 (*)     MCH 26.2 (*)     Neutrophil % 86.6 (*)     Lymphocyte % 3.3 (*)     Eosinophil % 0.1 (*)     Immature Grans % 0.7 (*)     Neutrophils, Absolute 15.00 (*)     Lymphocytes, Absolute 0.57 (*)     Monocytes, Absolute 1.58 (*)     Immature Grans, Absolute 0.12 (*)     All other components within normal limits   URINALYSIS, MICROSCOPIC ONLY - Abnormal; Notable for the following components:    RBC, UA 6-10 (*)     All other components within normal limits   LACTIC ACID, PLASMA - Abnormal; Notable for the following components:    Lactate 2.5 (*)     All other components within normal limits   COVID-19/FLUA&B/RSV, NP SWAB IN TRANSPORT MEDIA 1 HR TAT - Normal    Narrative:     Fact sheet for providers: https://www.fda.gov/media/869836/download    Fact sheet for patients: https://www.fda.gov/media/117129/download    Test performed by PCR.   LACTIC ACID, PLASMA - Normal   CK - Normal   MAGNESIUM - Normal   TSH - Normal   LIPASE - Normal   GASTROINTESTINAL PANEL, PCR (PREFERRED) DOES NOT INCLUDE CDIFF   CLOSTRIDIOIDES DIFFICILE TOXIN    Narrative:     The following orders were created for panel order  Clostridioides difficile Toxin - Stool, Per Rectum.  Procedure                               Abnormality         Status                     ---------                               -----------         ------                     Clostridioides difficile...[[id number]]                                                   Please view results for these tests on the individual orders.   CLOSTRIDIOIDES DIFFICILE EIA   BLOOD CULTURE   LACTIC ACID, REFLEX   CBC AND DIFFERENTIAL    Narrative:     The following orders were created for panel order CBC & Differential.  Procedure                               Abnormality         Status                     ---------                               -----------         ------                     CBC Auto Differential[[id number]]        Abnormal            Final result                 Please view results for these tests on the individual orders.       Results       Procedure Component Value Ref Range Date/Time    Blood Culture - Blood, [[id number]]     Order Status: Sent Specimen: Blood     Lactic Acid, Plasma [[id number]]  (Abnormal) Collected: 07/25/24 2113    Order Status: Completed Specimen: Blood Updated: 07/25/24 2140     Lactate 2.5 0.5 - 2.0 mmol/L     STAT Lactic Acid, Reflex [[id number]]     Order Status: Sent Specimen: Blood     Myoglobin, Serum [[id number]]  (Abnormal) Collected: 07/25/24 1557    Order Status: Completed Specimen: Blood Updated: 07/25/24 2054     Myoglobin 73.8 28.0 - 72.0 ng/mL     Narrative:      Results may be falsely decreased if patient taking Biotin.      Urinalysis, Microscopic Only - Urine, Clean Catch [[id number]]  (Abnormal) Collected: 07/25/24 1746    Order Status: Completed Specimen: Urine, Clean Catch Updated: 07/25/24 1800     RBC, UA 6-10 None Seen, 0-2 /HPF      WBC, UA None Seen None Seen, 0-2 /HPF      Bacteria, UA None Seen None Seen /HPF      Squamous Epithelial Cells, UA None Seen None Seen, 0-2 /HPF      Hyaline Casts, UA None Seen None Seen /LPF       Methodology Manual Light Microscopy    Urinalysis With Microscopic If Indicated (No Culture) - Urine, Clean Catch [[id number]]  (Abnormal) Collected: 07/25/24 1746    Order Status: Completed Specimen: Urine, Clean Catch Updated: 07/25/24 1758     Color, UA Dark Yellow Yellow, Straw      Appearance, UA Clear Clear      pH, UA <=5.0 4.5 - 8.0      Specific Gravity, UA 1.025 1.003 - 1.030      Glucose, UA >=1000 mg/dL (3+) Negative      Ketones, UA 40 mg/dL (2+) Negative      Bilirubin, UA Small (1+) Negative      Blood, UA Trace Negative      Protein, UA >=300 mg/dL (3+) Negative      Leuk Esterase, UA Negative Negative      Nitrite, UA Negative Negative      Urobilinogen, UA 0.2 E.U./dL 0.2 - 1.0 E.U./dL     Clostridioides difficile Toxin - Stool, Per Rectum [[id number]]     Order Status: Sent Specimen: Stool from Per Rectum     Narrative:      The following orders were created for panel order Clostridioides difficile Toxin - Stool, Per Rectum.  Procedure                               Abnormality         Status                     ---------                               -----------         ------                     Clostridioides difficile...[[id number]]                                                   Please view results for these tests on the individual orders.    Clostridioides difficile EIA - Stool, Per Rectum [[id number]]     Order Status: Sent Specimen: Stool from Per Rectum     Giardia / Cryptosporidium Screen - Stool, Per Rectum [[id number]]     Order Status: Canceled Specimen: Stool from Per Rectum     Rotavirus Antigen, Stool - Stool, Per Rectum [[id number]]     Order Status: Canceled Specimen: Stool from Per Rectum     Gastrointestinal Panel, PCR - Stool, Per Rectum [[id number]]     Order Status: Sent Specimen: Stool from Per Rectum     COVID-19, FLU A/B, RSV PCR 1 HR TAT - Swab, Nasopharynx [[id number]]  (Normal) Collected: 07/25/24 1611    Order Status: Completed Specimen: Swab from Nasopharynx Updated:  07/25/24 1706     COVID19 Not Detected Not Detected - Ref. Range      Influenza A PCR Not Detected Not Detected      Influenza B PCR Not Detected Not Detected      RSV, PCR Not Detected Not Detected     Narrative:      Fact sheet for providers: https://www.fda.gov/media/098573/download    Fact sheet for patients: https://www.fda.gov/media/856685/download    Test performed by PCR.    CK [[id number]]  (Normal) Collected: 07/25/24 1557    Order Status: Completed Specimen: Blood Updated: 07/25/24 1631     Creatine Kinase 42 20 - 200 U/L     Magnesium [[id number]]  (Normal) Collected: 07/25/24 1557    Order Status: Completed Specimen: Blood Updated: 07/25/24 1631     Magnesium 2.1 1.6 - 2.4 mg/dL     TSH [[id number]]  (Normal) Collected: 07/25/24 1557    Order Status: Completed Specimen: Blood Updated: 07/25/24 1631     TSH 2.270 0.270 - 4.200 uIU/mL     Comprehensive Metabolic Panel [[id number]]  (Abnormal) Collected: 07/25/24 1557    Order Status: Completed Specimen: Blood Updated: 07/25/24 1631     Glucose 176 65 - 99 mg/dL      BUN 16 8 - 23 mg/dL      Creatinine 1.59 0.76 - 1.27 mg/dL      Sodium 136 136 - 145 mmol/L      Potassium 3.8 3.5 - 5.2 mmol/L      Chloride 102 98 - 107 mmol/L      CO2 21.6 22.0 - 29.0 mmol/L      Calcium 8.9 8.6 - 10.5 mg/dL      Total Protein 5.8 6.0 - 8.5 g/dL      Albumin 3.2 3.5 - 5.2 g/dL      ALT (SGPT) 21 1 - 41 U/L      AST (SGOT) 21 1 - 40 U/L      Alkaline Phosphatase 114 39 - 117 U/L      Total Bilirubin 0.5 0.0 - 1.2 mg/dL      Globulin 2.6 gm/dL      A/G Ratio 1.2 g/dL      BUN/Creatinine Ratio 10.1 7.0 - 25.0      Anion Gap 12.4 5.0 - 15.0 mmol/L      eGFR 45.3 >60.0 mL/min/1.73     Narrative:      GFR Normal >60  Chronic Kidney Disease <60  Kidney Failure <15    The GFR formula is only valid for adults with stable renal function between ages 18 and 70.    Lipase [[id number]]  (Normal) Collected: 07/25/24 3644    Order Status: Completed Specimen: Blood Updated: 07/25/24 5849      Lipase 13 13 - 60 U/L     Lactic Acid, Plasma [[id number]]  (Normal) Collected: 07/25/24 1557    Order Status: Completed Specimen: Blood Updated: 07/25/24 1624     Lactate 1.6 0.5 - 2.0 mmol/L     CBC Auto Differential [[id number]]  (Abnormal) Collected: 07/25/24 1557    Order Status: Completed Specimen: Blood Updated: 07/25/24 1607     WBC 17.32 3.40 - 10.80 10*3/mm3      RBC 5.00 4.14 - 5.80 10*6/mm3      Hemoglobin 13.1 13.0 - 17.7 g/dL      Hematocrit 40.4 37.5 - 51.0 %      MCV 80.8 79.0 - 97.0 fL      MCH 26.2 26.6 - 33.0 pg      MCHC 32.4 31.5 - 35.7 g/dL      RDW 14.4 12.3 - 15.4 %      RDW-SD 42.0 37.0 - 54.0 fl      MPV 10.9 6.0 - 12.0 fL      Platelets 171 140 - 450 10*3/mm3      Neutrophil % 86.6 42.7 - 76.0 %      Lymphocyte % 3.3 19.6 - 45.3 %      Monocyte % 9.1 5.0 - 12.0 %      Eosinophil % 0.1 0.3 - 6.2 %      Basophil % 0.2 0.0 - 1.5 %      Immature Grans % 0.7 0.0 - 0.5 %      Neutrophils, Absolute 15.00 1.70 - 7.00 10*3/mm3      Lymphocytes, Absolute 0.57 0.70 - 3.10 10*3/mm3      Monocytes, Absolute 1.58 0.10 - 0.90 10*3/mm3      Eosinophils, Absolute 0.01 0.00 - 0.40 10*3/mm3      Basophils, Absolute 0.04 0.00 - 0.20 10*3/mm3      Immature Grans, Absolute 0.12 0.00 - 0.05 10*3/mm3      nRBC 0.0 0.0 - 0.2 /100 WBC              EKG:   ECG 12 Lead Electrolyte Imbalance   Preliminary Result   HEART COGB=100  bpm   RR Icauhntw=267  ms   CA Zclynlog=205  ms   P Horizontal Axis=13  deg   P Front Axis=62  deg   QRSD Interval=75  ms   QT Djhwyuxr=039  ms   BDkH=285  ms   QRS Axis=4  deg   T Wave Axis=  deg   - ABNORMAL ECG -   Sinus tachycardia   Nonspecific T abnormalities, lateral leads   Date and Time of Study:2024-07-25 16:11:30          Meds given in ED:   Medications   sodium chloride 0.9 % flush 10 mL (10 mL Intravenous Given 7/25/24 1937)   sodium chloride 0.9 % bolus 1,000 mL (1,000 mL Intravenous New Bag 7/25/24 2110)   lactated ringers bolus 1,000 mL (0 mL Intravenous Stopped 7/25/24 1937)    acetaminophen (TYLENOL) tablet 1,000 mg (1,000 mg Oral Given 24 1613)   metroNIDAZOLE (FLAGYL) IVPB 500 mg (0 mg Intravenous Stopped 24)   ciprofloxacin (CIPRO) tablet 500 mg (500 mg Oral Given 24)   acetaminophen (TYLENOL) tablet 1,000 mg (1,000 mg Oral Given 24)       Imaging results:  CT Abdomen Pelvis Without Contrast    Result Date: 2024  Diffuse colitis. Electronically Signed: Rehan Mccracken MD  2024 5:29 PM EDT  Workstation ID: [id number]     Ambulatory status:   - independant    Social issues:   Social History     Socioeconomic History    Marital status:      Spouse name: Jessica    Years of education: College   Tobacco Use    Smoking status: Former     Current packs/day: 0.00     Average packs/day: 2.0 packs/day for 14.0 years (28.0 ttl pk-yrs)     Types: Cigarettes     Start date: 1958     Quit date: 1972     Years since quittin.6     Passive exposure: Never    Smokeless tobacco: Never    Tobacco comments:     I have not smoked for 50+ years   Vaping Use    Vaping status: Never Used   Substance and Sexual Activity    Alcohol use: No    Drug use: No    Sexual activity: Not Currently     Partners: Female       NIH Stroke Scale:         Omaira Salas RN  24 22:10 EDT

## 2024-07-26 NOTE — DISCHARGE SUMMARY
Guy Plascencia  1950  [id number]    Hospitalists Discharge Summary    Date of Admission: 7/25/2024  Date of Discharge:  7/26/2024    History of Present Illness from hospitals on admit: Guy Plascencia is a 73 y.o. male who presented to [facility] on 7/25/2024 complaining of having diarrhea for last 2 weeks, patient started spiking fever in the last 3 days.  Patient says he is having loose stools 6-8 times in the day.  Patient underwent workup in ER which revealed elevated WBC count, lactic acid within normal limit, patient noted running fever greater than 100, patient underwent CT abdomen revealing diffuse colitis, following this we were asked to admit the patient for further care.  Patient received IV antibiotics in ER.  Patient denies for any nausea vomiting, denies for any cough, sore throat or for any other symptoms.  Patient tested negative for COVID and flu in ER.  Patient also given a history of having CAD stent 3 weeks ago, patient on Plavix.     Primary Discharge diagnoses/Hospital course-patient initially admitted with diffuse colitis of unknown etiology, later identified as being C. difficile positive therefore transitioned from IV antibiotics of Rocephin and Flagyl to oral vancomycin which she will discharge with, also given Questran to mitigate diarrhea.     Secondary Discharge Diagnoses: CAD with stent placement-stable.  GERD-stable.  Diabetes mellitus type 2-stable.     On 7/26/2024 patient's condition had improved. They were deemed stable for discharge. They were advised to take all medications as prescribed, follow up with PCP within 1 week. If there are any issues patient should contact PCP and/or return to the ED for follow up. Patient was agreeable to the plan and subsequently discharged at this time.     PCP  Patient Care Team:  Provider, No Known as PCP - Michelle Forrester MD as Consulting Physician (Hematology and Oncology)  Desmond Mayers MD as Consulting  Physician (Nephrology)  Remy Prescott (Optometry)  Deann Chau APRN as Nurse Practitioner (Pulmonary Disease)  Zion Haider MD as Neurologist (Neurology)  Marilou Mojica MD as Consulting Physician (Gastroenterology)  Jessica Smith MD as Consulting Physician (Pathology)    Consults:   Consults       No orders found for last 30 day(s).            Operations and Procedures Performed:       CT Abdomen Pelvis Without Contrast    Result Date: 7/25/2024  Narrative: CT ABDOMEN PELVIS WO CONTRAST Date of Exam: 7/25/2024 5:13 PM EDT Indication: abdominal pain, diarrhea. Comparison: 1/20/2020 Technique: Axial CT images were obtained of the abdomen and pelvis without the administration of contrast. Sagittal and coronal reconstructions were performed.  Automated exposure control and iterative reconstruction methods were used. FINDINGS: Lung bases: No masses. No consolidation. Liver:No masses. No intrahepatic biliary ductal dilatation. Spleen: Calcified granulomata Pancreas:No pancreatic masses. No evidence of pancreatitis. Gallbladder and common bile duct:No evidence of cholelithiasis. No evidence of cholecystitis. Adrenal glands:No adrenal masses Kidneys and ureters: 1.2 cm right renal cyst. 3.2 cm left renal cyst. No calculi present within the ureters. Normal caliber ureters. Urinary bladder:No urinary bladder wall thickening. No bladder masses. Small bowel:Normal caliber small bowel. Large bowel: Diffuse colonic wall thickening and inflammation consistent with colitis. Appendix: Normal GENITOURINARY: Normal prostate Ascites or pneumoperitoneum:None. Adenopathy:None present Osseous structures:Normal Other findings: Atheromatous disease of the abdominal aorta and visualized branches.     Impression: Diffuse colitis. Electronically Signed: Rehan Mccracken MD  7/25/2024 5:29 PM EDT  Workstation ID: [id number]     Allergies:  is allergic to metformin, penicillins, and  cimetidine.    Issa  reviewed    Discharge Medications:     Discharge Medications        New Medications        Instructions Start Date   cholestyramine light 4 g packet   4 g, Oral, Every 12 Hours Scheduled      vancomycin 125 MG capsule  Commonly known as: VANCOCIN   Take 1 capsule by mouth Every 6 (Six) Hours for 40 doses.             Continue These Medications        Instructions Start Date   amLODIPine 10 MG tablet  Commonly known as: NORVASC   10 mg, Oral, Daily      APPLE CIDER VINEGAR PO   Oral      ASPIR-81 PO   Oral, Daily      B-12 PO   1 tablet, Oral, Weekly      BENFOTIAMINE PO   Oral, 2 Times Daily      clopidogrel 75 MG tablet  Commonly known as: PLAVIX   75 mg, Oral, Daily      D3 ADULT PO   2,000 Units, Oral, Daily      dapagliflozin Propanediol 10 MG tablet   10 mg, Oral, Daily      glipizide 10 MG tablet  Commonly known as: GLUCOTROL   TAKE ONE TABLET BY MOUTH TWICE A DAY BEFORE A MEAL      hydrOXYzine 25 MG tablet  Commonly known as: ATARAX   TAKE ONE TABLET BY MOUTH THREE TIMES A DAY AS NEEDED FOR ITCHING      [facility] Glucose Test test strip  Generic drug: glucose blood   USE TO TEST BLOOD SUGAR TWO TIMES A DAY FOR TYPE 2 DIABETES      metoprolol tartrate 50 MG tablet  Commonly known as: LOPRESSOR   50 mg, Oral, 2 Times Daily      multivitamin with minerals tablet tablet   1 tablet, Oral, Daily      nitroglycerin 0.4 MG SL tablet  Commonly known as: NITROSTAT   1 under the tongue as needed for angina, may repeat q5mins for up three doses      OneTouch Verio Flex System w/Device kit   1 Device, Does not apply, 2 Times Daily      pravastatin 80 MG tablet  Commonly known as: PRAVACHOL   80 mg, Oral, Daily      Rybelsus 7 MG tablet  Generic drug: Semaglutide   7 mg, Oral, Daily      VITAMIN C PO   500 mg, Oral, Daily             Stop These Medications      pantoprazole 40 MG EC tablet  Commonly known as: Protonix            ASK your doctor about these medications        Instructions Start  Date   zinc sulfate 220 (50 Zn) MG capsule  Commonly known as: ZINCATE   220 mg, Oral, Daily               Last Lab Results:   Lab Results (most recent)       Procedure Component Value Units Date/Time    POC Glucose Once [[id number]]  (Normal) Collected: 07/26/24 0831    Specimen: Blood Updated: 07/26/24 0837     Glucose 116 mg/dL     Clostridioides difficile Toxin - Stool, Per Rectum [[id number]]  (Abnormal) Collected: 07/26/24 0510    Specimen: Stool from Per Rectum Updated: 07/26/24 0654    Narrative:      The following orders were created for panel order Clostridioides difficile Toxin - Stool, Per Rectum.  Procedure                               Abnormality         Status                     ---------                               -----------         ------                     Clostridioides difficile...[[id number]]  Abnormal            Final result                 Please view results for these tests on the individual orders.    Clostridioides difficile EIA - Stool, Per Rectum [[id number]]  (Abnormal) Collected: 07/26/24 0510    Specimen: Stool from Per Rectum Updated: 07/26/24 0654     C Diff GDH Ag Positive     C.diff Toxin Ag Positive    Narrative:      DNA from a toxigenic strain of C.difficile was detected, along with the presence of free toxin. These results are suggestive of C.difficile infection, in context of an appropriate clinical scenario.    Gastrointestinal Panel, PCR - Stool, Per Rectum [[id number]] Collected: 07/26/24 0510    Specimen: Stool from Per Rectum Updated: 07/26/24 0514    Basic Metabolic Panel [[id number]]  (Abnormal) Collected: 07/26/24 0159    Specimen: Blood Updated: 07/26/24 0445     Glucose 93 mg/dL      BUN 16 mg/dL      Creatinine 1.50 mg/dL      Sodium 138 mmol/L      Potassium 3.7 mmol/L      Chloride 105 mmol/L      CO2 18.8 mmol/L      Calcium 8.9 mg/dL      BUN/Creatinine Ratio 10.7     Anion Gap 14.2 mmol/L      eGFR 48.6 mL/min/1.73     Narrative:      GFR Normal >60  Chronic  Kidney Disease <60  Kidney Failure <15    The GFR formula is only valid for adults with stable renal function between ages 18 and 70.    Lactic Acid, Plasma [[id number]]  (Normal) Collected: 07/26/24 0159    Specimen: Blood Updated: 07/26/24 0445     Lactate 1.3 mmol/L     Magnesium [[id number]]  (Normal) Collected: 07/26/24 0159    Specimen: Blood Updated: 07/26/24 0445     Magnesium 2.1 mg/dL     CBC & Differential [[id number]]  (Abnormal) Collected: 07/26/24 0159    Specimen: Blood Updated: 07/26/24 0444    Narrative:      The following orders were created for panel order CBC & Differential.  Procedure                               Abnormality         Status                     ---------                               -----------         ------                     CBC Auto Differential[[id number]]        Abnormal            Final result                 Please view results for these tests on the individual orders.    CBC Auto Differential [[id number]]  (Abnormal) Collected: 07/26/24 0159    Specimen: Blood Updated: 07/26/24 0444     WBC 21.71 10*3/mm3      RBC 4.71 10*6/mm3      Hemoglobin 12.3 g/dL      Hematocrit 38.5 %      MCV 81.7 fL      MCH 26.1 pg      MCHC 31.9 g/dL      RDW 14.3 %      RDW-SD 42.2 fl      MPV 11.0 fL      Platelets 166 10*3/mm3      Neutrophil % 84.0 %      Lymphocyte % 5.1 %      Monocyte % 9.5 %      Eosinophil % 0.0 %      Basophil % 0.3 %      Immature Grans % 1.1 %      Neutrophils, Absolute 18.25 10*3/mm3      Lymphocytes, Absolute 1.11 10*3/mm3      Monocytes, Absolute 2.06 10*3/mm3      Eosinophils, Absolute 0.00 10*3/mm3      Basophils, Absolute 0.06 10*3/mm3      Immature Grans, Absolute 0.23 10*3/mm3      nRBC 0.0 /100 WBC     STAT Lactic Acid, Reflex [[id number]]  (Normal) Collected: 07/25/24 2336    Specimen: Blood Updated: 07/26/24 0000     Lactate 0.9 mmol/L     Blood Culture - Blood, Arm, Left [[id number]] Collected: 07/25/24 2336    Specimen: Blood from Arm, Left Updated:  07/25/24 2340    Lactic Acid, Plasma [[id number]]  (Abnormal) Collected: 07/25/24 2113    Specimen: Blood Updated: 07/25/24 2140     Lactate 2.5 mmol/L     Myoglobin, Serum [[id number]]  (Abnormal) Collected: 07/25/24 1557    Specimen: Blood Updated: 07/25/24 2054     Myoglobin 73.8 ng/mL     Narrative:      Results may be falsely decreased if patient taking Biotin.      Urinalysis, Microscopic Only - Urine, Clean Catch [[id number]]  (Abnormal) Collected: 07/25/24 1746    Specimen: Urine, Clean Catch Updated: 07/25/24 1800     RBC, UA 6-10 /HPF      WBC, UA None Seen /HPF      Bacteria, UA None Seen /HPF      Squamous Epithelial Cells, UA None Seen /HPF      Hyaline Casts, UA None Seen /LPF      Methodology Manual Light Microscopy    Urinalysis With Microscopic If Indicated (No Culture) - Urine, Clean Catch [[id number]]  (Abnormal) Collected: 07/25/24 1746    Specimen: Urine, Clean Catch Updated: 07/25/24 1758     Color, UA Dark Yellow     Appearance, UA Clear     pH, UA <=5.0     Specific Gravity, UA 1.025     Glucose, UA >=1000 mg/dL (3+)     Ketones, UA 40 mg/dL (2+)     Bilirubin, UA Small (1+)     Blood, UA Trace     Protein, UA >=300 mg/dL (3+)     Leuk Esterase, UA Negative     Nitrite, UA Negative     Urobilinogen, UA 0.2 E.U./dL    COVID-19, FLU A/B, RSV PCR 1 HR TAT - Swab, Nasopharynx [[id number]]  (Normal) Collected: 07/25/24 1611    Specimen: Swab from Nasopharynx Updated: 07/25/24 1706     COVID19 Not Detected     Influenza A PCR Not Detected     Influenza B PCR Not Detected     RSV, PCR Not Detected    Narrative:      Fact sheet for providers: https://www.fda.gov/media/274924/download    Fact sheet for patients: https://www.fda.gov/media/480883/download    Test performed by PCR.    CK [[id number]]  (Normal) Collected: 07/25/24 1557    Specimen: Blood Updated: 07/25/24 1631     Creatine Kinase 42 U/L     Magnesium [[id number]]  (Normal) Collected: 07/25/24 1552    Specimen: Blood Updated: 07/25/24 0903      Magnesium 2.1 mg/dL     TSH [[id number]]  (Normal) Collected: 07/25/24 1557    Specimen: Blood Updated: 07/25/24 1631     TSH 2.270 uIU/mL     Comprehensive Metabolic Panel [[id number]]  (Abnormal) Collected: 07/25/24 1557    Specimen: Blood Updated: 07/25/24 1631     Glucose 176 mg/dL      BUN 16 mg/dL      Creatinine 1.59 mg/dL      Sodium 136 mmol/L      Potassium 3.8 mmol/L      Chloride 102 mmol/L      CO2 21.6 mmol/L      Calcium 8.9 mg/dL      Total Protein 5.8 g/dL      Albumin 3.2 g/dL      ALT (SGPT) 21 U/L      AST (SGOT) 21 U/L      Alkaline Phosphatase 114 U/L      Total Bilirubin 0.5 mg/dL      Globulin 2.6 gm/dL      A/G Ratio 1.2 g/dL      BUN/Creatinine Ratio 10.1     Anion Gap 12.4 mmol/L      eGFR 45.3 mL/min/1.73     Narrative:      GFR Normal >60  Chronic Kidney Disease <60  Kidney Failure <15    The GFR formula is only valid for adults with stable renal function between ages 18 and 70.    Lipase [[id number]]  (Normal) Collected: 07/25/24 1557    Specimen: Blood Updated: 07/25/24 1631     Lipase 13 U/L     CBC & Differential [[id number]]  (Abnormal) Collected: 07/25/24 1557    Specimen: Blood Updated: 07/25/24 1607    Narrative:      The following orders were created for panel order CBC & Differential.  Procedure                               Abnormality         Status                     ---------                               -----------         ------                     CBC Auto Differential[[id number]]        Abnormal            Final result                 Please view results for these tests on the individual orders.    CBC Auto Differential [[id number]]  (Abnormal) Collected: 07/25/24 1557    Specimen: Blood Updated: 07/25/24 1607     WBC 17.32 10*3/mm3      RBC 5.00 10*6/mm3      Hemoglobin 13.1 g/dL      Hematocrit 40.4 %      MCV 80.8 fL      MCH 26.2 pg      MCHC 32.4 g/dL      RDW 14.4 %      RDW-SD 42.0 fl      MPV 10.9 fL      Platelets 171 10*3/mm3      Neutrophil % 86.6 %       Lymphocyte % 3.3 %      Monocyte % 9.1 %      Eosinophil % 0.1 %      Basophil % 0.2 %      Immature Grans % 0.7 %      Neutrophils, Absolute 15.00 10*3/mm3      Lymphocytes, Absolute 0.57 10*3/mm3      Monocytes, Absolute 1.58 10*3/mm3      Eosinophils, Absolute 0.01 10*3/mm3      Basophils, Absolute 0.04 10*3/mm3      Immature Grans, Absolute 0.12 10*3/mm3      nRBC 0.0 /100 WBC           Imaging Results (Most Recent)       Procedure Component Value Units Date/Time    CT Abdomen Pelvis Without Contrast [[id number]] Collected: 07/25/24 1725     Updated: 07/25/24 1732    Narrative:      CT ABDOMEN PELVIS WO CONTRAST    Date of Exam: 7/25/2024 5:13 PM EDT    Indication: abdominal pain, diarrhea.    Comparison: 1/20/2020    Technique: Axial CT images were obtained of the abdomen and pelvis without the administration of contrast. Sagittal and coronal reconstructions were performed.  Automated exposure control and iterative reconstruction methods were used.    FINDINGS:    Lung bases: No masses. No consolidation.    Liver:No masses. No intrahepatic biliary ductal dilatation.    Spleen: Calcified granulomata    Pancreas:No pancreatic masses. No evidence of pancreatitis.    Gallbladder and common bile duct:No evidence of cholelithiasis. No evidence of cholecystitis.    Adrenal glands:No adrenal masses    Kidneys and ureters: 1.2 cm right renal cyst. 3.2 cm left renal cyst. No calculi present within the ureters. Normal caliber ureters.    Urinary bladder:No urinary bladder wall thickening. No bladder masses.    Small bowel:Normal caliber small bowel.    Large bowel: Diffuse colonic wall thickening and inflammation consistent with colitis.    Appendix: Normal    GENITOURINARY: Normal prostate    Ascites or pneumoperitoneum:None.    Adenopathy:None present    Osseous structures:Normal    Other findings: Atheromatous disease of the abdominal aorta and visualized branches.      Impression:      Diffuse  colitis.          Electronically Signed: Rehan Mccracken MD    7/25/2024 5:29 PM EDT    Workstation ID: [id number]            PROCEDURES      Condition on Discharge:  Stable, Improved.     Physical Exam at Discharge  Vital Signs  Temp:  [98.7 °F (37.1 °C)-101.7 °F (38.7 °C)] 98.9 °F (37.2 °C)  Heart Rate:  [] 118  Resp:  [18-22] 20  BP: (126-181)/(56-88) 138/56    Physical Exam  Physical Exam:  General: Patient is awake and alert.  Elderly male. No acute distress noted.   HENT: Head is atraumatic, normocephalic. Hearing is grossly intact. Nose is without obvious congestion and appears patent. Neck is supple and trachea is midline.   Eyes: Vision is grossly intact. Pupils appear equal and round.   Cardiovascular: Heart has regular rate and rhythm with no murmurs, rubs or gallops noted.   Respiratory: Lungs are clear to ausculation without wheezes, rhonchi or rales.   Abdominal/GI: Soft, nontender, bowel sounds present. No rebound or guarding present.   Extremities: No peripheral edema noted.   Musculoskeletal: Spontaneous movement of bilateral upper and lower extremities against gravity noted. No signs of injury or deformity noted.   Skin: Warm and dry.   Psych: Mood and affect are appropriate. Cooperative with exam.   Neuro: No facial asymmetry noted. No focal deficits noted, hearing and vision are grossly intact.      Discharge Disposition  To home in stable condition    Discharge Diet:      Dietary Orders (From admission, onward)       Start     Ordered    07/26/24 0649  Patient is on Glucommander  (Glucommander™ SQ Insulin - Select Dosing Option)  Continuous        Question Answer Comment   Tray Note: Glucommander    Patient is on Glucommander: Yes        07/26/24 0649    07/25/24 2332  Diet: Cardiac; Healthy Heart (2-3 Na+); Fluid Consistency: Thin (IDDSI 0)  Diet Effective Now        References:    Diet Order [place]   Question Answer Comment   Diets: Cardiac    Cardiac Diet: Healthy Heart (2-3 Na+)    Fluid  Consistency: Thin (IDDSI 0)        07/25/24 2331                    Activity at Discharge:  As tolerated    Pre-discharge education  None       Follow-up Appointments  Future Appointments   Date Time Provider Department Center   7/29/2024  8:00 AM [facility]   7/31/2024  8:00 AM [facility]   8/2/2024  8:00 AM [facility]   8/5/2024  8:00 AM [facility]   8/7/2024  8:00 AM [facility] II [facility]   8/9/2024  8:00 AM [facility]   8/12/2024  8:00 AM [facility] II [facility]   8/14/2024  8:00 AM [facility] II [facility]   8/16/2024  8:00 AM [facility] II [facility]   8/19/2024  8:00 AM [facility] II [facility]   8/21/2024  8:00 AM [facility] II [facility]   8/23/2024  8:00 AM [facility] II [facility]   8/26/2024  8:00 AM [facility] II - [facility]   8/28/2024  8:00 AM [facility] II - [facility]   8/30/2024  8:00 AM [facility]ASE II - [facility]   9/9/2024  8:00 AM [facility] PHASE II - [facility]   9/11/2024  8:00 AM [facility]ASE II - [facility]   9/13/2024  8:00 AM [facility] PH[facility] II - [facility]   9/16/2024  8:00 AM [facility] PHASE II - [facility]   9/18/2024  8:00 AM [facility] PHASE II - [facility]   9/20/2024  8:00 AM [facility] PH[facility] II - [facility]   9/23/2024  8:00 AM [facility] II - [facility]   9/25/2024  8:00 AM [facility] PH[facility] II - [facility]   9/25/2024  9:30 AM [facility] ECHO CART 1 [facility]   9/27/2024  8:00 AM [facility] II [facility]   9/30/2024  8:00  AM [facility] PH[facility] II - [facility]   10/1/2024 10:45 AM Royal Chanel MD [facility]   10/2/2024  8:00 AM [facility] PHASE II - [facility]   10/4/2024  8:00 AM [facility] PH[facility] II - [facility]   10/7/2024  8:00 AM [facility] PH[facility] II - [facility]   10/9/2024  8:00 AM [facility] PHASE II - [facility]   10/11/2024  8:00 AM [facility] PHASE II - [facility]     Additional Instructions for the Follow-ups that You Need to Schedule       Discharge Follow-up with PCP   As directed       Currently Documented PCP:    Provider, No Known    PCP Phone Number:    [phone]     Follow Up Details: within 1 week, first available [facility] provider                Test Results Pending at Discharge  Pending Labs       Order Current Status    Blood Culture - Blood, Arm, Left In process    Gastrointestinal Panel, PCR - Stool, Per Rectum In process            Discharge over 30 min (if over 30 minutes give explanation as to why it took greater than 30 minutes)  Secondary to:   Coordination of care/follow up  Medication reconciliation  D/W patient      At [facility], we believe that sharing information builds trust and better relationships. You are receiving this note because you recently visited [facility]. It is possible you will see health information before a provider has talked with you about it. This kind of information can be easy to misunderstand. To help you fully understand what it means for your health, we urge you to discuss this note with your provider.    Jonathan Van DO  07/26/24  09:43 EDT

## 2024-07-26 NOTE — ED NOTES
"Pt found standing at bedside reports he \"had an accident\" of loose stool. blood had backed up in IV tubing tubing was removed and replaced with new. Pt ambulated to bathroom to clean up.  Is aware of need for stool sample if he has more. Pt denies dizziness or discomfort HR noted to be 130-140.  MD aware   "

## 2024-07-26 NOTE — PLAN OF CARE
Goal Outcome Evaluation:  Plan of Care Reviewed With: patient        Progress: improving  Outcome Evaluation: Pt VSS, am labs show + cdiff, continues isolation. continues tele, ST, 's-120's, md aware. IVF, IV antibiotics. Wife to bring CPAP in am. Pt up with assit to restroom. Pt resting at this time.

## 2024-07-26 NOTE — DISCHARGE INSTR - APPOINTMENTS
Patient will be given a list of local primary care providers accepting new patients with Discharge paperwork.

## 2024-07-26 NOTE — ED PROVIDER NOTES
Subjective   History of Present Illness  74 yo male h/o CAD presents w diarrhea, and abd cramps for past 2 weeks. No sick contacts at home. Has fever 100.6 and tachy at 111 o/w A&O x 4, brought in by EMS states he felt too weak to get out of the bathtub. Also reports abdominal pain, crampy, and diffuse. No blood in the stool. No recent travel and no recent antibiotics. Recent hospitalization was a short outpatient procedure (cardiac cath) at [facility] 3 weeks ago. Denies any chest pain currently. Says he has been able to keep down gatorade and water at home, as well as soup.         Review of Systems   All other systems reviewed and are negative.      Past Medical History:   Diagnosis Date    Eczema 03/03/2016    GERD (gastroesophageal reflux disease)     H/O  Multiple facial fractures 11/1968    H/O Broken femur 11/1968    History of transfusion     1968    Hyperlipidemia     Hypertension     Hypogonadism in male 03/03/2016    Kidney stone     Neoplasm of uncertain behavior     Osteoarthritis     Pancreatic cyst 05/31/2018    Peptic ulceration     Plantar fasciitis     Sleep apnea     CPAP    Splenomegaly 05/31/2018    Stage 3a chronic kidney disease 05/28/2024    Steatohepatitis 05/31/2018    Thrombocytopenia 03/03/2016    Thrombocytosis     Trigger middle finger of right hand 02/15/2017    Type 2 diabetes mellitus        Allergies   Allergen Reactions    Metformin GI Intolerance     Abnormal creatinine    Penicillins Unknown - Low Severity     ALLERGY TESTING SHOWED ALL TO PCN.     Cimetidine Rash       Past Surgical History:   Procedure Laterality Date    CARDIAC CATHETERIZATION N/A 6/17/2024    Procedure: Coronary angiography;  Surgeon: Myron Cox MD;  Location: [facility] CATH INVASIVE LOCATION;  Service: Cardiovascular;  Laterality: N/A;  NO LV GRAM    CARDIAC CATHETERIZATION N/A 6/17/2024    Procedure: Left heart cath;  Surgeon: Myron Cox MD;  Location: [facility] CATH INVASIVE LOCATION;  Service:  Cardiovascular;  Laterality: N/A;  NO LV GRAM    CARDIAC CATHETERIZATION N/A 6/17/2024    Procedure: Resting Full Cycle Ratio;  Surgeon: Myron Cox MD;  Location: [facility] CATH INVASIVE LOCATION;  Service: Cardiovascular;  Laterality: N/A;    CARDIAC CATHETERIZATION N/A 6/17/2024    Procedure: Atherectomy-coronary;  Surgeon: Myron Cox MD;  Location: [facility] CATH INVASIVE LOCATION;  Service: Cardiovascular;  Laterality: N/A;    CARDIAC CATHETERIZATION N/A 6/17/2024    Procedure: Stent BEBETO coronary;  Surgeon: Myron Cox MD;  Location: [facility] CATH INVASIVE LOCATION;  Service: Cardiovascular;  Laterality: N/A;    CARDIAC CATHETERIZATION N/A 6/17/2024    Procedure: Percutaneous Coronary Intervention;  Surgeon: Myron Cox MD;  Location: [facility] CATH INVASIVE LOCATION;  Service: Cardiovascular;  Laterality: N/A;    COLONOSCOPY      COSMETIC SURGERY  11/1968    MULTIPLE FACIAL FRACTURE    CYSTOSCOPY URETEROSCOPY STONE MANIPULATION/EXTRACTION Left 06/29/2016    Procedure: CYSTOSCOPY, LEFT URETEROSCOPY, BASKET EXTRACTION OF LEFT URETERAL STONE, LEFT STENT PLACEMENT;  Surgeon: Ish Gaitan MD;  Location: [facility] OR;  Service:     ENDOSCOPY N/A 10/02/2019    Procedure: ESOPHAGOGASTRODUODENOSCOPY with biopsies and dilatation to 20mm;  Surgeon: Marilou Mojica MD;  Location: [facility] OR;  Service: Gastroenterology    EXTRACORPOREAL SHOCK WAVE LITHOTRIPSY (ESWL) Right 07/29/2016    Procedure: RT EXTRACORPOREAL SHOCKWAVE LITHOTRIPSY;  Surgeon: Ish Gaiatn MD;  Location: [facility] MAIN OR;  Service:     FEMUR SURGERY  11/1968    FRACTURE SURGERY      femur fx    INTERVENTIONAL RADIOLOGY PROCEDURE N/A 6/17/2024    Procedure: Intravascular Ultrasound;  Surgeon: Myron Cox MD;  Location: [facility] CATH INVASIVE LOCATION;  Service: Cardiovascular;  Laterality: N/A;    OTHER SURGICAL HISTORY      stents, for kidney stones    OTHER SURGICAL HISTORY      lithotripsy    SKIN BIOPSY         Family History   Problem Relation Age of Onset     Heart disease Father             Heart attack Father 50    Skin cancer Brother 50    Cancer Brother     Cancer Other         Kidney, throat, skin    Colon polyps Neg Hx     Colon cancer Neg Hx        Social History     Socioeconomic History    Marital status:      Spouse name: Jessica    Years of education: College   Tobacco Use    Smoking status: Former     Current packs/day: 0.00     Average packs/day: 2.0 packs/day for 14.0 years (28.0 ttl pk-yrs)     Types: Cigarettes     Start date: 1958     Quit date: 1972     Years since quittin.6     Passive exposure: Never    Smokeless tobacco: Never    Tobacco comments:     I have not smoked for 50+ years   Vaping Use    Vaping status: Never Used   Substance and Sexual Activity    Alcohol use: No    Drug use: No    Sexual activity: Not Currently     Partners: Female           Objective   Physical Exam  Vitals and nursing note reviewed.   Constitutional:       General: He is not in acute distress.     Appearance: Normal appearance. He is ill-appearing. He is not toxic-appearing.   HENT:      Head: Normocephalic.      Nose: Nose normal.      Mouth/Throat:      Mouth: Mucous membranes are moist.   Eyes:      Pupils: Pupils are equal, round, and reactive to light.   Cardiovascular:      Rate and Rhythm: Normal rate and regular rhythm.      Pulses: Normal pulses.      Heart sounds: Normal heart sounds.   Pulmonary:      Effort: Pulmonary effort is normal. No respiratory distress.      Breath sounds: Normal breath sounds.   Abdominal:      Palpations: Abdomen is soft.      Tenderness: There is generalized abdominal tenderness.   Musculoskeletal:         General: No tenderness or deformity. Normal range of motion.      Cervical back: Normal range of motion.   Skin:     General: Skin is warm.   Neurological:      General: No focal deficit present.      Mental Status: He is alert.   Psychiatric:         Mood and Affect: Mood normal.         ECG 12  Lead      Date/Time: 7/26/2024 12:56 AM    Performed by: Manuel Piedra MD  Authorized by: Collin Elena MD  Interpreted by ED physician  Rhythm: sinus tachycardia  Rate: tachycardic  BPM: 111  QRS axis: normal  Conduction: conduction normal  ST Segments: ST segments normal  T Waves: T waves normal  Other: no other findings  Clinical impression: abnormal ECG               ED Course                                             Medical Decision Making  72 yo male w abd pain, diarrhea, dehydration. Fever, tachycardia.   -concern for sepsis, intra-abdominal pathology  -ordered IVF bolus, as well as flagyl, cipro directed against intra-abdominal cause of diarrhea  -covid, flu, RSV negative.   -CT abd and pelvis shows colitis  -leukocytosis 15, wife reports this to be chronic  -creatinine relatively stable compared to prior  -UA unremarkable  -pt reports improvement after IVF, was able to ambulate to the bathroom    Later, his fever spiked again, gave another dose of tylenol, when he got up again he began experiencing vomiting, and further weakness, states he still wants to go home. Was advised admission best course given need for IVF, antibiotics and overall weakness. Was eventually agreeable.     Problems Addressed:  Dehydration: complicated acute illness or injury  Diarrhea of presumed infectious origin: complicated acute illness or injury  Fever, unspecified fever cause: complicated acute illness or injury    Amount and/or Complexity of Data Reviewed  Labs: ordered.     Details: Labs Reviewed  MYOGLOBIN, SERUM - Abnormal; Notable for the following components:     Myoglobin                     73.8 (*)            All other components within normal limits         Narrative: Results may be falsely decreased if patient taking Biotin.                    COMPREHENSIVE METABOLIC PANEL - Abnormal; Notable for the following components:     Glucose                       176 (*)                Creatinine                     1.59 (*)               CO2                           21.6 (*)               Total Protein                 5.8 (*)                Albumin                       3.2 (*)                eGFR                          45.3 (*)            All other components within normal limits         Narrative: GFR Normal >60                  Chronic Kidney Disease <60                  Kidney Failure <15                                    The GFR formula is only valid for adults with stable renal function between ages 18 and 70.  URINALYSIS W/ MICROSCOPIC IF INDICATED (NO CULTURE) - Abnormal; Notable for the following components:     Color, UA                     Dark Yellow (*)               Glucose, UA                     (*)                  Ketones, UA                     (*)                  Bilirubin, UA                 Small (1+) (*)               Blood, UA                     Trace (*)               Protein, UA                     (*)               All other components within normal limits  CBC WITH AUTO DIFFERENTIAL - Abnormal; Notable for the following components:     WBC                           17.32 (*)               MCH                           26.2 (*)               Neutrophil %                  86.6 (*)               Lymphocyte %                  3.3 (*)                Eosinophil %                  0.1 (*)                Immature Grans %              0.7 (*)                Neutrophils, Absolute         15.00 (*)               Lymphocytes, Absolute         0.57 (*)               Monocytes, Absolute           1.58 (*)               Immature Grans, Absolute      0.12 (*)            All other components within normal limits  URINALYSIS, MICROSCOPIC ONLY - Abnormal; Notable for the following components:     RBC, UA                       6-10 (*)            All other components within normal limits  LACTIC ACID, PLASMA - Abnormal; Notable for the following components:     Lactate                       2.5 (*)              All other components within normal limits  COVID-19/FLUA&B/RSV, NP SWAB IN TRANSPORT MEDIA 1 HR TAT - Normal         Narrative: Fact sheet for providers: https://www.fda.gov/media/545573/download                    Fact sheet for patients: https://www.fda.gov/media/197119/download                                    Test performed by PCR.  LACTIC ACID, PLASMA - Normal  CK - Normal  MAGNESIUM - Normal  TSH - Normal  LIPASE - Normal  LACTIC ACID, REFLEX - Normal  BLOOD CULTURE  GASTROINTESTINAL PANEL, PCR (PREFERRED) DOES NOT INCLUDE CDIFF  CLOSTRIDIOIDES DIFFICILE TOXIN         Narrative: The following orders were created for panel order Clostridioides difficile Toxin - Stool, Per Rectum.                  Procedure                               Abnormality         Status                                     ---------                               -----------         ------                                     Clostridioides difficile...[[id number]]                                                                                   Please view results for these tests on the individual orders.  CLOSTRIDIOIDES DIFFICILE EIA  BASIC METABOLIC PANEL  CBC WITH AUTO DIFFERENTIAL  CBC AND DIFFERENTIAL         Narrative: The following orders were created for panel order CBC & Differential.                  Procedure                               Abnormality         Status                                     ---------                               -----------         ------                                     CBC Auto Differential[[id number]]        Abnormal            Final result                                                 Please view results for these tests on the individual orders.  CBC AND DIFFERENTIAL    Radiology: ordered.     Details: CT Abdomen Pelvis Without Contrast   Final Result    Diffuse colitis.                    Electronically Signed: Rehan Mccracken MD      7/25/2024 5:29 PM EDT      Workstation ID:  [id number]       ECG/medicine tests: ordered.    Risk  OTC drugs.  Prescription drug management.  Decision regarding hospitalization.        Final diagnoses:   Diarrhea of presumed infectious origin   Fever, unspecified fever cause   Dehydration       ED Disposition  ED Disposition       ED Disposition   Decision to Admit    Condition   --    Comment   Level of Care: Med/Surg [1]   Diagnosis: Colitis [[id number]]   Admitting Physician: SYD DESOUZA [[id number]]   Attending Physician: SYD DESOUZA [[id number]]                 Provider, No Known  [place] [id number] [phone]    Schedule an appointment as soon as possible for a visit   will need repeat CBC in 4 days    [facility] EMERGENCY DEPARTMENT  [address] [facility] [address] [phone]    If symptoms worsen         Medication List        New Prescriptions      ciprofloxacin 500 MG tablet  Commonly known as: CIPRO  Take 1 tablet by mouth 2 (Two) Times a Day for 10 days.     metroNIDAZOLE 500 MG tablet  Commonly known as: FLAGYL  Take 1 tablet by mouth 3 (Three) Times a Day for 10 days.               Where to Get Your Medications        These medications were sent to [facility] [id number] [place], [place] - [address] - [phone]  - [phone]   [address] [address]      Phone: [phone]   ciprofloxacin 500 MG tablet  metroNIDAZOLE 500 MG tablet            Manuel Piedra MD  07/26/24 0100

## 2024-07-27 NOTE — OUTREACH NOTE
Prep Survey      Flowsheet Row Responses   [facility] patient discharged from? [place]   Is LACE score < 7 ? Yes   Eligibility [facility] [place]   Date of Admission 07/25/24   Date of Discharge 07/26/24   Discharge Disposition Home or Self Care   Discharge diagnosis C. difficile colitis   Does the patient have one of the following disease processes/diagnoses(primary or secondary)? Other   Prep survey completed? Yes            Ann Marie BENOIT - Registered Nurse

## 2024-07-28 NOTE — CASE MANAGEMENT/SOCIAL WORK
Case Management Discharge Note      Final Note: Home         Selected Continued Care - Discharged on 7/26/2024 Admission date: 7/25/2024 - Discharge disposition: Home or Self Care      Destination    No services have been selected for the patient.                Durable Medical Equipment    No services have been selected for the patient.                Dialysis/Infusion    No services have been selected for the patient.                Home Medical Care    No services have been selected for the patient.                Therapy    No services have been selected for the patient.                Community Resources    No services have been selected for the patient.                Community & DME    No services have been selected for the patient.                         Final Discharge Disposition Code: 01 - home or self-care

## 2024-07-29 ENCOUNTER — APPOINTMENT (OUTPATIENT)
Dept: CARDIAC REHAB | Facility: HOSPITAL | Age: 74
End: 2024-07-29
Payer: MEDICARE

## 2024-07-29 ENCOUNTER — TRANSITIONAL CARE MANAGEMENT TELEPHONE ENCOUNTER (OUTPATIENT)
Dept: CALL CENTER | Facility: HOSPITAL | Age: 74
End: 2024-07-29
Payer: MEDICARE

## 2024-07-29 ENCOUNTER — TELEPHONE (OUTPATIENT)
Dept: FAMILY MEDICINE CLINIC | Facility: CLINIC | Age: 74
End: 2024-07-29
Payer: MEDICARE

## 2024-07-29 DIAGNOSIS — R19.7 DIARRHEA, UNSPECIFIED TYPE: Primary | ICD-10-CM

## 2024-07-29 RX ORDER — CHOLESTYRAMINE LIGHT 4 G/5.7G
1 POWDER, FOR SUSPENSION ORAL EVERY 12 HOURS SCHEDULED
Qty: 10 PACKET | Refills: 0 | Status: SHIPPED | OUTPATIENT
Start: 2024-07-29 | End: 2024-08-03

## 2024-07-29 NOTE — TELEPHONE ENCOUNTER
Pt will hold off on medication for sore throat.  Wife thinks due to his c-pap.  Pt says his sore throat is a little better now.  Pt is out of Rx Cholestyramine Light.  Hospital only gave him 3 days and he is still having diarrhea after eating.  Please send in Rx.

## 2024-07-29 NOTE — TELEPHONE ENCOUNTER
Pt's wife called and stated that her  is complaining of a sore throat & it's hard for him to swallow.   Patient was admitted to the hospital 07/25/24 for C.Diff & she is not wanting to take him out.   Patient is establishing care with Dr. Radford on 08/09/24 & was last seen in the office on 04/22/24 by Chelsea Mcelroy.   Patient's wife is asking if something could be called into their pharmacy for her .    Please advise.

## 2024-07-29 NOTE — OUTREACH NOTE
Call Center TCM Note      Flowsheet Row Responses   [facility] patient discharged from? [place]   Does the patient have one of the following disease processes/diagnoses(primary or secondary)? Other   TCM attempt successful? Yes   Call start time 1043   Call end time 1047   Discharge diagnosis C. difficile colitis   Is patient permission given to speak with other caregiver? Yes   List who call center can speak with spouse- Jessica   Person spoke with today (if not patient) and relationship spouse   Meds reviewed with patient/caregiver? Yes   Is the patient having any side effects they believe may be caused by any medication additions or changes? No   Does the patient have all medications ordered at discharge? Yes   Is the patient taking all medications as directed (includes completed medication regime)? Yes   Comments Hospital follow up with PCP Dr. Radford on 8/9   Does the patient have an appointment with their PCP within 7-14 days of discharge? Yes   Has home health visited the patient within 72 hours of discharge? N/A   Psychosocial issues? No   Did the patient receive a copy of their discharge instructions? Yes   Nursing interventions Reviewed instructions with patient   What is the patient's perception of their health status since discharge? New symptoms unrelated to diagnosis  [spouse states patient now has sore throat, she has spoken to MD office]   Is the patient/caregiver able to teach back signs and symptoms related to disease process for when to call PCP? Yes   Is the patient/caregiver able to teach back signs and symptoms related to disease process for when to call 911? Yes   Is the patient/caregiver able to teach back the hierarchy of who to call/visit for symptoms/problems? PCP, Specialist, Home health nurse, Urgent Care, ED, 911 Yes   TCM call completed? Yes   Wrap up additional comments Per spouse, patient is doing some better but now has a sore throat, all questions addressed, confirmed new  patient appt with PCP Dr. Radford for 8/9.   Call end time 1047   Would this patient benefit from a Referral to [facility] Social Work? No   Is the patient interested in additional calls from an ambulatory ? No            Marleni Arreguin RN    7/29/2024, 10:47 EDT

## 2024-07-30 LAB — BACTERIA SPEC AEROBE CULT: NORMAL

## 2024-07-31 ENCOUNTER — APPOINTMENT (OUTPATIENT)
Dept: CARDIAC REHAB | Facility: HOSPITAL | Age: 74
End: 2024-07-31
Payer: MEDICARE

## 2024-08-02 ENCOUNTER — APPOINTMENT (OUTPATIENT)
Dept: CARDIAC REHAB | Facility: HOSPITAL | Age: 74
End: 2024-08-02
Payer: MEDICARE

## 2024-08-05 ENCOUNTER — APPOINTMENT (OUTPATIENT)
Dept: CARDIAC REHAB | Facility: HOSPITAL | Age: 74
End: 2024-08-05
Payer: MEDICARE

## 2024-08-07 ENCOUNTER — APPOINTMENT (OUTPATIENT)
Dept: CARDIAC REHAB | Facility: HOSPITAL | Age: 74
End: 2024-08-07
Payer: MEDICARE

## 2024-08-09 ENCOUNTER — APPOINTMENT (OUTPATIENT)
Dept: CARDIAC REHAB | Facility: HOSPITAL | Age: 74
End: 2024-08-09
Payer: MEDICARE

## 2024-08-09 ENCOUNTER — OFFICE VISIT (OUTPATIENT)
Dept: FAMILY MEDICINE CLINIC | Facility: CLINIC | Age: 74
End: 2024-08-09
Payer: MEDICARE

## 2024-08-09 VITALS
OXYGEN SATURATION: 97 % | WEIGHT: 167.6 LBS | BODY MASS INDEX: 24.82 KG/M2 | DIASTOLIC BLOOD PRESSURE: 62 MMHG | HEIGHT: 69 IN | TEMPERATURE: 97.8 F | SYSTOLIC BLOOD PRESSURE: 140 MMHG | HEART RATE: 66 BPM

## 2024-08-09 DIAGNOSIS — N18.31 STAGE 3A CHRONIC KIDNEY DISEASE: ICD-10-CM

## 2024-08-09 DIAGNOSIS — I10 ESSENTIAL HYPERTENSION: ICD-10-CM

## 2024-08-09 DIAGNOSIS — A04.72 C. DIFFICILE DIARRHEA: Primary | ICD-10-CM

## 2024-08-09 DIAGNOSIS — R60.0 LOCALIZED EDEMA: ICD-10-CM

## 2024-08-09 DIAGNOSIS — I25.10 CORONARY ARTERY DISEASE INVOLVING NATIVE CORONARY ARTERY OF NATIVE HEART WITHOUT ANGINA PECTORIS: ICD-10-CM

## 2024-08-09 PROCEDURE — 1126F AMNT PAIN NOTED NONE PRSNT: CPT | Performed by: FAMILY MEDICINE

## 2024-08-09 PROCEDURE — 1111F DSCHRG MED/CURRENT MED MERGE: CPT | Performed by: FAMILY MEDICINE

## 2024-08-09 PROCEDURE — 3051F HG A1C>EQUAL 7.0%<8.0%: CPT | Performed by: FAMILY MEDICINE

## 2024-08-09 PROCEDURE — 1159F MED LIST DOCD IN RCRD: CPT | Performed by: FAMILY MEDICINE

## 2024-08-09 PROCEDURE — 1160F RVW MEDS BY RX/DR IN RCRD: CPT | Performed by: FAMILY MEDICINE

## 2024-08-09 PROCEDURE — 99495 TRANSJ CARE MGMT MOD F2F 14D: CPT | Performed by: FAMILY MEDICINE

## 2024-08-09 PROCEDURE — 3078F DIAST BP <80 MM HG: CPT | Performed by: FAMILY MEDICINE

## 2024-08-09 PROCEDURE — 3077F SYST BP >= 140 MM HG: CPT | Performed by: FAMILY MEDICINE

## 2024-08-12 ENCOUNTER — APPOINTMENT (OUTPATIENT)
Dept: CARDIAC REHAB | Facility: HOSPITAL | Age: 74
End: 2024-08-12
Payer: MEDICARE

## 2024-08-13 NOTE — PROGRESS NOTES
"  Subjective   Guy Plascencia is a 73 y.o. male who is here for   Chief Complaint   Patient presents with    Hospital Follow Up Visit     24 kept at [facility] for C. Diff, pt feeling much better   .     History of Present Illness   History of Present Illness  Guy Plascencia presents to the office for follow up after recent hospitalization.  Patient was hospitalized secondary to C. difficile infection.  Patient was admitted on  and discharged on .  Patient did receive IV antibiotics initially and then was transitioned to p.o. antibiotics.  Patient states that his diarrhea has resolved.     Patient is concerned about lower extremity swelling.  Patient states he has had some increased fluid in his bilateral lower legs.  He denies any pain.    Review of Systems   Constitutional:  Negative for activity change and appetite change.   Respiratory:  Negative for cough and shortness of breath.    Cardiovascular:  Negative for chest pain and leg swelling.   Skin:  Negative for color change and rash.       Objective   Vitals:    24 0942   BP: 140/62   BP Location: Left arm   Patient Position: Sitting   Cuff Size: Adult   Pulse: 66   Temp: 97.8 °F (36.6 °C)   SpO2: 97%   Weight: 76 kg (167 lb 9.6 oz)   Height: 175.3 cm (69.02\")      Physical Exam  Vitals and nursing note reviewed.   Constitutional:       Appearance: Normal appearance. He is normal weight.   HENT:      Head: Normocephalic and atraumatic.   Cardiovascular:      Rate and Rhythm: Normal rate and regular rhythm.      Pulses: Normal pulses.      Heart sounds: No murmur heard.  Pulmonary:      Effort: Pulmonary effort is normal. No respiratory distress.      Breath sounds: Normal breath sounds. No wheezing.   Musculoskeletal:      Right lower le+ Edema present.      Left lower le+ Edema present.   Skin:     General: Skin is warm and dry.   Neurological:      General: No focal deficit present.      Mental Status: He is alert.   Psychiatric:  "        Mood and Affect: Mood normal.         Thought Content: Thought content normal.       Physical Exam        Assessment & Plan   Assessment & Plan    Diagnoses and all orders for this visit:    1. C. difficile diarrhea (Primary)  Resolved.  Will continue to monitor.  Patient denies any diarrhea at this time.  2. Stage 3a chronic kidney disease  Stable.  3. Essential hypertension  Continue current medications.  Patient is currently on amlodipine 10 mg p.o. daily.  4. Coronary artery disease involving native coronary artery of native heart without angina pectoris  Stable no acute chest pain.  5. Localized edema  Likely related to recent IV fluids.  Will monitor closely.  Will consider decreasing his dose amlodipine if symptoms fail to resolve.    Results      There are no Patient Instructions on file for this visit.    There are no discontinued medications.     Return in about 2 weeks (around 8/23/2024), or Edema.  BMI is within normal parameters. No other follow-up for BMI required.        Manuel Radford MD  [place], [place]

## 2024-08-14 ENCOUNTER — APPOINTMENT (OUTPATIENT)
Dept: CARDIAC REHAB | Facility: HOSPITAL | Age: 74
End: 2024-08-14
Payer: MEDICARE

## 2024-08-16 ENCOUNTER — APPOINTMENT (OUTPATIENT)
Dept: CARDIAC REHAB | Facility: HOSPITAL | Age: 74
End: 2024-08-16
Payer: MEDICARE

## 2024-08-19 ENCOUNTER — APPOINTMENT (OUTPATIENT)
Dept: CARDIAC REHAB | Facility: HOSPITAL | Age: 74
End: 2024-08-19
Payer: MEDICARE

## 2024-08-19 ENCOUNTER — TELEPHONE (OUTPATIENT)
Dept: CARDIAC REHAB | Facility: HOSPITAL | Age: 74
End: 2024-08-19
Payer: MEDICARE

## 2024-08-21 ENCOUNTER — TREATMENT (OUTPATIENT)
Dept: CARDIAC REHAB | Facility: HOSPITAL | Age: 74
End: 2024-08-21
Payer: MEDICARE

## 2024-08-21 DIAGNOSIS — Z95.5 S/P DRUG ELUTING CORONARY STENT PLACEMENT: Primary | ICD-10-CM

## 2024-08-21 LAB — GLUCOSE BLDC GLUCOMTR-MCNC: 179 MG/DL (ref 70–130)

## 2024-08-21 PROCEDURE — 82948 REAGENT STRIP/BLOOD GLUCOSE: CPT

## 2024-08-21 PROCEDURE — 93798 PHYS/QHP OP CAR RHAB W/ECG: CPT

## 2024-08-23 ENCOUNTER — TREATMENT (OUTPATIENT)
Dept: CARDIAC REHAB | Facility: HOSPITAL | Age: 74
End: 2024-08-23
Payer: MEDICARE

## 2024-08-23 DIAGNOSIS — Z95.5 S/P DRUG ELUTING CORONARY STENT PLACEMENT: Primary | ICD-10-CM

## 2024-08-23 LAB — GLUCOSE BLDC GLUCOMTR-MCNC: 149 MG/DL (ref 70–130)

## 2024-08-23 PROCEDURE — 82948 REAGENT STRIP/BLOOD GLUCOSE: CPT

## 2024-08-23 PROCEDURE — 93798 PHYS/QHP OP CAR RHAB W/ECG: CPT

## 2024-08-26 ENCOUNTER — TREATMENT (OUTPATIENT)
Dept: CARDIAC REHAB | Facility: HOSPITAL | Age: 74
End: 2024-08-26
Payer: MEDICARE

## 2024-08-26 DIAGNOSIS — Z95.5 S/P DRUG ELUTING CORONARY STENT PLACEMENT: Primary | ICD-10-CM

## 2024-08-26 LAB — GLUCOSE BLDC GLUCOMTR-MCNC: 159 MG/DL (ref 70–130)

## 2024-08-26 PROCEDURE — 82948 REAGENT STRIP/BLOOD GLUCOSE: CPT

## 2024-08-26 PROCEDURE — 93798 PHYS/QHP OP CAR RHAB W/ECG: CPT

## 2024-08-28 ENCOUNTER — APPOINTMENT (OUTPATIENT)
Dept: CARDIAC REHAB | Facility: HOSPITAL | Age: 74
End: 2024-08-28
Payer: MEDICARE

## 2024-08-28 ENCOUNTER — OFFICE VISIT (OUTPATIENT)
Dept: FAMILY MEDICINE CLINIC | Facility: CLINIC | Age: 74
End: 2024-08-28
Payer: MEDICARE

## 2024-08-28 VITALS
HEART RATE: 76 BPM | OXYGEN SATURATION: 98 % | WEIGHT: 159 LBS | HEIGHT: 69 IN | SYSTOLIC BLOOD PRESSURE: 140 MMHG | BODY MASS INDEX: 23.55 KG/M2 | DIASTOLIC BLOOD PRESSURE: 76 MMHG | TEMPERATURE: 98.6 F

## 2024-08-28 DIAGNOSIS — E78.2 MIXED HYPERLIPIDEMIA: ICD-10-CM

## 2024-08-28 DIAGNOSIS — N18.31 STAGE 3A CHRONIC KIDNEY DISEASE: ICD-10-CM

## 2024-08-28 DIAGNOSIS — E11.65 TYPE 2 DIABETES MELLITUS WITH HYPERGLYCEMIA, WITHOUT LONG-TERM CURRENT USE OF INSULIN: Primary | ICD-10-CM

## 2024-08-28 DIAGNOSIS — I10 ESSENTIAL HYPERTENSION: ICD-10-CM

## 2024-08-28 DIAGNOSIS — A04.72 C. DIFFICILE COLITIS: ICD-10-CM

## 2024-08-28 PROCEDURE — 1160F RVW MEDS BY RX/DR IN RCRD: CPT | Performed by: FAMILY MEDICINE

## 2024-08-28 PROCEDURE — 1159F MED LIST DOCD IN RCRD: CPT | Performed by: FAMILY MEDICINE

## 2024-08-28 PROCEDURE — 3078F DIAST BP <80 MM HG: CPT | Performed by: FAMILY MEDICINE

## 2024-08-28 PROCEDURE — 1126F AMNT PAIN NOTED NONE PRSNT: CPT | Performed by: FAMILY MEDICINE

## 2024-08-28 PROCEDURE — 99214 OFFICE O/P EST MOD 30 MIN: CPT | Performed by: FAMILY MEDICINE

## 2024-08-28 PROCEDURE — 3051F HG A1C>EQUAL 7.0%<8.0%: CPT | Performed by: FAMILY MEDICINE

## 2024-08-28 PROCEDURE — 3077F SYST BP >= 140 MM HG: CPT | Performed by: FAMILY MEDICINE

## 2024-08-28 PROCEDURE — G2211 COMPLEX E/M VISIT ADD ON: HCPCS | Performed by: FAMILY MEDICINE

## 2024-08-28 RX ORDER — ORAL SEMAGLUTIDE 7 MG/1
7 TABLET ORAL DAILY
Qty: 30 TABLET | Refills: 11 | Status: SHIPPED | OUTPATIENT
Start: 2024-08-28

## 2024-08-28 RX ORDER — DAPAGLIFLOZIN 10 MG/1
10 TABLET, FILM COATED ORAL DAILY
Qty: 30 TABLET | Refills: 11 | Status: SHIPPED | OUTPATIENT
Start: 2024-08-28

## 2024-08-28 RX ORDER — AMLODIPINE BESYLATE 10 MG/1
10 TABLET ORAL DAILY
Qty: 90 TABLET | Refills: 3 | Status: SHIPPED | OUTPATIENT
Start: 2024-08-28

## 2024-08-28 RX ORDER — PRAVASTATIN SODIUM 80 MG/1
80 TABLET ORAL DAILY
Qty: 90 TABLET | Refills: 3 | Status: SHIPPED | OUTPATIENT
Start: 2024-08-28

## 2024-08-28 NOTE — PROGRESS NOTES
"  Subjective   Guy Plascencia is a 73 y.o. male who is here for   Chief Complaint   Patient presents with    Follow-up     C-diff     .     History of Present Illness   History of Present Illness  Guy Plascencia presents to the office for management of multiple medical problems including hypertension, coronary artery disease, hyperlipidemia diabetes type 2, chronic kidney disease, and recent C. difficile colitis.    Patient is currently being treated with amlodipine 10 mg daily for hypertension.  He denies any current chest pain or shortness of breath.  Patient does have history of coronary artery disease.  Recently underwent stent placement.  He is scheduled to begin cardiac rehab.    Patient recently had C. difficile colitis.  Patient states diarrhea has resolved.    Patient has a longstanding history of diabetes mellitus.  He is currently being treated with Rybelsus 7 mg daily, dapagliflozin 10 mg daily, and glyburide.    Review of Systems   Constitutional:  Negative for activity change and appetite change.   Respiratory:  Negative for cough and shortness of breath.    Cardiovascular:  Negative for chest pain and leg swelling.       Objective   Vitals:    08/28/24 0947 08/28/24 1001   BP: 160/74 140/76   Pulse: 76    Temp: 98.6 °F (37 °C)    SpO2: 98%    Weight: 72.1 kg (159 lb)    Height: 175.3 cm (69.02\")       Physical Exam  Vitals and nursing note reviewed.   Constitutional:       Appearance: Normal appearance. He is normal weight.   HENT:      Head: Normocephalic and atraumatic.   Cardiovascular:      Rate and Rhythm: Normal rate and regular rhythm.      Pulses: Normal pulses.      Heart sounds: No murmur heard.  Pulmonary:      Effort: Pulmonary effort is normal. No respiratory distress.      Breath sounds: Normal breath sounds. No wheezing.   Musculoskeletal:      Right lower leg: No edema.      Left lower leg: No edema.   Skin:     General: Skin is warm and dry.   Neurological:      General: No focal " deficit present.      Mental Status: He is alert.   Psychiatric:         Mood and Affect: Mood normal.         Thought Content: Thought content normal.       Physical Exam        Assessment & Plan   Assessment & Plan    Diagnoses and all orders for this visit:    1. Type 2 diabetes mellitus with hyperglycemia, without long-term current use of insulin (Primary)  Will repeat hemoglobin A1c.  Will continue semaglutide 7 mg daily as well as Depo flows and 10 mg daily.  Continue glipizide 10 mg daily as well.  -     Comprehensive metabolic panel  -     Hemoglobin A1c  -     Semaglutide (Rybelsus) 7 MG tablet; Take 7 mg by mouth Daily.  Dispense: 30 tablet; Refill: 11    2. Essential hypertension  Stable no acute chest pain.  Blood pressure slightly elevated today but did improve on repeat.  Patient is encouraged to continue cardiac rehab.  Will monitor blood pressures closely.  Continue current medications.    3. Mixed hyperlipidemia  Stable.  Continue current medication.  -     pravastatin (PRAVACHOL) 80 MG tablet; Take 1 tablet by mouth Daily.  Dispense: 90 tablet; Refill: 3    4. Stage 3a chronic kidney disease  Will obtain CMP.  5. C. difficile colitis  Resolved.  Other orders  -     dapagliflozin Propanediol 10 MG tablet; Take 10 mg by mouth Daily.  Dispense: 30 tablet; Refill: 11  -     amLODIPine (NORVASC) 10 MG tablet; Take 1 tablet by mouth Daily.  Dispense: 90 tablet; Refill: 3      Results  Lab Results   Component Value Date    GLUCOSE 93 07/26/2024    BUN 16 07/26/2024    CREATININE 1.50 (H) 07/26/2024     07/26/2024    K 3.7 07/26/2024     07/26/2024    CALCIUM 8.9 07/26/2024    PROTEINTOT 5.8 (L) 07/25/2024    ALBUMIN 3.2 (L) 07/25/2024    ALT 21 07/25/2024    AST 21 07/25/2024    ALKPHOS 114 07/25/2024    BILITOT 0.5 07/25/2024    GLOB 2.6 07/25/2024    AGRATIO 1.2 07/25/2024    BCR 10.7 07/26/2024    ANIONGAP 14.2 07/26/2024    EGFR 48.6 (L) 07/26/2024     Lab Results   Component Value Date     HGBA1C 7.4 (H) 04/22/2024     Lab Results   Component Value Date    CHOL 109 06/24/2022    CHLPL 117 04/22/2024    TRIG 101 04/22/2024    HDL 36 (L) 04/22/2024    LDL 62 04/22/2024         There are no Patient Instructions on file for this visit.    Medications Discontinued During This Encounter   Medication Reason    dapagliflozin Propanediol 10 MG tablet Reorder    Semaglutide (Rybelsus) 7 MG tablet Reorder    amLODIPine (NORVASC) 10 MG tablet Reorder    pravastatin (PRAVACHOL) 80 MG tablet Reorder        Return in about 3 months (around 11/28/2024), or diabetes.  BMI is within normal parameters. No other follow-up for BMI required.    Manuel Radford MD  [facility]  [place], [place]

## 2024-08-29 LAB
ALBUMIN SERPL-MCNC: 3.8 G/DL (ref 3.8–4.8)
ALP SERPL-CCNC: 115 IU/L (ref 44–121)
ALT SERPL-CCNC: 18 IU/L (ref 0–44)
AST SERPL-CCNC: 24 IU/L (ref 0–40)
BILIRUB SERPL-MCNC: 0.3 MG/DL (ref 0–1.2)
BUN SERPL-MCNC: 16 MG/DL (ref 8–27)
BUN/CREAT SERPL: 12 (ref 10–24)
CALCIUM SERPL-MCNC: 9.3 MG/DL (ref 8.6–10.2)
CHLORIDE SERPL-SCNC: 107 MMOL/L (ref 96–106)
CO2 SERPL-SCNC: 23 MMOL/L (ref 20–29)
CREAT SERPL-MCNC: 1.29 MG/DL (ref 0.76–1.27)
EGFRCR SERPLBLD CKD-EPI 2021: 58 ML/MIN/1.73
GLOBULIN SER CALC-MCNC: 2.6 G/DL (ref 1.5–4.5)
GLUCOSE SERPL-MCNC: 206 MG/DL (ref 70–99)
HBA1C MFR BLD: 7.2 % (ref 4.8–5.6)
POTASSIUM SERPL-SCNC: 4.1 MMOL/L (ref 3.5–5.2)
PROT SERPL-MCNC: 6.4 G/DL (ref 6–8.5)
SODIUM SERPL-SCNC: 145 MMOL/L (ref 134–144)

## 2024-08-29 NOTE — PROGRESS NOTES
CMP reveals slight elevation in glucose.  Creatinine has improved.  Will continue to monitor.  Hemoglobin A1c has also improved.  It is down to 7.2.  Continue current medications.

## 2024-08-30 ENCOUNTER — TREATMENT (OUTPATIENT)
Dept: CARDIAC REHAB | Facility: HOSPITAL | Age: 74
End: 2024-08-30
Payer: MEDICARE

## 2024-08-30 DIAGNOSIS — Z95.5 S/P DRUG ELUTING CORONARY STENT PLACEMENT: Primary | ICD-10-CM

## 2024-08-30 LAB — GLUCOSE BLDC GLUCOMTR-MCNC: 137 MG/DL (ref 70–130)

## 2024-08-30 PROCEDURE — 82948 REAGENT STRIP/BLOOD GLUCOSE: CPT

## 2024-08-30 PROCEDURE — 93798 PHYS/QHP OP CAR RHAB W/ECG: CPT

## 2024-09-09 ENCOUNTER — TREATMENT (OUTPATIENT)
Dept: CARDIAC REHAB | Facility: HOSPITAL | Age: 74
End: 2024-09-09
Payer: MEDICARE

## 2024-09-09 DIAGNOSIS — Z95.5 S/P DRUG ELUTING CORONARY STENT PLACEMENT: Primary | ICD-10-CM

## 2024-09-09 LAB — GLUCOSE BLDC GLUCOMTR-MCNC: 138 MG/DL (ref 70–130)

## 2024-09-09 PROCEDURE — 93798 PHYS/QHP OP CAR RHAB W/ECG: CPT

## 2024-09-09 PROCEDURE — 82948 REAGENT STRIP/BLOOD GLUCOSE: CPT

## 2024-09-11 ENCOUNTER — TREATMENT (OUTPATIENT)
Dept: CARDIAC REHAB | Facility: HOSPITAL | Age: 74
End: 2024-09-11
Payer: MEDICARE

## 2024-09-11 DIAGNOSIS — Z95.5 S/P DRUG ELUTING CORONARY STENT PLACEMENT: Primary | ICD-10-CM

## 2024-09-11 PROCEDURE — 93798 PHYS/QHP OP CAR RHAB W/ECG: CPT

## 2024-09-13 ENCOUNTER — APPOINTMENT (OUTPATIENT)
Dept: CARDIAC REHAB | Facility: HOSPITAL | Age: 74
End: 2024-09-13
Payer: MEDICARE

## 2024-09-16 ENCOUNTER — TREATMENT (OUTPATIENT)
Dept: CARDIAC REHAB | Facility: HOSPITAL | Age: 74
End: 2024-09-16
Payer: MEDICARE

## 2024-09-16 DIAGNOSIS — Z95.5 S/P DRUG ELUTING CORONARY STENT PLACEMENT: Primary | ICD-10-CM

## 2024-09-16 PROCEDURE — 93798 PHYS/QHP OP CAR RHAB W/ECG: CPT

## 2024-09-18 ENCOUNTER — TREATMENT (OUTPATIENT)
Dept: CARDIAC REHAB | Facility: HOSPITAL | Age: 74
End: 2024-09-18
Payer: MEDICARE

## 2024-09-18 DIAGNOSIS — Z95.5 S/P DRUG ELUTING CORONARY STENT PLACEMENT: Primary | ICD-10-CM

## 2024-09-18 PROCEDURE — 93798 PHYS/QHP OP CAR RHAB W/ECG: CPT

## 2024-09-20 ENCOUNTER — TREATMENT (OUTPATIENT)
Dept: CARDIAC REHAB | Facility: HOSPITAL | Age: 74
End: 2024-09-20
Payer: MEDICARE

## 2024-09-20 DIAGNOSIS — Z95.5 S/P DRUG ELUTING CORONARY STENT PLACEMENT: Primary | ICD-10-CM

## 2024-09-20 PROCEDURE — 93798 PHYS/QHP OP CAR RHAB W/ECG: CPT

## 2024-09-23 ENCOUNTER — TREATMENT (OUTPATIENT)
Dept: CARDIAC REHAB | Facility: HOSPITAL | Age: 74
End: 2024-09-23
Payer: MEDICARE

## 2024-09-23 DIAGNOSIS — Z95.5 S/P DRUG ELUTING CORONARY STENT PLACEMENT: Primary | ICD-10-CM

## 2024-09-23 PROCEDURE — 93798 PHYS/QHP OP CAR RHAB W/ECG: CPT

## 2024-09-25 ENCOUNTER — APPOINTMENT (OUTPATIENT)
Dept: CARDIAC REHAB | Facility: HOSPITAL | Age: 74
End: 2024-09-25
Payer: MEDICARE

## 2024-09-25 ENCOUNTER — HOSPITAL ENCOUNTER (OUTPATIENT)
Dept: CARDIOLOGY | Facility: HOSPITAL | Age: 74
Discharge: HOME | End: 2024-09-25
Admitting: PHYSICIAN ASSISTANT
Payer: MEDICARE

## 2024-09-25 VITALS
WEIGHT: 158.73 LBS | SYSTOLIC BLOOD PRESSURE: 186 MMHG | HEART RATE: 78 BPM | DIASTOLIC BLOOD PRESSURE: 89 MMHG | HEIGHT: 69 IN | BODY MASS INDEX: 23.51 KG/M2

## 2024-09-25 DIAGNOSIS — E78.2 MIXED HYPERLIPIDEMIA: ICD-10-CM

## 2024-09-25 DIAGNOSIS — I25.10 CORONARY ARTERY DISEASE INVOLVING NATIVE CORONARY ARTERY OF NATIVE HEART WITHOUT ANGINA PECTORIS: ICD-10-CM

## 2024-09-25 DIAGNOSIS — I10 ESSENTIAL HYPERTENSION: ICD-10-CM

## 2024-09-25 DIAGNOSIS — E11.65 TYPE 2 DIABETES MELLITUS WITH HYPERGLYCEMIA, WITHOUT LONG-TERM CURRENT USE OF INSULIN: ICD-10-CM

## 2024-09-25 DIAGNOSIS — N18.31 STAGE 3A CHRONIC KIDNEY DISEASE: ICD-10-CM

## 2024-09-25 DIAGNOSIS — Z95.5 STATUS POST INSERTION OF DRUG-ELUTING STENT INTO LEFT ANTERIOR DESCENDING (LAD) ARTERY: ICD-10-CM

## 2024-09-25 LAB
AORTIC DIMENSIONLESS INDEX: 0.7 (DI)
ASCENDING AORTA: 2.9 CM
BH CV ECHO LEFT VENTRICLE GLOBAL LONGITUDINAL STRAIN: -16.5 %
BH CV ECHO MEAS - ACS: 1.32 CM
BH CV ECHO MEAS - AO MAX PG: 10.4 MMHG
BH CV ECHO MEAS - AO MEAN PG: 6 MMHG
BH CV ECHO MEAS - AO ROOT AREA (BSA CORRECTED): 1.7 CM2
BH CV ECHO MEAS - AO ROOT DIAM: 3.2 CM
BH CV ECHO MEAS - AO V2 MAX: 161 CM/SEC
BH CV ECHO MEAS - AO V2 VTI: 27.4 CM
BH CV ECHO MEAS - AVA(I,D): 2.03 CM2
BH CV ECHO MEAS - EDV(CUBED): 85.2 ML
BH CV ECHO MEAS - EDV(MOD-SP2): 96 ML
BH CV ECHO MEAS - EDV(MOD-SP4): 128 ML
BH CV ECHO MEAS - EF(MOD-BP): 61.1 %
BH CV ECHO MEAS - EF(MOD-SP2): 59.4 %
BH CV ECHO MEAS - EF(MOD-SP4): 64.1 %
BH CV ECHO MEAS - ESV(CUBED): 24.1 ML
BH CV ECHO MEAS - ESV(MOD-SP2): 39 ML
BH CV ECHO MEAS - ESV(MOD-SP4): 46 ML
BH CV ECHO MEAS - FS: 34.3 %
BH CV ECHO MEAS - IVS/LVPW: 1.08 CM
BH CV ECHO MEAS - IVSD: 1.3 CM
BH CV ECHO MEAS - LAT PEAK E' VEL: 9.8 CM/SEC
BH CV ECHO MEAS - LV DIASTOLIC VOL/BSA (35-75): 68.4 CM2
BH CV ECHO MEAS - LV MASS(C)D: 203 GRAMS
BH CV ECHO MEAS - LV MAX PG: 4.2 MMHG
BH CV ECHO MEAS - LV MEAN PG: 2 MMHG
BH CV ECHO MEAS - LV SYSTOLIC VOL/BSA (12-30): 24.6 CM2
BH CV ECHO MEAS - LV V1 MAX: 103 CM/SEC
BH CV ECHO MEAS - LV V1 VTI: 18.7 CM
BH CV ECHO MEAS - LVIDD: 4.4 CM
BH CV ECHO MEAS - LVIDS: 2.9 CM
BH CV ECHO MEAS - LVOT AREA: 3 CM2
BH CV ECHO MEAS - LVOT DIAM: 1.95 CM
BH CV ECHO MEAS - LVPWD: 1.2 CM
BH CV ECHO MEAS - MED PEAK E' VEL: 9.6 CM/SEC
BH CV ECHO MEAS - MR MAX PG: 150.2 MMHG
BH CV ECHO MEAS - MR MAX VEL: 612.8 CM/SEC
BH CV ECHO MEAS - MR MEAN PG: 106.8 MMHG
BH CV ECHO MEAS - MR MEAN VEL: 499 CM/SEC
BH CV ECHO MEAS - MR VTI: 189.8 CM
BH CV ECHO MEAS - MV A MAX VEL: 66.9 CM/SEC
BH CV ECHO MEAS - MV DEC SLOPE: 550.8 CM/SEC2
BH CV ECHO MEAS - MV DEC TIME: 0.13 SEC
BH CV ECHO MEAS - MV E MAX VEL: 130 CM/SEC
BH CV ECHO MEAS - MV E/A: 1.94
BH CV ECHO MEAS - MV MAX PG: 6.8 MMHG
BH CV ECHO MEAS - MV MEAN PG: 2.8 MMHG
BH CV ECHO MEAS - MV P1/2T: 71.7 MSEC
BH CV ECHO MEAS - MV V2 VTI: 22.6 CM
BH CV ECHO MEAS - MVA(P1/2T): 3.1 CM2
BH CV ECHO MEAS - MVA(VTI): 2.47 CM2
BH CV ECHO MEAS - PA ACC TIME: 0.12 SEC
BH CV ECHO MEAS - PA V2 MAX: 94.1 CM/SEC
BH CV ECHO MEAS - QP/QS: 1.15
BH CV ECHO MEAS - RAP SYSTOLE: 3 MMHG
BH CV ECHO MEAS - RV MAX PG: 3.1 MMHG
BH CV ECHO MEAS - RV V1 MAX: 87.5 CM/SEC
BH CV ECHO MEAS - RV V1 VTI: 17.4 CM
BH CV ECHO MEAS - RVOT DIAM: 2.16 CM
BH CV ECHO MEAS - RVSP: 45 MMHG
BH CV ECHO MEAS - SV(LVOT): 55.7 ML
BH CV ECHO MEAS - SV(MOD-SP2): 57 ML
BH CV ECHO MEAS - SV(MOD-SP4): 82 ML
BH CV ECHO MEAS - SV(RVOT): 63.9 ML
BH CV ECHO MEAS - SVI(LVOT): 29.8 ML/M2
BH CV ECHO MEAS - SVI(MOD-SP2): 30.5 ML/M2
BH CV ECHO MEAS - SVI(MOD-SP4): 43.8 ML/M2
BH CV ECHO MEAS - TR MAX PG: 42.1 MMHG
BH CV ECHO MEAS - TR MAX VEL: 324.4 CM/SEC
BH CV ECHO MEASUREMENTS AVERAGE E/E' RATIO: 13.4
BH CV XLRA - TDI S': 16.3 CM/SEC
LEFT ATRIUM VOLUME INDEX: 37.8 ML/M2
SINUS: 3 CM
STJ: 2.32 CM

## 2024-09-25 PROCEDURE — 93306 TTE W/DOPPLER COMPLETE: CPT | Performed by: INTERNAL MEDICINE

## 2024-09-25 PROCEDURE — 93356 MYOCRD STRAIN IMG SPCKL TRCK: CPT | Performed by: INTERNAL MEDICINE

## 2024-09-25 PROCEDURE — 93306 TTE W/DOPPLER COMPLETE: CPT

## 2024-09-25 PROCEDURE — 93356 MYOCRD STRAIN IMG SPCKL TRCK: CPT

## 2024-09-25 PROCEDURE — 25510000001 PERFLUTREN 6.52 MG/ML SUSPENSION 2 ML VIAL: Performed by: PHYSICIAN ASSISTANT

## 2024-09-25 RX ADMIN — SODIUM CHLORIDE 2 ML: 9 INJECTION INTRAMUSCULAR; INTRAVENOUS; SUBCUTANEOUS at 10:08

## 2024-09-26 ENCOUNTER — TELEPHONE (OUTPATIENT)
Dept: CARDIOLOGY | Facility: CLINIC | Age: 74
End: 2024-09-26
Payer: MEDICARE

## 2024-09-27 ENCOUNTER — TREATMENT (OUTPATIENT)
Dept: CARDIAC REHAB | Facility: HOSPITAL | Age: 74
End: 2024-09-27
Payer: MEDICARE

## 2024-09-27 DIAGNOSIS — Z95.5 S/P DRUG ELUTING CORONARY STENT PLACEMENT: Primary | ICD-10-CM

## 2024-09-27 PROCEDURE — 93798 PHYS/QHP OP CAR RHAB W/ECG: CPT

## 2024-09-30 ENCOUNTER — TREATMENT (OUTPATIENT)
Dept: CARDIAC REHAB | Facility: HOSPITAL | Age: 74
End: 2024-09-30
Payer: MEDICARE

## 2024-09-30 DIAGNOSIS — Z95.5 S/P DRUG ELUTING CORONARY STENT PLACEMENT: Primary | ICD-10-CM

## 2024-09-30 PROCEDURE — 93798 PHYS/QHP OP CAR RHAB W/ECG: CPT

## 2024-09-30 NOTE — PROGRESS NOTES
RM:________     PCP: Manuel Radford MD    : 1950  AGE: 73 y.o.  EST PATIENT   REASON FOR VISIT/  CC:    Wt Readings from Last 3 Encounters:   24 72 kg (158 lb 11.7 oz)   24 72.1 kg (159 lb)   24 76 kg (167 lb 9.6 oz)      BP Readings from Last 3 Encounters:   24 (!) 186/89   24 140/76   24 140/62      WT: ____________ BP: __________L __________R HR______    ALLERGIES:Metformin, Penicillins, and Cimetidine SMOKING HISTORY:  Social History     Tobacco Use    Smoking status: Former     Current packs/day: 0.00     Average packs/day: 2.0 packs/day for 14.0 years (28.0 ttl pk-yrs)     Types: Cigarettes     Start date: 1958     Quit date: 1972     Years since quittin.7     Passive exposure: Never    Smokeless tobacco: Never    Tobacco comments:     I have not smoked for 50+ years   Vaping Use    Vaping status: Never Used   Substance Use Topics    Alcohol use: No    Drug use: No     CAFFEINE USE_________________  ALCOHOL ______________________    Below is the patient's most recent value for Albumin, ALT, AST, BUN, Calcium, Chloride, Cholesterol, CO2, Creatinine, GFR, Glucose, HDL, Hematocrit, Hemoglobin, Hemoglobin A1C, LDL, Magnesium, Phosphorus, Platelets, Potassium, PSA, Sodium, Triglycerides, TSH and WBC.   Lab Results   Component Value Date    ALBUMIN 3.8 2024    ALT 18 2024    AST 24 2024    BUN 16 2024    CALCIUM 9.3 2024     (H) 2024    CHOL 109 2022    CO2 23 2024    CREATININE 1.29 (H) 2024    HDL 36 (L) 2024    HCT 38.5 2024    HGB 12.3 (L) 2024    HGBA1C 7.2 (H) 2024    LDL 62 2024    MG 2.1 2024     2024    K 4.1 2024    PSA 1.010 2024     (H) 2024    TRIG 101 2024    TSH 2.270 2024    WBC 21.71 (H) 2024          NEW DIAGNOSIS/ SURGERY/ HOSP OR ED VISITS:  ______________________    __________________________________________________________________      RECENT LABS OR DIAGNOSTIC TESTING:  _____________________________    __________________________________________________________________      ASSESSMENT/ PLAN: _______________________________________________    __________________________________________________________________

## 2024-10-01 ENCOUNTER — OFFICE VISIT (OUTPATIENT)
Dept: CARDIOLOGY | Facility: CLINIC | Age: 74
End: 2024-10-01
Payer: MEDICARE

## 2024-10-01 VITALS
WEIGHT: 161.5 LBS | HEART RATE: 75 BPM | SYSTOLIC BLOOD PRESSURE: 158 MMHG | HEIGHT: 69 IN | RESPIRATION RATE: 18 BRPM | OXYGEN SATURATION: 98 % | BODY MASS INDEX: 23.92 KG/M2 | DIASTOLIC BLOOD PRESSURE: 60 MMHG

## 2024-10-01 DIAGNOSIS — E78.2 MIXED HYPERLIPIDEMIA: ICD-10-CM

## 2024-10-01 DIAGNOSIS — I25.10 CORONARY ARTERY DISEASE INVOLVING NATIVE CORONARY ARTERY OF NATIVE HEART WITHOUT ANGINA PECTORIS: Primary | ICD-10-CM

## 2024-10-01 DIAGNOSIS — I10 ESSENTIAL HYPERTENSION: ICD-10-CM

## 2024-10-01 DIAGNOSIS — N18.31 STAGE 3A CHRONIC KIDNEY DISEASE: ICD-10-CM

## 2024-10-01 DIAGNOSIS — I34.0 NONRHEUMATIC MITRAL VALVE REGURGITATION: ICD-10-CM

## 2024-10-01 PROBLEM — R94.39 ABNORMAL STRESS ECHO: Status: RESOLVED | Noted: 2024-06-11 | Resolved: 2024-10-01

## 2024-10-01 PROBLEM — R06.09 DYSPNEA ON EXERTION: Status: RESOLVED | Noted: 2024-06-11 | Resolved: 2024-10-01

## 2024-10-01 PROBLEM — R07.89 CHEST PAIN, ATYPICAL: Status: RESOLVED | Noted: 2024-06-11 | Resolved: 2024-10-01

## 2024-10-01 RX ORDER — PANTOPRAZOLE SODIUM 40 MG/1
40 TABLET, DELAYED RELEASE ORAL DAILY
COMMUNITY
Start: 2024-09-11

## 2024-10-01 RX ORDER — HYDRALAZINE HYDROCHLORIDE 50 MG/1
50 TABLET, FILM COATED ORAL 3 TIMES DAILY
Qty: 60 TABLET | Refills: 3 | Status: SHIPPED | OUTPATIENT
Start: 2024-10-01 | End: 2024-10-03 | Stop reason: SDUPTHER

## 2024-10-01 NOTE — PROGRESS NOTES
Date of Office Visit: 10/01/24  Encounter Provider: Royal Chanel MD  Place of Service: [facility] CARDIOLOGY  Patient Name: Guy Plascencia  :1950    Chief Complaint   Patient presents with    Follow-up     3 mths- Post ECHO   :     HPI:     Mr. Plascencia is 73 y.o. and presents today in follow up. I have reviewed prior notes and there are no changes except for any new updates described below. I have also reviewed any information entered into the medical record by the patient or by ancillary staff.     He has history of type 2 diabetes, hypertension, and hyperlipidemia.  His father had a history of nonpremature coronary disease.    He presented in May 2024 with very atypical chest pain that did not sound cardiac. However, he reported exertional dyspnea, which was concerning. A stress echo was abnormal. He underwent coronary angiography, which revealed severe disease of the LAD (treated with rotational atherectomy and PCI with 3x48mm and 3x28mm BEBETO) and Cx/OM2 (treated with 2.5x15mm, 2.5x15mm, 2.5x33mm BEBETO). There was severe disease in the distal RCA that was not stented.    He has been going to cardiac rehab and denies chest pain or dyspnea. He denies orthopnea, PND, palps, LH, or syncope. His BP has been very high.     Past Medical History:   Diagnosis Date    Coronary artery disease     Eczema 2016    Erectile dysfunction     GERD (gastroesophageal reflux disease)     H/O  Multiple facial fractures 1968    H/O Broken femur 1968    History of transfusion         Hyperlipidemia     Hypertension     Hypogonadism in male 2016    Kidney stone     Neoplasm of uncertain behavior     Osteoarthritis     Pancreatic cyst 2018    Peptic ulceration     Plantar fasciitis     Sleep apnea     CPAP    Splenomegaly 2018    Stage 3a chronic kidney disease 2024    Steatohepatitis 2018    Thrombocytopenia 2016    Thrombocytosis     Trigger middle  finger of right hand 02/15/2017    Type 2 diabetes mellitus        Past Surgical History:   Procedure Laterality Date    CARDIAC CATHETERIZATION N/A 06/17/2024    Procedure: Coronary angiography;  Surgeon: Myron Cox MD;  Location:  [facility] CATH INVASIVE LOCATION;  Service: Cardiovascular;  Laterality: N/A;  NO LV GRAM    CARDIAC CATHETERIZATION N/A 06/17/2024    Procedure: Left heart cath;  Surgeon: Myron Cox MD;  Location:  [facility] C[facility] INVASIVE [facility];  Service: Cardiovascular;  Laterality: N/A;  NO LV GRAM    CARDIAC CATHETERIZATION N/A 06/17/2024    Procedure: Resting Full Cycle Ratio;  Surgeon: Myron Cox MD;  Location:  [facility] CATH INVASIVE LOCATION;  Service: Cardiovascular;  Laterality: N/A;    CARDIAC CATHETERIZATION N/A 06/17/2024    Procedure: Atherectomy-coronary;  Surgeon: Myron Cox MD;  Location: [facility] CATH INVASIVE LOCATION;  Service: Cardiovascular;  Laterality: N/A;    CARDIAC CATHETERIZATION N/A 06/17/2024    Procedure: Stent BEBETO coronary;  Surgeon: Myron Cox MD;  Location: [facility] CATH INVASIVE LOCATION;  Service: Cardiovascular;  Laterality: N/A;    CARDIAC CATHETERIZATION N/A 06/17/2024    Procedure: Percutaneous Coronary Intervention;  Surgeon: Myron Cox MD;  Location[facility] CATH INVASIVE LOC[facility];  Service: Cardiovascular;  Laterality: N/A;    CARDIAC CATHETERIZATION  6/17/2024    COLONOSCOPY      CORONARY STENT PLACEMENT      COSMETIC SURGERY  11/1968    MULTIPLE FACIAL FRACTURE    CYSTOSCOPY URETEROSCOPY STONE MANIPULATION/EXTRACTION Left 06/29/2016    Procedure: CYSTOSCOPY, LEFT URETEROSCOPY, BASKET EXTRACTION OF LEFT URETERAL STONE, LEFT STENT PLACEMENT;  Surgeon: Ish Gaitan MD;  Location: [facility] OR;  Service:     ENDOSCOPY N/A 10/02/2019    Procedure: ESOPHAGOGASTRODUODENOSCOPY with biopsies and dilatation to 20mm;  Surgeon: Marilou Mojica MD;  Location: [facility] OR;  Service: Gastroenterology    EXTRACORPOREAL SHOCK WAVE LITHOTRIPSY (ESWL) Right 07/29/2016    Procedure: RT  EXTRACORPOREAL SHOCKWAVE LITHOTRIPSY;  Surgeon: Ish Gaitan MD;  Location: [facility] MAIN OR;  Service:     FEMUR SURGERY  1968    FRACTURE SURGERY      femur fx    INTERVENTIONAL RADIOLOGY PROCEDURE N/A 2024    Procedure: Intravascular Ultrasound;  Surgeon: Myron Cox MD;  Location:  [facility] CATH INVASIVE LOCATION;  Service: Cardiovascular;  Laterality: N/A;    OTHER SURGICAL HISTORY      stents, for kidney stones    OTHER SURGICAL HISTORY      lithotripsy    SKIN BIOPSY         Social History     Socioeconomic History    Marital status:      Spouse name: Jessica    Years of education: College   Tobacco Use    Smoking status: Former     Current packs/day: 0.00     Average packs/day: 2.0 packs/day for 14.0 years (28.0 ttl pk-yrs)     Types: Cigarettes     Start date: 1958     Quit date:      Years since quittin.7     Passive exposure: Never    Smokeless tobacco: Never    Tobacco comments:     I have not smoked for 50+ years   Vaping Use    Vaping status: Never Used   Substance and Sexual Activity    Alcohol use: No    Drug use: No    Sexual activity: Not Currently     Partners: Female     Birth control/protection: Vasectomy       Family History   Problem Relation Age of Onset    Heart disease Father             Heart attack Father 50    Skin cancer Brother 50    Cancer Brother     Cancer Other         Kidney, throat, skin    Colon polyps Neg Hx     Colon cancer Neg Hx        Review of Systems   Musculoskeletal:  Positive for arthritis.   All other systems reviewed and are negative.      Allergies   Allergen Reactions    Metformin GI Intolerance     Abnormal creatinine    Penicillins Unknown - Low Severity     ALLERGY TESTING SHOWED ALL TO PCN.     Cimetidine Rash         Current Outpatient Medications:     amLODIPine (NORVASC) 10 MG tablet, Take 1 tablet by mouth Daily., Disp: 90 tablet, Rfl: 3    APPLE CIDER VINEGAR PO, Take  by mouth., Disp: , Rfl:     Ascorbic Acid (VITAMIN C  PO), Take 500 mg by mouth Daily., Disp: , Rfl:     Aspirin (ASPIR-81 PO), Take  by mouth Daily., Disp: , Rfl:     BENFOTIAMINE PO, Take 300 mg by mouth Daily., Disp: , Rfl:     Cholecalciferol (D3 ADULT PO), Take 2,000 Units by mouth Daily., Disp: , Rfl:     clopidogrel (PLAVIX) 75 MG tablet, Take 1 tablet by mouth Daily., Disp: 90 tablet, Rfl: 3    Cyanocobalamin (B-12 PO), Take 1 tablet by mouth 1 (One) Time Per Week., Disp: , Rfl:     dapagliflozin Propanediol 10 MG tablet, Take 10 mg by mouth Daily., Disp: 30 tablet, Rfl: 11    glipizide (GLUCOTROL) 10 MG tablet, TAKE ONE TABLET BY MOUTH TWICE A DAY BEFORE A MEAL, Disp: 180 tablet, Rfl: 2    hydrOXYzine (ATARAX) 25 MG tablet, TAKE ONE TABLET BY MOUTH THREE TIMES A DAY AS NEEDED FOR ITCHING, Disp: 60 tablet, Rfl: 1    metoprolol tartrate (LOPRESSOR) 50 MG tablet, TAKE ONE TABLET BY MOUTH TWICE A DAY, Disp: 180 tablet, Rfl: 2    multivitamin with minerals tablet tablet, Take 1 tablet by mouth Daily., Disp: , Rfl:     pantoprazole (PROTONIX) 40 MG EC tablet, Take 1 tablet by mouth Daily., Disp: , Rfl:     pravastatin (PRAVACHOL) 80 MG tablet, Take 1 tablet by mouth Daily., Disp: 90 tablet, Rfl: 3    Semaglutide (Rybelsus) 7 MG tablet, Take 7 mg by mouth Daily., Disp: 30 tablet, Rfl: 11    zinc sulfate (ZINCATE) 220 (50 Zn) MG capsule, Take 1 capsule by mouth Daily., Disp: , Rfl:     Blood Glucose Monitoring Suppl (ONETOUCH VERIO FLEX SYSTEM) w/Device kit, 1 Device 2 (Two) Times a Day., Disp: 1 kit, Rfl: 0    glucose blood ([employer] Glucose Test) test strip, USE TO TEST BLOOD SUGAR TWO TIMES A DAY FOR TYPE 2 DIABETES, Disp: 100 each, Rfl: 12    hydrALAZINE (APRESOLINE) 50 MG tablet, Take 1 tablet by mouth 3 (Three) Times a Day., Disp: 60 tablet, Rfl: 3    nitroglycerin (NITROSTAT) 0.4 MG SL tablet, 1 under the tongue as needed for angina, may repeat q5mins for up three doses, Disp: 45 tablet, Rfl: 11      Objective:     Vitals:    10/01/24 1044   BP: 158/60  "  BP Location: Left arm   Patient Position: Sitting   Cuff Size: Adult   Pulse: 75   Resp: 18   SpO2: 98%   Weight: 73.3 kg (161 lb 8 oz)   Height: 175 cm (68.9\")     Body mass index is 23.92 kg/m².    Vitals reviewed.   Constitutional:       Appearance: Well-developed and not in distress.   Eyes:      Conjunctiva/sclera: Conjunctivae normal.   HENT:      Head: Normocephalic.      Nose: Nose normal.   Neck:      Thyroid: Thyroid normal.      Vascular: No JVD. JVD normal.      Lymphadenopathy: No cervical adenopathy.   Pulmonary:      Effort: Pulmonary effort is normal.      Breath sounds: Normal breath sounds.   Cardiovascular:      Normal rate. Regular rhythm.      Murmurs: There is no murmur.   Pulses:     Intact distal pulses.   Edema:     Peripheral edema absent.   Abdominal:      Palpations: Abdomen is soft.      Tenderness: There is no abdominal tenderness.   Musculoskeletal: Normal range of motion.      Cervical back: Normal range of motion. Skin:     General: Skin is warm and dry.   Neurological:      General: No focal deficit present.      Mental Status: Alert and oriented to person, place, and time.      Cranial Nerves: No cranial nerve deficit.   Psychiatric:         Behavior: Behavior normal.         Thought Content: Thought content normal.         Judgment: Judgment normal.         Procedures      Assessment:       Diagnosis Plan   1. Coronary artery disease involving native coronary artery of native heart without angina pectoris        2. Mixed hyperlipidemia        3. Essential hypertension  Duplex Renal Artery - Bilateral Complete CAR      4. Stage 3a chronic kidney disease  Duplex Renal Artery - Bilateral Complete CAR      5. Nonrheumatic mitral valve regurgitation               Plan:       1/2. He is s/p extensive BEBETO placement to the LAD and Cx/OM2 in June 2024. He will remain on DAPT for at least one year given his stent burden. He is on pravastatin and I'll need to intensify this at this next " visit.     3/4. His BP is poorly controlled. He has stage 3A/3B CKD. He has known vascular disease. I am going to rule out WESTLEY with a duplex and consider an ARB (with close follow up of renal function). In the meantime, I will start hydralazine 50mg BID.    5. He had moderate MR on echo in 2024; this should be rechecked in year. Hopefully it will be improved once his systolic BP is better.     Sincerely,       Royal Chanel MD

## 2024-10-02 ENCOUNTER — TREATMENT (OUTPATIENT)
Dept: CARDIAC REHAB | Facility: HOSPITAL | Age: 74
End: 2024-10-02
Payer: MEDICARE

## 2024-10-02 DIAGNOSIS — Z95.5 S/P DRUG ELUTING CORONARY STENT PLACEMENT: Primary | ICD-10-CM

## 2024-10-02 PROCEDURE — 93798 PHYS/QHP OP CAR RHAB W/ECG: CPT

## 2024-10-03 ENCOUNTER — TELEPHONE (OUTPATIENT)
Dept: CARDIOLOGY | Facility: CLINIC | Age: 74
End: 2024-10-03
Payer: MEDICARE

## 2024-10-03 RX ORDER — HYDRALAZINE HYDROCHLORIDE 50 MG/1
50 TABLET, FILM COATED ORAL 2 TIMES DAILY
Qty: 60 TABLET | Refills: 3 | Status: SHIPPED | OUTPATIENT
Start: 2024-10-03

## 2024-10-03 NOTE — TELEPHONE ENCOUNTER
Caller: Guy Plascencia    Relationship to patient: Self    Best call back number:  [phone] - [phone]    Patient is needing: PATIENT'S HYDRALAZINE MEDICATION IS 3 TIMES A DAY AND SHE PRESCRIBED 60 PILLS AT A TIME SO PATIENT IS GOING TO RUN OUT OF MEDICATION EVERY 20 DAYS. CHECKING TO SEE IF HE CAN GET MORE MEDICATION AT AT TIME

## 2024-10-04 ENCOUNTER — TREATMENT (OUTPATIENT)
Dept: CARDIAC REHAB | Facility: HOSPITAL | Age: 74
End: 2024-10-04
Payer: MEDICARE

## 2024-10-04 DIAGNOSIS — Z95.5 S/P DRUG ELUTING CORONARY STENT PLACEMENT: Primary | ICD-10-CM

## 2024-10-04 PROCEDURE — 93798 PHYS/QHP OP CAR RHAB W/ECG: CPT

## 2024-10-07 ENCOUNTER — TREATMENT (OUTPATIENT)
Dept: CARDIAC REHAB | Facility: HOSPITAL | Age: 74
End: 2024-10-07
Payer: MEDICARE

## 2024-10-07 DIAGNOSIS — Z95.5 S/P DRUG ELUTING CORONARY STENT PLACEMENT: Primary | ICD-10-CM

## 2024-10-07 PROCEDURE — 93798 PHYS/QHP OP CAR RHAB W/ECG: CPT

## 2024-10-09 ENCOUNTER — TREATMENT (OUTPATIENT)
Dept: CARDIAC REHAB | Facility: HOSPITAL | Age: 74
End: 2024-10-09
Payer: MEDICARE

## 2024-10-09 DIAGNOSIS — Z95.5 S/P DRUG ELUTING CORONARY STENT PLACEMENT: Primary | ICD-10-CM

## 2024-10-09 PROCEDURE — 93798 PHYS/QHP OP CAR RHAB W/ECG: CPT

## 2024-10-11 ENCOUNTER — TREATMENT (OUTPATIENT)
Dept: CARDIAC REHAB | Facility: HOSPITAL | Age: 74
End: 2024-10-11
Payer: MEDICARE

## 2024-10-11 DIAGNOSIS — Z95.5 S/P DRUG ELUTING CORONARY STENT PLACEMENT: Primary | ICD-10-CM

## 2024-10-11 PROCEDURE — 93798 PHYS/QHP OP CAR RHAB W/ECG: CPT

## 2024-10-14 ENCOUNTER — TREATMENT (OUTPATIENT)
Dept: CARDIAC REHAB | Facility: HOSPITAL | Age: 74
End: 2024-10-14
Payer: MEDICARE

## 2024-10-14 DIAGNOSIS — Z95.5 S/P DRUG ELUTING CORONARY STENT PLACEMENT: Primary | ICD-10-CM

## 2024-10-14 PROCEDURE — 93798 PHYS/QHP OP CAR RHAB W/ECG: CPT

## 2024-10-16 ENCOUNTER — TREATMENT (OUTPATIENT)
Dept: CARDIAC REHAB | Facility: HOSPITAL | Age: 74
End: 2024-10-16
Payer: MEDICARE

## 2024-10-16 DIAGNOSIS — Z95.5 S/P DRUG ELUTING CORONARY STENT PLACEMENT: Primary | ICD-10-CM

## 2024-10-16 PROCEDURE — 93798 PHYS/QHP OP CAR RHAB W/ECG: CPT

## 2024-10-18 ENCOUNTER — TREATMENT (OUTPATIENT)
Dept: CARDIAC REHAB | Facility: HOSPITAL | Age: 74
End: 2024-10-18
Payer: MEDICARE

## 2024-10-18 DIAGNOSIS — Z95.5 S/P DRUG ELUTING CORONARY STENT PLACEMENT: Primary | ICD-10-CM

## 2024-10-18 PROCEDURE — 93798 PHYS/QHP OP CAR RHAB W/ECG: CPT

## 2024-10-21 ENCOUNTER — TREATMENT (OUTPATIENT)
Dept: CARDIAC REHAB | Facility: HOSPITAL | Age: 74
End: 2024-10-21
Payer: MEDICARE

## 2024-10-21 DIAGNOSIS — Z95.5 S/P DRUG ELUTING CORONARY STENT PLACEMENT: Primary | ICD-10-CM

## 2024-10-21 PROCEDURE — 93798 PHYS/QHP OP CAR RHAB W/ECG: CPT

## 2024-10-23 ENCOUNTER — TREATMENT (OUTPATIENT)
Dept: CARDIAC REHAB | Facility: HOSPITAL | Age: 74
End: 2024-10-23
Payer: MEDICARE

## 2024-10-23 DIAGNOSIS — Z95.5 S/P DRUG ELUTING CORONARY STENT PLACEMENT: Primary | ICD-10-CM

## 2024-10-23 PROCEDURE — 93798 PHYS/QHP OP CAR RHAB W/ECG: CPT

## 2024-10-25 ENCOUNTER — TREATMENT (OUTPATIENT)
Dept: CARDIAC REHAB | Facility: HOSPITAL | Age: 74
End: 2024-10-25
Payer: MEDICARE

## 2024-10-25 DIAGNOSIS — Z95.5 S/P DRUG ELUTING CORONARY STENT PLACEMENT: Primary | ICD-10-CM

## 2024-10-25 PROCEDURE — 93798 PHYS/QHP OP CAR RHAB W/ECG: CPT

## 2024-11-08 ENCOUNTER — TRANSCRIBE ORDERS (OUTPATIENT)
Dept: ADMINISTRATIVE | Facility: HOSPITAL | Age: 74
End: 2024-11-08
Payer: MEDICARE

## 2024-11-08 ENCOUNTER — LAB (OUTPATIENT)
Dept: LAB | Facility: HOSPITAL | Age: 74
End: 2024-11-08
Payer: MEDICARE

## 2024-11-08 DIAGNOSIS — N18.31 CHRONIC KIDNEY DISEASE, STAGE 3A: ICD-10-CM

## 2024-11-08 DIAGNOSIS — N18.31 CHRONIC KIDNEY DISEASE, STAGE 3A: Primary | ICD-10-CM

## 2024-11-08 LAB
ANION GAP SERPL CALCULATED.3IONS-SCNC: 10 MMOL/L (ref 5–15)
BACTERIA UR QL AUTO: ABNORMAL /HPF
BILIRUB UR QL STRIP: NEGATIVE
BUN SERPL-MCNC: 20 MG/DL (ref 8–23)
BUN/CREAT SERPL: 12.4 (ref 7–25)
CALCIUM SPEC-SCNC: 8.9 MG/DL (ref 8.6–10.5)
CHLORIDE SERPL-SCNC: 107 MMOL/L (ref 98–107)
CLARITY UR: CLEAR
CO2 SERPL-SCNC: 25 MMOL/L (ref 22–29)
COLOR UR: YELLOW
CREAT SERPL-MCNC: 1.61 MG/DL (ref 0.76–1.27)
CREAT UR-MCNC: 98.5 MG/DL
EGFRCR SERPLBLD CKD-EPI 2021: 44.6 ML/MIN/1.73
GLUCOSE SERPL-MCNC: 144 MG/DL (ref 65–99)
GLUCOSE UR STRIP-MCNC: ABNORMAL MG/DL
HGB UR QL STRIP.AUTO: NEGATIVE
HOLD SPECIMEN: NORMAL
HYALINE CASTS UR QL AUTO: ABNORMAL /LPF
KETONES UR QL STRIP: NEGATIVE
LEUKOCYTE ESTERASE UR QL STRIP.AUTO: NEGATIVE
NITRITE UR QL STRIP: NEGATIVE
PH UR STRIP.AUTO: 5.5 [PH] (ref 5–8)
POTASSIUM SERPL-SCNC: 3.8 MMOL/L (ref 3.5–5.2)
PROT ?TM UR-MCNC: 202.6 MG/DL
PROT UR QL STRIP: ABNORMAL
PROT/CREAT UR: 2056.9 MG/G CREA (ref 0–200)
RBC # UR STRIP: ABNORMAL /HPF
REF LAB TEST METHOD: ABNORMAL
SODIUM SERPL-SCNC: 142 MMOL/L (ref 136–145)
SP GR UR STRIP: >1.03 (ref 1–1.03)
SQUAMOUS #/AREA URNS HPF: ABNORMAL /HPF
UROBILINOGEN UR QL STRIP: ABNORMAL
WBC # UR STRIP: ABNORMAL /HPF

## 2024-11-08 PROCEDURE — 84156 ASSAY OF PROTEIN URINE: CPT

## 2024-11-08 PROCEDURE — 80048 BASIC METABOLIC PNL TOTAL CA: CPT

## 2024-11-08 PROCEDURE — 87086 URINE CULTURE/COLONY COUNT: CPT

## 2024-11-08 PROCEDURE — 36415 COLL VENOUS BLD VENIPUNCTURE: CPT

## 2024-11-08 PROCEDURE — 82570 ASSAY OF URINE CREATININE: CPT

## 2024-11-08 PROCEDURE — 81001 URINALYSIS AUTO W/SCOPE: CPT

## 2024-11-09 LAB — BACTERIA SPEC AEROBE CULT: NORMAL

## 2024-11-12 ENCOUNTER — HOSPITAL ENCOUNTER (OUTPATIENT)
Dept: CARDIOLOGY | Facility: HOSPITAL | Age: 74
Discharge: HOME | End: 2024-11-12
Admitting: INTERNAL MEDICINE
Payer: MEDICARE

## 2024-11-12 DIAGNOSIS — I10 ESSENTIAL HYPERTENSION: ICD-10-CM

## 2024-11-12 DIAGNOSIS — N18.31 STAGE 3A CHRONIC KIDNEY DISEASE: ICD-10-CM

## 2024-11-12 LAB
BH CV ECHO MEAS - DIST REN A EDV LEFT: 9.8 CM/S
BH CV ECHO MEAS - DIST REN A PSV LEFT: 61.9 CM/S
BH CV ECHO MEAS - MID REN A EDV LEFT: 9.3 CM/S
BH CV ECHO MEAS - MID REN A PSV LEFT: 54.8 CM/S
BH CV ECHO MEAS - PROX REN A EDV LEFT: -11.7 CM/S
BH CV ECHO MEAS - PROX REN A PSV LEFT: -74.3 CM/S
BH CV VAS RENAL AORTIC MID PSV: 72.4 CM/S
BH CV VAS RENAL HILUM LEFT EDV: 4.8 CM/S
BH CV VAS RENAL HILUM LEFT PSV: 15.5 CM/S
BH CV VAS RENAL HILUM RIGHT EDV: 4.2 CM/S
BH CV VAS RENAL HILUM RIGHT PSV: 17.9 CM/S
BH CV XLRA MEAS - KID L LEFT: 11.6 CM
BH CV XLRA MEAS DIST REN A EDV RIGHT: 10.6 CM/S
BH CV XLRA MEAS DIST REN A PSV RIGHT: 59.4 CM/S
BH CV XLRA MEAS KID L RIGHT: 9.9 CM
BH CV XLRA MEAS KID W RIGHT: 4.8 CM
BH CV XLRA MEAS MID REN A EDV RIGHT: 8.9 CM/S
BH CV XLRA MEAS MID REN A PSV RIGHT: 44.3 CM/S
BH CV XLRA MEAS PROX REN A EDV RIGHT: -17.7 CM/S
BH CV XLRA MEAS PROX REN A PSV RIGHT: -82.4 CM/S
BH CV XLRA MEAS RAR LEFT: 1.03
BH CV XLRA MEAS RAR RIGHT: 1.85
BH CV XLRA MEAS RENAL A ORG EDV LEFT: 9.2 CM/S
BH CV XLRA MEAS RENAL A ORG EDV RIGHT: 12 CM/S
BH CV XLRA MEAS RENAL A ORG PSV LEFT: 73.7 CM/S
BH CV XLRA MEAS RENAL A ORG PSV RIGHT: 134.2 CM/S
LEFT KIDNEY WIDTH: 4 CM
LEFT RENAL UPPER PARENCHYMA MAX: 10 CM/S
LEFT RENAL UPPER PARENCHYMA MIN: 2.9 CM/S
LEFT RENAL UPPER PARENCHYMA RI: 0.71
RIGHT RENAL UPPER PARENCHYMA MAX: 9.6 CM/S
RIGHT RENAL UPPER PARENCHYMA MIN: 3 CM/S
RIGHT RENAL UPPER PARENCHYMA RI: 0.69

## 2024-11-12 PROCEDURE — 93975 VASCULAR STUDY: CPT

## 2024-11-12 PROCEDURE — 93975 VASCULAR STUDY: CPT | Performed by: SURGERY

## 2024-11-13 RX ORDER — GLIPIZIDE 10 MG/1
10 TABLET ORAL
Qty: 180 TABLET | Refills: 2 | Status: CANCELLED | OUTPATIENT
Start: 2024-11-13

## 2024-11-14 RX ORDER — GLIPIZIDE 10 MG/1
10 TABLET ORAL
Qty: 180 TABLET | Refills: 0 | Status: SHIPPED | OUTPATIENT
Start: 2024-11-14

## 2024-11-25 DIAGNOSIS — I10 ESSENTIAL HYPERTENSION: ICD-10-CM

## 2024-11-25 RX ORDER — METOPROLOL TARTRATE 50 MG
50 TABLET ORAL 2 TIMES DAILY
Qty: 180 TABLET | Refills: 0 | Status: SHIPPED | OUTPATIENT
Start: 2024-11-25

## 2024-11-26 ENCOUNTER — OFFICE VISIT (OUTPATIENT)
Dept: FAMILY MEDICINE CLINIC | Facility: CLINIC | Age: 74
End: 2024-11-26
Payer: MEDICARE

## 2024-11-26 VITALS
BODY MASS INDEX: 24.73 KG/M2 | TEMPERATURE: 97.4 F | HEART RATE: 83 BPM | OXYGEN SATURATION: 97 % | DIASTOLIC BLOOD PRESSURE: 64 MMHG | WEIGHT: 167 LBS | SYSTOLIC BLOOD PRESSURE: 130 MMHG | HEIGHT: 69 IN

## 2024-11-26 DIAGNOSIS — L30.8 PRURITIC DERMATITIS: ICD-10-CM

## 2024-11-26 DIAGNOSIS — I25.10 CORONARY ARTERY DISEASE INVOLVING NATIVE CORONARY ARTERY OF NATIVE HEART WITHOUT ANGINA PECTORIS: ICD-10-CM

## 2024-11-26 DIAGNOSIS — E11.65 TYPE 2 DIABETES MELLITUS WITH HYPERGLYCEMIA, WITHOUT LONG-TERM CURRENT USE OF INSULIN: ICD-10-CM

## 2024-11-26 DIAGNOSIS — E78.2 MIXED HYPERLIPIDEMIA: Primary | ICD-10-CM

## 2024-11-26 DIAGNOSIS — I10 ESSENTIAL HYPERTENSION: ICD-10-CM

## 2024-11-26 DIAGNOSIS — N18.31 STAGE 3A CHRONIC KIDNEY DISEASE: ICD-10-CM

## 2024-11-26 PROCEDURE — 1159F MED LIST DOCD IN RCRD: CPT | Performed by: FAMILY MEDICINE

## 2024-11-26 PROCEDURE — 1126F AMNT PAIN NOTED NONE PRSNT: CPT | Performed by: FAMILY MEDICINE

## 2024-11-26 PROCEDURE — 99214 OFFICE O/P EST MOD 30 MIN: CPT | Performed by: FAMILY MEDICINE

## 2024-11-26 PROCEDURE — 1160F RVW MEDS BY RX/DR IN RCRD: CPT | Performed by: FAMILY MEDICINE

## 2024-11-26 PROCEDURE — G2211 COMPLEX E/M VISIT ADD ON: HCPCS | Performed by: FAMILY MEDICINE

## 2024-11-26 PROCEDURE — 3078F DIAST BP <80 MM HG: CPT | Performed by: FAMILY MEDICINE

## 2024-11-26 PROCEDURE — 3075F SYST BP GE 130 - 139MM HG: CPT | Performed by: FAMILY MEDICINE

## 2024-11-26 PROCEDURE — 3051F HG A1C>EQUAL 7.0%<8.0%: CPT | Performed by: FAMILY MEDICINE

## 2024-11-26 RX ORDER — HYDROXYZINE HYDROCHLORIDE 25 MG/1
25 TABLET, FILM COATED ORAL 3 TIMES DAILY PRN
Qty: 60 TABLET | Refills: 1 | Status: SHIPPED | OUTPATIENT
Start: 2024-11-26

## 2024-11-26 NOTE — PROGRESS NOTES
"  Subjective   Guy Plascencia is a 73 y.o. male who is here for   Chief Complaint   Patient presents with    Diabetes   .   Guy Plascencia is to the office for follow-up of multiple medical problems.  Patient has chronic kidney disease, hypertension, hyperlipidemia, coronary artery disease, type 2 diabetes.    Patient has upcoming appointment with nephrology.  Patient also has upcoming appointment with cardiology for follow-up of coronary artery disease.      Diabetes  He has type 2 diabetes mellitus. No MedicAlert identification noted. The initial diagnosis of diabetes was made 20 years ago. Pertinent negatives for hypoglycemia include no confusion, headaches, speech difficulty, sweats or tremors. Pertinent negatives for diabetes include no blurred vision, no foot paresthesias, no foot ulcerations, no polydipsia, no polyuria and no weight loss. Symptoms are stable. He is compliant with treatment all of the time. He is following a diabetic diet. When asked about meal planning, he reported none. He participates in exercise three times a week. He monitors blood glucose at home 1-2 x per week. His highest blood glucose is 140-180 mg/dl. He sees a podiatrist. Eye exam is current.   Additional comments: When needed     History of Present Illness      Review of Systems   Constitutional:  Negative for weight loss.   Eyes:  Negative for blurred vision.   Endocrine: Negative for polydipsia and polyuria.   Neurological:  Negative for tremors, speech difficulty and headaches.   Psychiatric/Behavioral:  Negative for confusion.        Objective   Vitals:    11/26/24 0902   BP: 130/64   BP Location: Left arm   Patient Position: Sitting   Cuff Size: Adult   Pulse: 83   Temp: 97.4 °F (36.3 °C)   SpO2: 97%   Weight: 75.8 kg (167 lb)   Height: 175 cm (68.9\")      Physical Exam  Vitals and nursing note reviewed.   Constitutional:       Appearance: Normal appearance. He is normal weight.   HENT:      Head: Normocephalic and atraumatic. "   Cardiovascular:      Rate and Rhythm: Normal rate and regular rhythm.      Pulses: Normal pulses.      Heart sounds: No murmur heard.  Pulmonary:      Effort: Pulmonary effort is normal. No respiratory distress.      Breath sounds: Normal breath sounds. No wheezing.   Skin:     General: Skin is warm and dry.   Neurological:      General: No focal deficit present.      Mental Status: He is alert.   Psychiatric:         Mood and Affect: Mood normal.         Thought Content: Thought content normal.       Physical Exam        Assessment & Plan   Assessment & Plan    Diagnoses and all orders for this visit:    1. Mixed hyperlipidemia (Primary)  Stable.  Continue current medications.  On pravastatin 80 mg daily.  -     Comprehensive Metabolic Panel; Future  -     Hemoglobin A1c; Future  -     Microalbumin / Creatinine Urine Ratio - Urine, Clean Catch; Future  -     TSH Rfx On Abnormal To Free T4; Future  -     Lipid Panel; Future  -     CBC & Differential; Future    2. Essential hypertension  Blood pressure is well-controlled today.  He is on amlodipine and hydralazine.  He has seen nephrology for evaluation of chronic kidney disease.  They discussed possibly adding angiotensin receptor blocker.  -     Comprehensive Metabolic Panel; Future  -     Hemoglobin A1c; Future  -     Microalbumin / Creatinine Urine Ratio - Urine, Clean Catch; Future  -     TSH Rfx On Abnormal To Free T4; Future  -     Lipid Panel; Future  -     CBC & Differential; Future    3. Coronary artery disease involving native coronary artery of native heart without angina pectoris  Stable no acute chest pain.  Continue Plavix 75 mg daily.  Has nitroglycerin at home.  Also on metoprolol  -     Comprehensive Metabolic Panel; Future  -     Hemoglobin A1c; Future  -     Microalbumin / Creatinine Urine Ratio - Urine, Clean Catch; Future  -     TSH Rfx On Abnormal To Free T4; Future  -     Lipid Panel; Future  -     CBC & Differential; Future    4. Type 2  diabetes mellitus with hyperglycemia, without long-term current use of insulin  Will obtain labs prior to next appointment.  Last A1c was 7.2.  -     Comprehensive Metabolic Panel; Future  -     Hemoglobin A1c; Future  -     Microalbumin / Creatinine Urine Ratio - Urine, Clean Catch; Future  -     TSH Rfx On Abnormal To Free T4; Future  -     Lipid Panel; Future  -     CBC & Differential; Future    5. Pruritic dermatitis  Continue hydroxyzine  -     hydrOXYzine (ATARAX) 25 MG tablet; Take 1 tablet by mouth 3 (Three) Times a Day As Needed for Itching.  Dispense: 60 tablet; Refill: 1    6. Stage 3a chronic kidney disease  Continue follow-up with nephrology.    Results      There are no Patient Instructions on file for this visit.    Medications Discontinued During This Encounter   Medication Reason    hydrOXYzine (ATARAX) 25 MG tablet Reorder        Return in about 5 months (around 4/26/2025), or Diabetes, for Recheck.  BMI is within normal parameters. No other follow-up for BMI required.        Manuel Radford MD  [place], [place]

## 2024-12-19 RX ORDER — HYDRALAZINE HYDROCHLORIDE 50 MG/1
50 TABLET, FILM COATED ORAL 2 TIMES DAILY
Qty: 60 TABLET | Refills: 3 | Status: SHIPPED | OUTPATIENT
Start: 2024-12-19

## 2024-12-23 DIAGNOSIS — L30.8 PRURITIC DERMATITIS: ICD-10-CM

## 2024-12-23 RX ORDER — HYDROXYZINE HYDROCHLORIDE 25 MG/1
TABLET, FILM COATED ORAL
Qty: 60 TABLET | Refills: 1 | Status: SHIPPED | OUTPATIENT
Start: 2024-12-23

## 2025-01-22 ENCOUNTER — HOSPITAL ENCOUNTER (EMERGENCY)
Facility: HOSPITAL | Age: 75
Discharge: HOME | End: 2025-01-22
Attending: STUDENT IN AN ORGANIZED HEALTH CARE EDUCATION/TRAINING PROGRAM | Admitting: STUDENT IN AN ORGANIZED HEALTH CARE EDUCATION/TRAINING PROGRAM
Payer: MEDICARE

## 2025-01-22 ENCOUNTER — TELEPHONE (OUTPATIENT)
Dept: FAMILY MEDICINE CLINIC | Facility: CLINIC | Age: 75
End: 2025-01-22
Payer: MEDICARE

## 2025-01-22 VITALS
DIASTOLIC BLOOD PRESSURE: 85 MMHG | WEIGHT: 160 LBS | BODY MASS INDEX: 23.7 KG/M2 | SYSTOLIC BLOOD PRESSURE: 174 MMHG | TEMPERATURE: 98.2 F | RESPIRATION RATE: 18 BRPM | HEIGHT: 69 IN | OXYGEN SATURATION: 98 % | HEART RATE: 84 BPM

## 2025-01-22 DIAGNOSIS — D69.6 THROMBOCYTOPENIA: ICD-10-CM

## 2025-01-22 DIAGNOSIS — R04.0 NOSEBLEED: Primary | ICD-10-CM

## 2025-01-22 DIAGNOSIS — R04.0 EPISTAXIS: Primary | ICD-10-CM

## 2025-01-22 PROCEDURE — 99283 EMERGENCY DEPT VISIT LOW MDM: CPT | Performed by: STUDENT IN AN ORGANIZED HEALTH CARE EDUCATION/TRAINING PROGRAM

## 2025-01-22 RX ORDER — OXYMETAZOLINE HYDROCHLORIDE 0.05 G/100ML
3 SPRAY NASAL ONCE
Status: COMPLETED | OUTPATIENT
Start: 2025-01-22 | End: 2025-01-22

## 2025-01-22 RX ORDER — TRANEXAMIC ACID 100 MG/ML
INJECTION, SOLUTION INTRAVENOUS
Status: COMPLETED
Start: 2025-01-22 | End: 2025-01-22

## 2025-01-22 RX ORDER — TRANEXAMIC ACID 100 MG/ML
500 INJECTION, SOLUTION INTRAVENOUS ONCE
Status: COMPLETED | OUTPATIENT
Start: 2025-01-22 | End: 2025-01-22

## 2025-01-22 RX ADMIN — TRANEXAMIC ACID 500 MG: 100 INJECTION, SOLUTION INTRAVENOUS at 03:19

## 2025-01-22 RX ADMIN — Medication 3 SPRAY: at 00:44

## 2025-01-22 NOTE — ED PROVIDER NOTES
Subjective   History of Present Illness  74-year-old male with past medical history of being on Plavix presenting with complaint of a nosebleed started 3 hours ago.  He has a history of thrombocytopenia but no blood thinner usage, no issues with coagulation.  He states that he noticed a nosebleed while he was sleeping, he held a rag underneath his nose and came to the emergency room.  No recent trauma, no history of nosebleeds since he was a little kid        Review of Systems    Past Medical History:   Diagnosis Date    Allergic     Coronary artery disease     Eczema 03/03/2016    Erectile dysfunction     GERD (gastroesophageal reflux disease)     H/O  Multiple facial fractures 11/1968    H/O Broken femur 11/1968    History of transfusion     1968    Hyperlipidemia     Hypertension     Hypogonadism in male 03/03/2016    Kidney stone     Neoplasm of uncertain behavior     Osteoarthritis     Pancreatic cyst 05/31/2018    Peptic ulceration     Plantar fasciitis     Sleep apnea     CPAP    Splenomegaly 05/31/2018    Stage 3a chronic kidney disease 05/28/2024    Steatohepatitis 05/31/2018    Thrombocytopenia 03/03/2016    Thrombocytosis     Trigger middle finger of right hand 02/15/2017    Type 2 diabetes mellitus        Allergies   Allergen Reactions    Metformin GI Intolerance     Abnormal creatinine    Penicillins Unknown - Low Severity     ALLERGY TESTING SHOWED ALL TO PCN.     Cimetidine Rash       Past Surgical History:   Procedure Laterality Date    CARDIAC CATHETERIZATION N/A 06/17/2024    Procedure: Coronary angiography;  Surgeon: Myron Cox MD;  Location: [facility] CATH INVASIVE LOCATION;  Service: Cardiovascular;  Laterality: N/A;  NO LV GRAM    CARDIAC CATHETERIZATION N/A 06/17/2024    Procedure: Left heart cath;  Surgeon: Myron Cox MD;  Location: [facility] CATH INVASIVE LOCATION;  Service: Cardiovascular;  Laterality: N/A;  NO LV GRAM    CARDIAC CATHETERIZATION N/A 06/17/2024    Procedure: Resting Full Cycle  Ratio;  Surgeon: Myron Cox MD;  Location:  [facility] CATH INVASIVE LOCATION;  Service: Cardiovascular;  Laterality: N/A;    CARDIAC CATHETERIZATION N/A 2024    Procedure: Atherectomy-coronary;  Surgeon: Myron Cox MD;  Location:  [facility] CATH INVASIVE LOCATION;  Service: Cardiovascular;  Laterality: N/A;    CARDIAC CATHETERIZATION N/A 2024    Procedure: Stent BEBETO coronary;  Surgeon: Myron Cox MD;  [facility]VAS[facility] LOC[facility];  Service: Cardiovascular;  Laterality: N/A;    CARDIAC CATHETERIZATION N/A 2024    Procedure: Percutaneous Coronary Intervention;  Surgeon: Myron Cox MD;  Location:  [facility] CATH INVASIVE LOCATION;  Service: Cardiovascular;  Laterality: N/A;    CARDIAC CATHETERIZATION  2024    COLONOSCOPY      CORONARY STENT PLACEMENT      COSMETIC SURGERY  1968    MULTIPLE FACIAL FRACTURE    CYSTOSCOPY URETEROSCOPY STONE MANIPULATION/EXTRACTION Left 2016    Procedure: CYSTOSCOPY, LEFT URETEROSCOPY, BASKET EXTRACTION OF LEFT URETERAL STONE, LEFT STENT PLACEMENT;  Surgeon: Ish Gaitan MD;  Location: [facility] OR;  Service:     ENDOSCOPY N/A 10/02/2019    Procedure: ESOPHAGOGASTRODUODENOSCOPY with biopsies and dilatation to 20mm;  Surgeon: Marilou Mojica MD;  Location: [facility] OR;  Service: Gastroenterology    EXTRACORPOREAL SHOCK WAVE LITHOTRIPSY (ESWL) Right 2016    Procedure: RT EXTRACORPOREAL SHOCKWAVE LITHOTRIPSY;  Surgeon: Ish Gaitan MD;  Location: [facility] MAIN OR;  Service:     FEMUR SURGERY  1968    FRACTURE SURGERY      femur fx    INTERVENTIONAL RADIOLOGY PROCEDURE N/A 2024    Procedure: Intravascular Ultrasound;  Surgeon: Myron Cox MD;  [facility] C[facility]VASIVE LOC[facility];  Service: Cardiovascular;  Laterality: N/A;    OTHER SURGICAL HISTORY      stents, for kidney stones    OTHER SURGICAL HISTORY      lithotripsy    SKIN BIOPSY         Family History   Problem Relation Age of Onset    Heart disease Father              Heart attack Father 50    Skin cancer Brother 50    Cancer Brother     Cancer Other         Kidney, throat, skin    Colon polyps Neg Hx     Colon cancer Neg Hx        Social History     Socioeconomic History    Marital status:      Spouse name: Jessica    Years of education: College   Tobacco Use    Smoking status: Former     Current packs/day: 0.00     Average packs/day: 2.0 packs/day for 14.0 years (28.0 ttl pk-yrs)     Types: Cigarettes     Start date: 1958     Quit date: 1972     Years since quittin.0     Passive exposure: Never    Smokeless tobacco: Never    Tobacco comments:     I have not smoked for 50+ years   Vaping Use    Vaping status: Never Used   Substance and Sexual Activity    Alcohol use: No    Drug use: No    Sexual activity: Not Currently     Partners: Female     Birth control/protection: Vasectomy           Objective   Physical Exam  Vitals and nursing note reviewed. Exam conducted with a chaperone present.   Constitutional:       General: He is not in acute distress.     Appearance: Normal appearance. He is not ill-appearing, toxic-appearing or diaphoretic.   HENT:      Head: Normocephalic and atraumatic.      Nose:      Comments: Bleeding from left nares, no bleeding source visualized on nasal examination     Mouth/Throat:      Pharynx: No oropharyngeal exudate or posterior oropharyngeal erythema.   Eyes:      Extraocular Movements: Extraocular movements intact.      Conjunctiva/sclera: Conjunctivae normal.      Pupils: Pupils are equal, round, and reactive to light.   Cardiovascular:      Rate and Rhythm: Normal rate and regular rhythm.   Pulmonary:      Effort: Pulmonary effort is normal. No respiratory distress.      Breath sounds: Normal breath sounds. No stridor. No wheezing, rhonchi or rales.   Abdominal:      General: Abdomen is flat. There is no distension.      Tenderness: There is no abdominal tenderness. There is no guarding or rebound.   Musculoskeletal:          General: No swelling, tenderness, deformity or signs of injury. Normal range of motion.      Cervical back: Normal range of motion.   Skin:     General: Skin is warm and dry.      Capillary Refill: Capillary refill takes less than 2 seconds.      Findings: No erythema or rash.   Neurological:      General: No focal deficit present.      Mental Status: He is alert and oriented to person, place, and time. Mental status is at baseline.      Cranial Nerves: No cranial nerve deficit.      Sensory: No sensory deficit.      Motor: No weakness.   Psychiatric:         Mood and Affect: Mood normal.         Procedures           ED Course  ED Course as of 01/22/25 0305 Wed Jan 22, 2025   0254 Bleeding stopped, monitoring [AK]   0304 Patient states that he would rather keep the nasal clamp on for 12 hours then put a Rhino Rocket in.  Counseled him on steps to take to avoid recurrent epistaxis.  Recommended urgent follow-up with ENT, referral given. [AK]      ED Course User Index  [AK] Cale Alfaro MD                                                       Medical Decision Making  History and physical exam remarkable for  epistaxis that has been present for approximately 15 minutes, will hold direct pressure after applying Afrin and wait for 10 minutes for coagulation.  Otherwise patient well-appearing with no signs of secondary injuries.  No history of coagulopathy to reverse,    Risk  OTC drugs.        Final diagnoses:   Epistaxis       ED Disposition  ED Disposition       ED Disposition   Discharge    Condition   Stable    Comment   --               [facility] EMERGENCY DEPARTMENT  [address] [facility] [address] [phone]  Go to   If symptoms worsen    Manuel Radford MD  [address] [address] [phone]    In 2 days           Medication List      No changes were made to your prescriptions during this visit.            Cale Alfaro MD  01/22/25  1839

## 2025-01-22 NOTE — TELEPHONE ENCOUNTER
Patient's wife, Jessica, called asking if the patient should be seen here in the office tomorrow to have his Rhino Rocket removed that was placed in the ED today.   Per Dr. Radford, he would feel more comfortable if the patient saw an ENT to have it removed due to his past medical history and current medications.   Patient's wife understood and would like to have a referral sent.

## 2025-01-22 NOTE — Clinical Note
[facility] EMERGENCY DEPARTMENT  [address]  [place] [address]  Phone: [phone]    Guy Plascencia was seen and treated in our emergency department on 1/22/2025.  He may return to work on 01/24/2025.         Thank you for choosing [facility].    Cale Alfaro MD

## 2025-01-23 ENCOUNTER — TELEPHONE (OUTPATIENT)
Dept: CARDIOLOGY | Facility: CLINIC | Age: 75
End: 2025-01-23
Payer: MEDICARE

## 2025-01-23 NOTE — TELEPHONE ENCOUNTER
"Chart review shows the following note from ED visit:      Chart review shows the following communication from PCP:      Patient is currently taking clopidogrel 75 mg daily and aspirin 81 mg daily.  Called Guy Plascencia for additional information.  He reports ENT would like him to hold clopidogrel and plavix for 4 to 5 days.  He states his nose bleed was \"very bad\".    LOV: 10/1/24 with Dr. Chanel    Please let me know how you would like to proceed.    Thank you,  Maegan HOUSE RN  Triage Nurse [facility]  01/23/25  15:31 EST        "

## 2025-01-23 NOTE — TELEPHONE ENCOUNTER
Caller: uGy Plascencia    Relationship: Self    Best call back number: [phone]    Which medication are you concerned about: CLOPIDOGREL    Who prescribed you this medication: JUDY    What are your concerns: NOSE BLEED, PATIENT HAD TO GO TO ER FOR NOSE BLEED, HAD NOSE PACKED, SAW ENT TODAY AND WAS TOLD TO REACH OUT TO DR KIM TO GET CLEARANCE TO COME OFF MEDICATION FOR A FEW DAYS TO HEAL. PLEASE CALL PATIENT BACK AND ADVISE.

## 2025-01-24 NOTE — TELEPHONE ENCOUNTER
His stent is ~8 months old. I don't want him  holding clopidogrel at all. He can hold aspirin for 5 days but there is risk of stent thrombosis.    melodie

## 2025-02-10 RX ORDER — GLIPIZIDE 10 MG/1
10 TABLET ORAL
Qty: 180 TABLET | Refills: 0 | Status: SHIPPED | OUTPATIENT
Start: 2025-02-10

## 2025-02-24 DIAGNOSIS — I10 ESSENTIAL HYPERTENSION: ICD-10-CM

## 2025-02-25 RX ORDER — METOPROLOL TARTRATE 50 MG
50 TABLET ORAL 2 TIMES DAILY
Qty: 180 TABLET | Refills: 0 | Status: SHIPPED | OUTPATIENT
Start: 2025-02-25

## 2025-03-31 RX ORDER — HYDRALAZINE HYDROCHLORIDE 50 MG/1
50 TABLET, FILM COATED ORAL 2 TIMES DAILY
Qty: 60 TABLET | Refills: 1 | Status: SHIPPED | OUTPATIENT
Start: 2025-03-31

## 2025-04-22 ENCOUNTER — OFFICE VISIT (OUTPATIENT)
Age: 75
End: 2025-04-22
Payer: MEDICARE

## 2025-04-22 VITALS
SYSTOLIC BLOOD PRESSURE: 130 MMHG | HEIGHT: 69 IN | DIASTOLIC BLOOD PRESSURE: 60 MMHG | BODY MASS INDEX: 24.73 KG/M2 | WEIGHT: 167 LBS | HEART RATE: 83 BPM

## 2025-04-22 DIAGNOSIS — E78.2 MIXED HYPERLIPIDEMIA: ICD-10-CM

## 2025-04-22 DIAGNOSIS — I25.10 CORONARY ARTERY DISEASE INVOLVING NATIVE CORONARY ARTERY OF NATIVE HEART WITHOUT ANGINA PECTORIS: Primary | ICD-10-CM

## 2025-04-22 DIAGNOSIS — N18.31 STAGE 3A CHRONIC KIDNEY DISEASE: ICD-10-CM

## 2025-04-22 DIAGNOSIS — I34.0 NONRHEUMATIC MITRAL VALVE REGURGITATION: ICD-10-CM

## 2025-04-22 DIAGNOSIS — I10 ESSENTIAL HYPERTENSION: ICD-10-CM

## 2025-04-22 NOTE — PROGRESS NOTES
CARDIOLOGY        Patient Name: Guy Plascencia  :1950  Age: 74 y.o.  Primary Cardiologist: Royal Chanel MD  Encounter Provider:  Jonnathan Bowie PA-C    Date of Service: 25            CHIEF COMPLAINT / REASON FOR OFFICE VISIT     6-month follow-up      HISTORY OF PRESENT ILLNESS       HPI  Guy Plascencia is a 74 y.o. male who presents today for 6-month follow-up.     Pt has a  history significant for CAD, hyperlipidemia, hypertension, CKD, and nonrheumatic mitral valve regurgitation presents for 6-month follow-up.  He was seen in office on 10/1/2021 where he was going to cardiac rehab and denied any chest pain or dyspnea.  He had no orthopnea, PND, palpitations, or syncope.  His blood pressure was elevated and he was started on hydralazine 50 mg twice daily.  He had a renal artery ultrasound as well that was normal. He is here for follow-up.    Patient has been doing well since last office visit with Dr. Chanel.  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  Patient states he is busy and stressed due to tax time and is not been as active along with not eating healthy.  He has noticed over the past month occasionally when he goes up steps will have some shortness of breath but no chest pain.  Denies any dyspnea on exertion with ambulating otherwise.  Does not occur every time he goes up steps.  He denies any palpitations, lightness, dizziness, edema to his legs, or fatigue.  He has no bleeding issues being on his aspirin and Plavix.  He does not routinely check his blood pressure.      Below is a brief summary of patients pertinent cardiac findings:  He has history of type 2 diabetes, hypertension, and hyperlipidemia.  His father had a history of nonpremature coronary disease.     He presented in May 2024 with very atypical chest pain that did not sound cardiac. However, he reported exertional dyspnea, which was concerning. A stress echo was abnormal. He  "underwent coronary angiography, which revealed severe disease of the LAD (treated with rotational atherectomy and PCI with 3x48mm and 3x28mm BEBETO) and Cx/OM2 (treated with 2.5x15mm, 2.5x15mm, 2.5x33mm BEBETO). There was severe disease in the distal RCA that was not stented.     The following portions of the patient's history were reviewed and updated as appropriate: allergies, current medications, past family history, past medical history, past social history, past surgical history and problem list.      VITAL SIGNS     Visit Vitals  /60 (BP Location: Left arm, Patient Position: Sitting, Cuff Size: Adult)   Pulse 83   Ht 175.3 cm (69\")   Wt 75.8 kg (167 lb)   BMI 24.66 kg/m²       @[email or web address]  Wt Readings from Last 3 Encounters:   04/22/25 75.8 kg (167 lb)   01/22/25 72.6 kg (160 lb)   11/26/24 75.8 kg (167 lb)     Body mass index is 24.66 kg/m².        PHYSICAL EXAMINATION     Constitutional:       General: Awake. Not in acute distress.     Appearance: Not in distress.   Pulmonary:      Effort: Pulmonary effort is normal.      Breath sounds: Normal breath sounds.   Cardiovascular:      Normal rate. Regular rhythm.      Murmurs: There is no murmur.   Skin:     General: Skin is warm.   Neurological:      Mental Status: Alert.   Psychiatric:         Behavior: Behavior is cooperative.           REVIEWED DATA     Procedures    Cardiac Procedures:    Renal duplex renal artery B/L on 11-12-24    Normal right renal artery.    Normal left renal artery.    Adult stress echo on 6/6/2024  Interpretation Summary       Left ventricular systolic function is normal. Left ventricular ejection fraction appears to be 66 - 70%.    Left ventricular wall thickness is consistent with mild concentric hypertrophy.    Left ventricular diastolic function was normal.    Saline test results are negative.    Abnormal stress echo with echocardiographic evidence for myocardial ischemia. The following left ventricular wall segments are " hypokinetic at peak stress: apical anterior, apical septal, apex hypokinetic and mid anteroseptal.    1 mm horizontal ST depression is noted in the inferolateral leads at peak stress.  Abnormal stress study which is evaluated in the setting of a hypertensive response to stress.     Cardiac catheterization on 6/17/2024  Conclusions:  Successful IVUS guided PCI to severely calcified, RFR positive LAD with rotational atherectomy and overlapping 3.0 x 28mm and 3.0 x 48mm Xience Skypoint drug-eluting stents (postdilated from the ostium to the mid segment with a 3.5 mm NC to high-pressure)  Successful PCI to severe ostial circumflex and proximal OM2 stenoses with overlapping 2.5 x 15mm, 2.5 x 15mm, and 2.5 x 33mm Xience Skypoint drug-eluting stents.     Recommendations:   Continue DAPT for at least 6 months but consider indefinitely given location and number of stents  Continue aggressive risk factor modification      Lab Results   Component Value Date     11/08/2024     (H) 08/28/2024    K 3.8 11/08/2024    K 4.1 08/28/2024     11/08/2024     (H) 08/28/2024    CO2 25.0 11/08/2024    CO2 23 08/28/2024    BUN 20 11/08/2024    BUN 16 08/28/2024    CREATININE 1.61 (H) 11/08/2024    CREATININE 1.29 (H) 08/28/2024    EGFRIFNONA 76 10/18/2021    EGFRIFNONA 85 07/30/2021    EGFRIFAFRI 83 04/07/2021    EGFRIFAFRI 79 11/05/2020    GLUCOSE 144 (H) 11/08/2024    GLUCOSE 206 (H) 08/28/2024    CALCIUM 8.9 11/08/2024    CALCIUM 9.3 08/28/2024    ALBUMIN 3.8 08/28/2024    ALBUMIN 3.2 (L) 07/25/2024    BILITOT 0.3 08/28/2024    BILITOT 0.5 07/25/2024    AST 24 08/28/2024    AST 21 07/25/2024    ALT 18 08/28/2024    ALT 21 07/25/2024     Lab Results   Component Value Date    WBC 21.71 (H) 07/26/2024    WBC 17.32 (H) 07/25/2024    HGB 12.3 (L) 07/26/2024    HGB 13.1 07/25/2024    HCT 38.5 07/26/2024    HCT 40.4 07/25/2024    MCV 81.7 07/26/2024    MCV 80.8 07/25/2024     07/26/2024     07/25/2024      Lab Results   Component Value Date    PROBNP 1,224.0 (H) 10/29/2023    PROBNP 3,044.0 (H) 10/27/2023    .0 (H) 11/08/2023     Lab Results   Component Value Date    CKTOTAL 42 07/25/2024    TROPONINT 17 04/17/2024     Lab Results   Component Value Date    TSH 2.270 07/25/2024    TSH 3.170 06/24/2022             ASSESSMENT & PLAN     Diagnoses and all orders for this visit:    1. Coronary artery disease involving native coronary artery of native heart without angina pectoris (Primary)    2. Mixed hyperlipidemia    3. Essential hypertension    4. Stage 3a chronic kidney disease    5. Nonrheumatic mitral valve regurgitation    6.  Dyspnea on exertion    1/2. He is s/p extensive BEBETO placement to the LAD and Cx/OM2 in June 2024. He will remain on DAPT for at least one year given his stent burden. He is on pravastatin 80 mg daily. Lipids on 4-22-24 LDL 62, HDL 35, and triglycerides 101, and TC of 117. Repeat lipids ordered by PCP     3/4. His BP is poorly controlled. He has stage 3A/3B CKD. He has known vascular disease. Renal US was normal on 11-12-24.     5. He had moderate MR on echo in 2024. Will repeat echocardiogram.    6.  Likely multifactorial due being sedentary and diet habits.  He is going to be more active.  Will check echocardiogram.  He has no chest discomfort.      I will have him close follow-up in 3 months with Dr. Chanel.  He BP is improved to 130/60.  He has to be more active with healthier eating habits.  I will recheck echocardiogram to assess his moderate MR along with evaluating his dyspnea on exertion.  He has to call office with any new symptoms.    Return in about 3 months (around 7/22/2025) for Dr. Chanel.    Future Appointments         Provider Department Center    4/24/2025 9:00 AM Manuel Radford MD [facility] PRIMARY CARE [facility]    5/1/2025 4:30 PM [facility] ECHO CART 1 [facility] CARDIOLOGY [facility]    7/8/2025 10:30 AM Manuel Radford MD [facility] [place]  [facility] PRIMARY CARE [facility]    7/15/2025 10:15 AM Royal Chanel MD [facility] CARDIOLOGY [facility]                MEDICATIONS         Discharge Medications            Accurate as of April 22, 2025  9:25 AM. If you have any questions, ask your nurse or doctor.                Continue These Medications        Instructions Start Date   amLODIPine 10 MG tablet  Commonly known as: NORVASC   10 mg, Oral, Daily      APPLE CIDER VINEGAR PO   Take  by mouth.      ASPIR-81 PO   Daily      B-12 PO   1 tablet, Weekly      BENFOTIAMINE PO   300 mg, Daily      clopidogrel 75 MG tablet  Commonly known as: PLAVIX   75 mg, Oral, Daily      D3 ADULT PO   2,000 Units, Daily      dapagliflozin Propanediol 10 MG tablet   10 mg, Oral, Daily      glipizide 10 MG tablet  Commonly known as: GLUCOTROL   10 mg, Oral, 2 Times Daily Before Meals      hydrALAZINE 50 MG tablet  Commonly known as: APRESOLINE   50 mg, Oral, 2 Times Daily      hydrOXYzine 25 MG tablet  Commonly known as: ATARAX   TAKE 1 TABLET BY MOUTH 3 TIMES A DAY AS NEEDED FOR ITCHING      [facility] Glucose Test test strip  Generic drug: glucose blood   USE TO TEST BLOOD SUGAR TWO TIMES A DAY FOR TYPE 2 DIABETES      metoprolol tartrate 50 MG tablet  Commonly known as: LOPRESSOR   50 mg, Oral, 2 Times Daily      multivitamin with minerals tablet tablet   1 tablet, Daily      nitroglycerin 0.4 MG SL tablet  Commonly known as: NITROSTAT   1 under the tongue as needed for angina, may repeat q5mins for up three doses      OneTouch Verio Flex System w/Device kit   1 Device, Not Applicable, 2 Times Daily      pantoprazole 40 MG EC tablet  Commonly known as: PROTONIX   40 mg, Daily      pravastatin 80 MG tablet  Commonly known as: PRAVACHOL   80 mg, Oral, Daily      Rybelsus 7 MG tablet  Generic drug: Semaglutide   7 mg, Oral, Daily      VITAMIN C PO   500 mg, Daily      zinc sulfate 220 (50 Zn) MG capsule  Commonly known as: ZINCATE   220 mg, Daily                    **Dragon Disclaimer:   Much of this encounter note is an electronic transcription/translation of spoken language to printed text. The electronic translation of spoken language may permit erroneous, or at times, nonsensical words or phrases to be inadvertently transcribed. Although I have reviewed the note for such errors, some may still exist.

## 2025-04-24 ENCOUNTER — OFFICE VISIT (OUTPATIENT)
Dept: FAMILY MEDICINE CLINIC | Facility: CLINIC | Age: 75
End: 2025-04-24
Payer: MEDICARE

## 2025-04-24 VITALS
TEMPERATURE: 97.9 F | OXYGEN SATURATION: 97 % | DIASTOLIC BLOOD PRESSURE: 70 MMHG | SYSTOLIC BLOOD PRESSURE: 136 MMHG | HEIGHT: 69 IN | HEART RATE: 80 BPM | BODY MASS INDEX: 25.03 KG/M2 | WEIGHT: 169 LBS

## 2025-04-24 DIAGNOSIS — I25.10 CORONARY ARTERY DISEASE INVOLVING NATIVE CORONARY ARTERY OF NATIVE HEART WITHOUT ANGINA PECTORIS: ICD-10-CM

## 2025-04-24 DIAGNOSIS — E78.2 MIXED HYPERLIPIDEMIA: Primary | ICD-10-CM

## 2025-04-24 DIAGNOSIS — E11.65 TYPE 2 DIABETES MELLITUS WITH HYPERGLYCEMIA, WITHOUT LONG-TERM CURRENT USE OF INSULIN: ICD-10-CM

## 2025-04-24 DIAGNOSIS — M70.22 OLECRANON BURSITIS OF LEFT ELBOW: ICD-10-CM

## 2025-04-24 DIAGNOSIS — I10 ESSENTIAL HYPERTENSION: ICD-10-CM

## 2025-04-29 NOTE — PROGRESS NOTES
"  Subjective   Guy Plascencia is a 74 y.o. male who is here for   Chief Complaint   Patient presents with    Diabetes    left elbow swollen   .   Guy Plascencia presents to the office for follow-up of multiple medical problems.  Patient has diabetes type 2, hypertension, hyperlipidemia, and coronary artery disease.    He is also concerned today about left elbow swelling.  Patient denies any injury.  Patient denies any warmth or tenderness to area.    Diabetes  Visit type:  Follow-up  Diabetes type:  Type 2  Associated symptoms:     no chest pain, no fatigue and no weakness    Current treatments:  Oral agent (dual therapy)  Treatment compliance:  Most of the time  ACE-I / ARB:  Is being taken  Eye exam current: yes    Sees podiatrist: yes      Pertinent negative symptoms include no chest pain, no fatigue and no weakness.   Other symptom:  Do not know     History of Present Illness      Review of Systems   Constitutional:  Negative for fatigue.   Cardiovascular:  Negative for chest pain.   Neurological:  Negative for weakness.       Objective   Vitals:    04/24/25 0901   BP: 136/70   BP Location: Left arm   Patient Position: Sitting   Cuff Size: Adult   Pulse: 80   Temp: 97.9 °F (36.6 °C)   SpO2: 97%   Weight: 76.7 kg (169 lb)   Height: 175.3 cm (69.02\")      Physical Exam  Vitals and nursing note reviewed.   Constitutional:       Appearance: Normal appearance. He is normal weight.   HENT:      Head: Normocephalic and atraumatic.   Cardiovascular:      Rate and Rhythm: Normal rate and regular rhythm.      Pulses: Normal pulses.           Dorsalis pedis pulses are 2+ on the right side and 2+ on the left side.        Posterior tibial pulses are 2+ on the right side and 2+ on the left side.      Heart sounds: No murmur heard.  Pulmonary:      Effort: Pulmonary effort is normal. No respiratory distress.      Breath sounds: Normal breath sounds. No wheezing.   Musculoskeletal:      Left elbow: Swelling (olecranon bursa) " present. No effusion. Normal range of motion. No tenderness.   Feet:      Right foot:      Protective Sensation: 10 sites tested.  10 sites sensed.      Skin integrity: Skin integrity normal.      Left foot:      Protective Sensation: 10 sites tested.  10 sites sensed.      Skin integrity: Skin integrity normal.   Skin:     General: Skin is warm and dry.   Neurological:      General: No focal deficit present.      Mental Status: He is alert.   Psychiatric:         Mood and Affect: Mood normal.         Thought Content: Thought content normal.       Physical Exam        Assessment & Plan   Assessment & Plan    Diagnoses and all orders for this visit:    1. Mixed hyperlipidemia (Primary)  Stable.  Continue pravastatin 80 mg daily.  2. Essential hypertension  Blood pressure is well-controlled.  Continue current medications.  3. Coronary artery disease involving native coronary artery of native heart without angina pectoris  Stable no acute chest pain.  Continue Plavix 75 mg daily.  4. Type 2 diabetes mellitus with hyperglycemia, without long-term current use of insulin  Continue Rybelsus 7 mg daily.  Continue glipizide 10 mg twice daily.  5. Olecranon bursitis of left elbow  Slight swelling on today's exam.  Nontender to palpation.  Will continue to monitor.Discussed wearing elbow sleeve to help with swelling.    Results  Lab Results   Component Value Date    GLUCOSE 144 (H) 11/08/2024    BUN 20 11/08/2024    CREATININE 1.61 (H) 11/08/2024     11/08/2024    K 3.8 11/08/2024     11/08/2024    CALCIUM 8.9 11/08/2024    PROTEINTOT 6.4 08/28/2024    ALBUMIN 3.8 08/28/2024    ALT 18 08/28/2024    AST 24 08/28/2024    ALKPHOS 115 08/28/2024    BILITOT 0.3 08/28/2024    GLOB 2.6 08/28/2024    AGRATIO 1.2 07/25/2024    BCR 12.4 11/08/2024    ANIONGAP 10.0 11/08/2024    EGFR 44.6 (L) 11/08/2024     Lab Results   Component Value Date    GLUCOSE 144 (H) 11/08/2024    BUN 20 11/08/2024    CREATININE 1.61 (H) 11/08/2024      11/08/2024    K 3.8 11/08/2024     11/08/2024    CALCIUM 8.9 11/08/2024    PROTEINTOT 6.4 08/28/2024    ALBUMIN 3.8 08/28/2024    ALT 18 08/28/2024    AST 24 08/28/2024    ALKPHOS 115 08/28/2024    BILITOT 0.3 08/28/2024    GLOB 2.6 08/28/2024    AGRATIO 1.2 07/25/2024    BCR 12.4 11/08/2024    ANIONGAP 10.0 11/08/2024    EGFR 44.6 (L) 11/08/2024     Lab Results   Component Value Date    WBC 21.71 (H) 07/26/2024    HGB 12.3 (L) 07/26/2024    HCT 38.5 07/26/2024    MCV 81.7 07/26/2024     07/26/2024     Lab Results   Component Value Date    CHOL 109 06/24/2022    CHLPL 117 04/22/2024    TRIG 101 04/22/2024    HDL 36 (L) 04/22/2024    LDL 62 04/22/2024     Lab Results   Component Value Date    HGBA1C 7.2 (H) 08/28/2024     Lab Results   Component Value Date    CHOL 109 06/24/2022    CHLPL 117 04/22/2024    TRIG 101 04/22/2024    HDL 36 (L) 04/22/2024    LDL 62 04/22/2024     Lab Results   Component Value Date    MICROALBUR 1,247.8 04/22/2024           There are no Patient Instructions on file for this visit.    There are no discontinued medications.     Return in about 2 months (around 7/8/2025) for Medicare Wellness.  BMI is within normal parameters. No other follow-up for BMI required.        Manuel Radford MD  [facility]  [place], [place]

## 2025-05-01 ENCOUNTER — HOSPITAL ENCOUNTER (OUTPATIENT)
Dept: CARDIOLOGY | Facility: HOSPITAL | Age: 75
Discharge: HOME | End: 2025-05-01
Admitting: PHYSICIAN ASSISTANT
Payer: MEDICARE

## 2025-05-01 VITALS — HEIGHT: 69 IN | BODY MASS INDEX: 25.03 KG/M2 | WEIGHT: 169 LBS

## 2025-05-01 DIAGNOSIS — I10 ESSENTIAL HYPERTENSION: ICD-10-CM

## 2025-05-01 DIAGNOSIS — I34.0 NONRHEUMATIC MITRAL VALVE REGURGITATION: ICD-10-CM

## 2025-05-01 DIAGNOSIS — E78.2 MIXED HYPERLIPIDEMIA: ICD-10-CM

## 2025-05-01 DIAGNOSIS — I25.10 CORONARY ARTERY DISEASE INVOLVING NATIVE CORONARY ARTERY OF NATIVE HEART WITHOUT ANGINA PECTORIS: ICD-10-CM

## 2025-05-01 DIAGNOSIS — N18.31 STAGE 3A CHRONIC KIDNEY DISEASE: ICD-10-CM

## 2025-05-01 LAB
AORTIC DIMENSIONLESS INDEX: 0.66 (DI)
AV MEAN PRESS GRAD SYS DOP V1V2: 5 MMHG
AV VMAX SYS DOP: 150 CM/SEC
BH CV ECHO LEFT VENTRICLE GLOBAL LONGITUDINAL STRAIN: -14.5 %
BH CV ECHO MEAS - AO MAX PG: 9 MMHG
BH CV ECHO MEAS - AO ROOT DIAM: 3.4 CM
BH CV ECHO MEAS - AO V2 VTI: 29 CM
BH CV ECHO MEAS - AVA(I,D): 1.96 CM2
BH CV ECHO MEAS - EDV(CUBED): 110.6 ML
BH CV ECHO MEAS - EDV(MOD-SP2): 92 ML
BH CV ECHO MEAS - EDV(MOD-SP4): 91 ML
BH CV ECHO MEAS - EF(MOD-SP2): 62 %
BH CV ECHO MEAS - EF(MOD-SP4): 63.7 %
BH CV ECHO MEAS - ESV(CUBED): 30 ML
BH CV ECHO MEAS - ESV(MOD-SP2): 35 ML
BH CV ECHO MEAS - ESV(MOD-SP4): 33 ML
BH CV ECHO MEAS - FS: 35.3 %
BH CV ECHO MEAS - IVS/LVPW: 1 CM
BH CV ECHO MEAS - IVSD: 0.9 CM
BH CV ECHO MEAS - LAT PEAK E' VEL: 12.2 CM/SEC
BH CV ECHO MEAS - LV DIASTOLIC VOL/BSA (35-75): 47.3 CM2
BH CV ECHO MEAS - LV MASS(C)D: 147.8 GRAMS
BH CV ECHO MEAS - LV MAX PG: 4.1 MMHG
BH CV ECHO MEAS - LV MEAN PG: 2 MMHG
BH CV ECHO MEAS - LV SYSTOLIC VOL/BSA (12-30): 17.2 CM2
BH CV ECHO MEAS - LV V1 MAX: 101 CM/SEC
BH CV ECHO MEAS - LV V1 VTI: 19 CM
BH CV ECHO MEAS - LVIDD: 4.8 CM
BH CV ECHO MEAS - LVIDS: 3.1 CM
BH CV ECHO MEAS - LVOT AREA: 3 CM2
BH CV ECHO MEAS - LVOT DIAM: 1.95 CM
BH CV ECHO MEAS - LVPWD: 0.9 CM
BH CV ECHO MEAS - MED PEAK E' VEL: 9.1 CM/SEC
BH CV ECHO MEAS - MR MAX PG: 103 MMHG
BH CV ECHO MEAS - MR MAX VEL: 507.5 CM/SEC
BH CV ECHO MEAS - MV A DUR: 0.12 SEC
BH CV ECHO MEAS - MV A MAX VEL: 48.7 CM/SEC
BH CV ECHO MEAS - MV DEC SLOPE: 458 CM/SEC2
BH CV ECHO MEAS - MV DEC TIME: 0.25 SEC
BH CV ECHO MEAS - MV E MAX VEL: 109 CM/SEC
BH CV ECHO MEAS - MV E/A: 2.24
BH CV ECHO MEAS - MV MAX PG: 6.9 MMHG
BH CV ECHO MEAS - MV MEAN PG: 2.8 MMHG
BH CV ECHO MEAS - MV P1/2T: 86.6 MSEC
BH CV ECHO MEAS - MV V2 VTI: 28.4 CM
BH CV ECHO MEAS - MVA(P1/2T): 2.5 CM2
BH CV ECHO MEAS - MVA(VTI): 2 CM2
BH CV ECHO MEAS - PA ACC TIME: 0.11 SEC
BH CV ECHO MEAS - PA V2 MAX: 99.6 CM/SEC
BH CV ECHO MEAS - PULM A REVS DUR: 0.08 SEC
BH CV ECHO MEAS - PULM A REVS VEL: 32.4 CM/SEC
BH CV ECHO MEAS - PULM DIAS VEL: 87.6 CM/SEC
BH CV ECHO MEAS - PULM S/D: 0.53
BH CV ECHO MEAS - PULM SYS VEL: 46.1 CM/SEC
BH CV ECHO MEAS - RAP SYSTOLE: 3 MMHG
BH CV ECHO MEAS - RV MAX PG: 2.6 MMHG
BH CV ECHO MEAS - RV V1 MAX: 80.2 CM/SEC
BH CV ECHO MEAS - RV V1 VTI: 16.6 CM
BH CV ECHO MEAS - RVSP: 28.3 MMHG
BH CV ECHO MEAS - SV(LVOT): 56.8 ML
BH CV ECHO MEAS - SV(MOD-SP2): 57 ML
BH CV ECHO MEAS - SV(MOD-SP4): 58 ML
BH CV ECHO MEAS - SVI(LVOT): 29.5 ML/M2
BH CV ECHO MEAS - SVI(MOD-SP2): 29.6 ML/M2
BH CV ECHO MEAS - SVI(MOD-SP4): 30.2 ML/M2
BH CV ECHO MEAS - TAPSE (>1.6): 2.29 CM
BH CV ECHO MEAS - TR MAX PG: 25.3 MMHG
BH CV ECHO MEAS - TR MAX VEL: 251.3 CM/SEC
BH CV ECHO MEASUREMENTS AVERAGE E/E' RATIO: 10.23
BH CV XLRA - RV BASE: 3.8 CM
BH CV XLRA - TDI S': 15.7 CM/SEC
LEFT ATRIUM VOLUME INDEX: 23.2 ML/M2
LV EF BIPLANE MOD: 62.8 %

## 2025-05-01 PROCEDURE — 93306 TTE W/DOPPLER COMPLETE: CPT

## 2025-05-01 PROCEDURE — 93356 MYOCRD STRAIN IMG SPCKL TRCK: CPT

## 2025-05-02 ENCOUNTER — LAB (OUTPATIENT)
Dept: LAB | Facility: HOSPITAL | Age: 75
End: 2025-05-02
Payer: MEDICARE

## 2025-05-02 ENCOUNTER — RESULTS FOLLOW-UP (OUTPATIENT)
Age: 75
End: 2025-05-02
Payer: MEDICARE

## 2025-05-02 DIAGNOSIS — I25.10 ATHEROSCLEROSIS OF NATIVE CORONARY ARTERY WITHOUT ANGINA PECTORIS, UNSPECIFIED WHETHER NATIVE OR TRANSPLANTED HEART: ICD-10-CM

## 2025-05-02 DIAGNOSIS — I10 ESSENTIAL HYPERTENSION, MALIGNANT: ICD-10-CM

## 2025-05-02 DIAGNOSIS — E11.65 INADEQUATELY CONTROLLED DIABETES MELLITUS: ICD-10-CM

## 2025-05-02 DIAGNOSIS — E78.2 MIXED HYPERLIPIDEMIA: Primary | ICD-10-CM

## 2025-05-02 LAB
ALBUMIN SERPL-MCNC: 4.1 G/DL (ref 3.5–5.2)
ALBUMIN UR-MCNC: 46.7 MG/DL
ALBUMIN/GLOB SERPL: 1.6 G/DL
ALP SERPL-CCNC: 95 U/L (ref 39–117)
ALT SERPL W P-5'-P-CCNC: 10 U/L (ref 1–41)
ANION GAP SERPL CALCULATED.3IONS-SCNC: 10.9 MMOL/L (ref 5–15)
AST SERPL-CCNC: 16 U/L (ref 1–40)
BILIRUB SERPL-MCNC: 0.3 MG/DL (ref 0–1.2)
BUN SERPL-MCNC: 18 MG/DL (ref 8–23)
BUN/CREAT SERPL: 10.8 (ref 7–25)
CALCIUM SPEC-SCNC: 9.1 MG/DL (ref 8.6–10.5)
CHLORIDE SERPL-SCNC: 108 MMOL/L (ref 98–107)
CHOLEST SERPL-MCNC: 99 MG/DL (ref 0–200)
CO2 SERPL-SCNC: 23.1 MMOL/L (ref 22–29)
CREAT SERPL-MCNC: 1.66 MG/DL (ref 0.76–1.27)
CREAT UR-MCNC: 70.4 MG/DL
EGFRCR SERPLBLD CKD-EPI 2021: 42.7 ML/MIN/1.73
GLOBULIN UR ELPH-MCNC: 2.6 GM/DL
GLUCOSE SERPL-MCNC: 189 MG/DL (ref 65–99)
HBA1C MFR BLD: 8.6 % (ref 4.8–5.6)
HDLC SERPL-MCNC: 35 MG/DL (ref 40–60)
LDLC SERPL CALC-MCNC: 47 MG/DL (ref 0–100)
LDLC/HDLC SERPL: 1.35 {RATIO}
MICROALBUMIN/CREAT UR: 663.4 MG/G (ref 0–29)
POTASSIUM SERPL-SCNC: 4.6 MMOL/L (ref 3.5–5.2)
PROT SERPL-MCNC: 6.7 G/DL (ref 6–8.5)
SODIUM SERPL-SCNC: 142 MMOL/L (ref 136–145)
TRIGL SERPL-MCNC: 84 MG/DL (ref 0–150)
TSH SERPL DL<=0.05 MIU/L-ACNC: 4.03 UIU/ML (ref 0.27–4.2)
VLDLC SERPL-MCNC: 17 MG/DL (ref 5–40)

## 2025-05-02 PROCEDURE — 84443 ASSAY THYROID STIM HORMONE: CPT

## 2025-05-02 PROCEDURE — 82570 ASSAY OF URINE CREATININE: CPT

## 2025-05-02 PROCEDURE — 80061 LIPID PANEL: CPT

## 2025-05-02 PROCEDURE — 82043 UR ALBUMIN QUANTITATIVE: CPT

## 2025-05-02 PROCEDURE — 36415 COLL VENOUS BLD VENIPUNCTURE: CPT

## 2025-05-02 PROCEDURE — 80053 COMPREHEN METABOLIC PANEL: CPT

## 2025-05-02 PROCEDURE — 83036 HEMOGLOBIN GLYCOSYLATED A1C: CPT

## 2025-05-19 ENCOUNTER — LAB (OUTPATIENT)
Dept: LAB | Facility: HOSPITAL | Age: 75
End: 2025-05-19
Payer: MEDICARE

## 2025-05-19 PROCEDURE — 85025 COMPLETE CBC W/AUTO DIFF WBC: CPT | Performed by: FAMILY MEDICINE

## 2025-05-19 PROCEDURE — 84466 ASSAY OF TRANSFERRIN: CPT | Performed by: FAMILY MEDICINE

## 2025-05-19 PROCEDURE — 83540 ASSAY OF IRON: CPT | Performed by: FAMILY MEDICINE

## 2025-05-19 RX ORDER — GLIPIZIDE 10 MG/1
10 TABLET ORAL
Qty: 180 TABLET | Refills: 0 | Status: SHIPPED | OUTPATIENT
Start: 2025-05-19

## 2025-05-28 DIAGNOSIS — I10 ESSENTIAL HYPERTENSION: ICD-10-CM

## 2025-05-28 RX ORDER — METOPROLOL TARTRATE 50 MG
50 TABLET ORAL 2 TIMES DAILY
Qty: 180 TABLET | Refills: 0 | Status: SHIPPED | OUTPATIENT
Start: 2025-05-28

## 2025-06-16 RX ORDER — CLOPIDOGREL BISULFATE 75 MG/1
75 TABLET ORAL DAILY
Qty: 90 TABLET | Refills: 3 | Status: SHIPPED | OUTPATIENT
Start: 2025-06-16

## 2025-06-18 ENCOUNTER — LAB (OUTPATIENT)
Dept: LAB | Facility: HOSPITAL | Age: 75
End: 2025-06-18
Payer: MEDICARE

## 2025-06-18 ENCOUNTER — TRANSCRIBE ORDERS (OUTPATIENT)
Dept: ADMINISTRATIVE | Facility: HOSPITAL | Age: 75
End: 2025-06-18
Payer: COMMERCIAL

## 2025-06-18 DIAGNOSIS — N18.31 CHRONIC KIDNEY DISEASE (CKD) STAGE G3A/A1, MODERATELY DECREASED GLOMERULAR FILTRATION RATE (GFR) BETWEEN 45-59 ML/MIN/1.73 SQUARE METER AND ALBUMINURIA CREATININE RATIO LESS THAN 30 MG/G (CMS/H*: Primary | ICD-10-CM

## 2025-06-18 DIAGNOSIS — N18.31 CHRONIC KIDNEY DISEASE (CKD) STAGE G3A/A1, MODERATELY DECREASED GLOMERULAR FILTRATION RATE (GFR) BETWEEN 45-59 ML/MIN/1.73 SQUARE METER AND ALBUMINURIA CREATININE RATIO LESS THAN 30 MG/G (CMS/H*: ICD-10-CM

## 2025-06-18 LAB
ANION GAP SERPL CALCULATED.3IONS-SCNC: 11.2 MMOL/L (ref 5–15)
BUN SERPL-MCNC: 19 MG/DL (ref 8–23)
BUN/CREAT SERPL: 12.8 (ref 7–25)
CALCIUM SPEC-SCNC: 9.6 MG/DL (ref 8.6–10.5)
CHLORIDE SERPL-SCNC: 108 MMOL/L (ref 98–107)
CO2 SERPL-SCNC: 22.8 MMOL/L (ref 22–29)
CREAT SERPL-MCNC: 1.49 MG/DL (ref 0.76–1.27)
CREAT UR-MCNC: 100.6 MG/DL
EGFRCR SERPLBLD CKD-EPI 2021: 48.6 ML/MIN/1.73
GLUCOSE SERPL-MCNC: 137 MG/DL (ref 65–99)
POTASSIUM SERPL-SCNC: 4.2 MMOL/L (ref 3.5–5.2)
PROT ?TM UR-MCNC: 89.1 MG/DL
PROT/CREAT UR: 885.7 MG/G CREA (ref 0–200)
SODIUM SERPL-SCNC: 142 MMOL/L (ref 136–145)

## 2025-06-18 PROCEDURE — 84156 ASSAY OF PROTEIN URINE: CPT

## 2025-06-18 PROCEDURE — 82570 ASSAY OF URINE CREATININE: CPT

## 2025-06-18 PROCEDURE — 80048 BASIC METABOLIC PNL TOTAL CA: CPT

## 2025-06-18 PROCEDURE — 36415 COLL VENOUS BLD VENIPUNCTURE: CPT

## 2025-06-19 NOTE — PROGRESS NOTES
RM:________     PCP: Manuel Radford MD    : 1950  AGE: 74 y.o.  EST PATIENT   REASON FOR VISIT/  CC:    Wt Readings from Last 3 Encounters:   25 76.7 kg (169 lb)   25 76.7 kg (169 lb)   25 75.8 kg (167 lb)      BP Readings from Last 3 Encounters:   25 136/70   25 130/60   25 174/85      WT: ____________ BP: __________L __________R HR______    ALLERGIES:Metformin, Penicillins, and Cimetidine SMOKING HISTORY:  Social History     Tobacco Use    Smoking status: Former     Current packs/day: 0.00     Average packs/day: 2.0 packs/day for 14.0 years (28.0 ttl pk-yrs)     Types: Cigarettes     Start date: 1958     Quit date: 1972     Years since quittin.5     Passive exposure: Never    Smokeless tobacco: Never    Tobacco comments:     I have not smoked for 50+ years   Vaping Use    Vaping status: Never Used   Substance Use Topics    Alcohol use: No    Drug use: No     CAFFEINE USE_________________  ALCOHOL ______________________    Below is the patient's most recent value for Albumin, ALT, AST, BUN, Calcium, Chloride, Cholesterol, CO2, Creatinine, GFR, Glucose, HDL, Hematocrit, Hemoglobin, Hemoglobin A1C, LDL, Magnesium, Phosphorus, Platelets, Potassium, PSA, Sodium, Triglycerides, TSH and WBC.   Lab Results   Component Value Date    ALBUMIN 4.1 2025    ALT 10 2025    AST 16 2025    BUN 19.0 2025    CALCIUM 9.6 2025     (H) 2025    CHOL 99 2025    CO2 22.8 2025    CREATININE 1.49 (H) 2025    HDL 35 (L) 2025    HCT 33.7 (L) 2025    HGB 8.8 (L) 2025    HGBA1C 8.60 (H) 2025    LDL 47 2025    MG 2.1 2024     (L) 2025    K 4.2 2025    PSA 1.010 2024     2025    TRIG 84 2025    TSH 4.030 2025    WBC 6.29 2025          NEW DIAGNOSIS/ SURGERY/ HOSP OR ED VISITS:  ______________________    __________________________________________________________________      RECENT LABS OR DIAGNOSTIC TESTING:  _____________________________    __________________________________________________________________      ASSESSMENT/ PLAN: _______________________________________________    __________________________________________________________________

## 2025-07-01 RX ORDER — HYDRALAZINE HYDROCHLORIDE 50 MG/1
50 TABLET, FILM COATED ORAL 2 TIMES DAILY
Qty: 180 TABLET | Refills: 1 | Status: SHIPPED | OUTPATIENT
Start: 2025-07-01

## 2025-07-03 DIAGNOSIS — E11.65 TYPE 2 DIABETES MELLITUS WITH HYPERGLYCEMIA, WITHOUT LONG-TERM CURRENT USE OF INSULIN: ICD-10-CM

## 2025-07-03 RX ORDER — BLOOD-GLUCOSE METER
EACH MISCELLANEOUS
Qty: 100 EACH | Refills: 12 | Status: SHIPPED | OUTPATIENT
Start: 2025-07-03 | End: 2025-07-07 | Stop reason: SDUPTHER

## 2025-07-03 RX ORDER — PANTOPRAZOLE SODIUM 40 MG/1
40 TABLET, DELAYED RELEASE ORAL DAILY
Qty: 90 TABLET | Refills: 0 | Status: SHIPPED | OUTPATIENT
Start: 2025-07-03

## 2025-07-03 NOTE — TELEPHONE ENCOUNTER
Caller: Guy Plascencia    Relationship: Self    Best call back number: [phone]    Requested Prescriptions:   Requested Prescriptions     Pending Prescriptions Disp Refills    glucose blood ([employer] Glucose Test) test strip 100 each 12     Sig: Use as instructed    pantoprazole (PROTONIX) 40 MG EC tablet       Sig: Take 1 tablet by mouth Daily.        Pharmacy where request should be sent: [id number] [place] [facility] [address] - [phone]  - [phone]     Last office visit with prescribing clinician: 4/24/2025   Last telemedicine visit with prescribing clinician: Visit date not found   Next office visit with prescribing clinician: 7/29/2025     Additional details provided by patient: STATED THAT THEY HAVE BEEN OUT OF THE PANTOPRAZOLE FOR 2 WEEKS NOW AND ARE ALMOST OUT OF THE TEST STRIPS. STATED THAT THEY ARE ABOUT TO GO OUT OF TOWN AND DO NOT HAVE ENOUGH OF THEM TO LAST UNTIL THEY GET BACK.     Does the patient have less than a 3 day supply:  [x] Yes  [] No    Would you like a call back once the refill request has been completed: [] Yes [x] No    If the office needs to give you a call back, can they leave a voicemail: [] Yes [x] No    Carlos Ribera   07/03/25 13:29 EDT

## 2025-07-07 DIAGNOSIS — E11.65 TYPE 2 DIABETES MELLITUS WITH HYPERGLYCEMIA, WITHOUT LONG-TERM CURRENT USE OF INSULIN: ICD-10-CM

## 2025-07-07 RX ORDER — BLOOD-GLUCOSE METER
EACH MISCELLANEOUS
Qty: 100 EACH | Refills: 12 | Status: SHIPPED | OUTPATIENT
Start: 2025-07-07

## 2025-07-15 ENCOUNTER — OFFICE VISIT (OUTPATIENT)
Dept: CARDIOLOGY | Facility: CLINIC | Age: 75
End: 2025-07-15
Payer: MEDICARE

## 2025-07-15 VITALS
WEIGHT: 175.7 LBS | DIASTOLIC BLOOD PRESSURE: 60 MMHG | HEART RATE: 78 BPM | BODY MASS INDEX: 26.02 KG/M2 | HEIGHT: 69 IN | SYSTOLIC BLOOD PRESSURE: 140 MMHG

## 2025-07-15 DIAGNOSIS — N18.31 STAGE 3A CHRONIC KIDNEY DISEASE: ICD-10-CM

## 2025-07-15 DIAGNOSIS — E78.2 MIXED HYPERLIPIDEMIA: ICD-10-CM

## 2025-07-15 DIAGNOSIS — I34.0 NONRHEUMATIC MITRAL VALVE REGURGITATION: ICD-10-CM

## 2025-07-15 DIAGNOSIS — I10 ESSENTIAL HYPERTENSION: ICD-10-CM

## 2025-07-15 DIAGNOSIS — I25.10 CORONARY ARTERY DISEASE INVOLVING NATIVE CORONARY ARTERY OF NATIVE HEART WITHOUT ANGINA PECTORIS: Primary | ICD-10-CM

## 2025-07-15 RX ORDER — ATORVASTATIN CALCIUM 80 MG/1
80 TABLET, FILM COATED ORAL DAILY
Qty: 90 TABLET | Refills: 3 | Status: SHIPPED | OUTPATIENT
Start: 2025-07-15

## 2025-07-15 RX ORDER — LISINOPRIL 5 MG/1
5 TABLET ORAL DAILY
COMMUNITY
Start: 2025-06-25 | End: 2026-06-25

## 2025-07-15 NOTE — PROGRESS NOTES
Date of Office Visit: 07/15/25  Encounter Provider: Royal Chanel MD  Place of Service: [facility] CARDIOLOGY  Patient Name: Guy Plascencia  :1950    Chief Complaint   Patient presents with   • Follow-up     3 month   :     HPI:     Mr. Plascencia is 74 y.o. and presents today in follow up. I have reviewed prior notes and there are no changes except for any new updates described below. I have also reviewed any information entered into the medical record by the patient or by ancillary staff.     He has history of type 2 diabetes, hypertension, and hyperlipidemia.  His father had a history of nonpremature coronary disease.    He presented in May 2024 with very atypical chest pain that did not sound cardiac. However, he reported exertional dyspnea, which was concerning. A stress echo was abnormal. He underwent coronary angiography, which revealed severe disease of the LAD (treated with rotational atherectomy and PCI with 3x48mm and 3x28mm BEBETO) and Cx/OM2 (treated with 2.5x15mm, 2.5x15mm, 2.5x33mm BEBETO). There was severe disease in the distal RCA that was not stented.    He denies chest pain or dyspnea. He denies orthopnea, PND, palps, LH, or syncope.     Past Medical History:   Diagnosis Date   • Allergic    • Coronary artery disease    • Eczema 2016   • Erectile dysfunction    • GERD (gastroesophageal reflux disease)    • H/O  Multiple facial fractures 1968   • H/O Broken femur 1968   • History of transfusion        • Hyperlipidemia    • Hypertension    • Hypogonadism in male 2016   • Kidney stone    • Neoplasm of uncertain behavior    • Osteoarthritis    • Pancreatic cyst 2018   • Peptic ulceration    • Plantar fasciitis    • Sleep apnea     CPAP   • Splenomegaly 2018   • Stage 3a chronic kidney disease 2024   • Steatohepatitis 2018   • Thrombocytopenia 2016   • Thrombocytosis    • Trigger middle finger of right hand 02/15/2017   •  Type 2 diabetes mellitus        Past Surgical History:   Procedure Laterality Date   • CARDIAC CATHETERIZATION N/A 06/17/2024    Procedure: Coronary angiography;  Surgeon: Myron Cox MD;  Location:  [facility] INVASIVE [facility]ATION;  Service: Cardiovascular;  Laterality: N/A;  NO LV GRAM   • CARDIAC CATHETERIZATION N/A 06/17/2024    Procedure: Left heart cath;  Surgeon: Myron Cox MD;  Location:  [facility];  Service: Cardiovascular;  Laterality: N/A;  NO LV GRAM   • CARDIAC CATHETERIZATION N/A 06/17/2024    Procedure: Resting Full Cycle Ratio;  Surgeon: Myron Cox MD;  Location:  [facility];  Service: Cardiovascular;  Laterality: N/A;   • CARDIAC CATHETERIZATION N/A 06/17/2024    Procedure: Atherectomy-coronary;  Surgeon: Myron Cox MD;  Location: [facility] C[facility]VAS[facility] LOC[facility];  Service: Cardiovascular;  Laterality: N/A;   • CARDIAC CATHETERIZATION N/A 06/17/2024    Procedure: Stent BEBETO coronary;  Surgeon: Myron Cox MD;  Location: [facility];  Service: Cardiovascular;  Laterality: N/A;   • CARDIAC CATHETERIZATION N/A 06/17/2024    Procedure: Percutaneous Coronary Intervention;  Surgeon: Myron Cox MD;  [facility]: [facility];  Service: Cardiovascular;  Laterality: N/A;   • CARDIAC CATHETERIZATION  6/17/2024   • COLONOSCOPY     • CORONARY STENT PLACEMENT     • COSMETIC SURGERY  11/1968    MULTIPLE FACIAL FRACTURE   • CYSTOSCOPY URETEROSCOPY STONE MANIPULATION/EXTRACTION Left 06/29/2016    Procedure: CYSTOSCOPY, LEFT URETEROSCOPY, BASKET EXTRACTION OF LEFT URETERAL STONE, LEFT STENT PLACEMENT;  Surgeon: Ish Gaitan MD;  Location: [facility] OR;  Service:    • ENDOSCOPY N/A 10/02/2019    Procedure: ESOPHAGOGASTRODUODENOSCOPY with biopsies and dilatation to 20mm;  Surgeon: Marilou Mojica MD;  Location: [facility] OR;  Service: Gastroenterology   • EXTRACORPOREAL SHOCK WAVE LITHOTRIPSY (ESWL) Right 07/29/2016    Procedure: RT EXTRACORPOREAL  SHOCKWAVE LITHOTRIPSY;  Surgeon: Ish Gaitan MD;  Location: [facility] MAIN OR;  Service:    • FEMUR SURGERY  1968   • FRACTURE SURGERY      femur fx   • INTERVENTIONAL RADIOLOGY PROCEDURE N/A 2024    Procedure: Intravascular Ultrasound;  Surgeon: Myron Cox MD;  Location: [facility] CATH INVASIVE LOCATION;  Service: Cardiovascular;  Laterality: N/A;   • OTHER SURGICAL HISTORY      stents, for kidney stones   • OTHER SURGICAL HISTORY      lithotripsy   • SKIN BIOPSY         Social History     Socioeconomic History   • Marital status:      Spouse name: Jessiac   • Years of education: College   Tobacco Use   • Smoking status: Former     Current packs/day: 0.00     Average packs/day: 2.0 packs/day for 14.0 years (28.0 ttl pk-yrs)     Types: Cigarettes     Start date: 1958     Quit date:      Years since quittin.5     Passive exposure: Never   • Smokeless tobacco: Never   • Tobacco comments:     I have not smoked for 50+ years   Vaping Use   • Vaping status: Never Used   Substance and Sexual Activity   • Alcohol use: No   • Drug use: No   • Sexual activity: Not Currently     Partners: Female     Birth control/protection: Vasectomy       Family History   Problem Relation Age of Onset   • Heart disease Father            • Heart attack Father 50   • Skin cancer Brother 50   • Cancer Brother    • Cancer Other         Kidney, throat, skin   • Colon polyps Neg Hx    • Colon cancer Neg Hx        Review of Systems   Musculoskeletal:  Positive for arthritis.   All other systems reviewed and are negative.      Allergies   Allergen Reactions   • Metformin GI Intolerance     Abnormal creatinine   • Penicillins Unknown - Low Severity     ALLERGY TESTING SHOWED ALL TO PCN.    • Cimetidine Rash         Current Outpatient Medications:   •  amLODIPine (NORVASC) 10 MG tablet, Take 1 tablet by mouth Daily., Disp: 90 tablet, Rfl: 3  •  APPLE CIDER VINEGAR PO, Take  by mouth., Disp: , Rfl:   •  Ascorbic  Acid (VITAMIN C PO), Take 500 mg by mouth Daily., Disp: , Rfl:   •  BENFOTIAMINE PO, Take 300 mg by mouth Daily., Disp: , Rfl:   •  Blood Glucose Monitoring Suppl (ONETOUCH VERIO FLEX SYSTEM) w/Device kit, 1 Device 2 (Two) Times a Day., Disp: 1 kit, Rfl: 0  •  Cholecalciferol (D3 ADULT PO), Take 2,000 Units by mouth Daily., Disp: , Rfl:   •  clopidogrel (PLAVIX) 75 MG tablet, Take 1 tablet by mouth Daily., Disp: 90 tablet, Rfl: 3  •  Cyanocobalamin (B-12 PO), Take 1 tablet by mouth 1 (One) Time Per Week., Disp: , Rfl:   •  dapagliflozin Propanediol 10 MG tablet, Take 10 mg by mouth Daily., Disp: 30 tablet, Rfl: 11  •  ferrous gluconate (FERGON) 324 MG tablet, Take 1 tablet by mouth Daily With Breakfast., Disp: 90 tablet, Rfl: 1  •  glipizide (GLUCOTROL) 10 MG tablet, TAKE 1 TABLET BY MOUTH 2 TIMES A DAY BEFORE MEALS, Disp: 180 tablet, Rfl: 0  •  glucose blood ([facility] Glucose Test) test strip, Use to check blood sugar once a day, Disp: 100 each, Rfl: 12  •  hydrALAZINE (APRESOLINE) 50 MG tablet, TAKE 1 TABLET BY MOUTH 2 TIMES A DAY, Disp: 180 tablet, Rfl: 1  •  hydrOXYzine (ATARAX) 25 MG tablet, TAKE 1 TABLET BY MOUTH 3 TIMES A DAY AS NEEDED FOR ITCHING, Disp: 60 tablet, Rfl: 1  •  lisinopril (PRINIVIL,ZESTRIL) 5 MG tablet, Take 1 tablet by mouth Daily., Disp: , Rfl:   •  metoprolol tartrate (LOPRESSOR) 50 MG tablet, TAKE 1 TABLET BY MOUTH 2 TIMES A DAY, Disp: 180 tablet, Rfl: 0  •  multivitamin with minerals tablet tablet, Take 1 tablet by mouth Daily., Disp: , Rfl:   •  nitroglycerin (NITROSTAT) 0.4 MG SL tablet, 1 under the tongue as needed for angina, may repeat q5mins for up three doses, Disp: 45 tablet, Rfl: 11  •  pantoprazole (PROTONIX) 40 MG EC tablet, Take 1 tablet by mouth Daily., Disp: 90 tablet, Rfl: 0  •  Semaglutide (Rybelsus) 7 MG tablet, Take 7 mg by mouth Daily., Disp: 30 tablet, Rfl: 11  •  zinc sulfate (ZINCATE) 220 (50 Zn) MG capsule, Take 1 capsule by mouth Daily., Disp: , Rfl:   •   "atorvastatin (LIPITOR) 80 MG tablet, Take 1 tablet by mouth Daily., Disp: 90 tablet, Rfl: 3      Objective:     Vitals:    07/15/25 1035   BP: 140/60   BP Location: Right arm   Patient Position: Lying   Cuff Size: Adult   Pulse: 78   Weight: 79.7 kg (175 lb 11.2 oz)   Height: 175.3 cm (69\")     Body mass index is 25.95 kg/m².    Vitals reviewed.   Constitutional:       Appearance: Well-developed and not in distress.   Eyes:      Conjunctiva/sclera: Conjunctivae normal.   HENT:      Head: Normocephalic.      Nose: Nose normal.   Neck:      Thyroid: Thyroid normal.      Vascular: No JVD. JVD normal.      Lymphadenopathy: No cervical adenopathy.   Pulmonary:      Effort: Pulmonary effort is normal.      Breath sounds: Normal breath sounds.   Cardiovascular:      Normal rate. Regular rhythm.      Murmurs: There is a grade 2/6 holosystolic murmur.   Pulses:     Intact distal pulses.   Edema:     Peripheral edema absent.   Abdominal:      Palpations: Abdomen is soft.      Tenderness: There is no abdominal tenderness.   Musculoskeletal: Normal range of motion.      Cervical back: Normal range of motion. Skin:     General: Skin is warm and dry.   Neurological:      General: No focal deficit present.      Mental Status: Alert and oriented to person, place, and time.      Cranial Nerves: No cranial nerve deficit.   Psychiatric:         Behavior: Behavior normal.         Thought Content: Thought content normal.         Judgment: Judgment normal.         ECG 12 Lead    Date/Time: 7/15/2025 10:45 AM  Performed by: Royal Chanel MD    Authorized by: Royal Chanel MD  Comparison: compared with previous ECG   Similar to previous ECG  Rhythm: sinus rhythm  Conduction: conduction normal  Other findings: non-specific ST-T wave changes    Clinical impression: non-specific ECG          Assessment:       Diagnosis Plan   1. Coronary artery disease involving native coronary artery of native heart without angina pectoris  ECG 12 Lead    "   2. Mixed hyperlipidemia        3. Essential hypertension        4. Stage 3a chronic kidney disease        5. Nonrheumatic mitral valve regurgitation  Adult Transthoracic Echo Complete W/ Cont if Necessary Per Protocol             Plan:       1/2. He is s/p extensive BEBETO placement to the LAD and Cx/OM2 in June 2024. He has remained on DAPT. I'm going to switch him to clopidogrel as monotherapy given his stent burden. He is on pravastatin; I stopped this and ordered atorvastatin 80mg.    3/4. His BP is relatively well controlled. He has stage 3A/3B CKD. He has known vascular disease, but a renal artery duplex was normal in 2024. He follows with Dr Mayers.    5. He had moderate MR on echo in 2024 and 2025; this should be rechecked in year.     Sincerely,       Royal Chanel MD

## 2025-07-15 NOTE — LETTER
July 15, 2025     Manuel Radford MD  [address] [facility] [address]    Patient: Guy Plascencia   YOB: 1950   Date of Visit: 7/15/2025     Dear Manuel Radford MD:       Thank you for referring Guy Plascencia to me for evaluation. Below are the relevant portions of my assessment and plan of care.    If you have questions, please do not hesitate to call me. I look forward to following Guy along with you.         Sincerely,        Royal Chanel MD        CC: MD Darío Gonzalez Jamie D, MD  07/15/25 1105  Sign when Signing Visit  Date of Office Visit: 07/15/25  Encounter Provider: Royal Chanel MD  Place of Service: [facility] CARDIOLOGY  Patient Name: Guy Plascencia  :1950    Chief Complaint   Patient presents with   • Follow-up     3 month   :     HPI:     Mr. Plascencia is 74 y.o. and presents today in follow up. I have reviewed prior notes and there are no changes except for any new updates described below. I have also reviewed any information entered into the medical record by the patient or by ancillary staff.     He has history of type 2 diabetes, hypertension, and hyperlipidemia.  His father had a history of nonpremature coronary disease.    He presented in May 2024 with very atypical chest pain that did not sound cardiac. However, he reported exertional dyspnea, which was concerning. A stress echo was abnormal. He underwent coronary angiography, which revealed severe disease of the LAD (treated with rotational atherectomy and PCI with 3x48mm and 3x28mm BEBETO) and Cx/OM2 (treated with 2.5x15mm, 2.5x15mm, 2.5x33mm BEBETO). There was severe disease in the distal RCA that was not stented.    He denies chest pain or dyspnea. He denies orthopnea, PND, palps, LH, or syncope.     Past Medical History:   Diagnosis Date   • Allergic    • Coronary artery disease    • Eczema 2016   • Erectile dysfunction    • GERD  (gastroesophageal reflux disease)    • H/O  Multiple facial fractures 11/1968   • H/O Broken femur 11/1968   • History of transfusion     1968   • Hyperlipidemia    • Hypertension    • Hypogonadism in male 03/03/2016   • Kidney stone    • Neoplasm of uncertain behavior    • Osteoarthritis    • Pancreatic cyst 05/31/2018   • Peptic ulceration    • Plantar fasciitis    • Sleep apnea     CPAP   • Splenomegaly 05/31/2018   • Stage 3a chronic kidney disease 05/28/2024   • Steatohepatitis 05/31/2018   • Thrombocytopenia 03/03/2016   • Thrombocytosis    • Trigger middle finger of right hand 02/15/2017   • Type 2 diabetes mellitus        Past Surgical History:   Procedure Laterality Date   • CARDIAC CATHETERIZATION N/A 06/17/2024    Procedure: Coronary angiography;  Surgeon: Myron Cox MD;  Location:  [facility];  Service: Cardiovascular;  Laterality: N/A;  NO LV GRAM   • CARDIAC CATHETERIZATION N/A 06/17/2024    Procedure: Left heart cath;  Surgeon: Myron Cox MD;  Location:  [facility];  Service: Cardiovascular;  Laterality: N/A;  NO LV GRAM   • CARDIAC CATHETERIZATION N/A 06/17/2024    Procedure: Resting Full Cycle Ratio;  Surgeon: Myron Cox MD;  Location:  [facility];  Service: Cardiovascular;  Laterality: N/A;   • CARDIAC CATHETERIZATION N/A 06/17/2024    Procedure: Atherectomy-coronary;  Surgeon: Myron Cox MD;  Location:  [facility];  Service: Cardiovascular;  Laterality: N/A;   • CARDIAC CATHETERIZATION N/A 06/17/2024    Procedure: Stent BEBETO coronary;  Surgeon: Myron Cox MD;  Location:  [facility];  Service: Cardiovascular;  Laterality: N/A;   • CARDIAC CATHETERIZATION N/A 06/17/2024    Procedure: Percutaneous Coronary Intervention;  Surgeon: Myron Cox MD;  Location:  [facility];  Service: Cardiovascular;  Laterality: N/A;   • CARDIAC CATHETERIZATION  6/17/2024   • COLONOSCOPY     • CORONARY STENT  PLACEMENT     • COSMETIC SURGERY  1968    MULTIPLE FACIAL FRACTURE   • CYSTOSCOPY URETEROSCOPY STONE MANIPULATION/EXTRACTION Left 2016    Procedure: CYSTOSCOPY, LEFT URETEROSCOPY, BASKET EXTRACTION OF LEFT URETERAL STONE, LEFT STENT PLACEMENT;  Surgeon: Ish Gaitan MD;  Location:  [facility];  Service:    • ENDOSCOPY N/A 10/02/2019    Procedure: ESOPHAGOGASTRODUODENOSCOPY with biopsies and dilatation to 20mm;  Surgeon: Marilou Mojica MD;  Location:  [facility];  Service: Gastroenterology   • EXTRACORPOREAL SHOCK WAVE LITHOTRIPSY (ESWL) Right 2016    Procedure: RT EXTRACORPOREAL SHOCKWAVE LITHOTRIPSY;  Surgeon: sIh Gaitan MD;  Location: [facility];  Service:    • FEMUR SURGERY  1968   • FRACTURE SURGERY      femur fx   • INTERVENTIONAL RADIOLOGY PROCEDURE N/A 2024    Procedure: Intravascular Ultrasound;  Surgeon: Myron Cox MD;  Location:  [facility];  Service: Cardiovascular;  Laterality: N/A;   • OTHER SURGICAL HISTORY      stents, for kidney stones   • OTHER SURGICAL HISTORY      lithotripsy   • SKIN BIOPSY         Social History     Socioeconomic History   • Marital status:      Spouse name: Jessica   • Years of education: College   Tobacco Use   • Smoking status: Former     Current packs/day: 0.00     Average packs/day: 2.0 packs/day for 14.0 years (28.0 ttl pk-yrs)     Types: Cigarettes     Start date: 1958     Quit date:      Years since quittin.5     Passive exposure: Never   • Smokeless tobacco: Never   • Tobacco comments:     I have not smoked for 50+ years   Vaping Use   • Vaping status: Never Used   Substance and Sexual Activity   • Alcohol use: No   • Drug use: No   • Sexual activity: Not Currently     Partners: Female     Birth control/protection: Vasectomy       Family History   Problem Relation Age of Onset   • Heart disease Father            • Heart attack Father 50   • Skin cancer Brother 50   • Cancer Brother    •  Cancer Other         Kidney, throat, skin   • Colon polyps Neg Hx    • Colon cancer Neg Hx        Review of Systems   Musculoskeletal:  Positive for arthritis.   All other systems reviewed and are negative.      Allergies   Allergen Reactions   • Metformin GI Intolerance     Abnormal creatinine   • Penicillins Unknown - Low Severity     ALLERGY TESTING SHOWED ALL TO PCN.    • Cimetidine Rash         Current Outpatient Medications:   •  amLODIPine (NORVASC) 10 MG tablet, Take 1 tablet by mouth Daily., Disp: 90 tablet, Rfl: 3  •  APPLE CIDER VINEGAR PO, Take  by mouth., Disp: , Rfl:   •  Ascorbic Acid (VITAMIN C PO), Take 500 mg by mouth Daily., Disp: , Rfl:   •  BENFOTIAMINE PO, Take 300 mg by mouth Daily., Disp: , Rfl:   •  Blood Glucose Monitoring Suppl (ONETOUCH VERIO FLEX SYSTEM) w/Device kit, 1 Device 2 (Two) Times a Day., Disp: 1 kit, Rfl: 0  •  Cholecalciferol (D3 ADULT PO), Take 2,000 Units by mouth Daily., Disp: , Rfl:   •  clopidogrel (PLAVIX) 75 MG tablet, Take 1 tablet by mouth Daily., Disp: 90 tablet, Rfl: 3  •  Cyanocobalamin (B-12 PO), Take 1 tablet by mouth 1 (One) Time Per Week., Disp: , Rfl:   •  dapagliflozin Propanediol 10 MG tablet, Take 10 mg by mouth Daily., Disp: 30 tablet, Rfl: 11  •  ferrous gluconate (FERGON) 324 MG tablet, Take 1 tablet by mouth Daily With Breakfast., Disp: 90 tablet, Rfl: 1  •  glipizide (GLUCOTROL) 10 MG tablet, TAKE 1 TABLET BY MOUTH 2 TIMES A DAY BEFORE MEALS, Disp: 180 tablet, Rfl: 0  •  glucose blood ([employer] Glucose Test) test strip, Use to check blood sugar once a day, Disp: 100 each, Rfl: 12  •  hydrALAZINE (APRESOLINE) 50 MG tablet, TAKE 1 TABLET BY MOUTH 2 TIMES A DAY, Disp: 180 tablet, Rfl: 1  •  hydrOXYzine (ATARAX) 25 MG tablet, TAKE 1 TABLET BY MOUTH 3 TIMES A DAY AS NEEDED FOR ITCHING, Disp: 60 tablet, Rfl: 1  •  lisinopril (PRINIVIL,ZESTRIL) 5 MG tablet, Take 1 tablet by mouth Daily., Disp: , Rfl:   •  metoprolol tartrate (LOPRESSOR) 50 MG tablet,  "TAKE 1 TABLET BY MOUTH 2 TIMES A DAY, Disp: 180 tablet, Rfl: 0  •  multivitamin with minerals tablet tablet, Take 1 tablet by mouth Daily., Disp: , Rfl:   •  nitroglycerin (NITROSTAT) 0.4 MG SL tablet, 1 under the tongue as needed for angina, may repeat q5mins for up three doses, Disp: 45 tablet, Rfl: 11  •  pantoprazole (PROTONIX) 40 MG EC tablet, Take 1 tablet by mouth Daily., Disp: 90 tablet, Rfl: 0  •  Semaglutide (Rybelsus) 7 MG tablet, Take 7 mg by mouth Daily., Disp: 30 tablet, Rfl: 11  •  zinc sulfate (ZINCATE) 220 (50 Zn) MG capsule, Take 1 capsule by mouth Daily., Disp: , Rfl:   •  atorvastatin (LIPITOR) 80 MG tablet, Take 1 tablet by mouth Daily., Disp: 90 tablet, Rfl: 3      Objective:     Vitals:    07/15/25 1035   BP: 140/60   BP Location: Right arm   Patient Position: Lying   Cuff Size: Adult   Pulse: 78   Weight: 79.7 kg (175 lb 11.2 oz)   Height: 175.3 cm (69\")     Body mass index is 25.95 kg/m².    Vitals reviewed.   Constitutional:       Appearance: Well-developed and not in distress.   Eyes:      Conjunctiva/sclera: Conjunctivae normal.   HENT:      Head: Normocephalic.      Nose: Nose normal.   Neck:      Thyroid: Thyroid normal.      Vascular: No JVD. JVD normal.      Lymphadenopathy: No cervical adenopathy.   Pulmonary:      Effort: Pulmonary effort is normal.      Breath sounds: Normal breath sounds.   Cardiovascular:      Normal rate. Regular rhythm.      Murmurs: There is no murmur.   Pulses:     Intact distal pulses.   Edema:     Peripheral edema absent.   Abdominal:      Palpations: Abdomen is soft.      Tenderness: There is no abdominal tenderness.   Musculoskeletal: Normal range of motion.      Cervical back: Normal range of motion. Skin:     General: Skin is warm and dry.   Neurological:      General: No focal deficit present.      Mental Status: Alert and oriented to person, place, and time.      Cranial Nerves: No cranial nerve deficit.   Psychiatric:         Behavior: Behavior " normal.         Thought Content: Thought content normal.         Judgment: Judgment normal.           ECG 12 Lead    Date/Time: 7/15/2025 10:45 AM  Performed by: Royal Chanel MD    Authorized by: Royal Chanel MD  Comparison: compared with previous ECG   Similar to previous ECG  Rhythm: sinus rhythm  Conduction: conduction normal  Other findings: non-specific ST-T wave changes    Clinical impression: non-specific ECG            Assessment:       Diagnosis Plan   1. Coronary artery disease involving native coronary artery of native heart without angina pectoris  ECG 12 Lead      2. Mixed hyperlipidemia        3. Essential hypertension        4. Stage 3a chronic kidney disease        5. Nonrheumatic mitral valve regurgitation  Adult Transthoracic Echo Complete W/ Cont if Necessary Per Protocol             Plan:       1/2. He is s/p extensive BEBETO placement to the LAD and Cx/OM2 in June 2024. He has remained on DAPT. I'm going to switch him to clopidogrel as monotherapy given his stent burden. He is on pravastatin; I stopped this and ordered atorvastatin 80mg.    3/4. His BP is relatively well controlled. He has stage 3A/3B CKD. He has known vascular disease, but a renal artery duplex was normal in 2024. He follows with Dr Mayers.    5. He had moderate MR on echo in 2024 and 2025; this should be rechecked in year.     Sincerely,       Royal Chanel MD

## 2025-07-29 ENCOUNTER — OFFICE VISIT (OUTPATIENT)
Dept: FAMILY MEDICINE CLINIC | Facility: CLINIC | Age: 75
End: 2025-07-29
Payer: MEDICARE

## 2025-07-29 VITALS
DIASTOLIC BLOOD PRESSURE: 64 MMHG | HEIGHT: 69 IN | SYSTOLIC BLOOD PRESSURE: 152 MMHG | HEART RATE: 70 BPM | OXYGEN SATURATION: 98 % | BODY MASS INDEX: 25.77 KG/M2 | WEIGHT: 174 LBS | TEMPERATURE: 97.6 F

## 2025-07-29 DIAGNOSIS — E78.2 MIXED HYPERLIPIDEMIA: ICD-10-CM

## 2025-07-29 DIAGNOSIS — L30.8 PRURITIC DERMATITIS: ICD-10-CM

## 2025-07-29 DIAGNOSIS — Z00.00 MEDICARE ANNUAL WELLNESS VISIT, SUBSEQUENT: Primary | ICD-10-CM

## 2025-07-29 DIAGNOSIS — I25.10 CORONARY ARTERY DISEASE INVOLVING NATIVE CORONARY ARTERY OF NATIVE HEART WITHOUT ANGINA PECTORIS: ICD-10-CM

## 2025-07-29 DIAGNOSIS — E11.65 TYPE 2 DIABETES MELLITUS WITH HYPERGLYCEMIA, WITHOUT LONG-TERM CURRENT USE OF INSULIN: ICD-10-CM

## 2025-07-29 DIAGNOSIS — N18.31 STAGE 3A CHRONIC KIDNEY DISEASE: ICD-10-CM

## 2025-07-29 DIAGNOSIS — I10 ESSENTIAL HYPERTENSION: ICD-10-CM

## 2025-07-29 RX ORDER — HYDROXYZINE HYDROCHLORIDE 25 MG/1
25 TABLET, FILM COATED ORAL 2 TIMES DAILY
Qty: 60 TABLET | Refills: 1 | Status: SHIPPED | OUTPATIENT
Start: 2025-07-29

## 2025-07-29 NOTE — ASSESSMENT & PLAN NOTE
{Coronary Artery Disease (OPTIONAL):79292}  Stable no acute chest pain at this time.  Continue follow-up with cardiology.

## 2025-07-29 NOTE — ASSESSMENT & PLAN NOTE
{Hypertension is (optional):194948}  Well-controlled.  Continue current medications.  Orders:    Comprehensive Metabolic Panel; Future    Hemoglobin A1c; Future    Microalbumin / Creatinine Urine Ratio - Urine, Clean Catch; Future    TSH Rfx On Abnormal To Free T4; Future    Lipid Panel; Future    CBC & Differential; Future

## 2025-07-29 NOTE — ASSESSMENT & PLAN NOTE
Currently on dapagliflozin 10 mg daily, Rybelsus 7 mg daily.    Orders:    Comprehensive Metabolic Panel; Future    Hemoglobin A1c; Future    Microalbumin / Creatinine Urine Ratio - Urine, Clean Catch; Future    TSH Rfx On Abnormal To Free T4; Future    Lipid Panel; Future    CBC & Differential; Future

## 2025-08-14 RX ORDER — GLIPIZIDE 10 MG/1
10 TABLET ORAL
Qty: 180 TABLET | Refills: 0 | Status: SHIPPED | OUTPATIENT
Start: 2025-08-14

## 2025-08-26 RX ORDER — NITROGLYCERIN 0.4 MG/1
TABLET SUBLINGUAL
Qty: 45 TABLET | Refills: 11 | Status: SHIPPED | OUTPATIENT
Start: 2025-08-26

## 2025-08-28 DIAGNOSIS — I10 ESSENTIAL HYPERTENSION: ICD-10-CM

## 2025-08-28 RX ORDER — METOPROLOL TARTRATE 50 MG
50 TABLET ORAL 2 TIMES DAILY
Qty: 180 TABLET | Refills: 0 | Status: SHIPPED | OUTPATIENT
Start: 2025-08-28

## (undated) DEVICE — MASK,FACE,FLUID RES,SHLD,FOGFREE,TIES: Brand: MEDLINE

## (undated) DEVICE — PK CATH CARD 40

## (undated) DEVICE — CORONARY IMAGING CATHETER: Brand: OPTICROSS™ 6 HD

## (undated) DEVICE — BALN PTCA TAKERU RX NC 3X15MM

## (undated) DEVICE — Device

## (undated) DEVICE — NC TREK CORONARY DILATATION CATHETER 3.5 MM X 15 MM / RAPID-EXCHANGE: Brand: NC TREK

## (undated) DEVICE — PRE-CONNECTED EXCHANGEABLE BURR CATHETER AND BURR ADVANCING DEVICE: Brand: ROTAPRO™

## (undated) DEVICE — JACKT LAB KNIT COLR LG BLU

## (undated) DEVICE — LUBE ROTAGLIDE BURRCATH SYS

## (undated) DEVICE — Device: Brand: ASAHI SION BLUE

## (undated) DEVICE — SUCTION CANISTER, 3000CC,SAFELINER: Brand: DEROYAL

## (undated) DEVICE — GOWN ISOL W/THUMB UNIV BLU BX/15

## (undated) DEVICE — ESOPHAGEAL BALLOON DILATATION CATHETER: Brand: CRE FIXED WIRE

## (undated) DEVICE — MINI TREK™ II CORONARY DILATATION CATHETER 2.00 MM X 20 MM / OVER-THE-WIRE: Brand: MINI TREK™

## (undated) DEVICE — Device: Brand: DEFENDO AIR/WATER/SUCTION AND BIOPSY VALVE

## (undated) DEVICE — DEV INFL ALLIANCE2 SYS

## (undated) DEVICE — GLIDESHEATH SLENDER STAINLESS STEEL KIT: Brand: GLIDESHEATH SLENDER

## (undated) DEVICE — CATH GUIDE LAUNCHER EBU3.5 6F 100CM

## (undated) DEVICE — VIAL FORMALIN CAP 10P 40ML

## (undated) DEVICE — BW-412T DISP COMBO CLEANING BRUSH: Brand: SINGLE USE COMBINATION CLEANING BRUSH

## (undated) DEVICE — CATH DIAG DXTERITY ULTRA TRANSRADIAL 4.0 5F 100CM 0/SH

## (undated) DEVICE — SPNG GZ WOVN 4X4IN 12PLY 10/BX STRL

## (undated) DEVICE — DGW .035 FC J3MM 260CM TEF: Brand: EMERALD

## (undated) DEVICE — GW ATHRCTMY ROTAWIRE DRV FLOP W/WIRECLIP/TORQ FLX 330CM

## (undated) DEVICE — DEV INDEFLATOR P/N 580289

## (undated) DEVICE — GLV SURG SENSICARE MICRO PF LF 6 STRL

## (undated) DEVICE — CATH GUIDE ZUMA2 EBU 7F 3.5X100CM

## (undated) DEVICE — FRCP BX RADJAW4 NDL 2.8 240CM LG OG BX40

## (undated) DEVICE — NC TREK CORONARY DILATATION CATHETER 3.0 MM X 25 MM / RAPID-EXCHANGE: Brand: NC TREK

## (undated) DEVICE — KT MANIFLD CARDIAC

## (undated) DEVICE — SYR LL 3CC

## (undated) DEVICE — NC TREK CORONARY DILATATION CATHETER 2.5 MM X 20 MM / RAPID-EXCHANGE: Brand: NC TREK

## (undated) DEVICE — GW PRESSUREWIRE X WIRELESS FFR 175CM

## (undated) DEVICE — THE BITE BLOCK MAXI, LATEX FREE STRAP IS USED TO PROTECT THE ENDOSCOPE INSERTION TUBE FROM BEING BITTEN BY THE PATIENT.